# Patient Record
Sex: FEMALE | Race: WHITE | Employment: OTHER | URBAN - METROPOLITAN AREA
[De-identification: names, ages, dates, MRNs, and addresses within clinical notes are randomized per-mention and may not be internally consistent; named-entity substitution may affect disease eponyms.]

---

## 2019-07-30 ENCOUNTER — OFFICE VISIT (OUTPATIENT)
Dept: INTERNAL MEDICINE CLINIC | Age: 74
End: 2019-07-30
Payer: COMMERCIAL

## 2019-07-30 ENCOUNTER — TELEPHONE (OUTPATIENT)
Dept: INTERNAL MEDICINE CLINIC | Age: 74
End: 2019-07-30

## 2019-07-30 VITALS
HEIGHT: 64 IN | HEART RATE: 72 BPM | OXYGEN SATURATION: 99 % | SYSTOLIC BLOOD PRESSURE: 70 MMHG | DIASTOLIC BLOOD PRESSURE: 50 MMHG | WEIGHT: 119.8 LBS | BODY MASS INDEX: 20.45 KG/M2

## 2019-07-30 DIAGNOSIS — N64.4 BREAST TENDERNESS: ICD-10-CM

## 2019-07-30 DIAGNOSIS — I25.83 CORONARY ARTERY DISEASE DUE TO LIPID RICH PLAQUE: ICD-10-CM

## 2019-07-30 DIAGNOSIS — E03.9 ACQUIRED HYPOTHYROIDISM: ICD-10-CM

## 2019-07-30 DIAGNOSIS — R63.0 DECREASED APPETITE: ICD-10-CM

## 2019-07-30 DIAGNOSIS — M89.8X9 RADIOLUCENT LESION OF BONE: ICD-10-CM

## 2019-07-30 DIAGNOSIS — E55.9 VITAMIN D DEFICIENCY: ICD-10-CM

## 2019-07-30 DIAGNOSIS — I25.10 CORONARY ARTERY DISEASE DUE TO LIPID RICH PLAQUE: ICD-10-CM

## 2019-07-30 DIAGNOSIS — M54.5 CHRONIC LOW BACK PAIN, UNSPECIFIED BACK PAIN LATERALITY, WITH SCIATICA PRESENCE UNSPECIFIED: ICD-10-CM

## 2019-07-30 DIAGNOSIS — R79.89 ELEVATED FERRITIN: ICD-10-CM

## 2019-07-30 DIAGNOSIS — E78.5 HYPERLIPIDEMIA LDL GOAL <70: ICD-10-CM

## 2019-07-30 DIAGNOSIS — Z86.718 HISTORY OF DVT (DEEP VEIN THROMBOSIS): ICD-10-CM

## 2019-07-30 DIAGNOSIS — Z86.73 HISTORY OF STROKE: ICD-10-CM

## 2019-07-30 DIAGNOSIS — I48.0 PAROXYSMAL ATRIAL FIBRILLATION (HCC): ICD-10-CM

## 2019-07-30 DIAGNOSIS — F41.9 ANXIETY: ICD-10-CM

## 2019-07-30 DIAGNOSIS — R43.8 BAD TASTE IN MOUTH: ICD-10-CM

## 2019-07-30 DIAGNOSIS — K21.9 GASTROESOPHAGEAL REFLUX DISEASE WITHOUT ESOPHAGITIS: ICD-10-CM

## 2019-07-30 DIAGNOSIS — Z72.0 TOBACCO USE: ICD-10-CM

## 2019-07-30 DIAGNOSIS — F51.04 CHRONIC INSOMNIA: ICD-10-CM

## 2019-07-30 DIAGNOSIS — G89.29 CHRONIC LOW BACK PAIN, UNSPECIFIED BACK PAIN LATERALITY, WITH SCIATICA PRESENCE UNSPECIFIED: ICD-10-CM

## 2019-07-30 DIAGNOSIS — M32.9 LUPUS (HCC): ICD-10-CM

## 2019-07-30 DIAGNOSIS — R91.1 PULMONARY NODULE: ICD-10-CM

## 2019-07-30 DIAGNOSIS — E55.9 VITAMIN D DEFICIENCY: Primary | ICD-10-CM

## 2019-07-30 PROBLEM — M54.50 CHRONIC LOW BACK PAIN: Status: ACTIVE | Noted: 2019-07-30

## 2019-07-30 LAB
A/G RATIO: 1.1 (ref 1.1–2.2)
ALBUMIN SERPL-MCNC: 4.4 G/DL (ref 3.4–5)
ALP BLD-CCNC: 92 U/L (ref 40–129)
ALT SERPL-CCNC: 9 U/L (ref 10–40)
ANION GAP SERPL CALCULATED.3IONS-SCNC: 19 MMOL/L (ref 3–16)
AST SERPL-CCNC: 19 U/L (ref 15–37)
BASOPHILS ABSOLUTE: 0 K/UL (ref 0–0.2)
BASOPHILS RELATIVE PERCENT: 0.4 %
BILIRUB SERPL-MCNC: <0.2 MG/DL (ref 0–1)
BUN BLDV-MCNC: 30 MG/DL (ref 7–20)
C-REACTIVE PROTEIN: 1.9 MG/L (ref 0–5.1)
CALCIUM SERPL-MCNC: 9.9 MG/DL (ref 8.3–10.6)
CHLORIDE BLD-SCNC: 91 MMOL/L (ref 99–110)
CHOLESTEROL, TOTAL: 111 MG/DL (ref 0–199)
CO2: 18 MMOL/L (ref 21–32)
CREAT SERPL-MCNC: 1.2 MG/DL (ref 0.6–1.2)
EOSINOPHILS ABSOLUTE: 0 K/UL (ref 0–0.6)
EOSINOPHILS RELATIVE PERCENT: 0.3 %
FERRITIN: 399.2 NG/ML (ref 15–150)
FOLATE: >20 NG/ML (ref 4.78–24.2)
GFR AFRICAN AMERICAN: 53
GFR NON-AFRICAN AMERICAN: 44
GLOBULIN: 3.9 G/DL
GLUCOSE BLD-MCNC: 110 MG/DL (ref 70–99)
HCT VFR BLD CALC: 27.7 % (ref 36–48)
HDLC SERPL-MCNC: 51 MG/DL (ref 40–60)
HEMOGLOBIN: 9.7 G/DL (ref 12–16)
IRON SATURATION: 14 % (ref 15–50)
IRON: 41 UG/DL (ref 37–145)
LDL CHOLESTEROL CALCULATED: 49 MG/DL
LYMPHOCYTES ABSOLUTE: 0.7 K/UL (ref 1–5.1)
LYMPHOCYTES RELATIVE PERCENT: 16 %
MAGNESIUM: 2.2 MG/DL (ref 1.8–2.4)
MCH RBC QN AUTO: 34.8 PG (ref 26–34)
MCHC RBC AUTO-ENTMCNC: 34.9 G/DL (ref 31–36)
MCV RBC AUTO: 99.7 FL (ref 80–100)
MONOCYTES ABSOLUTE: 0.6 K/UL (ref 0–1.3)
MONOCYTES RELATIVE PERCENT: 14 %
NEUTROPHILS ABSOLUTE: 2.9 K/UL (ref 1.7–7.7)
NEUTROPHILS RELATIVE PERCENT: 69.3 %
PDW BLD-RTO: 13.1 % (ref 12.4–15.4)
PLATELET # BLD: 204 K/UL (ref 135–450)
PMV BLD AUTO: 7 FL (ref 5–10.5)
POTASSIUM SERPL-SCNC: 5.2 MMOL/L (ref 3.5–5.1)
RBC # BLD: 2.78 M/UL (ref 4–5.2)
SEDIMENTATION RATE, ERYTHROCYTE: 64 MM/HR (ref 0–30)
SODIUM BLD-SCNC: 128 MMOL/L (ref 136–145)
TOTAL IRON BINDING CAPACITY: 286 UG/DL (ref 260–445)
TOTAL PROTEIN: 8.3 G/DL (ref 6.4–8.2)
TRIGL SERPL-MCNC: 56 MG/DL (ref 0–150)
TSH REFLEX: 0.5 UIU/ML (ref 0.27–4.2)
VITAMIN B-12: 521 PG/ML (ref 211–911)
VITAMIN D 25-HYDROXY: 89.7 NG/ML
VLDLC SERPL CALC-MCNC: 11 MG/DL
WBC # BLD: 4.2 K/UL (ref 4–11)

## 2019-07-30 PROCEDURE — 99205 OFFICE O/P NEW HI 60 MIN: CPT | Performed by: INTERNAL MEDICINE

## 2019-07-30 RX ORDER — MIRTAZAPINE 7.5 MG/1
7.5 TABLET, FILM COATED ORAL NIGHTLY
Qty: 30 TABLET | Refills: 1 | Status: ON HOLD | OUTPATIENT
Start: 2019-07-30 | End: 2019-08-17 | Stop reason: HOSPADM

## 2019-07-30 RX ORDER — MELOXICAM 7.5 MG/1
7.5 TABLET ORAL DAILY
COMMUNITY
End: 2019-08-07 | Stop reason: ALTCHOICE

## 2019-07-30 RX ORDER — POTASSIUM CHLORIDE 750 MG/1
10 CAPSULE, EXTENDED RELEASE ORAL 2 TIMES DAILY
COMMUNITY
End: 2019-08-01 | Stop reason: ALTCHOICE

## 2019-07-30 RX ORDER — ATORVASTATIN CALCIUM 40 MG/1
40 TABLET, FILM COATED ORAL DAILY
COMMUNITY
End: 2019-12-06 | Stop reason: SDUPTHER

## 2019-07-30 RX ORDER — NITROGLYCERIN 0.4 MG/1
0.4 TABLET SUBLINGUAL EVERY 5 MIN PRN
Status: ON HOLD | COMMUNITY
End: 2022-01-26 | Stop reason: HOSPADM

## 2019-07-30 RX ORDER — ESOMEPRAZOLE MAGNESIUM 40 MG/1
40 FOR SUSPENSION ORAL DAILY
COMMUNITY
End: 2019-07-30 | Stop reason: CLARIF

## 2019-07-30 RX ORDER — CARVEDILOL 3.12 MG/1
3.12 TABLET ORAL 2 TIMES DAILY
Qty: 60 TABLET | Refills: 5 | Status: SHIPPED | OUTPATIENT
Start: 2019-07-30 | End: 2020-03-31 | Stop reason: SDUPTHER

## 2019-07-30 RX ORDER — AMOXICILLIN AND CLAVULANATE POTASSIUM 875; 125 MG/1; MG/1
1 TABLET, FILM COATED ORAL 2 TIMES DAILY
Qty: 28 TABLET | Refills: 0 | Status: ON HOLD | OUTPATIENT
Start: 2019-07-30 | End: 2019-08-17 | Stop reason: HOSPADM

## 2019-07-30 RX ORDER — AMITRIPTYLINE HYDROCHLORIDE 25 MG/1
25 TABLET, FILM COATED ORAL NIGHTLY
Qty: 30 TABLET | Refills: 0 | Status: SHIPPED | OUTPATIENT
Start: 2019-07-30

## 2019-07-30 RX ORDER — CARVEDILOL 3.12 MG/1
3.12 TABLET ORAL DAILY
Qty: 30 TABLET | Refills: 5 | Status: SHIPPED | OUTPATIENT
Start: 2019-07-30 | End: 2019-07-30 | Stop reason: SDUPTHER

## 2019-07-30 RX ORDER — AMITRIPTYLINE HYDROCHLORIDE 50 MG/1
50 TABLET, FILM COATED ORAL NIGHTLY
COMMUNITY
End: 2019-07-30 | Stop reason: SDUPTHER

## 2019-07-30 RX ORDER — ESOMEPRAZOLE MAGNESIUM 40 MG/1
40 CAPSULE, DELAYED RELEASE ORAL
Qty: 90 CAPSULE | Refills: 1 | Status: SHIPPED | OUTPATIENT
Start: 2019-07-30

## 2019-07-30 ASSESSMENT — PATIENT HEALTH QUESTIONNAIRE - PHQ9
2. FEELING DOWN, DEPRESSED OR HOPELESS: 0
SUM OF ALL RESPONSES TO PHQ QUESTIONS 1-9: 1
SUM OF ALL RESPONSES TO PHQ9 QUESTIONS 1 & 2: 1
SUM OF ALL RESPONSES TO PHQ QUESTIONS 1-9: 1
1. LITTLE INTEREST OR PLEASURE IN DOING THINGS: 1

## 2019-07-30 NOTE — PROGRESS NOTES
on file     Family History   Problem Relation Age of Onset    Cancer Father         Brain Cancer     Breast Cancer Sister     High Blood Pressure Sister         Outpatient Medications Prior to Visit   Medication Sig Dispense Refill    atorvastatin (LIPITOR) 40 MG tablet Take 40 mg by mouth daily      meloxicam (MOBIC) 7.5 MG tablet Take 7.5 mg by mouth daily      rivaroxaban (XARELTO) 10 MG TABS tablet Take 10 mg by mouth      Cholecalciferol (VITAMIN D3) 5000 units TABS Take by mouth      potassium chloride (MICRO-K) 10 MEQ extended release capsule Take 10 mEq by mouth 2 times daily      aspirin 81 MG EC tablet Take 81 mg by mouth daily.  levothyroxine (SYNTHROID) 150 MCG tablet Take 125 mcg by mouth daily       nitroGLYCERIN (NITROSTAT) 0.4 MG SL tablet Place 0.4 mg under the tongue every 5 minutes as needed for Chest pain up to max of 3 total doses. If no relief after 1 dose, call 911.  amitriptyline (ELAVIL) 50 MG tablet Take 50 mg by mouth nightly      esomeprazole Magnesium (NEXIUM) 40 MG PACK Take 40 mg by mouth daily      metoprolol tartrate (LOPRESSOR) 25 MG tablet Take 25 mg by mouth 2 times daily      fentanyl (DURAGESIC) 25 MCG/HR Place 1 patch onto the skin every 72 hours. (Patient not taking: Reported on 7/30/2019) 10 patch 0    oxycodone-acetaminophen (PERCOCET) 7.5-325 MG per tablet Take 1 tablet by mouth every 6 hours as needed for Pain. (Patient not taking: Reported on 7/30/2019) 105 tablet 0    quetiapine (SEROQUEL) 25 MG tablet Take 1-2 tablets by mouth nightly. (Patient not taking: Reported on 7/30/2019) 60 tablet 2    pregabalin (LYRICA) 100 MG capsule 1 in AM, 2 in PM (Patient not taking: Reported on 7/30/2019) 90 capsule 2    lubiprostone (AMITIZA) 24 MCG capsule Take 1 capsule by mouth 2 times daily (with meals). (Patient not taking: Reported on 7/30/2019) 60 capsule 3    hydroxychloroquine (PLAQUENIL) 200 MG tablet Take 200 mg by mouth daily.       triamcinolone smoking history- currently smokes 1 ppd. Due for repeat CT chest in March 2020. Radiolucent lesion of bone     Has been seeing heme/onc in [place]. \"Bone uptake on bone scan: No evidence of cancer per CT scan and monoclonal protein was not seen on serum protein electrophoresis. Patient did have a slight increase ratio of free light chain analysis. We will get a follow-up free light chain analysis in September 2019\" per Dr. Dara Mcgee (heme/onc in [place])            Relevant Orders    ALFREDO - Casey Jimenez MD, Oncology, [facility]    Tobacco use     Smokes 1 ppd. Started smoking in 1960. No interest in quitting. Other Visit Diagnoses     Vitamin D deficiency    -  Primary    Relevant Orders    Vitamin D 25 Hydroxy (Completed)    PEPPER Reflex to Antibody Cascade (Completed)    C-REACTIVE PROTEIN (Completed)    SEDIMENTATION RATE (Completed)          Current Outpatient Medications   Medication Sig Dispense Refill    atorvastatin (LIPITOR) 40 MG tablet Take 40 mg by mouth daily      meloxicam (MOBIC) 7.5 MG tablet Take 7.5 mg by mouth daily      rivaroxaban (XARELTO) 10 MG TABS tablet Take 10 mg by mouth      Cholecalciferol (VITAMIN D3) 5000 units TABS Take by mouth      mirtazapine (REMERON) 7.5 MG tablet Take 1 tablet by mouth nightly 30 tablet 1    amitriptyline (ELAVIL) 25 MG tablet Take 1 tablet by mouth nightly 30 tablet 0    esomeprazole (NEXIUM) 40 MG delayed release capsule Take 1 capsule by mouth every morning (before breakfast) 90 capsule 1    amoxicillin-clavulanate (AUGMENTIN) 875-125 MG per tablet Take 1 tablet by mouth 2 times daily for 14 days 28 tablet 0    carvedilol (COREG) 3.125 MG tablet Take 1 tablet by mouth 2 times daily 60 tablet 5    aspirin 81 MG EC tablet Take 81 mg by mouth daily.       levothyroxine (SYNTHROID) 125 MCG tablet Take 1 tablet by mouth daily 30 tablet 5    nitroGLYCERIN (NITROSTAT) 0.4 MG SL tablet Place 0.4 mg under the tongue every 5 minutes as needed for Chest pain up to max of 3 total doses. If no relief after 1 dose, call 911.  fish oil-omega-3 fatty acids (FISH OIL) 1000 MG capsule Take 1 capsule by mouth daily. No current facility-administered medications for this visit. Return in about 4 weeks (around 8/27/2019) for extended.

## 2019-07-31 DIAGNOSIS — M32.9 LUPUS (HCC): ICD-10-CM

## 2019-07-31 DIAGNOSIS — R79.89 ELEVATED SERUM CREATININE: ICD-10-CM

## 2019-07-31 DIAGNOSIS — E87.1 HYPONATREMIA: Primary | ICD-10-CM

## 2019-07-31 LAB
ANTI-CENTROMERE B IGG: <0.2 AI (ref 0–0.9)
ANTI-CHROMATIN IGG: <0.2 AI (ref 0–0.9)
ANTI-DSDNA IGG: <1 IU/ML (ref 0–9)
ANTI-JO1 IGG: <0.2 AI (ref 0–0.9)
ANTI-RIBOSOMAL P IGG: <0.2 AI (ref 0–0.9)
ANTI-RNP IGG: 1 AI (ref 0–0.9)
ANTI-SCL70 IGG: <0.2 AI (ref 0–0.9)
ANTI-SMITH IGG: 0.3 AI (ref 0–0.9)
ANTI-SMRNP IGG: <0.2 AI (ref 0–0.9)
ANTI-SS-A IGG: >8 AI (ref 0–0.9)
ANTI-SS-B IGG: >8 AI (ref 0–0.9)

## 2019-08-01 RX ORDER — LEVOTHYROXINE SODIUM 0.12 MG/1
125 TABLET ORAL DAILY
Qty: 30 TABLET | Refills: 5 | Status: SHIPPED | OUTPATIENT
Start: 2019-08-01 | End: 2019-12-06 | Stop reason: SDUPTHER

## 2019-08-02 LAB
ANA INTERPRETATION: ABNORMAL
ANA PATTERN: ABNORMAL
ANA TITER: ABNORMAL
ANTI-NUCLEAR ANTIBODY (ANA): POSITIVE
PATHOLOGIST: ABNORMAL

## 2019-08-06 ASSESSMENT — ENCOUNTER SYMPTOMS
SINUS PRESSURE: 0
DIARRHEA: 0
BACK PAIN: 1
SORE THROAT: 0
CHEST TIGHTNESS: 0
SINUS PAIN: 0
CONSTIPATION: 0
SHORTNESS OF BREATH: 0

## 2019-08-06 NOTE — ASSESSMENT & PLAN NOTE
s/p PTCA and stent. No MI. Change metoprolol to Coreg 3.125 mg BID d/t low blood pressures w/metoprolol, continue  ASA 81 mg qd, Lipitor 40 mg qd, and NTG SL prn. Refer to cardiology.

## 2019-08-06 NOTE — ASSESSMENT & PLAN NOTE
Per review of records. Previously saw pain management and was on oxycodone. Not currently on medication.

## 2019-08-06 NOTE — ASSESSMENT & PLAN NOTE
Has not seen rheumatology. Was told that it Amy Baldemar affects my skin\". Had elevated ferritin on recent labs in [place]. ? Acute phase reactant. Check labs. Consider referral to rheumatology.
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
## 2019-08-06 NOTE — ASSESSMENT & PLAN NOTE
Has been seeing heme/onc in [place]. \"Bone uptake on bone scan: No evidence of cancer per CT scan and monoclonal protein was not seen on serum protein electrophoresis. Patient did have a slight increase ratio of free light chain analysis.  We will get a follow-up free light chain analysis in September 2019\" per Dr. Perri Greer (heme/onc in [place])

## 2019-08-07 ENCOUNTER — TELEPHONE (OUTPATIENT)
Dept: INTERNAL MEDICINE CLINIC | Age: 74
End: 2019-08-07

## 2019-08-07 NOTE — TELEPHONE ENCOUNTER
[facility] calling Pt is taking meds that have interactions She reported to her prev PCP and was advised to continue meds Now that she has new PCP she wanted to let you be aware Major drug interactions with  ASA interferes xarelto as well as ASA with mobic and Xarelto    Caller also wants to let you know she will be following pt 1 x week for 9 weeks for med safety and ,education Pt had falls on Monday no injuries

## 2019-08-12 ENCOUNTER — HOSPITAL ENCOUNTER (INPATIENT)
Age: 74
LOS: 5 days | Discharge: HOME HEALTH | DRG: 562 | End: 2019-08-17
Attending: EMERGENCY MEDICINE | Admitting: INTERNAL MEDICINE
Payer: COMMERCIAL

## 2019-08-12 ENCOUNTER — APPOINTMENT (OUTPATIENT)
Dept: GENERAL RADIOLOGY | Age: 74
DRG: 562 | End: 2019-08-12
Payer: COMMERCIAL

## 2019-08-12 ENCOUNTER — APPOINTMENT (OUTPATIENT)
Dept: CT IMAGING | Age: 74
DRG: 562 | End: 2019-08-12
Payer: COMMERCIAL

## 2019-08-12 DIAGNOSIS — S42.292A HUMERAL HEAD FRACTURE, LEFT, CLOSED, INITIAL ENCOUNTER: Primary | ICD-10-CM

## 2019-08-12 DIAGNOSIS — R53.1 GENERAL WEAKNESS: ICD-10-CM

## 2019-08-12 DIAGNOSIS — E87.1 HYPONATREMIA: ICD-10-CM

## 2019-08-12 DIAGNOSIS — F40.9 PHOBIA, UNSPECIFIED TYPE: ICD-10-CM

## 2019-08-12 DIAGNOSIS — R52 INTRACTABLE PAIN: ICD-10-CM

## 2019-08-12 PROBLEM — S72.002A HIP FRACTURE REQUIRING OPERATIVE REPAIR, LEFT, CLOSED, INITIAL ENCOUNTER (HCC): Status: RESOLVED | Noted: 2019-08-12 | Resolved: 2019-08-12

## 2019-08-12 PROBLEM — S72.002A HIP FRACTURE REQUIRING OPERATIVE REPAIR, LEFT, CLOSED, INITIAL ENCOUNTER (HCC): Status: ACTIVE | Noted: 2019-08-12
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
## 2019-08-12 PROBLEM — S42.202A CLOSED FRACTURE OF UPPER END OF LEFT HUMERUS: Status: ACTIVE | Noted: 2019-08-12

## 2019-08-12 LAB
A/G RATIO: 0.7 (ref 1.1–2.2)
ALBUMIN SERPL-MCNC: 3.7 G/DL (ref 3.4–5)
ALP BLD-CCNC: 68 U/L (ref 40–129)
ALT SERPL-CCNC: 10 U/L (ref 10–40)
ANION GAP SERPL CALCULATED.3IONS-SCNC: 11 MMOL/L (ref 3–16)
ANION GAP SERPL CALCULATED.3IONS-SCNC: 11 MMOL/L (ref 3–16)
ANION GAP SERPL CALCULATED.3IONS-SCNC: 8 MMOL/L (ref 3–16)
AST SERPL-CCNC: 21 U/L (ref 15–37)
BASOPHILS ABSOLUTE: 0 K/UL (ref 0–0.2)
BASOPHILS RELATIVE PERCENT: 0.3 %
BILIRUB SERPL-MCNC: 0.4 MG/DL (ref 0–1)
BILIRUBIN URINE: NEGATIVE
BLOOD, URINE: NEGATIVE
BUN BLDV-MCNC: 19 MG/DL (ref 7–20)
BUN BLDV-MCNC: 20 MG/DL (ref 7–20)
BUN BLDV-MCNC: 21 MG/DL (ref 7–20)
CALCIUM SERPL-MCNC: 8.3 MG/DL (ref 8.3–10.6)
CALCIUM SERPL-MCNC: 8.4 MG/DL (ref 8.3–10.6)
CALCIUM SERPL-MCNC: 8.9 MG/DL (ref 8.3–10.6)
CHLORIDE BLD-SCNC: 87 MMOL/L (ref 99–110)
CHLORIDE BLD-SCNC: 91 MMOL/L (ref 99–110)
CHLORIDE BLD-SCNC: 96 MMOL/L (ref 99–110)
CLARITY: CLEAR
CO2: 22 MMOL/L (ref 21–32)
CO2: 22 MMOL/L (ref 21–32)
CO2: 23 MMOL/L (ref 21–32)
COLOR: YELLOW
CREAT SERPL-MCNC: 0.9 MG/DL (ref 0.6–1.2)
EKG ATRIAL RATE: 82 BPM
EKG DIAGNOSIS: NORMAL
EKG P AXIS: 55 DEGREES
EKG P-R INTERVAL: 176 MS
EKG Q-T INTERVAL: 376 MS
EKG QRS DURATION: 86 MS
EKG QTC CALCULATION (BAZETT): 439 MS
EKG R AXIS: -16 DEGREES
EKG T AXIS: 55 DEGREES
EKG VENTRICULAR RATE: 82 BPM
EOSINOPHILS ABSOLUTE: 0 K/UL (ref 0–0.6)
EOSINOPHILS RELATIVE PERCENT: 0.1 %
GFR AFRICAN AMERICAN: >60
GFR NON-AFRICAN AMERICAN: >60
GLOBULIN: 5 G/DL
GLUCOSE BLD-MCNC: 108 MG/DL (ref 70–99)
GLUCOSE BLD-MCNC: 111 MG/DL (ref 70–99)
GLUCOSE BLD-MCNC: 90 MG/DL (ref 70–99)
GLUCOSE URINE: NEGATIVE MG/DL
HCT VFR BLD CALC: 23.7 % (ref 36–48)
HEMOGLOBIN: 8.2 G/DL (ref 12–16)
INR BLD: 1.04 (ref 0.86–1.14)
KETONES, URINE: NEGATIVE MG/DL
LEUKOCYTE ESTERASE, URINE: NEGATIVE
LYMPHOCYTES ABSOLUTE: 1.1 K/UL (ref 1–5.1)
LYMPHOCYTES RELATIVE PERCENT: 19.2 %
MCH RBC QN AUTO: 33.2 PG (ref 26–34)
MCHC RBC AUTO-ENTMCNC: 34.7 G/DL (ref 31–36)
MCV RBC AUTO: 95.8 FL (ref 80–100)
MICROSCOPIC EXAMINATION: NORMAL
MONOCYTES ABSOLUTE: 1.1 K/UL (ref 0–1.3)
MONOCYTES RELATIVE PERCENT: 18.8 %
NEUTROPHILS ABSOLUTE: 3.5 K/UL (ref 1.7–7.7)
NEUTROPHILS RELATIVE PERCENT: 61.6 %
NITRITE, URINE: NEGATIVE
PDW BLD-RTO: 13.5 % (ref 12.4–15.4)
PH UA: 7 (ref 5–8)
PLATELET # BLD: 171 K/UL (ref 135–450)
PMV BLD AUTO: 6.1 FL (ref 5–10.5)
POTASSIUM REFLEX MAGNESIUM: 3.7 MMOL/L (ref 3.5–5.1)
POTASSIUM REFLEX MAGNESIUM: 4.5 MMOL/L (ref 3.5–5.1)
POTASSIUM REFLEX MAGNESIUM: 5.2 MMOL/L (ref 3.5–5.1)
PROTEIN UA: NEGATIVE MG/DL
PROTHROMBIN TIME: 11.8 SEC (ref 9.8–13)
RBC # BLD: 2.48 M/UL (ref 4–5.2)
SODIUM BLD-SCNC: 121 MMOL/L (ref 136–145)
SODIUM BLD-SCNC: 124 MMOL/L (ref 136–145)
SODIUM BLD-SCNC: 126 MMOL/L (ref 136–145)
SPECIFIC GRAVITY UA: 1.03 (ref 1–1.03)
TOTAL PROTEIN: 8.7 G/DL (ref 6.4–8.2)
URINE REFLEX TO CULTURE: NORMAL
URINE TYPE: NORMAL
UROBILINOGEN, URINE: 0.2 E.U./DL
WBC # BLD: 5.6 K/UL (ref 4–11)

## 2019-08-12 PROCEDURE — 99221 1ST HOSP IP/OBS SF/LOW 40: CPT | Performed by: NURSE PRACTITIONER

## 2019-08-12 PROCEDURE — 2500000003 HC RX 250 WO HCPCS: Performed by: EMERGENCY MEDICINE

## 2019-08-12 PROCEDURE — 6360000002 HC RX W HCPCS

## 2019-08-12 PROCEDURE — 96374 THER/PROPH/DIAG INJ IV PUSH: CPT

## 2019-08-12 PROCEDURE — 81003 URINALYSIS AUTO W/O SCOPE: CPT

## 2019-08-12 PROCEDURE — 96376 TX/PRO/DX INJ SAME DRUG ADON: CPT

## 2019-08-12 PROCEDURE — 70450 CT HEAD/BRAIN W/O DYE: CPT

## 2019-08-12 PROCEDURE — 85025 COMPLETE CBC W/AUTO DIFF WBC: CPT

## 2019-08-12 PROCEDURE — 6370000000 HC RX 637 (ALT 250 FOR IP): Performed by: INTERNAL MEDICINE

## 2019-08-12 PROCEDURE — 6360000002 HC RX W HCPCS: Performed by: INTERNAL MEDICINE

## 2019-08-12 PROCEDURE — 73060 X-RAY EXAM OF HUMERUS: CPT

## 2019-08-12 PROCEDURE — 2060000000 HC ICU INTERMEDIATE R&B

## 2019-08-12 PROCEDURE — 2580000003 HC RX 258: Performed by: EMERGENCY MEDICINE

## 2019-08-12 PROCEDURE — 6360000002 HC RX W HCPCS: Performed by: EMERGENCY MEDICINE

## 2019-08-12 PROCEDURE — 85610 PROTHROMBIN TIME: CPT

## 2019-08-12 PROCEDURE — 36415 COLL VENOUS BLD VENIPUNCTURE: CPT

## 2019-08-12 PROCEDURE — 96375 TX/PRO/DX INJ NEW DRUG ADDON: CPT

## 2019-08-12 PROCEDURE — 80053 COMPREHEN METABOLIC PANEL: CPT

## 2019-08-12 PROCEDURE — 2580000003 HC RX 258: Performed by: INTERNAL MEDICINE

## 2019-08-12 PROCEDURE — 93010 ELECTROCARDIOGRAM REPORT: CPT | Performed by: INTERNAL MEDICINE

## 2019-08-12 PROCEDURE — 71045 X-RAY EXAM CHEST 1 VIEW: CPT

## 2019-08-12 PROCEDURE — 96361 HYDRATE IV INFUSION ADD-ON: CPT

## 2019-08-12 PROCEDURE — 93005 ELECTROCARDIOGRAM TRACING: CPT | Performed by: EMERGENCY MEDICINE

## 2019-08-12 PROCEDURE — 99285 EMERGENCY DEPT VISIT HI MDM: CPT

## 2019-08-12 RX ORDER — AMITRIPTYLINE HYDROCHLORIDE 25 MG/1
25 TABLET, FILM COATED ORAL NIGHTLY
Status: DISCONTINUED | OUTPATIENT
Start: 2019-08-12 | End: 2019-08-17 | Stop reason: HOSPADM

## 2019-08-12 RX ORDER — PANTOPRAZOLE SODIUM 40 MG/1
40 TABLET, DELAYED RELEASE ORAL
Status: DISCONTINUED | OUTPATIENT
Start: 2019-08-13 | End: 2019-08-17 | Stop reason: HOSPADM

## 2019-08-12 RX ORDER — SODIUM CHLORIDE 0.9 % (FLUSH) 0.9 %
10 SYRINGE (ML) INJECTION EVERY 12 HOURS SCHEDULED
Status: DISCONTINUED | OUTPATIENT
Start: 2019-08-12 | End: 2019-08-17 | Stop reason: HOSPADM

## 2019-08-12 RX ORDER — 0.9 % SODIUM CHLORIDE 0.9 %
1000 INTRAVENOUS SOLUTION INTRAVENOUS ONCE
Status: COMPLETED | OUTPATIENT
Start: 2019-08-12 | End: 2019-08-12

## 2019-08-12 RX ORDER — LORAZEPAM 2 MG/ML
1 INJECTION INTRAMUSCULAR ONCE
Status: COMPLETED | OUTPATIENT
Start: 2019-08-12 | End: 2019-08-12

## 2019-08-12 RX ORDER — ASPIRIN 81 MG/1
81 TABLET ORAL DAILY
Status: DISCONTINUED | OUTPATIENT
Start: 2019-08-12 | End: 2019-08-17 | Stop reason: HOSPADM

## 2019-08-12 RX ORDER — HYDROMORPHONE HYDROCHLORIDE 1 MG/ML
0.5 INJECTION, SOLUTION INTRAMUSCULAR; INTRAVENOUS; SUBCUTANEOUS ONCE
Status: COMPLETED | OUTPATIENT
Start: 2019-08-12 | End: 2019-08-12

## 2019-08-12 RX ORDER — ONDANSETRON 2 MG/ML
4 INJECTION INTRAMUSCULAR; INTRAVENOUS EVERY 6 HOURS PRN
Status: DISCONTINUED | OUTPATIENT
Start: 2019-08-12 | End: 2019-08-17 | Stop reason: HOSPADM

## 2019-08-12 RX ORDER — MORPHINE SULFATE 2 MG/ML
2 INJECTION, SOLUTION INTRAMUSCULAR; INTRAVENOUS EVERY 4 HOURS PRN
Status: DISCONTINUED | OUTPATIENT
Start: 2019-08-12 | End: 2019-08-17 | Stop reason: HOSPADM

## 2019-08-12 RX ORDER — SODIUM CHLORIDE 0.9 % (FLUSH) 0.9 %
10 SYRINGE (ML) INJECTION PRN
Status: DISCONTINUED | OUTPATIENT
Start: 2019-08-12 | End: 2019-08-17 | Stop reason: HOSPADM

## 2019-08-12 RX ORDER — SODIUM CHLORIDE 9 MG/ML
INJECTION, SOLUTION INTRAVENOUS CONTINUOUS
Status: DISCONTINUED | OUTPATIENT
Start: 2019-08-12 | End: 2019-08-14

## 2019-08-12 RX ORDER — MIRTAZAPINE 15 MG/1
7.5 TABLET, FILM COATED ORAL NIGHTLY
Status: DISCONTINUED | OUTPATIENT
Start: 2019-08-12 | End: 2019-08-16

## 2019-08-12 RX ORDER — CARVEDILOL 3.12 MG/1
3.12 TABLET ORAL 2 TIMES DAILY
Status: DISCONTINUED | OUTPATIENT
Start: 2019-08-12 | End: 2019-08-17 | Stop reason: HOSPADM

## 2019-08-12 RX ORDER — AMOXICILLIN AND CLAVULANATE POTASSIUM 875; 125 MG/1; MG/1
1 TABLET, FILM COATED ORAL 2 TIMES DAILY
Status: COMPLETED | OUTPATIENT
Start: 2019-08-12 | End: 2019-08-13

## 2019-08-12 RX ORDER — ATORVASTATIN CALCIUM 40 MG/1
40 TABLET, FILM COATED ORAL DAILY
Status: DISCONTINUED | OUTPATIENT
Start: 2019-08-12 | End: 2019-08-17 | Stop reason: HOSPADM

## 2019-08-12 RX ORDER — HYDROMORPHONE HYDROCHLORIDE 1 MG/ML
0.5 INJECTION, SOLUTION INTRAMUSCULAR; INTRAVENOUS; SUBCUTANEOUS
Status: COMPLETED | OUTPATIENT
Start: 2019-08-12 | End: 2019-08-12

## 2019-08-12 RX ORDER — HYDROCODONE BITARTRATE AND ACETAMINOPHEN 5; 325 MG/1; MG/1
1 TABLET ORAL EVERY 6 HOURS PRN
Status: DISCONTINUED | OUTPATIENT
Start: 2019-08-12 | End: 2019-08-17 | Stop reason: HOSPADM

## 2019-08-12 RX ORDER — LORAZEPAM 2 MG/ML
INJECTION INTRAMUSCULAR
Status: COMPLETED
Start: 2019-08-12 | End: 2019-08-12

## 2019-08-12 RX ADMIN — HYDROMORPHONE HYDROCHLORIDE 0.5 MG: 1 INJECTION, SOLUTION INTRAMUSCULAR; INTRAVENOUS; SUBCUTANEOUS at 03:00

## 2019-08-12 RX ADMIN — AMOXICILLIN AND CLAVULANATE POTASSIUM 1 TABLET: 875; 125 TABLET, FILM COATED ORAL at 15:15

## 2019-08-12 RX ADMIN — ASPIRIN 81 MG: 81 TABLET, COATED ORAL at 15:16

## 2019-08-12 RX ADMIN — HYDROMORPHONE HYDROCHLORIDE 0.5 MG: 1 INJECTION, SOLUTION INTRAMUSCULAR; INTRAVENOUS; SUBCUTANEOUS at 08:25

## 2019-08-12 RX ADMIN — MORPHINE SULFATE 2 MG: 2 INJECTION, SOLUTION INTRAMUSCULAR; INTRAVENOUS at 15:17

## 2019-08-12 RX ADMIN — MORPHINE SULFATE 2 MG: 2 INJECTION, SOLUTION INTRAMUSCULAR; INTRAVENOUS at 20:32

## 2019-08-12 RX ADMIN — HYDROMORPHONE HYDROCHLORIDE 0.5 MG: 1 INJECTION, SOLUTION INTRAMUSCULAR; INTRAVENOUS; SUBCUTANEOUS at 01:53

## 2019-08-12 RX ADMIN — LORAZEPAM 1 MG: 2 INJECTION INTRAMUSCULAR; INTRAVENOUS at 05:19

## 2019-08-12 RX ADMIN — SODIUM CHLORIDE: 9 INJECTION, SOLUTION INTRAVENOUS at 15:15

## 2019-08-12 RX ADMIN — ENOXAPARIN SODIUM 40 MG: 40 INJECTION SUBCUTANEOUS at 15:16

## 2019-08-12 RX ADMIN — MIRTAZAPINE 7.5 MG: 15 TABLET, FILM COATED ORAL at 20:36

## 2019-08-12 RX ADMIN — DESMOPRESSIN ACETATE 40 MG: 0.2 TABLET ORAL at 20:40

## 2019-08-12 RX ADMIN — CARVEDILOL 3.12 MG: 3.12 TABLET, FILM COATED ORAL at 20:36

## 2019-08-12 RX ADMIN — SODIUM CHLORIDE 1000 ML: 9 INJECTION, SOLUTION INTRAVENOUS at 08:24

## 2019-08-12 RX ADMIN — AMOXICILLIN AND CLAVULANATE POTASSIUM 1 TABLET: 875; 125 TABLET, FILM COATED ORAL at 20:36

## 2019-08-12 RX ADMIN — LORAZEPAM 1 MG: 2 INJECTION INTRAMUSCULAR at 05:19

## 2019-08-12 RX ADMIN — HYDROMORPHONE HYDROCHLORIDE 0.5 MG: 1 INJECTION, SOLUTION INTRAMUSCULAR; INTRAVENOUS; SUBCUTANEOUS at 09:38

## 2019-08-12 RX ADMIN — HYDROCODONE BITARTRATE AND ACETAMINOPHEN 1 TABLET: 5; 325 TABLET ORAL at 23:16

## 2019-08-12 RX ADMIN — SODIUM CHLORIDE: 9 INJECTION, SOLUTION INTRAVENOUS at 23:14

## 2019-08-12 RX ADMIN — AMITRIPTYLINE HYDROCHLORIDE 25 MG: 25 TABLET, FILM COATED ORAL at 20:36

## 2019-08-12 ASSESSMENT — PAIN SCALES - GENERAL
PAINLEVEL_OUTOF10: 7
PAINLEVEL_OUTOF10: 10
PAINLEVEL_OUTOF10: 9
PAINLEVEL_OUTOF10: 10
PAINLEVEL_OUTOF10: 0
PAINLEVEL_OUTOF10: 6
PAINLEVEL_OUTOF10: 10

## 2019-08-12 ASSESSMENT — PAIN DESCRIPTION - LOCATION
LOCATION: SHOULDER

## 2019-08-12 ASSESSMENT — PAIN DESCRIPTION - PAIN TYPE
TYPE: ACUTE PAIN

## 2019-08-12 ASSESSMENT — PAIN DESCRIPTION - ORIENTATION
ORIENTATION: LEFT

## 2019-08-12 NOTE — CONSULTS
Cancer     Breast Cancer Sister     High Blood Pressure Sister      Medications:  ALL MEDICATIONS HAVE BEEN REVIEWED:  Scheduled:  Continuous:  PRN:HYDROmorphone    Allergies: No Known Allergies    Review of Systems:  Constitutional: Negative for fever, chills, fatigue. Skin:  Negative for pruritis, rash  Eyes: Negative for photophobia and visual disturbance. ENT:  Negative for rhinorrhea, epistaxis, sore throat  Respiratory:  Negative for cough and shortness of breath. Cardiovascular: Negative for chest pain. Gastrointestinal: Negative for nausea, vomiting, diarrhea. Genitourinary: Negative for dysuria and difficulty urinating. Neurological: Negative for confusion, dysarthria, tremors, seizures. Psychiatric:  Negative for depression or anxiety  Musculoskeletal:  Positive for left shoulder pain. Objective:  Vitals:    08/12/19 0824   BP:    Pulse: 87   Resp: 15   Temp:    SpO2: 100%      Physical Examination:  GENERAL: No apparent distress, well-nourished  SKIN:  Warm and dry  EYES: Nonicteric. ENT: Mucous membranes moist  HEAD: Normocephalic, atraumatic  RESPIRATORY: Resp easy and unlabored  CARDIOVASCULAR: Regular rate and rhythm  GI: Abdomen soft, nontender  NEURO: Awake and alert. No speech defect  PSYCHIATRIC: Appropriate affect; not agitated  MUSCULOSKELETAL:  LEFT UE  Inspection: Upon examination the patient's left arm is in a sling and swath. There are no open areas about the left shoulder. Skin examination is unremarkable for lesions, ulcerations, or rashes. She has diffuse tenderness to palpation of the left arm. ROM of the elbow/shoulder deferred. Motor: She can flex and extend the left wrist.  5/5  strength bilaterally.    Sensation: Grossly intact to light touch throughout the left upper extremity   Vascular:  2+ Radial pulse    Labs reviewed:  Recent Labs     08/12/19  0748   WBC 5.6   HGB 8.2*   HCT 23.7*        Recent Labs     08/12/19  0748   *   K 5.2*

## 2019-08-12 NOTE — CARE COORDINATION
Please consider ordering PT/OT for assistance with discharge planning.    Meagan López, Case Management
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
## 2019-08-12 NOTE — ED PROVIDER NOTES
eMERGENCY dEPARTMENT eNCOUnter      [facility]    Chief Complaint   Patient presents with    Fall     pt brought to ed by ems after a fall, patient fell walking c/o left shoulder pain radiating to back rates pain 10/10        HPI    Adri Tony is a 75 y.o. female who presents with left humerus pain and left shoulder pain after a fall. The patient states she was walking in the kitchen and then fell. She did not have any loss of consciousness before or after the fall. Did not strike her head has no head or neck pain. She is uninjured anywhere else except in the left upper extremity. She states the pain is very severe and it is 10 out of 10 and is intensity. 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     HBP (high blood pressure)     Heart disease     Spinal stenosis, lumbar region, without neurogenic claudication     Sprain of lumbosacral (joint) (ligament)     Sprain of neck     Thyroid disorder        SURGICAL HISTORY    Past Surgical History:   Procedure Laterality Date    HYSTERECTOMY, TOTAL ABDOMINAL  1972    LUMBAR FUSION      L4-5    PARTIAL HYSTERECTOMY  1964       CURRENT MEDICATIONS    Current Outpatient Rx   Medication Sig Dispense Refill    levothyroxine (SYNTHROID) 125 MCG tablet Take 1 tablet by mouth daily 30 tablet 5    atorvastatin (LIPITOR) 40 MG tablet Take 40 mg by mouth daily      nitroGLYCERIN (NITROSTAT) 0.4 MG SL tablet Place 0.4 mg under the tongue every 5 minutes as needed for Chest pain up to max of 3 total doses. If no relief after 1 dose, call 911.       rivaroxaban (XARELTO) 10 MG TABS tablet Take 10 mg Warm, Dry, No erythema, No rash. Lymphatic:  No lymphadenopathy noted. Neurologic:  Alert & oriented x 3, Normal motor function, Normal sensory function, No focal deficits noted. Psychiatric:  Affect normal, Judgment normal, Mood normal.     EKG        RADIOLOGY    XR HUMERUS LEFT (MIN 2 VIEWS)   Final Result   Nondisplaced (mildly impacted) proximal left humeral fracture. PROCEDURES        ED COURSE & MEDICAL DECISION MAKING    Pertinent Labs & Imaging studies reviewed. (See chart for details)  This patient has left upper extremity pain. She is given some IV Dilaudid for pain and then I got an x-ray. This patient has a humeral head fracture. She is stating that she is going to have a hard time taking care of her activities of daily life so I am putting in a social work consult she can be evaluated in the morning as to whether she is going to be able to be discharged tonight. FINAL IMPRESSION    1.  Humeral head fracture, left, closed, initial encounter             Beatriz Anderson MD  08/12/19 3116

## 2019-08-12 NOTE — ED NOTES
Paged Dr Jessica Drake regarding admission orders to innuire if pt can go to 4T bed instead of 3T. Also pt has humerus fracture, not hip fracture as admit order states. Awaiting order change.       Steven Ceja RN  08/12/19 4185

## 2019-08-13 PROBLEM — D64.9 ANEMIA: Status: ACTIVE | Noted: 2019-08-13

## 2019-08-13 PROBLEM — E43 SEVERE MALNUTRITION (HCC): Chronic | Status: ACTIVE | Noted: 2019-08-13

## 2019-08-13 LAB
ABO/RH: NORMAL
ANION GAP SERPL CALCULATED.3IONS-SCNC: 10 MMOL/L (ref 3–16)
ANION GAP SERPL CALCULATED.3IONS-SCNC: 11 MMOL/L (ref 3–16)
ANION GAP SERPL CALCULATED.3IONS-SCNC: 9 MMOL/L (ref 3–16)
ANTIBODY SCREEN: NORMAL
BASOPHILS ABSOLUTE: 0 K/UL (ref 0–0.2)
BASOPHILS RELATIVE PERCENT: 0.3 %
BLOOD BANK DISPENSE STATUS: NORMAL
BLOOD BANK PRODUCT CODE: NORMAL
BPU ID: NORMAL
BUN BLDV-MCNC: 14 MG/DL (ref 7–20)
BUN BLDV-MCNC: 16 MG/DL (ref 7–20)
BUN BLDV-MCNC: 19 MG/DL (ref 7–20)
CALCIUM SERPL-MCNC: 7.7 MG/DL (ref 8.3–10.6)
CALCIUM SERPL-MCNC: 7.9 MG/DL (ref 8.3–10.6)
CALCIUM SERPL-MCNC: 8.1 MG/DL (ref 8.3–10.6)
CHLORIDE BLD-SCNC: 101 MMOL/L (ref 99–110)
CHLORIDE BLD-SCNC: 103 MMOL/L (ref 99–110)
CHLORIDE BLD-SCNC: 98 MMOL/L (ref 99–110)
CO2: 18 MMOL/L (ref 21–32)
CO2: 20 MMOL/L (ref 21–32)
CO2: 20 MMOL/L (ref 21–32)
CREAT SERPL-MCNC: 0.8 MG/DL (ref 0.6–1.2)
CREAT SERPL-MCNC: 0.8 MG/DL (ref 0.6–1.2)
CREAT SERPL-MCNC: 0.9 MG/DL (ref 0.6–1.2)
DESCRIPTION BLOOD BANK: NORMAL
EOSINOPHILS ABSOLUTE: 0 K/UL (ref 0–0.6)
EOSINOPHILS RELATIVE PERCENT: 0.8 %
FOLATE: >20 NG/ML (ref 4.78–24.2)
GFR AFRICAN AMERICAN: >60
GFR NON-AFRICAN AMERICAN: >60
GLUCOSE BLD-MCNC: 121 MG/DL (ref 70–99)
GLUCOSE BLD-MCNC: 129 MG/DL (ref 70–99)
GLUCOSE BLD-MCNC: 97 MG/DL (ref 70–99)
HCT VFR BLD CALC: 19.1 % (ref 36–48)
HEMOGLOBIN: 6.4 G/DL (ref 12–16)
IRON SATURATION: 10 % (ref 15–50)
IRON: 17 UG/DL (ref 37–145)
LYMPHOCYTES ABSOLUTE: 0.8 K/UL (ref 1–5.1)
LYMPHOCYTES RELATIVE PERCENT: 20 %
MAGNESIUM: 1.8 MG/DL (ref 1.8–2.4)
MAGNESIUM: 2.1 MG/DL (ref 1.8–2.4)
MCH RBC QN AUTO: 32.9 PG (ref 26–34)
MCHC RBC AUTO-ENTMCNC: 33.8 G/DL (ref 31–36)
MCV RBC AUTO: 97.5 FL (ref 80–100)
MONOCYTES ABSOLUTE: 0.7 K/UL (ref 0–1.3)
MONOCYTES RELATIVE PERCENT: 16.5 %
NEUTROPHILS ABSOLUTE: 2.5 K/UL (ref 1.7–7.7)
NEUTROPHILS RELATIVE PERCENT: 62.4 %
PDW BLD-RTO: 13.3 % (ref 12.4–15.4)
PLATELET # BLD: 134 K/UL (ref 135–450)
PMV BLD AUTO: 6.5 FL (ref 5–10.5)
POTASSIUM REFLEX MAGNESIUM: 3.4 MMOL/L (ref 3.5–5.1)
POTASSIUM REFLEX MAGNESIUM: 3.5 MMOL/L (ref 3.5–5.1)
POTASSIUM REFLEX MAGNESIUM: 3.8 MMOL/L (ref 3.5–5.1)
RBC # BLD: 1.96 M/UL (ref 4–5.2)
SODIUM BLD-SCNC: 128 MMOL/L (ref 136–145)
SODIUM BLD-SCNC: 129 MMOL/L (ref 136–145)
SODIUM BLD-SCNC: 133 MMOL/L (ref 136–145)
TOTAL IRON BINDING CAPACITY: 171 UG/DL (ref 260–445)
TSH REFLEX: 1.97 UIU/ML (ref 0.27–4.2)
VITAMIN B-12: 356 PG/ML (ref 211–911)
WBC # BLD: 4.1 K/UL (ref 4–11)

## 2019-08-13 PROCEDURE — P9016 RBC LEUKOCYTES REDUCED: HCPCS

## 2019-08-13 PROCEDURE — 80048 BASIC METABOLIC PNL TOTAL CA: CPT

## 2019-08-13 PROCEDURE — 6360000002 HC RX W HCPCS: Performed by: INTERNAL MEDICINE

## 2019-08-13 PROCEDURE — 86900 BLOOD TYPING SEROLOGIC ABO: CPT

## 2019-08-13 PROCEDURE — 97162 PT EVAL MOD COMPLEX 30 MIN: CPT

## 2019-08-13 PROCEDURE — 94760 N-INVAS EAR/PLS OXIMETRY 1: CPT

## 2019-08-13 PROCEDURE — 36415 COLL VENOUS BLD VENIPUNCTURE: CPT

## 2019-08-13 PROCEDURE — 2580000003 HC RX 258: Performed by: INTERNAL MEDICINE

## 2019-08-13 PROCEDURE — 2060000000 HC ICU INTERMEDIATE R&B

## 2019-08-13 PROCEDURE — 85025 COMPLETE CBC W/AUTO DIFF WBC: CPT

## 2019-08-13 PROCEDURE — 84443 ASSAY THYROID STIM HORMONE: CPT

## 2019-08-13 PROCEDURE — 36430 TRANSFUSION BLD/BLD COMPNT: CPT

## 2019-08-13 PROCEDURE — 97530 THERAPEUTIC ACTIVITIES: CPT

## 2019-08-13 PROCEDURE — 6370000000 HC RX 637 (ALT 250 FOR IP): Performed by: INTERNAL MEDICINE

## 2019-08-13 PROCEDURE — 82746 ASSAY OF FOLIC ACID SERUM: CPT

## 2019-08-13 PROCEDURE — 86880 COOMBS TEST DIRECT: CPT

## 2019-08-13 PROCEDURE — 86923 COMPATIBILITY TEST ELECTRIC: CPT

## 2019-08-13 PROCEDURE — 83735 ASSAY OF MAGNESIUM: CPT

## 2019-08-13 PROCEDURE — 97116 GAIT TRAINING THERAPY: CPT

## 2019-08-13 PROCEDURE — 86850 RBC ANTIBODY SCREEN: CPT

## 2019-08-13 PROCEDURE — 83540 ASSAY OF IRON: CPT

## 2019-08-13 PROCEDURE — 83550 IRON BINDING TEST: CPT

## 2019-08-13 PROCEDURE — 86901 BLOOD TYPING SEROLOGIC RH(D): CPT

## 2019-08-13 PROCEDURE — 82607 VITAMIN B-12: CPT

## 2019-08-13 PROCEDURE — 97166 OT EVAL MOD COMPLEX 45 MIN: CPT

## 2019-08-13 PROCEDURE — 86922 COMPATIBILITY TEST ANTIGLOB: CPT

## 2019-08-13 RX ORDER — 0.9 % SODIUM CHLORIDE 0.9 %
250 INTRAVENOUS SOLUTION INTRAVENOUS ONCE
Status: DISCONTINUED | OUTPATIENT
Start: 2019-08-13 | End: 2019-08-17 | Stop reason: HOSPADM

## 2019-08-13 RX ADMIN — MORPHINE SULFATE 2 MG: 2 INJECTION, SOLUTION INTRAMUSCULAR; INTRAVENOUS at 10:29

## 2019-08-13 RX ADMIN — PANTOPRAZOLE SODIUM 40 MG: 40 TABLET, DELAYED RELEASE ORAL at 05:18

## 2019-08-13 RX ADMIN — DESMOPRESSIN ACETATE 40 MG: 0.2 TABLET ORAL at 20:10

## 2019-08-13 RX ADMIN — MIRTAZAPINE 7.5 MG: 15 TABLET, FILM COATED ORAL at 20:05

## 2019-08-13 RX ADMIN — ENOXAPARIN SODIUM 40 MG: 40 INJECTION SUBCUTANEOUS at 10:30

## 2019-08-13 RX ADMIN — ASPIRIN 81 MG: 81 TABLET, COATED ORAL at 10:30

## 2019-08-13 RX ADMIN — HYDROCODONE BITARTRATE AND ACETAMINOPHEN 1 TABLET: 5; 325 TABLET ORAL at 12:56

## 2019-08-13 RX ADMIN — LEVOTHYROXINE SODIUM 125 MCG: 100 TABLET ORAL at 10:29

## 2019-08-13 RX ADMIN — HYDROCODONE BITARTRATE AND ACETAMINOPHEN 1 TABLET: 5; 325 TABLET ORAL at 18:09

## 2019-08-13 RX ADMIN — CARVEDILOL 3.12 MG: 3.12 TABLET, FILM COATED ORAL at 10:29

## 2019-08-13 RX ADMIN — MORPHINE SULFATE 2 MG: 2 INJECTION, SOLUTION INTRAMUSCULAR; INTRAVENOUS at 03:14

## 2019-08-13 RX ADMIN — MORPHINE SULFATE 2 MG: 2 INJECTION, SOLUTION INTRAMUSCULAR; INTRAVENOUS at 20:06

## 2019-08-13 RX ADMIN — MORPHINE SULFATE 2 MG: 2 INJECTION, SOLUTION INTRAMUSCULAR; INTRAVENOUS at 15:22

## 2019-08-13 RX ADMIN — HYDROCODONE BITARTRATE AND ACETAMINOPHEN 1 TABLET: 5; 325 TABLET ORAL at 07:25

## 2019-08-13 RX ADMIN — CARVEDILOL 3.12 MG: 3.12 TABLET, FILM COATED ORAL at 20:05

## 2019-08-13 RX ADMIN — AMOXICILLIN AND CLAVULANATE POTASSIUM 1 TABLET: 875; 125 TABLET, FILM COATED ORAL at 20:05

## 2019-08-13 RX ADMIN — AMOXICILLIN AND CLAVULANATE POTASSIUM 1 TABLET: 875; 125 TABLET, FILM COATED ORAL at 10:30

## 2019-08-13 RX ADMIN — SODIUM CHLORIDE: 9 INJECTION, SOLUTION INTRAVENOUS at 07:23

## 2019-08-13 RX ADMIN — Medication 10 ML: at 10:30

## 2019-08-13 RX ADMIN — AMITRIPTYLINE HYDROCHLORIDE 25 MG: 25 TABLET, FILM COATED ORAL at 20:06

## 2019-08-13 ASSESSMENT — PAIN DESCRIPTION - LOCATION
LOCATION: ARM;SHOULDER
LOCATION: ARM
LOCATION: ARM;SHOULDER
LOCATION: ARM;SHOULDER
LOCATION: SHOULDER

## 2019-08-13 ASSESSMENT — PAIN DESCRIPTION - PAIN TYPE
TYPE: ACUTE PAIN

## 2019-08-13 ASSESSMENT — PAIN SCALES - GENERAL
PAINLEVEL_OUTOF10: 2
PAINLEVEL_OUTOF10: 5
PAINLEVEL_OUTOF10: 6
PAINLEVEL_OUTOF10: 5
PAINLEVEL_OUTOF10: 3
PAINLEVEL_OUTOF10: 5
PAINLEVEL_OUTOF10: 3
PAINLEVEL_OUTOF10: 5
PAINLEVEL_OUTOF10: 5
PAINLEVEL_OUTOF10: 6

## 2019-08-13 ASSESSMENT — PAIN DESCRIPTION - ORIENTATION
ORIENTATION: LEFT

## 2019-08-13 NOTE — PROGRESS NOTES
weeks  -Pain management      Hyponatremia: In the setting of poor oral intake. Improved today up to 120s. -Continue IV hydration with normal saline and will decrease rate to 75 cc an hour. We will plan to stop IV fluids in the morning  -Monitor renal function and electrolytes. If hyponatremia persists, will consider nephrology consult        Severe malnutrition ([facility] [address]        Medications:  Reviewed  Infusion Medications    sodium chloride 75 mL/hr at 08/13/19 1519     Scheduled Medications    0.9 % sodium chloride  250 mL Intravenous Once    amitriptyline  25 mg Oral Nightly    amoxicillin-clavulanate  1 tablet Oral BID    aspirin  81 mg Oral Daily    atorvastatin  40 mg Oral Daily    carvedilol  3.125 mg Oral BID    pantoprazole  40 mg Oral QAM AC    levothyroxine  125 mcg Oral Daily    mirtazapine  7.5 mg Oral Nightly    sodium chloride flush  10 mL Intravenous 2 times per day    enoxaparin  40 mg Subcutaneous Daily     PRN Meds: sodium chloride flush, magnesium hydroxide, ondansetron, morphine, HYDROcodone 5 mg - acetaminophen      DVT Prophylaxis: Subcut enoxaparin  Diet: DIET GENERAL;  Dietary Nutrition Supplements: Standard High Calorie Oral Supplement  Code Status: Full Code    Dispo: Anticipate discharge in the next 48hrs    ____________________________________________________________________________    Subjective:   Overnight Events:   Uneventful overnight. Pain at the fracture site but controlled with medications  Improved hydration  Noted for H&H drop. She reports family history of anemia. I suspect anemia at baseline worsened with hydration      Physical Exam Performed:  /66   Pulse 74   Temp 98 °F (36.7 °C) (Temporal)   Resp 16   Ht 5' 4\" (1.626 m)   Wt 128 lb 6.4 oz (58.2 kg)   SpO2 95%   BMI 22.04 kg/m²   General appearance: No apparent distress, appears stated age and cooperative.   HEENT: Normocephalic, atraumatic, MMM, No sclera icterus/conjuctival palor  Neck: Supple, Alo Paul MD

## 2019-08-13 NOTE — PLAN OF CARE
Problem: Falls - Risk of:  Goal: Will remain free from falls  Description  Will remain free from falls  8/13/2019 1012 by Berl Koyanagi, RN  Outcome: Ongoing  Note:   Pt high fall risk, bed alarm on. Instructed pt to call nurse prior to any ambulation. Problem: Pain:  Goal: Pain level will decrease  Description  Pain level will decrease  Outcome: Ongoing  Note:   Pt c/o level 5/10 left shoulder and arm pain.  Will medicate with morphine per mar

## 2019-08-14 ENCOUNTER — APPOINTMENT (OUTPATIENT)
Dept: GENERAL RADIOLOGY | Age: 74
DRG: 562 | End: 2019-08-14
Payer: COMMERCIAL

## 2019-08-14 LAB
ANION GAP SERPL CALCULATED.3IONS-SCNC: 10 MMOL/L (ref 3–16)
BILIRUB SERPL-MCNC: 0.3 MG/DL (ref 0–1)
BILIRUBIN DIRECT: <0.2 MG/DL (ref 0–0.3)
BILIRUBIN, INDIRECT: NORMAL MG/DL (ref 0–1)
BLOOD SMEAR REVIEW: NORMAL
BUN BLDV-MCNC: 11 MG/DL (ref 7–20)
CALCIUM SERPL-MCNC: 7.4 MG/DL (ref 8.3–10.6)
CHLORIDE BLD-SCNC: 108 MMOL/L (ref 99–110)
CO2: 17 MMOL/L (ref 21–32)
CREAT SERPL-MCNC: 0.8 MG/DL (ref 0.6–1.2)
DAT C3: NORMAL
DAT IGG: NORMAL
DAT POLYSPECIFIC: NORMAL
FERRITIN: 278 NG/ML (ref 15–150)
GFR AFRICAN AMERICAN: >60
GFR NON-AFRICAN AMERICAN: >60
GLUCOSE BLD-MCNC: 91 MG/DL (ref 70–99)
HAPTOGLOBIN: 171 MG/DL (ref 30–200)
HCT VFR BLD CALC: 21.8 % (ref 36–48)
HCT VFR BLD CALC: 22 % (ref 36–48)
HEMOGLOBIN: 7.3 G/DL (ref 12–16)
IGA: 209 MG/DL (ref 70–400)
IGG: 2069 MG/DL (ref 700–1600)
IGM: 128 MG/DL (ref 40–230)
IMMATURE RETIC FRACT: 0.56 (ref 0.21–0.37)
LACTATE DEHYDROGENASE: 124 U/L (ref 100–190)
LV EF: 65 %
LVEF MODALITY: NORMAL
MCH RBC QN AUTO: 31.8 PG (ref 26–34)
MCHC RBC AUTO-ENTMCNC: 33.2 G/DL (ref 31–36)
MCV RBC AUTO: 95.9 FL (ref 80–100)
OCCULT BLOOD DIAGNOSTIC: NORMAL
PDW BLD-RTO: 15.8 % (ref 12.4–15.4)
PLATELET # BLD: 131 K/UL (ref 135–450)
PMV BLD AUTO: 6.4 FL (ref 5–10.5)
POTASSIUM SERPL-SCNC: 3.6 MMOL/L (ref 3.5–5.1)
RBC # BLD: 2.3 M/UL (ref 4–5.2)
RETICULOCYTE ABSOLUTE COUNT: 0.06 M/UL (ref 0.02–0.1)
RETICULOCYTE COUNT PCT: 2.7 % (ref 0.5–2.18)
RHEUMATOID FACTOR: 31 IU/ML
SEDIMENTATION RATE, ERYTHROCYTE: 31 MM/HR (ref 0–30)
SODIUM BLD-SCNC: 135 MMOL/L (ref 136–145)
TRANSFERRIN: 148 MG/DL (ref 200–360)
WBC # BLD: 3.2 K/UL (ref 4–11)

## 2019-08-14 PROCEDURE — 36415 COLL VENOUS BLD VENIPUNCTURE: CPT

## 2019-08-14 PROCEDURE — 85652 RBC SED RATE AUTOMATED: CPT

## 2019-08-14 PROCEDURE — 83883 ASSAY NEPHELOMETRY NOT SPEC: CPT

## 2019-08-14 PROCEDURE — 2580000003 HC RX 258: Performed by: INTERNAL MEDICINE

## 2019-08-14 PROCEDURE — 84466 ASSAY OF TRANSFERRIN: CPT

## 2019-08-14 PROCEDURE — 82784 ASSAY IGA/IGD/IGG/IGM EACH: CPT

## 2019-08-14 PROCEDURE — 94760 N-INVAS EAR/PLS OXIMETRY 1: CPT

## 2019-08-14 PROCEDURE — 97535 SELF CARE MNGMENT TRAINING: CPT

## 2019-08-14 PROCEDURE — 2060000000 HC ICU INTERMEDIATE R&B

## 2019-08-14 PROCEDURE — 83010 ASSAY OF HAPTOGLOBIN QUANT: CPT

## 2019-08-14 PROCEDURE — 77075 RADEX OSSEOUS SURVEY COMPL: CPT

## 2019-08-14 PROCEDURE — 80048 BASIC METABOLIC PNL TOTAL CA: CPT

## 2019-08-14 PROCEDURE — 82728 ASSAY OF FERRITIN: CPT

## 2019-08-14 PROCEDURE — 6360000002 HC RX W HCPCS: Performed by: INTERNAL MEDICINE

## 2019-08-14 PROCEDURE — 82247 BILIRUBIN TOTAL: CPT

## 2019-08-14 PROCEDURE — 93306 TTE W/DOPPLER COMPLETE: CPT

## 2019-08-14 PROCEDURE — 85027 COMPLETE CBC AUTOMATED: CPT

## 2019-08-14 PROCEDURE — 82248 BILIRUBIN DIRECT: CPT

## 2019-08-14 PROCEDURE — 86431 RHEUMATOID FACTOR QUANT: CPT

## 2019-08-14 PROCEDURE — 83615 LACTATE (LD) (LDH) ENZYME: CPT

## 2019-08-14 PROCEDURE — 85045 AUTOMATED RETICULOCYTE COUNT: CPT

## 2019-08-14 PROCEDURE — 84155 ASSAY OF PROTEIN SERUM: CPT

## 2019-08-14 PROCEDURE — 6370000000 HC RX 637 (ALT 250 FOR IP): Performed by: INTERNAL MEDICINE

## 2019-08-14 PROCEDURE — 84165 PROTEIN E-PHORESIS SERUM: CPT

## 2019-08-14 PROCEDURE — G0328 FECAL BLOOD SCRN IMMUNOASSAY: HCPCS

## 2019-08-14 RX ADMIN — MORPHINE SULFATE 2 MG: 2 INJECTION, SOLUTION INTRAMUSCULAR; INTRAVENOUS at 11:39

## 2019-08-14 RX ADMIN — CARVEDILOL 3.12 MG: 3.12 TABLET, FILM COATED ORAL at 09:07

## 2019-08-14 RX ADMIN — MORPHINE SULFATE 2 MG: 2 INJECTION, SOLUTION INTRAMUSCULAR; INTRAVENOUS at 16:29

## 2019-08-14 RX ADMIN — HYDROCODONE BITARTRATE AND ACETAMINOPHEN 1 TABLET: 5; 325 TABLET ORAL at 09:07

## 2019-08-14 RX ADMIN — AMITRIPTYLINE HYDROCHLORIDE 25 MG: 25 TABLET, FILM COATED ORAL at 21:08

## 2019-08-14 RX ADMIN — HYDROCODONE BITARTRATE AND ACETAMINOPHEN 1 TABLET: 5; 325 TABLET ORAL at 18:32

## 2019-08-14 RX ADMIN — LEVOTHYROXINE SODIUM 125 MCG: 100 TABLET ORAL at 09:33

## 2019-08-14 RX ADMIN — SODIUM CHLORIDE: 9 INJECTION, SOLUTION INTRAVENOUS at 05:14

## 2019-08-14 RX ADMIN — CARVEDILOL 3.12 MG: 3.12 TABLET, FILM COATED ORAL at 21:08

## 2019-08-14 RX ADMIN — HYDROCODONE BITARTRATE AND ACETAMINOPHEN 1 TABLET: 5; 325 TABLET ORAL at 00:10

## 2019-08-14 RX ADMIN — DESMOPRESSIN ACETATE 40 MG: 0.2 TABLET ORAL at 16:47

## 2019-08-14 RX ADMIN — MAGNESIUM HYDROXIDE 30 ML: 400 SUSPENSION ORAL at 16:48

## 2019-08-14 RX ADMIN — MORPHINE SULFATE 2 MG: 2 INJECTION, SOLUTION INTRAMUSCULAR; INTRAVENOUS at 06:34

## 2019-08-14 RX ADMIN — PANTOPRAZOLE SODIUM 40 MG: 40 TABLET, DELAYED RELEASE ORAL at 05:13

## 2019-08-14 RX ADMIN — MIRTAZAPINE 7.5 MG: 15 TABLET, FILM COATED ORAL at 21:08

## 2019-08-14 RX ADMIN — Medication 10 ML: at 21:08

## 2019-08-14 RX ADMIN — MORPHINE SULFATE 2 MG: 2 INJECTION, SOLUTION INTRAMUSCULAR; INTRAVENOUS at 02:34

## 2019-08-14 RX ADMIN — ENOXAPARIN SODIUM 40 MG: 40 INJECTION SUBCUTANEOUS at 09:07

## 2019-08-14 RX ADMIN — ASPIRIN 81 MG: 81 TABLET, COATED ORAL at 09:07

## 2019-08-14 RX ADMIN — MORPHINE SULFATE 2 MG: 2 INJECTION, SOLUTION INTRAMUSCULAR; INTRAVENOUS at 21:07

## 2019-08-14 ASSESSMENT — PAIN DESCRIPTION - PROGRESSION
CLINICAL_PROGRESSION: GRADUALLY IMPROVING

## 2019-08-14 ASSESSMENT — PAIN DESCRIPTION - ORIENTATION
ORIENTATION: LEFT
ORIENTATION: LEFT;OUTER
ORIENTATION: LEFT

## 2019-08-14 ASSESSMENT — PAIN DESCRIPTION - PAIN TYPE
TYPE: ACUTE PAIN

## 2019-08-14 ASSESSMENT — PAIN SCALES - GENERAL
PAINLEVEL_OUTOF10: 2
PAINLEVEL_OUTOF10: 3
PAINLEVEL_OUTOF10: 8
PAINLEVEL_OUTOF10: 8
PAINLEVEL_OUTOF10: 7
PAINLEVEL_OUTOF10: 6
PAINLEVEL_OUTOF10: 7
PAINLEVEL_OUTOF10: 8
PAINLEVEL_OUTOF10: 7
PAINLEVEL_OUTOF10: 7
PAINLEVEL_OUTOF10: 8
PAINLEVEL_OUTOF10: 5
PAINLEVEL_OUTOF10: 6
PAINLEVEL_OUTOF10: 8
PAINLEVEL_OUTOF10: 3

## 2019-08-14 ASSESSMENT — PAIN DESCRIPTION - LOCATION
LOCATION: SHOULDER

## 2019-08-14 ASSESSMENT — PAIN - FUNCTIONAL ASSESSMENT: PAIN_FUNCTIONAL_ASSESSMENT: PREVENTS OR INTERFERES SOME ACTIVE ACTIVITIES AND ADLS

## 2019-08-14 ASSESSMENT — PAIN DESCRIPTION - ONSET: ONSET: ON-GOING

## 2019-08-14 ASSESSMENT — PAIN DESCRIPTION - DESCRIPTORS: DESCRIPTORS: THROBBING

## 2019-08-14 ASSESSMENT — PAIN DESCRIPTION - FREQUENCY: FREQUENCY: CONTINUOUS

## 2019-08-14 NOTE — PLAN OF CARE
LUE pain rated 8/10- 2 mg iv morphine now given for pain -alert and oriented x 4 - vitals stable- sling and swath in place - call light in reach -bed alarm on

## 2019-08-14 NOTE — PROGRESS NOTES
spinal stenosis, lumbar fusion  Family / Caregiver Present: No  Diagnosis: L proximal humerus fx, non operative  Subjective  Subjective: Pt lying supine in bed on OT arrival. Pt agreeable to OT eval. Pt requesting to use the toilet. Pt pleasant and cooperative with session, until UB bathing and incrasing pain in L UE. Pt began yelling at OT, telling this writer to [employer] up! \" and \"Get out! \" \"I'm perfectly capable of doing this for myself. You are always crowding me, in my way. \"   General Comment  Comments: Pt goes by \"Levi\"  Pain Assessment  Pain Assessment: 0-10  Pain Level: 7(at end of session; 3/10 at beginning)  Pain Type: Acute pain  Pain Location: Shoulder  Pain Orientation: Left  Pre Treatment Pain Screening  Intervention List: Patient able to continue with treatment;Nurse called to administer meds  Vital Signs  Patient Currently in Pain: Yes   Orientation  Orientation  Overall Orientation Status: Within Functional Limits  Objective    ADL  Grooming: Setup(brush teeth in stance at sink)  UE Bathing: Maximum assistance(Pt partially bathed abdomen, OT assisted with UEs)  LE Bathing: Contact guard assistance(colette bathing in stance)  UE Dressing: Maximum assistance(slip gown over shoulders for UB bathing; pt able to zip/unzip, dependent don/doff swath, adjust sling)  LE Dressing: Supervision(don/doff socks over heels w/R hand)  Toileting: Supervision  Additional Comments: Pt wearing long zip-up gown and agreed to bathing under the gown. Pt requested using washclothes with soap and water, instead of warm wipes. Balance  Sitting Balance: Supervision  Standing Balance: Contact guard assistance(CGA/SBA)  Standing Balance  Time: ~30 sec, ~9 min; ~30 sec  Activity: functional mobility ~10 ft to toilet, grooming in stance at sink; functional mobility ~15 ft to chair  Comment: no AD, no LOB.  Pt held IV pole from bathroom to chair  Functional Mobility  Functional - Mobility Device: No device  Activity: To/from bathroom  Assist Level: Contact guard assistance(CGA/SBA)  Toilet Transfers  Toilet - Technique: Ambulating  Equipment Used: Standard toilet  Toilet Transfer: Contact guard assistance  Toilet Transfers Comments: SBA sit to stand  Bed mobility  Supine to Sit: Stand by assistance  Sit to Supine: Stand by assistance  Scooting: Stand by assistance  Transfers  Sit to stand: Contact guard assistance  Stand to sit: Stand by assistance                    Vision  Patient Visual Report: No visual complaint reported. Cognition  Overall Cognitive Status: WFL  Safety Judgement: Decreased awareness of need for assistance;Decreased awareness of need for safety  Insights: Decreased awareness of deficits  Cognition Comment: Pt adamantly refusing d/c to SNF. Pt not aware of current limitations. Pt very agitated with attempts to assist her, use of gait belt, holding on to gait belt.      Perception  Overall Perceptual Status: WFL              Exercises  Other: Not addressed d/t pt refusal/UE pain                    Plan   Plan  Times per week: 7x  Times per day: Daily  Current Treatment Recommendations: ROM, Functional Mobility Training, Equipment Evaluation, Education, & procurement, Self-Care / ADL, Safety Education & Training, Home Management Training           AM-PAC Score        -[facility] Inpatient Daily Activity Raw Score: 15 (08/14/19 0936)  AM-PAC Inpatient ADL T-Scale Score : 34.69 (08/14/19 0936)  ADL Inpatient CMS 0-100% Score: 56.46 (08/14/19 0936)  ADL Inpatient CMS G-Code Modifier : CK (08/14/19 0936)    Goals  Short term goals  Time Frame for Short term goals: Discharge  Short term goal 1: Min A for all UB ADLs--Dependent UB bathing, Max A UB dressing  Short term goal 2: Mod I for all LB ADLs--supervision partially doff/don socks  Short term goal 3: Mod I for functional mobility--CGA/SBA  Short term goal 4: Mod I for functional transfers--CGA/SBA  Short term goal 5: Mod I light IADL task--Not addressed  Long term

## 2019-08-14 NOTE — CONSULTS
HYSTERECTOMY  1965        Current Medications:     0.9 % sodium chloride  250 mL Intravenous Once    amitriptyline  25 mg Oral Nightly    aspirin  81 mg Oral Daily    atorvastatin  40 mg Oral Daily    carvedilol  3.125 mg Oral BID    pantoprazole  40 mg Oral QAM AC    levothyroxine  125 mcg Oral Daily    mirtazapine  7.5 mg Oral Nightly    sodium chloride flush  10 mL Intravenous 2 times per day    enoxaparin  40 mg Subcutaneous Daily       Allergies: Allergies   Allergen Reactions    Other Other (See Comments)     Anti depression cause black outs     Trazodone      Sedation?  Venlafaxine      Sedation? Social History:    reports that she has been smoking. She started smoking about 59 years ago. She has a 45.00 pack-year smoking history. She has never used smokeless tobacco. She reports that she drank alcohol. She reports that she does not use drugs. Family History:     family history includes Breast Cancer in her sister; Cancer in her father; High Blood Pressure in her sister. ROS:      Constitutional:  No weight loss, No fever, No chills, No night sweats.  Energy level good. Eyes:  No diplopia, No transient or permanent loss of vision, No scotomata. ENT / Mouth:  No epistaxis, No dysphagia, No hoarseness, No oral ulcers, No gingival bleeding.  No sore throat, No postnasal drip, No nasal drip, No mouth pain, No sinus pain, No tinnitus, Normal hearing. Cardiovascular:  No chest pain, No palpitations, No syncope, No upper extremity edema, No lower extremity edema, No calf discomfort. Respiratory:  No cough.  No hemoptysis, No pleurisy, No wheezing, No dyspnea. Gastrointestinal:  No abdominal pain, No abdominal cramping, No nausea, No vomiting, No constipation, No diarrhea, No hemotochezia, No melena, No jaundice, No dyspepsia, No dysphagia. Urinary:  No dysuria, No hematuria, No urinary incontinence.   Musculoskeletal:  No muscle pain, No swollen joints, No joint redness, No bone (L) 2.48 (L)  1.96 (L)  2.30 (L)   Hemoglobin Quant Latest Ref Range: 12.0 - 16.0 g/dL 9.7 (L) 8.2 (L)  6.4 (LL)  7.3 (L)   Hematocrit Latest Ref Range: 36.0 - 48.0 % 27.7 (L) 23.7 (L)  19.1 (LL) 21.8 (L) 22.0 (L)   MCV Latest Ref Range: 80.0 - 100.0 fL 99.7 95.8  97.5  95.9   MCH Latest Ref Range: 26.0 - 34.0 pg 34.8 (H) 33.2  32.9  31.8   MCHC Latest Ref Range: 31.0 - 36.0 g/dL 34.9 34.7  33.8  33.2   MPV Latest Ref Range: 5.0 - 10.5 fL 7.0 6.1  6.5  6.4   RDW Latest Ref Range: 12.4 - 15.4 % 13.1 13.5  13.3  15.8 (H)   Platelet Count Latest Ref Range: 135 - 450 K/uL 204 171  134 (L)  131 (L)          Ref. Range 7/30/2019 15:04   PEPPER Latest Ref Range: Negative  POSITIVE (A)   PEPPER Interpretation Unknown see below   PEPPER Pattern Unknown Speckled   PEPPER TITER Unknown 1:2560        Ref. Range 8/13/2019 04:41   Iron Latest Ref Range: 37 - 145 ug/dL 17 (L)   Iron Saturation Latest Ref Range: 15 - 50 % 10 (L)   TIBC Latest Ref Range: 260 - 445 ug/dL 171 (L)   Folate Latest Ref Range: 4.78 - 24.20 ng/mL >20.00   Vitamin B-12 Latest Ref Range: 211 - 911 pg/mL 356       A/P:    Anemia: More ABDON with Anemia of inflammation/Chr. Dz  -Anemia -acute on chronic  -High ESR and PEPPER strong +ve  -previously HERNANDEZ at [address] 3/2019  -SPEP : polyclonal,no M spike,more Chr.  Inflamation    -recent PEPPER Strong +ve 7/30/19 from PCP  -BL UE joint deformity  -Would need Rheumatological HERNANDEZ as OP-PCP to arrange    Acute drop in HGB,can't r/o GI source w low iron saturation  -SFOBT Pending  -high retic count  -no hemolysis  - check SHELBY    Previously high ferritin  -also possible more- acute phase reactant to inflamation  -HFE negative in 3/2019 at [facility]  -check transferrin saturation    Her low Mild WBC and PLT possible dilutional,watch  -normal WBC and PLT on 8/12/19      No C-scope last 15 yrs,would need GI HERNANDEZ at some point  Will repete SPEP/SFLC/DUGLAS/ESR  With bone and joint pain,gen-get bone XR survey to r/o any lytic lesions    No

## 2019-08-14 NOTE — PROGRESS NOTES
Hospitalist Progress Note      PCP: [address], DO    Date of Admission: 8/12/2019    LOS: 2    Chief Complaint:   Chief Complaint   Patient presents with    Fall     pt brought to ed by ems after a fall, patient fell walking c/o left shoulder pain radiating to back rates pain 10/10        Case Summary:   79-year-old lady with history of hypothyroidism, hypertension, spinal stenosis of the lumbar region, depression, chronic pain syndrome who was admitted following a fall without loss of consciousness and found to have left humeral fracture complicated by hyponatremia in the setting of poor oral intake and poor hydration and appetite. Hyponatremia improved with hydration. She was consulted by orthopedics and had a use letter placed with plans for follow-up in the outpatient. Active Hospital Problems    Diagnosis Date Noted    Severe malnutrition ([address] [E43] 08/13/2019    Anemia [D64.9] 08/13/2019    Closed fracture of upper end of left humerus [S42.202A] 08/12/2019    Hyponatremia [E87.1] 08/12/2019    General weakness [R53.1] 08/12/2019    Fall [W19. XXXA] 08/12/2019         Principal Problem:    Fall with general weakness: Follow-up at home with loss of strength in the lower extremities sustaining left humeral fracture. Patient found to have hyponatremia with sodium of 121 on presentation. This is a improved with hydration. -PT OT evaluations  -Falls precaution    Active Problems:    Anemia: Unclear etiology. Likely poor dietary supplemental intake. Denies any melena, hematochezia, hematemesis. No abdominal pains. Status post 1 unit packed red blood cell transfusion. Hemodynamics remained stable. Hemoglobin eight 7.3 this morning after 1 unit. I discussed with hematology this morning and its noted that the patient seems to have followed up with outpatient hematology for anemia and there is question for inflammatory related anemia.   She has positive PEPPER from outside studies with

## 2019-08-15 ENCOUNTER — APPOINTMENT (OUTPATIENT)
Dept: GENERAL RADIOLOGY | Age: 74
DRG: 562 | End: 2019-08-15
Payer: COMMERCIAL

## 2019-08-15 PROBLEM — E87.1 HYPONATREMIA: Status: RESOLVED | Noted: 2019-08-12 | Resolved: 2019-08-15

## 2019-08-15 PROBLEM — R09.02 HYPOXIA: Status: ACTIVE | Noted: 2019-08-15

## 2019-08-15 LAB
ANION GAP SERPL CALCULATED.3IONS-SCNC: 9 MMOL/L (ref 3–16)
BASOPHILS ABSOLUTE: 0 K/UL (ref 0–0.2)
BASOPHILS RELATIVE PERCENT: 0.4 %
BUN BLDV-MCNC: 14 MG/DL (ref 7–20)
CALCIUM SERPL-MCNC: 8.6 MG/DL (ref 8.3–10.6)
CHLORIDE BLD-SCNC: 103 MMOL/L (ref 99–110)
CO2: 22 MMOL/L (ref 21–32)
CREAT SERPL-MCNC: 0.9 MG/DL (ref 0.6–1.2)
EOSINOPHILS ABSOLUTE: 0.1 K/UL (ref 0–0.6)
EOSINOPHILS RELATIVE PERCENT: 2.2 %
GFR AFRICAN AMERICAN: >60
GFR NON-AFRICAN AMERICAN: >60
GLUCOSE BLD-MCNC: 102 MG/DL (ref 70–99)
HCT VFR BLD CALC: 23.6 % (ref 36–48)
HEMOGLOBIN: 8 G/DL (ref 12–16)
KAPPA, FREE LIGHT CHAINS, SERUM: 85.05 MG/L (ref 3.3–19.4)
KAPPA/LAMBDA RATIO: 1.81 (ref 0.26–1.65)
KAPPA/LAMBDA TEST COMMENT: ABNORMAL
LAMBDA, FREE LIGHT CHAINS, SERUM: 46.86 MG/L (ref 5.71–26.3)
LYMPHOCYTES ABSOLUTE: 0.8 K/UL (ref 1–5.1)
LYMPHOCYTES RELATIVE PERCENT: 23.2 %
MCH RBC QN AUTO: 32.2 PG (ref 26–34)
MCHC RBC AUTO-ENTMCNC: 34 G/DL (ref 31–36)
MCV RBC AUTO: 94.9 FL (ref 80–100)
MONOCYTES ABSOLUTE: 0.6 K/UL (ref 0–1.3)
MONOCYTES RELATIVE PERCENT: 18.2 %
NEUTROPHILS ABSOLUTE: 1.9 K/UL (ref 1.7–7.7)
NEUTROPHILS RELATIVE PERCENT: 56 %
PDW BLD-RTO: 15.1 % (ref 12.4–15.4)
PLATELET # BLD: 161 K/UL (ref 135–450)
PMV BLD AUTO: 6.5 FL (ref 5–10.5)
POTASSIUM SERPL-SCNC: 3.9 MMOL/L (ref 3.5–5.1)
RBC # BLD: 2.49 M/UL (ref 4–5.2)
SODIUM BLD-SCNC: 134 MMOL/L (ref 136–145)
WBC # BLD: 3.4 K/UL (ref 4–11)

## 2019-08-15 PROCEDURE — 36415 COLL VENOUS BLD VENIPUNCTURE: CPT

## 2019-08-15 PROCEDURE — 94760 N-INVAS EAR/PLS OXIMETRY 1: CPT

## 2019-08-15 PROCEDURE — 97535 SELF CARE MNGMENT TRAINING: CPT

## 2019-08-15 PROCEDURE — 86902 BLOOD TYPE ANTIGEN DONOR EA: CPT

## 2019-08-15 PROCEDURE — 71045 X-RAY EXAM CHEST 1 VIEW: CPT

## 2019-08-15 PROCEDURE — 6370000000 HC RX 637 (ALT 250 FOR IP): Performed by: INTERNAL MEDICINE

## 2019-08-15 PROCEDURE — 94762 N-INVAS EAR/PLS OXIMTRY CONT: CPT

## 2019-08-15 PROCEDURE — 97530 THERAPEUTIC ACTIVITIES: CPT

## 2019-08-15 PROCEDURE — 86870 RBC ANTIBODY IDENTIFICATION: CPT

## 2019-08-15 PROCEDURE — 86860 RBC ANTIBODY ELUTION: CPT

## 2019-08-15 PROCEDURE — 85025 COMPLETE CBC W/AUTO DIFF WBC: CPT

## 2019-08-15 PROCEDURE — 97116 GAIT TRAINING THERAPY: CPT

## 2019-08-15 PROCEDURE — 2060000000 HC ICU INTERMEDIATE R&B

## 2019-08-15 PROCEDURE — 2700000000 HC OXYGEN THERAPY PER DAY

## 2019-08-15 PROCEDURE — 80048 BASIC METABOLIC PNL TOTAL CA: CPT

## 2019-08-15 PROCEDURE — 85613 RUSSELL VIPER VENOM DILUTED: CPT

## 2019-08-15 PROCEDURE — 85610 PROTHROMBIN TIME: CPT

## 2019-08-15 PROCEDURE — 85730 THROMBOPLASTIN TIME PARTIAL: CPT

## 2019-08-15 PROCEDURE — 86147 CARDIOLIPIN ANTIBODY EA IG: CPT

## 2019-08-15 PROCEDURE — 2580000003 HC RX 258: Performed by: INTERNAL MEDICINE

## 2019-08-15 PROCEDURE — 86880 COOMBS TEST DIRECT: CPT

## 2019-08-15 PROCEDURE — 6360000002 HC RX W HCPCS: Performed by: INTERNAL MEDICINE

## 2019-08-15 RX ADMIN — DESMOPRESSIN ACETATE 40 MG: 0.2 TABLET ORAL at 21:17

## 2019-08-15 RX ADMIN — MIRTAZAPINE 7.5 MG: 15 TABLET, FILM COATED ORAL at 21:17

## 2019-08-15 RX ADMIN — LEVOTHYROXINE SODIUM 125 MCG: 100 TABLET ORAL at 08:47

## 2019-08-15 RX ADMIN — MORPHINE SULFATE 2 MG: 2 INJECTION, SOLUTION INTRAMUSCULAR; INTRAVENOUS at 05:33

## 2019-08-15 RX ADMIN — MORPHINE SULFATE 2 MG: 2 INJECTION, SOLUTION INTRAMUSCULAR; INTRAVENOUS at 12:32

## 2019-08-15 RX ADMIN — ASPIRIN 81 MG: 81 TABLET, COATED ORAL at 08:47

## 2019-08-15 RX ADMIN — Medication 10 ML: at 08:48

## 2019-08-15 RX ADMIN — HYDROCODONE BITARTRATE AND ACETAMINOPHEN 1 TABLET: 5; 325 TABLET ORAL at 19:17

## 2019-08-15 RX ADMIN — Medication 10 ML: at 21:17

## 2019-08-15 RX ADMIN — ENOXAPARIN SODIUM 40 MG: 40 INJECTION SUBCUTANEOUS at 08:48

## 2019-08-15 RX ADMIN — AMITRIPTYLINE HYDROCHLORIDE 25 MG: 25 TABLET, FILM COATED ORAL at 21:17

## 2019-08-15 RX ADMIN — HYDROCODONE BITARTRATE AND ACETAMINOPHEN 1 TABLET: 5; 325 TABLET ORAL at 08:47

## 2019-08-15 RX ADMIN — CARVEDILOL 3.12 MG: 3.12 TABLET, FILM COATED ORAL at 08:47

## 2019-08-15 RX ADMIN — CARVEDILOL 3.12 MG: 3.12 TABLET, FILM COATED ORAL at 21:17

## 2019-08-15 RX ADMIN — PANTOPRAZOLE SODIUM 40 MG: 40 TABLET, DELAYED RELEASE ORAL at 05:33

## 2019-08-15 RX ADMIN — HYDROCODONE BITARTRATE AND ACETAMINOPHEN 1 TABLET: 5; 325 TABLET ORAL at 01:52

## 2019-08-15 ASSESSMENT — PAIN SCALES - GENERAL
PAINLEVEL_OUTOF10: 4
PAINLEVEL_OUTOF10: 5
PAINLEVEL_OUTOF10: 5
PAINLEVEL_OUTOF10: 3
PAINLEVEL_OUTOF10: 3
PAINLEVEL_OUTOF10: 5
PAINLEVEL_OUTOF10: 5
PAINLEVEL_OUTOF10: 8
PAINLEVEL_OUTOF10: 6
PAINLEVEL_OUTOF10: 5
PAINLEVEL_OUTOF10: 8

## 2019-08-15 ASSESSMENT — PAIN DESCRIPTION - ORIENTATION
ORIENTATION: LEFT

## 2019-08-15 ASSESSMENT — PAIN DESCRIPTION - PROGRESSION
CLINICAL_PROGRESSION: GRADUALLY IMPROVING
CLINICAL_PROGRESSION: GRADUALLY IMPROVING

## 2019-08-15 ASSESSMENT — PAIN DESCRIPTION - DESCRIPTORS
DESCRIPTORS: THROBBING
DESCRIPTORS: THROBBING

## 2019-08-15 ASSESSMENT — PAIN DESCRIPTION - LOCATION
LOCATION: SHOULDER

## 2019-08-15 ASSESSMENT — PAIN DESCRIPTION - ONSET
ONSET: ON-GOING
ONSET: ON-GOING

## 2019-08-15 ASSESSMENT — PAIN - FUNCTIONAL ASSESSMENT: PAIN_FUNCTIONAL_ASSESSMENT: PREVENTS OR INTERFERES SOME ACTIVE ACTIVITIES AND ADLS

## 2019-08-15 ASSESSMENT — PAIN DESCRIPTION - FREQUENCY
FREQUENCY: CONTINUOUS
FREQUENCY: CONTINUOUS

## 2019-08-15 ASSESSMENT — PAIN DESCRIPTION - PAIN TYPE
TYPE: ACUTE PAIN
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
## 2019-08-15 NOTE — PROGRESS NOTES
Physical Therapy  Facility/Department: [address]  Daily Treatment Note  NAME: June Akins  : 1945  MRN: [id number]    Date of Service: 8/15/2019    Discharge Recommendations:    June Akins scored a 16/24 on the AM-PAC short mobility form. Current research shows that an AM-PAC score of 17 or less is typically not associated with a discharge to the patient's home setting. Based on the patients AM-PAC score and their current functional mobility deficits, it is recommended that the patient have 3-5 sessions per week of Physical Therapy at d/c to increase the patients independence. PT Equipment Recommendations  Equipment Needed: No(possible LBQC)    Assessment   Body structures, Functions, Activity limitations: Decreased functional mobility ; Decreased strength;Decreased balance;Decreased safe awareness;Decreased endurance  Assessment: Patient not at baseline function and would benefit from skilled PT to address above deficits and facilitate return to baseline function. Concern with home discharge as patient lives alone and at increased risk of falls. Tolerates limited activity due to pain. Very adamantly opposed to non-home discharge despite education provided. Treatment Diagnosis: decreased functional mobility, impaired gait, decreased balance  Prognosis: Good  Decision Making: Medium Complexity  Clinical Presentation: evolving  PT Education: Goals;PT Role;Plan of Care;Precautions;Gait Training;General Safety;Weight-bearing Education  Patient Education: d/c recommendations  Barriers to Learning: emotional  REQUIRES PT FOLLOW UP: Yes  Activity Tolerance  Activity Tolerance: Patient limited by pain  Activity Tolerance: frequent rest breaks due SOB. O2 sats significantly dropped     Patient Diagnosis(es): The primary encounter diagnosis was Humeral head fracture, left, closed, initial encounter.  Diagnoses of Hyponatremia, Intractable pain, and General weakness were also pertinent to this
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
## 2019-08-16 LAB
ALBUMIN SERPL-MCNC: 2.6 G/DL (ref 3.1–4.9)
ALPHA-1-GLOBULIN: 0.3 G/DL (ref 0.2–0.4)
ALPHA-2-GLOBULIN: 0.6 G/DL (ref 0.4–1.1)
BASOPHILS ABSOLUTE: 0 K/UL (ref 0–0.2)
BASOPHILS RELATIVE PERCENT: 0.4 %
BETA GLOBULIN: 0.8 G/DL (ref 0.9–1.6)
EOSINOPHILS ABSOLUTE: 0.1 K/UL (ref 0–0.6)
EOSINOPHILS RELATIVE PERCENT: 1.5 %
GAMMA GLOBULIN: 1.9 G/DL (ref 0.6–1.8)
HCT VFR BLD CALC: 24.4 % (ref 36–48)
HEMOGLOBIN: 8.3 G/DL (ref 12–16)
LYMPHOCYTES ABSOLUTE: 0.8 K/UL (ref 1–5.1)
LYMPHOCYTES RELATIVE PERCENT: 20.8 %
MCH RBC QN AUTO: 32.1 PG (ref 26–34)
MCHC RBC AUTO-ENTMCNC: 34.1 G/DL (ref 31–36)
MCV RBC AUTO: 94.2 FL (ref 80–100)
MONOCYTES ABSOLUTE: 0.7 K/UL (ref 0–1.3)
MONOCYTES RELATIVE PERCENT: 16.9 %
NEUTROPHILS ABSOLUTE: 2.3 K/UL (ref 1.7–7.7)
NEUTROPHILS RELATIVE PERCENT: 60.4 %
PDW BLD-RTO: 15.4 % (ref 12.4–15.4)
PLATELET # BLD: 188 K/UL (ref 135–450)
PMV BLD AUTO: 6.4 FL (ref 5–10.5)
RBC # BLD: 2.59 M/UL (ref 4–5.2)
SPE/IFE INTERPRETATION: NORMAL
TOTAL PROTEIN: 6.2 G/DL (ref 6.4–8.2)
WBC # BLD: 3.8 K/UL (ref 4–11)

## 2019-08-16 PROCEDURE — 99221 1ST HOSP IP/OBS SF/LOW 40: CPT | Performed by: PSYCHIATRY & NEUROLOGY

## 2019-08-16 PROCEDURE — 85025 COMPLETE CBC W/AUTO DIFF WBC: CPT

## 2019-08-16 PROCEDURE — 2580000003 HC RX 258: Performed by: INTERNAL MEDICINE

## 2019-08-16 PROCEDURE — 6360000002 HC RX W HCPCS: Performed by: INTERNAL MEDICINE

## 2019-08-16 PROCEDURE — 2060000000 HC ICU INTERMEDIATE R&B

## 2019-08-16 PROCEDURE — 6370000000 HC RX 637 (ALT 250 FOR IP): Performed by: INTERNAL MEDICINE

## 2019-08-16 PROCEDURE — 94680 O2 UPTK RST&XERS DIR SIMPLE: CPT

## 2019-08-16 PROCEDURE — 97535 SELF CARE MNGMENT TRAINING: CPT

## 2019-08-16 PROCEDURE — 36415 COLL VENOUS BLD VENIPUNCTURE: CPT

## 2019-08-16 PROCEDURE — 97530 THERAPEUTIC ACTIVITIES: CPT

## 2019-08-16 PROCEDURE — 94761 N-INVAS EAR/PLS OXIMETRY MLT: CPT

## 2019-08-16 PROCEDURE — 97116 GAIT TRAINING THERAPY: CPT

## 2019-08-16 PROCEDURE — 6370000000 HC RX 637 (ALT 250 FOR IP): Performed by: PSYCHIATRY & NEUROLOGY

## 2019-08-16 RX ORDER — MIRTAZAPINE 15 MG/1
15 TABLET, FILM COATED ORAL NIGHTLY
Status: DISCONTINUED | OUTPATIENT
Start: 2019-08-16 | End: 2019-08-17 | Stop reason: HOSPADM

## 2019-08-16 RX ORDER — FUROSEMIDE 10 MG/ML
40 INJECTION INTRAMUSCULAR; INTRAVENOUS ONCE
Status: COMPLETED | OUTPATIENT
Start: 2019-08-16 | End: 2019-08-16

## 2019-08-16 RX ADMIN — ENOXAPARIN SODIUM 40 MG: 40 INJECTION SUBCUTANEOUS at 09:35

## 2019-08-16 RX ADMIN — ASPIRIN 81 MG: 81 TABLET, COATED ORAL at 09:35

## 2019-08-16 RX ADMIN — LEVOTHYROXINE SODIUM 125 MCG: 100 TABLET ORAL at 09:35

## 2019-08-16 RX ADMIN — CARVEDILOL 3.12 MG: 3.12 TABLET, FILM COATED ORAL at 20:55

## 2019-08-16 RX ADMIN — FUROSEMIDE 40 MG: 10 INJECTION, SOLUTION INTRAMUSCULAR; INTRAVENOUS at 17:21

## 2019-08-16 RX ADMIN — Medication 10 ML: at 00:40

## 2019-08-16 RX ADMIN — HYDROCODONE BITARTRATE AND ACETAMINOPHEN 1 TABLET: 5; 325 TABLET ORAL at 04:53

## 2019-08-16 RX ADMIN — Medication 10 ML: at 17:22

## 2019-08-16 RX ADMIN — DESMOPRESSIN ACETATE 40 MG: 0.2 TABLET ORAL at 17:21

## 2019-08-16 RX ADMIN — HYDROCODONE BITARTRATE AND ACETAMINOPHEN 1 TABLET: 5; 325 TABLET ORAL at 20:55

## 2019-08-16 RX ADMIN — AMITRIPTYLINE HYDROCHLORIDE 25 MG: 25 TABLET, FILM COATED ORAL at 20:55

## 2019-08-16 RX ADMIN — Medication 10 ML: at 20:56

## 2019-08-16 RX ADMIN — PANTOPRAZOLE SODIUM 40 MG: 40 TABLET, DELAYED RELEASE ORAL at 04:53

## 2019-08-16 RX ADMIN — MORPHINE SULFATE 2 MG: 2 INJECTION, SOLUTION INTRAMUSCULAR; INTRAVENOUS at 00:39

## 2019-08-16 RX ADMIN — Medication 10 ML: at 09:36

## 2019-08-16 RX ADMIN — MIRTAZAPINE 15 MG: 15 TABLET, FILM COATED ORAL at 20:55

## 2019-08-16 RX ADMIN — HYDROCODONE BITARTRATE AND ACETAMINOPHEN 1 TABLET: 5; 325 TABLET ORAL at 14:12

## 2019-08-16 RX ADMIN — MORPHINE SULFATE 2 MG: 2 INJECTION, SOLUTION INTRAMUSCULAR; INTRAVENOUS at 17:21

## 2019-08-16 RX ADMIN — CARVEDILOL 3.12 MG: 3.12 TABLET, FILM COATED ORAL at 09:35

## 2019-08-16 ASSESSMENT — PAIN DESCRIPTION - DESCRIPTORS
DESCRIPTORS: THROBBING
DESCRIPTORS: THROBBING

## 2019-08-16 ASSESSMENT — PAIN - FUNCTIONAL ASSESSMENT: PAIN_FUNCTIONAL_ASSESSMENT: PREVENTS OR INTERFERES SOME ACTIVE ACTIVITIES AND ADLS

## 2019-08-16 ASSESSMENT — PAIN DESCRIPTION - ONSET
ONSET: ON-GOING
ONSET: ON-GOING

## 2019-08-16 ASSESSMENT — PAIN SCALES - GENERAL
PAINLEVEL_OUTOF10: 5
PAINLEVEL_OUTOF10: 8
PAINLEVEL_OUTOF10: 6
PAINLEVEL_OUTOF10: 5
PAINLEVEL_OUTOF10: 8
PAINLEVEL_OUTOF10: 7
PAINLEVEL_OUTOF10: 8
PAINLEVEL_OUTOF10: 7
PAINLEVEL_OUTOF10: 8
PAINLEVEL_OUTOF10: 7

## 2019-08-16 ASSESSMENT — PAIN DESCRIPTION - FREQUENCY
FREQUENCY: CONTINUOUS
FREQUENCY: CONTINUOUS

## 2019-08-16 ASSESSMENT — PAIN DESCRIPTION - PROGRESSION
CLINICAL_PROGRESSION: GRADUALLY IMPROVING
CLINICAL_PROGRESSION: GRADUALLY IMPROVING

## 2019-08-16 ASSESSMENT — PAIN DESCRIPTION - LOCATION
LOCATION: SHOULDER

## 2019-08-16 ASSESSMENT — PAIN DESCRIPTION - PAIN TYPE
TYPE: SURGICAL PAIN
TYPE: ACUTE PAIN

## 2019-08-16 ASSESSMENT — PAIN DESCRIPTION - ORIENTATION
ORIENTATION: LEFT
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
## 2019-08-16 NOTE — PROGRESS NOTES
Short term goals: To be met prior to discharge  Short term goal 1: Bed mobility with mod I  Short term goal 2: Sit to/from stand with mod I  Short term goal 3: Ambulate 150 feet without AD and mod I  Short term goal 4: Navigate up/down 1 setp with rail and mod I  Patient Goals   Patient goals : TO go home  NO GOALS MET THIS DATE    Plan    Plan  Times per week: 7  Times per day: Daily  Current Treatment Recommendations: Strengthening, ROM, Balance Training, Functional Mobility Training, Transfer Training, Endurance Training, Gait Training, Stair training, Home Exercise Program, Safety Education & Training  Safety Devices  Type of devices: All fall risk precautions in place, Call light within reach, Gait belt, Patient at risk for falls, Nurse notified, Left in chair, Chair alarm in place  Restraints  Initially in place: No     Therapy Time   Individual Concurrent Group Co-treatment   Time In 0851         Time Out 0922         Minutes 31         Timed Code Treatment Minutes: [address] [facility], [address]      I agree with the note above. Goals addressed by PT this session.    [address] [place] [id number]

## 2019-08-16 NOTE — PROGRESS NOTES
2.20 07/30/2019     PEPPER:    Lab Results   Component Value Date    ANATITER 1:2560 07/30/2019    PEPPER POSITIVE 07/30/2019       RADIOLOGY:    Xr Humerus Left (min 2 Views)    Result Date: 8/12/2019  EXAMINATION: TWO XRAY VIEWS OF THE LEFT HUMERUS 8/12/2019 1:55 am COMPARISON: None. HISTORY: ORDERING SYSTEM PROVIDED HISTORY: Fall TECHNOLOGIST PROVIDED HISTORY: Reason for exam:->Fall FINDINGS: Nondisplaced (mildly impacted) proximal humeral fracture involving the metadiaphysis and surgical neck. Distal humerus is intact. Included left lung is clear. Unremarkable soft tissues. Nondisplaced (mildly impacted) proximal left humeral fracture. Xr Bone Survey Complete    Result Date: 8/14/2019  EXAMINATION: COMPLETE BONE SURVEY 8/14/2019 3:32 pm COMPARISON: 08/12/2019 humeral x-ray series. HISTORY: ORDERING SYSTEM PROVIDED HISTORY: anemia.r/o lytic lesions on bone TECHNOLOGIST PROVIDED HISTORY: Reason for exam:->anemia.r/o lytic lesions on bone Reason for Exam: Anemia. R/o lytic lesions on bone. fx left shoulder. Acuity: Unknown Type of Exam: Unknown FINDINGS: Lateral view of the cervical spine demonstrates 1 mm anterolisthesis C4 on C5 with moderate degenerative disc disease at C5-6. No obvious lytic lesions. Prevertebral soft tissues unremarkable. Thoracic spine demonstrates no obvious lytic lesion or vertebral body malalignment. Thoracic spine demonstrates surgical changes on the left side at the L4-5 level with a posterior marleny fixed by screws. AP view of the pelvis demonstrates intact pubic rami. Moderate joint space narrowing of the hips. No obvious lytic lesion about the iliac bones. AP view of the right femur demonstrates no lytic or blastic lesion. AP view of the left femur demonstrates no lytic or blastic lesion. No obvious lytic lesion is identified within the ribs. AP view of the right humerus demonstrates no lytic or blastic lesion. AP view of the left humerus demonstrates no lytic lesion.

## 2019-08-16 NOTE — FLOWSHEET NOTE
MD paged: Brandy Garcia has been seen by Psych. Dr. Shanthi Fox is increasing her Remeron. Pt is going to have home O2 eval.  LSW is unsure if patient is going to have a qualifying diagnosis and is worried that if she needs home O2 we may have problem with d/c.  Please advise, thank you\"

## 2019-08-16 NOTE — PROGRESS NOTES
Modified independent(increased time to complete)  Transfers  Sit to stand: Contact guard assistance  Stand to sit: Contact guard assistance    Cognition  Overall Cognitive Status: WFL  Safety Judgement: Decreased awareness of need for assistance;Decreased awareness of need for safety  Insights: Decreased awareness of deficits  Cognition Comment: Pt adamantly refusing d/c to SNF. Pt not aware of current limitations. Pt less agitated less date with assistance      Right Hand AROM (degrees)  Right Hand General AROM: pt completed gently L hand and elbow ROM during sessoin. completing grasp and release, pronation and supination, and slight elbow flexion. Pt completed ROM exercises seated in chair for up to 20 minutes. Pt pleased with progress and ability to move affected L UE.         Plan   Plan  Times per week: 7x  Times per day: Daily  Current Treatment Recommendations: ROM, Functional Mobility Training, Equipment Evaluation, Education, & procurement, Self-Care / ADL, Safety Education & Training, Home Management Training      AM-PAC Score        AM-PAC Inpatient Daily Activity Raw Score: 15 (08/15/19 1436)  AM-PAC Inpatient ADL T-Scale Score : 34.69 (08/15/19 1436)  ADL Inpatient CMS 0-100% Score: 56.46 (08/15/19 1436)  ADL Inpatient CMS G-Code Modifier : CK (08/15/19 1436)    Goals  Short term goals  Time Frame for Short term goals: Discharge  Short term goal 1: Min A for all UB ADLs--Dependent UB bathing, Max A UB dressing, pt declined 8/16  Short term goal 2: Mod I for all LB ADLs--supervision partially doff/don socks, pt declined 8/16  Short term goal 3: Mod I for functional mobility--CGA 8/16  Short term goal 4: Mod I for functional transfers--CGA 8/16  Short term goal 5: Mod I light IADL task--not addressed 8/16  Long term goals  Time Frame for Long term goals : STG=LTG  Patient Goals   Patient goals : Pt adamantly wanting to d/c home       Therapy Time   Individual Concurrent Group Co-treatment   Time In 9666

## 2019-08-16 NOTE — CARE COORDINATION
Patient was referred to [facility] to offer a reacher per OT  recommendation. Referral made for home oxygen upon discharge. Will need an order and qualifying SATs before discharge. SATs must be day before Discharge or day of Discharge. The following is how the SATs should read:     RA at rest_____%   RA with Ambulation ____%   Ambulation with _____LPM of O2 _____%. Thanks for the referral                  Discharge order called/faxed to [facility] home care.   [phone]         Fax: [phone]

## 2019-08-16 NOTE — CONSULTS
hemorrhage, mass effect or   midline shift.  No abnormal extra-axial fluid collection.  There are moderate   periventricular white matter hypodensities. Rondi Fails is a small remote   appearing lacunar infarct within the right basal ganglia.  There is a small   remote appearing infarct within anterolateral left cerebellum with mild   volume loss.  The gray-white differentiation is maintained without evidence   of an acute infarct.  There is no evidence of hydrocephalus.       ORBITS: The visualized portion of the orbits demonstrate no acute abnormality.       SINUSES: The visualized paranasal sinuses and mastoid air cells demonstrate   no acute abnormality.       SOFT TISSUES/SKULL:  No acute abnormality of the visualized skull or soft   tissues.           Impression   No acute intracranial abnormality.       Moderate white matter changes including a small remote appearing left   cerebellar infarct.      EK2019: rate 82 bpm, NSR, QTc 439ms        Cosme Scott MD   Psychiatrist

## 2019-08-16 NOTE — PLAN OF CARE
Problem: Pain:  Goal: Pain level will decrease  Description  Pain level will decrease  8/16/2019 0043 by Elvira Mccloud, VIRIDIANA  Outcome: Ongoing  Note:   Pt c/o pain to left arm/shoulder as 8 out of 10. PRN morphine given, see MAR.   Pt states I\"t is there, but already improving\"
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
## 2019-08-16 NOTE — PROGRESS NOTES
Nutrition Assessment    Type and Reason for Visit: Reassess    Nutrition Recommendations:   1. Continue current diet  2. Continue Ensure Enlive TID  3. Encourage po    Nutrition Assessment: Pt's appetite has improved since last visit. PO now 256-50% and also %. Pt is consuming her supplements 100%. Pt denies any n/v at this time. Pt remains at risk for further nutritional risk d/t varied po intake although it has improved. Malnutrition Assessment:  · Malnutrition Status: Meets the criteria for severe malnutrition  · Context: Chronic illness  · Findings of the 6 clinical characteristics of malnutrition (Minimum of 2 out of 6 clinical characteristics is required to make the diagnosis of moderate or severe Protein Calorie Malnutrition based on AND/ASPEN Guidelines):  1. Energy Intake-Less than or equal to 50% of estimated energy requirement, Greater than or equal to 3 months(prior to admission)    2. Weight Loss-Unable to assess,    3. Fat Loss-No significant subcutaneous fat loss,    4. Muscle Loss-Moderate muscle mass loss, Temples (temporalis muscle), Clavicles (pectoralis and deltoids)  5. Fluid Accumulation-No significant fluid accumulation,    6.  Strength-Not measured    Nutrition Risk Level:  Moderate    Nutrient Needs:  · Estimated Daily Total Kcal: 1503-5485  · Estimated Daily Protein (g): 71-89 gms(1.2-1.5 gms)  · Estimated Daily Total Fluid (ml/day): 1 ml/kcal    Nutrition Diagnosis:   · Problem: Inadequate energy intake  · Etiology: related to Insufficient energy/nutrient consumption     Signs and symptoms:  as evidenced by Intake 25-50%, Diet history of poor intake, Moderate muscle loss    Objective Information:  · Nutrition-Focused Physical Findings: LUE +1  · Wound Type: None  · Current Nutrition Therapies:  · Oral Diet Orders: General   · Oral Diet intake: 26-50%, %  · Oral Nutrition Supplement (ONS) Orders: Standard High Calorie Oral Supplement  · ONS intake:

## 2019-08-17 VITALS
OXYGEN SATURATION: 95 % | DIASTOLIC BLOOD PRESSURE: 67 MMHG | TEMPERATURE: 98.6 F | WEIGHT: 129.7 LBS | RESPIRATION RATE: 16 BRPM | HEART RATE: 84 BPM | BODY MASS INDEX: 22.14 KG/M2 | SYSTOLIC BLOOD PRESSURE: 107 MMHG | HEIGHT: 64 IN

## 2019-08-17 LAB
ANTICARDIOLIPIN IGG ANTIBODY: 7 GPL (ref 0–14)
BASOPHILS ABSOLUTE: 0 K/UL (ref 0–0.2)
BASOPHILS RELATIVE PERCENT: 0.6 %
BLOOD BANK DISPENSE STATUS: NORMAL
BLOOD BANK DISPENSE STATUS: NORMAL
BLOOD BANK PRODUCT CODE: NORMAL
BLOOD BANK PRODUCT CODE: NORMAL
BPU ID: NORMAL
BPU ID: NORMAL
CARDIOLIPIN AB IGM: 15 MPL (ref 0–12)
DESCRIPTION BLOOD BANK: NORMAL
DESCRIPTION BLOOD BANK: NORMAL
DRVVT CONFIRMATION TEST: ABNORMAL RATIO
DRVVT SCREEN: 30 SEC (ref 33–44)
DRVVT,DIL: ABNORMAL SEC (ref 33–44)
EOSINOPHILS ABSOLUTE: 0.1 K/UL (ref 0–0.6)
EOSINOPHILS RELATIVE PERCENT: 2 %
HCT VFR BLD CALC: 24.7 % (ref 36–48)
HEMOGLOBIN: 8.3 G/DL (ref 12–16)
HEXAGONAL PHOSPHOLIPID NEUTRALIZAT TEST: ABNORMAL
LUPUS ANTICOAG INTERP: ABNORMAL
LYMPHOCYTES ABSOLUTE: 0.8 K/UL (ref 1–5.1)
LYMPHOCYTES RELATIVE PERCENT: 24.4 %
MCH RBC QN AUTO: 31.4 PG (ref 26–34)
MCHC RBC AUTO-ENTMCNC: 33.4 G/DL (ref 31–36)
MCV RBC AUTO: 93.8 FL (ref 80–100)
MONOCYTES ABSOLUTE: 0.7 K/UL (ref 0–1.3)
MONOCYTES RELATIVE PERCENT: 20.7 %
NEUTROPHILS ABSOLUTE: 1.8 K/UL (ref 1.7–7.7)
NEUTROPHILS RELATIVE PERCENT: 52.3 %
PDW BLD-RTO: 14.9 % (ref 12.4–15.4)
PLATELET # BLD: 211 K/UL (ref 135–450)
PLT NEUTA: ABNORMAL
PMV BLD AUTO: 6.3 FL (ref 5–10.5)
PT D: 12 SEC (ref 12–15.5)
PTT D: 32 SEC (ref 32–48)
PTT-D CORR REFLEX: ABNORMAL SEC (ref 32–48)
PTT-HEPARIN NEUTRALIZED: ABNORMAL SEC (ref 32–48)
RBC # BLD: 2.64 M/UL (ref 4–5.2)
REPTILASE TIME: ABNORMAL SEC
THROMBIN TIME: ABNORMAL SEC (ref 14.7–19.5)
WBC # BLD: 3.4 K/UL (ref 4–11)

## 2019-08-17 PROCEDURE — 85025 COMPLETE CBC W/AUTO DIFF WBC: CPT

## 2019-08-17 PROCEDURE — 36415 COLL VENOUS BLD VENIPUNCTURE: CPT

## 2019-08-17 PROCEDURE — 97116 GAIT TRAINING THERAPY: CPT

## 2019-08-17 PROCEDURE — 6370000000 HC RX 637 (ALT 250 FOR IP): Performed by: INTERNAL MEDICINE

## 2019-08-17 PROCEDURE — 97530 THERAPEUTIC ACTIVITIES: CPT

## 2019-08-17 PROCEDURE — 6360000002 HC RX W HCPCS: Performed by: INTERNAL MEDICINE

## 2019-08-17 RX ORDER — HYDROCODONE BITARTRATE AND ACETAMINOPHEN 7.5; 325 MG/1; MG/1
1 TABLET ORAL ONCE
Status: DISCONTINUED | OUTPATIENT
Start: 2019-08-17 | End: 2019-08-17

## 2019-08-17 RX ORDER — HYDROCODONE BITARTRATE AND ACETAMINOPHEN 5; 325 MG/1; MG/1
1 TABLET ORAL EVERY 6 HOURS PRN
Qty: 15 TABLET | Refills: 0 | Status: SHIPPED | OUTPATIENT
Start: 2019-08-17 | End: 2019-08-22 | Stop reason: SDUPTHER

## 2019-08-17 RX ORDER — HYDROCODONE BITARTRATE AND ACETAMINOPHEN 5; 325 MG/1; MG/1
1 TABLET ORAL ONCE
Status: DISCONTINUED | OUTPATIENT
Start: 2019-08-17 | End: 2019-08-17 | Stop reason: HOSPADM

## 2019-08-17 RX ORDER — MIRTAZAPINE 15 MG/1
15 TABLET, FILM COATED ORAL NIGHTLY
Qty: 30 TABLET | Refills: 3 | Status: SHIPPED | OUTPATIENT
Start: 2019-08-17

## 2019-08-17 RX ORDER — DOCUSATE SODIUM 100 MG/1
100 CAPSULE, LIQUID FILLED ORAL 2 TIMES DAILY
Qty: 60 CAPSULE | Refills: 0 | COMMUNITY
Start: 2019-08-17 | End: 2019-09-16

## 2019-08-17 RX ADMIN — ENOXAPARIN SODIUM 40 MG: 40 INJECTION SUBCUTANEOUS at 08:07

## 2019-08-17 RX ADMIN — CARVEDILOL 3.12 MG: 3.12 TABLET, FILM COATED ORAL at 08:07

## 2019-08-17 RX ADMIN — LEVOTHYROXINE SODIUM 125 MCG: 100 TABLET ORAL at 08:07

## 2019-08-17 RX ADMIN — ASPIRIN 81 MG: 81 TABLET, COATED ORAL at 08:07

## 2019-08-17 RX ADMIN — HYDROCODONE BITARTRATE AND ACETAMINOPHEN 1 TABLET: 5; 325 TABLET ORAL at 05:46

## 2019-08-17 RX ADMIN — HYDROCODONE BITARTRATE AND ACETAMINOPHEN 1 TABLET: 5; 325 TABLET ORAL at 17:11

## 2019-08-17 RX ADMIN — HYDROCODONE BITARTRATE AND ACETAMINOPHEN 1 TABLET: 5; 325 TABLET ORAL at 11:46

## 2019-08-17 RX ADMIN — PANTOPRAZOLE SODIUM 40 MG: 40 TABLET, DELAYED RELEASE ORAL at 05:46

## 2019-08-17 ASSESSMENT — PAIN SCALES - GENERAL
PAINLEVEL_OUTOF10: 4
PAINLEVEL_OUTOF10: 6
PAINLEVEL_OUTOF10: 8
PAINLEVEL_OUTOF10: 4

## 2019-08-17 ASSESSMENT — PAIN DESCRIPTION - PAIN TYPE
TYPE: ACUTE PAIN

## 2019-08-17 ASSESSMENT — PAIN DESCRIPTION - LOCATION
LOCATION: ARM

## 2019-08-17 ASSESSMENT — PAIN DESCRIPTION - ORIENTATION
ORIENTATION: LEFT

## 2019-08-17 NOTE — DISCHARGE SUMMARY
proximal left humeral fracture. Invasive procedures and treatments. 1. None     Problem-based Hospital Course. Hypoxia: This in the setting of IV hydration for hyponatremia during her admission course. Received Lasix currently saturating 96% on room air no need for home oxygen therapy      Anxiety disorder: Psych consulted  continue Elavil and mirtazapine increased to 15 Mg     Fall with general weakness: Fall at home with loss of strength in the lower extremities sustaining left humeral fracture. Active Problems:    Anemia of chronic inflammatory disease: Likely chronic inflammation disease given positive rheumatological work-up with strong positive PEPPER and positive rheumatoid factor. Stool was negative for occult blood. She required 1 unit packed red blood cell transfusion   She was consulted by hematology/oncology who advocated for outpatient rheumatology consult and work-up. ESR ferritin slightly elevated at 31 and 278 respectively. O04 and folic acid within normal limits. Iron studies noted for iron deficiency.       Closed fracture of upper end of left humerus: Pain at fracture site with the use lab in place.  -Consulted by orthopedics with plans for follow-up with outpatient in a couple of weeks. She has a sling in place  -Pain management outpatient follow-up with Orthopedic       Hyponatremia: Resolved    Severe malnutrition ([address]): Continue nutritional supplement    Consults. IP CONSULT TO SOCIAL WORK  IP CONSULT TO HOSPITALIST  IP CONSULT TO ORTHOPEDIC SURGERY  IP CONSULT TO SOCIAL WORK  IP CONSULT TO DIETITIAN  IP CONSULT TO ONCOLOGY  IP CONSULT TO PSYCHIATRY    Physical examination on discharge day. /72   Pulse 77   Temp 98.8 °F (37.1 °C) (Temporal)   Resp 16   Ht 5' 4\" (1.626 m)   Wt 129 lb 11.2 oz (58.8 kg)   SpO2 96%   BMI 22.26 kg/m²   General appearance. Alert. Looks comfortable. HEENT. Sclera clear. Moist mucus membranes. Cardiovascular.  Regular rate daily  Notes to patient:  Use: treat heart failure, prevent future heart attacks, lower blood pressure and heart rate, treat chest pain  Side effects: dizziness, fatigue, and diarrhea     esomeprazole 40 MG delayed release capsule  Commonly known as:  NEXIUM  Take 1 capsule by mouth every morning (before breakfast)  Notes to patient:  Use: Prevention and treatment of gastric ulcers  Side Effects: Headache, fatigue, constipation, dry  mouth     fish oil-omega-3 fatty acids 1000 MG capsule  Notes to patient:  supplement     levothyroxine 125 MCG tablet  Commonly known as:  SYNTHROID  Take 1 tablet by mouth daily  Notes to patient:  Use: to treat low thyroid levels  Side effects: hair loss, chest pain, irregular heartbeat, irritability, insomnia      nitroGLYCERIN 0.4 MG SL tablet  Commonly known as:  NITROSTAT  Notes to patient:  Nitroglycerin/ nitro-bid/ Nitrostat  Use: Treat chest pain  Side effects: headache, flushing, dizziness     Vitamin D3 5000 units Tabs  Notes to patient:  USE:  vitamin D supplement    Side Effects: nausea, constipation        STOP taking these medications    amoxicillin-clavulanate 875-125 MG per tablet  Commonly known as:  AUGMENTIN     rivaroxaban 10 MG Tabs tablet  Commonly known as:  Eliz Gram           Where to Get Your Medications      These medications were sent to [facility] [facility], [address] [place] [address],[address] [facility][note 3] [phone] [address]    Phone:  [phone]   · mirtazapine 15 MG tablet     You can get these medications from any pharmacy    Bring a paper prescription for each of these medications  · HYDROcodone-acetaminophen 5-325 MG per tablet  You don't need a prescription for these medications  · docusate sodium 100 MG capsule         Discharge recommendations given to patient. Follow Up. PCP  in 1 week   Disposition. home  Activity.  activity as tolerated  Diet: DIET GENERAL;  Dietary Nutrition Supplements: Standard High

## 2019-08-17 NOTE — DISCHARGE INSTR - COC
Continuity of Care Form    Patient Name: Corbin Brown   :  1945  MRN:  [id number]    Admit date:  2019  Discharge date:  2019      Code Status Order: Full Code   Advance Directives:   Advance Care Flowsheet Documentation     Date/Time Healthcare Directive Type of Healthcare Directive Copy in [address] Agent's Name Healthcare Agent's Phone Number    19 1245  No, patient does not have an advance directive for healthcare treatment -- -- -- -- --          Admitting Physician:  Han Grande MD  PCP: Saba Valera DO    Discharging Nurse: Rachael Yarbrough RN (previously [facility] RN)     [address] Unit/Room#: 8FS-3056/2615-11  Discharging Unit Phone Number: [phone]      Emergency Contact:   Extended Emergency Contact Information  Primary Emergency Contact: [address] [place] [address] Phone: [phone]  Relation: Brother/Sister    Past Surgical History:  Past Surgical History:   Procedure Laterality Date    HYSTERECTOMY, TOTAL ABDOMINAL  1972    LUMBAR FUSION      L4-5    PARTIAL HYSTERECTOMY  1964       Immunization History:   Immunization History   Administered Date(s) Administered    Influenza, High Dose (Fluzone 65 yrs and older) 10/13/2018    Pneumococcal Conjugate 13-valent (Namykgp41) 10/28/2015    Pneumococcal Conjugate 7-valent (Prevnar7) 10/28/2015       Active Problems:  Patient Active Problem List   Diagnosis Code    Sprain of ligament of lumbosacral joint S33. 5XXA    BWC Neck sprain and strain S13. 9XXA    BWC Degeneration of lumbar or lumbosacral intervertebral disc M51.37    BWC Acquired spondylolisthesis M43.10    BWC Displacement of lumbar intervertebral disc without myelopathy M51.26    BWC Spinal stenosis, lumbar region, without neurogenic claudication M48.061    C Depressive disorder, not elsewhere classified F32.9    Chronic pain syndrome G89.4    Lupus (HCC) M32.9    Coronary artery disease due to

## 2019-08-17 NOTE — PROGRESS NOTES
Minutes 24         Timed Code Treatment Minutes: 25 Minutes       Mendoza Frankel, [address] [place], [place] [address]

## 2019-08-19 ENCOUNTER — TELEPHONE (OUTPATIENT)
Dept: INTERNAL MEDICINE CLINIC | Age: 74
End: 2019-08-19

## 2019-08-19 DIAGNOSIS — S42.202D CLOSED FRACTURE OF PROXIMAL END OF LEFT HUMERUS WITH ROUTINE HEALING, UNSPECIFIED FRACTURE MORPHOLOGY, SUBSEQUENT ENCOUNTER: Primary | ICD-10-CM

## 2019-08-19 DIAGNOSIS — F40.9 PHOBIA, UNSPECIFIED TYPE: ICD-10-CM

## 2019-08-19 NOTE — TELEPHONE ENCOUNTER
[address] Transitions Initial Follow Up Call    Outreach made within 2 business days of discharge: Yes    Patient: Nestor Martínez Patient : 1945   MRN: [id number]  Reason for Admission: There are no discharge diagnoses documented for the most recent discharge. Discharge Date: 19       Spoke with: patient    Discharge department/facility: [facility]    TCM Interactive Patient Contact:  Was patient able to fill all prescriptions: Yes  Was patient instructed to bring all medications to the follow-up visit: Yes  Is patient taking all medications as directed in the discharge summary? Yes  Does patient understand their discharge instructions: Yes  Does patient have questions or concerns that need addressed prior to 7-14 day follow up office visit: no    Scheduled appointment with PCP within 7-14 days-already had an appt set for 19 & wanted to keep it, so I changed it to a TCM.     Follow Up  Future Appointments   Date Time Provider Reyes Huffman   2019  4:00 PM Octavia Conde DO [facility] [address] [place], [place]   Patient Services Specialist  [phone]

## 2019-08-19 NOTE — TELEPHONE ENCOUNTER
Margie Barriga w/[facility] would like to go over pts medication list b/c she was recently d/c from the hospital due to a fx of her humerus.

## 2019-08-20 LAB — BLOOD BANK REF CASE: NORMAL

## 2019-08-22 RX ORDER — HYDROCODONE BITARTRATE AND ACETAMINOPHEN 5; 325 MG/1; MG/1
1 TABLET ORAL EVERY 6 HOURS PRN
Qty: 28 TABLET | Refills: 0 | Status: SHIPPED | OUTPATIENT
Start: 2019-08-22 | End: 2019-08-29

## 2019-08-28 ENCOUNTER — TELEPHONE (OUTPATIENT)
Dept: INTERNAL MEDICINE CLINIC | Age: 74
End: 2019-08-28

## 2019-08-30 ENCOUNTER — OFFICE VISIT (OUTPATIENT)
Dept: INTERNAL MEDICINE CLINIC | Age: 74
End: 2019-08-30
Payer: COMMERCIAL

## 2019-08-30 VITALS
HEIGHT: 64 IN | WEIGHT: 123 LBS | DIASTOLIC BLOOD PRESSURE: 78 MMHG | HEART RATE: 84 BPM | BODY MASS INDEX: 21 KG/M2 | SYSTOLIC BLOOD PRESSURE: 118 MMHG
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
## 2019-08-30 DIAGNOSIS — R93.6 ABNORMAL FINDINGS ON DIAGNOSTIC IMAGING OF LIMBS: ICD-10-CM

## 2019-08-30 DIAGNOSIS — S42.215S CLOSED NONDISPLACED FRACTURE OF SURGICAL NECK OF LEFT HUMERUS, UNSPECIFIED FRACTURE MORPHOLOGY, SEQUELA: ICD-10-CM

## 2019-08-30 DIAGNOSIS — Z86.718 HISTORY OF DVT (DEEP VEIN THROMBOSIS): ICD-10-CM

## 2019-08-30 DIAGNOSIS — I48.0 PAROXYSMAL ATRIAL FIBRILLATION (HCC): ICD-10-CM

## 2019-08-30 DIAGNOSIS — Z86.73 HISTORY OF STROKE: ICD-10-CM

## 2019-08-30 DIAGNOSIS — R76.8 RHEUMATOID FACTOR POSITIVE: ICD-10-CM

## 2019-08-30 DIAGNOSIS — R76.8 POSITIVE ANA (ANTINUCLEAR ANTIBODY): ICD-10-CM

## 2019-08-30 DIAGNOSIS — D64.9 ANEMIA, UNSPECIFIED TYPE: ICD-10-CM

## 2019-08-30 PROCEDURE — 1111F DSCHRG MED/CURRENT MED MERGE: CPT | Performed by: NURSE PRACTITIONER

## 2019-08-30 PROCEDURE — 99495 TRANSJ CARE MGMT MOD F2F 14D: CPT | Performed by: NURSE PRACTITIONER

## 2019-08-30 ASSESSMENT — ENCOUNTER SYMPTOMS
APNEA: 0
WHEEZING: 0
SHORTNESS OF BREATH: 0
COUGH: 0
SINUS PRESSURE: 0
ABDOMINAL DISTENTION: 0
DIARRHEA: 0
ABDOMINAL PAIN: 0
CONSTIPATION: 0
EYE REDNESS: 0
BLOOD IN STOOL: 0
VOMITING: 0
NAUSEA: 0
BACK PAIN: 0
RHINORRHEA: 0
CHEST TIGHTNESS: 0
EYE PAIN: 0

## 2019-08-30 NOTE — PROGRESS NOTES
Home Medication Instructions YUMIKO:    Printed on:08/30/19 7171   Medication Information                      amitriptyline (ELAVIL) 25 MG tablet  Take 1 tablet by mouth nightly             aspirin 81 MG EC tablet  Take 81 mg by mouth daily. atorvastatin (LIPITOR) 40 MG tablet  Take 40 mg by mouth daily             carvedilol (COREG) 3.125 MG tablet  Take 1 tablet by mouth 2 times daily             Cholecalciferol (VITAMIN D3) 5000 units TABS  Take 1 tablet by mouth daily              docusate sodium (COLACE) 100 MG capsule  Take 1 capsule by mouth 2 times daily             esomeprazole (NEXIUM) 40 MG delayed release capsule  Take 1 capsule by mouth every morning (before breakfast)             levothyroxine (SYNTHROID) 125 MCG tablet  Take 1 tablet by mouth daily             mirtazapine (REMERON) 15 MG tablet  Take 1 tablet by mouth nightly             nitroGLYCERIN (NITROSTAT) 0.4 MG SL tablet  Place 0.4 mg under the tongue every 5 minutes as needed for Chest pain up to max of 3 total doses. If no relief after 1 dose, call 911. rivaroxaban (XARELTO) 10 MG TABS tablet  Take 1 tablet by mouth every 24 hours                   Medications marked \"taking\" at this time  Outpatient Medications Marked as Taking for the 8/30/19 encounter (Office Visit) with MANUEL Barrios CNP   Medication Sig Dispense Refill    rivaroxaban (XARELTO) 10 MG TABS tablet Take 1 tablet by mouth every 24 hours 30 tablet 3    mirtazapine (REMERON) 15 MG tablet Take 1 tablet by mouth nightly 30 tablet 3    docusate sodium (COLACE) 100 MG capsule Take 1 capsule by mouth 2 times daily 60 capsule 0    levothyroxine (SYNTHROID) 125 MCG tablet Take 1 tablet by mouth daily 30 tablet 5    atorvastatin (LIPITOR) 40 MG tablet Take 40 mg by mouth daily      nitroGLYCERIN (NITROSTAT) 0.4 MG SL tablet Place 0.4 mg under the tongue every 5 minutes as needed for Chest pain up to max of 3 total doses.  If no relief after 1 Negative for chest pain, palpitations and leg swelling. Gastrointestinal: Negative for abdominal distention, abdominal pain, blood in stool, constipation, diarrhea, nausea and vomiting. Endocrine: Negative for cold intolerance, heat intolerance, polydipsia, polyphagia and polyuria. Genitourinary: Negative for difficulty urinating, dysuria, enuresis, frequency, hematuria and urgency. Musculoskeletal: Positive for arthralgias. Negative for back pain, gait problem, joint swelling and neck pain. Skin: Negative for rash and wound. Allergic/Immunologic: Negative for environmental allergies, food allergies and immunocompromised state. Neurological: Negative for dizziness, syncope, light-headedness, numbness and headaches. Hematological: Negative for adenopathy. Does not bruise/bleed easily. Psychiatric/Behavioral: Negative for agitation, behavioral problems and confusion. The patient is not nervous/anxious. Vitals:    08/30/19 1130   BP: 118/78   Site: Right Upper Arm   Position: Sitting   Cuff Size: Small Adult   Pulse: 84   Weight: 123 lb (55.8 kg)   Height: 5' 4\" (1.626 m)     Body mass index is 21.11 kg/m². Wt Readings from Last 3 Encounters:   08/30/19 123 lb (55.8 kg)   08/17/19 129 lb 11.2 oz (58.8 kg)   07/30/19 119 lb 12.8 oz (54.3 kg)     BP Readings from Last 3 Encounters:   08/30/19 118/78   08/17/19 107/67   07/30/19 (!) 70/50      Summary   -Normal global systolic function with an ejection fraction estimated at 65%.  -No regional wall motion abnormalities noted.   -Aortic valve appears sclerotic but opens adequately. No evidence of aortic   valve regurgitation.   -There is trivial tricuspid regurgitation with a RVSP estimation of 39 mmHg.   -Normal diastolic function. Avg. E/e'=9.1      Signature    Physical Exam   Constitutional: She is oriented to person, place, and time. She appears well-developed. Non-toxic appearance. She does not have a sickly appearance.  She appears ill for about a year. Referrals provided. - [facility], [facility]    7. Positive PEPPER (antinuclear antibody)  Stable, uncontrolled. - Digna Manning MD, [facility]ivanitology, [facility], [facility]    8. Rheumatoid factor positive  Stable, uncontrolled. - Digna Manning MD, [facility]ivanitology, [facility], [facility]    9. Abnormal findings on diagnostic imaging of limbs   - DEXA BONE DENSITY 2 SITES; Future      History of DVT, PE, stroke and afib- has been off xarelto from fall. Reviewed risk / benefit of xarelto and falls in detail with pt. Pt would like to restart xarelto given history - new Rx sent. Agreeable to PT for fall prevention - adding to this to PT at home.       Medical Decision Making: moderate complexity    Follow up with PCP in 1 month   Strongly encouraged appropriate follow up

## 2019-09-06 ENCOUNTER — HOSPITAL ENCOUNTER (OUTPATIENT)
Dept: GENERAL RADIOLOGY | Age: 74
Discharge: HOME | End: 2019-09-06
Payer: COMMERCIAL

## 2019-09-06 DIAGNOSIS — S42.215S CLOSED NONDISPLACED FRACTURE OF SURGICAL NECK OF LEFT HUMERUS, UNSPECIFIED FRACTURE MORPHOLOGY, SEQUELA: ICD-10-CM

## 2019-09-06 DIAGNOSIS — R93.6 ABNORMAL FINDINGS ON DIAGNOSTIC IMAGING OF LIMBS: ICD-10-CM

## 2019-09-06 PROCEDURE — 77080 DXA BONE DENSITY AXIAL: CPT

## 2019-09-13 ENCOUNTER — OFFICE VISIT (OUTPATIENT)
Dept: ORTHOPEDIC SURGERY | Age: 74
End: 2019-09-13
Payer: COMMERCIAL

## 2019-09-13 VITALS
DIASTOLIC BLOOD PRESSURE: 64 MMHG | HEIGHT: 64 IN | BODY MASS INDEX: 21 KG/M2 | WEIGHT: 123 LBS | SYSTOLIC BLOOD PRESSURE: 86 MMHG | HEART RATE: 88 BPM

## 2019-09-13 DIAGNOSIS — S42.292A OTHER CLOSED DISPLACED FRACTURE OF PROXIMAL END OF LEFT HUMERUS, INITIAL ENCOUNTER: Primary | ICD-10-CM

## 2019-09-13 PROCEDURE — 99202 OFFICE O/P NEW SF 15 MIN: CPT | Performed by: PHYSICIAN ASSISTANT

## 2019-09-13 PROCEDURE — L3660 SO 8 AB RSTR CAN/WEB PRE OTS: HCPCS | Performed by: PHYSICIAN ASSISTANT

## 2019-09-13 NOTE — PROGRESS NOTES
Patient Name: Jess Haywood  Medical Record Number: [id number]  YOB: 1945  Date of Encounter: 9/13/2019     Chief Complaint   Patient presents with    Shoulder Injury     Injured Left shoulder when she fell; 8/12/19. History of Present Illness:   Ms. Jess Haywood is here regarding her left proximal humerus fracture she sustained during a fall at her home on 8/12/2019. She did go to the emergency department where she was found to have a fracture. She was admitted and was seen by Paul Mcclure. It was decided that this would be treated nonoperatively. Patient has been wearing a sling. She rates her pain a 2/10. She has been working with both physical therapy and occupational therapy at her home. She feels her range of motion is slowly improving. The patient's past medical history, medications, allergies, family history, social history, and review of systems have been reviewed, and dated and are recorded in the chart under the 'MEDIA\" tab. Physical Exam:    Ms. Jess Haywood appears well, she is in no apparent distress, she demonstrates appropriate mood & affect. She is alert and oriented to person, place and time. BP 86/64   Pulse 88   Ht 5' 4\" (1.626 m)   Wt 123 lb (55.8 kg)   BMI 21.11 kg/m²     On examination of patient's left shoulder there is no obvious swelling or joint effusion. She does have tenderness on palpation of the proximal humerus. She has active forward flexion to about 45 degrees and abduction to about 35 degrees. She has good functional range of motion and strength of her left elbow and with  strength. There is no edema or signs of DVT. Radiology:  X-rays obtained and reviewed in office:   Views: 3 view left shoulder including Grashey, Y and axillary  Impression: There is evidence of callus formation around a mildly displaced proximal humerus fracture involving the metadiaphysis and surgical neck.      Orders:  Orders Placed This Encounter Procedures    XR SHOULDER LEFT (MIN 2 VIEWS)    Serene Holcomb Shoulder Sling       Impression:   Diagnosis Orders   1. Other closed displaced fracture of proximal end of left humerus, initial encounter  XR SHOULDER LEFT (MIN 2 VIEWS)    Serene Holcomb Shoulder Sling       Treatment Plan:    Patient is 4+ weeks out from her initial injury that resulted in a left proximal humerus fracture. She states her pain and range of motion are improving. She has been wearing the sling from her sling and swath. She is given a normal arm sling at today's visit. She can start weaning out of the sling when at home but will continue wearing the sling when out and about for the next few weeks. She is working with home physical therapy and Occupational Therapy. She will also continue working on her home exercises and stretches. She can start working on more active range of motion with the left shoulder. She will plan on following up in 3 weeks or before that time with any concerns. We we will repeat imaging at that time. Juliocesar Hannah was informed of the results of any imaging. We discussed treatment options and a time was given to answer questions. A plan was proposed and Juliocesar Hannah understand and accepts this course of care. Electronically signed by Randi Beckford PA-C on 9/64/2822  Board Certified [facility]    Please note that portions of this note were completed with a voice recognition program.  Efforts were made to edit the dictations but occasionally words are mis-transcribed.
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
## 2019-09-18 NOTE — PROGRESS NOTES
Aleks Powell MD  [facility]) Physicians  [facility] Rheumatology  Rheumatology Consult Note    HISTORY OF PRESENT ILLNESS:    The pt is a 75 y.o. female referred by Sherin Del Rosario NP for evaluation of anemia, positive PEPPER and RF. Pt does not recall why she was referred to Rheumatology. Per chart review pt was recently admitted 8/12-8/17/19 for a fall at home sustaining L humeral fx. Pt states she fell after \"taking a few steps and next thing I know I was on the floor\", she denies LOC, SOB, CP, dizziness. Her hospitalization was c/b hypoxia s/p IV hydration for hyponatremia, she was diuresed w/ Lasix. She also had significant anemia requiring 1 U of pRBC, had negative occult blood, anemia felt to be d/t \"chronic inflammation disease\" and pt was referred to Rheumatology for +ANA and +RF. Per chart review, pt was noted to have high titer PEPPER and positive SSA and SSB dating back to 2010. Pt states she was reportedly Dx w/ Bx proven \"skin lupus\" by a dermatologist (Dr. Mary Grace Umana?) who has since then retired many yrs ago. She recalls having a rash on her back. Pt states she reportedly underwent \"2 skin biopsies by my dermatologist just to be sure and he told me that I just have skin lupus\". Pt states he's never been evaluated by a rheumatologist in the past.  She reports chronic dry and \"thin\" skin, skin on her shins sometimes turn red. She denies photosensitivity or any other rash including malar rash. She reports worsening diffuse alopecia over the past 3-4 yrs. She denies sicca, denies Hx of nasal or oral ulcers. Pt reports occasional joint pain her hands brought on by overuse of her hands, she reports reduced  strength over the yrs. She denies any joint swelling or morning stiffness. She reports chronic LBP exacerbated by physical activity. She denies any subjective myalgias or muscle weakness, dysphagia.      Pt reports easy bruising from chronic anticoagulation mouth 2 times daily, Disp: 60 tablet, Rfl: 5    aspirin 81 MG EC tablet, Take 81 mg by mouth daily. , Disp: , Rfl:     ALLERGIES:  Other; Trazodone; and Venlafaxine    PHYSICAL EXAM:    Vitals:    /79 (Site: Right Upper Arm, Position: Sitting, Cuff Size: Medium Adult)   Pulse 78   Ht 5' 3.5\" (1.613 m)   Wt 122 lb (55.3 kg)   BMI 21.27 kg/m²     GEN: AAOx3, in NAD, pale appearance w/ temporal wasting  HEAD: normocephalic, atraumatic  EYES: EOMI, PERRLA, no injection or icterus  NOSE: no nasal ulcers or nasal drainage  ORAL CAVITY: moist oral mucosa w/ some saliva pooling, no oral lesions, no evidence of thrush, no evidence of parotid gland enlargement  NECK: supple w/ FROM, of evidence of lymphadenopathy  CVS: RRR, no murmurs rubs or gallops, no JVD, 2+ distal pulses b/l  LUNGS: in no acute respiratory distress, CTAB  ABDOMEN: soft, NT/ND, no evidence of organomegaly  LYMPH: no cervical, supraclavicular or axillary LAD  MSK:  Spine: old surgical scar along lumbar spine, no paraspinal muscle or vertebral tenderness, SI joints NTTP  Upper extremities: LUE in sling   Hands: no active synovitis, minimal bogginess in b/l DIP joints slightly TTP w/ chronic OA changes w/ +[place] nodes slightly TTP, able to make strong full fists   Wrist: no synovitis in the wrist joints b/l, FROM   Elbow: no synovitis or bursitis, FROM   Shoulders: no pain or swelling or warmth on palpation, FROM  Lower extremities:   Knees: there is chronic bony hypertrophy of the b/l knee joints otherwise no warmth or effusion present, good ROM, +crepitus   Ankles: no synovitis, FROM, Achilles tendons w/o swelling or warmth NTTP   Feet: no toe swelling or pain or warmth on palpation w/ FROM, negative MTP squeeze test  INTEGUMENTARY: dry skin, no active rash or psoriatic lesions, no petechiae, bruises, or palpable purpura, no patchy alopecia, no nail or periungual changes, no clubbing or digital ulcers  PSYCH: normal mood    LABS:  I personally reviewed prior labs including:  Lab Results   Component Value Date    WBC 3.4 (L) 08/17/2019    HGB 8.3 (L) 08/17/2019    HCT 24.7 (L) 08/17/2019    MCV 93.8 08/17/2019     08/17/2019    LYMPHOPCT 24.4 08/17/2019    RBC 2.64 (L) 08/17/2019    MCH 31.4 08/17/2019    MCHC 33.4 08/17/2019    RDW 14.9 08/17/2019       Lab Results   Component Value Date     (L) 08/15/2019    K 3.9 08/15/2019     08/15/2019    CO2 22 08/15/2019    BUN 14 08/15/2019    CREATININE 0.9 08/15/2019    GLUCOSE 102 (H) 08/15/2019    CALCIUM 8.6 08/15/2019    PROT 6.2 (L) 08/14/2019    LABALBU 2.6 (L) 08/14/2019    BILITOT 0.3 08/14/2019    ALKPHOS 68 08/12/2019    AST 21 08/12/2019    ALT 10 08/12/2019    LABGLOM >60 08/15/2019    GFRAA >60 08/15/2019    AGRATIO 0.7 (L) 08/12/2019    GLOB 5.0 08/12/2019     PEPPER 1:2560 w/ nucleolar pattern, +SSA, SSB, negative dsDNA, SM, RNP, PHUC-1 (12/2/10)  PEPPER 1:2560 w/ speckled pattern, +RNP 1.0, +SSA and SSB (>8.0), rest of SERAFIN panel negative (7/30/19)  Negative dsDNA (1/26/19)  RF latex 53.4 (1/24/19)  RF 31.0 (8/14/19)  Negative CCP (1/24/19)  IgA and IgM wnl, IgG 2069 (8/14/19)  SPEP (3/4/19): The SPE pattern reflects a polyclonal increase in gamma globulin due to numerous clones of plasma cells producing heterogeneous antibody in response to some form of antigenic stimulus.  Hypergammaglobulinemia is found in a wide variety of infectious, non-infectious and autoimmune disease states. Evidence of monoclonal protein is not apparent. SPEP (8/14/19): no monoclobnal band, hypergammaglobulinemia is present (elevated polyclonal).   This may be seen in a variety of conditions associated with chronic inflammation and/or infection, including chronic liver disease, autoimmune diseases, granulomatous processes, etc.   Kappa/lambda ratio elevated to 2.09 (3/1/19)  Haptoglobin wnl (8/14/19)  Iron low at 17, low iron saturation at 10, TIBC low at 171 (8/13/19), ferritin mildly elevated to 278

## 2019-09-19 ENCOUNTER — OFFICE VISIT (OUTPATIENT)
Dept: RHEUMATOLOGY | Age: 74
End: 2019-09-19
Payer: COMMERCIAL

## 2019-09-19 VITALS
BODY MASS INDEX: 20.83 KG/M2 | WEIGHT: 122 LBS | DIASTOLIC BLOOD PRESSURE: 79 MMHG | HEART RATE: 78 BPM | HEIGHT: 64 IN | SYSTOLIC BLOOD PRESSURE: 119 MMHG

## 2019-09-19 DIAGNOSIS — R53.83 OTHER FATIGUE: ICD-10-CM

## 2019-09-19 DIAGNOSIS — M80.00XA AGE-RELATED OSTEOPOROSIS WITH CURRENT PATHOLOGICAL FRACTURE, INITIAL ENCOUNTER: ICD-10-CM

## 2019-09-19 DIAGNOSIS — Z79.899 HIGH RISK MEDICATION USE: ICD-10-CM

## 2019-09-19 DIAGNOSIS — Z11.59 NEED FOR HEPATITIS B SCREENING TEST: ICD-10-CM

## 2019-09-19 DIAGNOSIS — R76.8 POSITIVE ANA (ANTINUCLEAR ANTIBODY): ICD-10-CM

## 2019-09-19 DIAGNOSIS — I82.409 RECURRENT DEEP VEIN THROMBOSIS (DVT) (HCC): ICD-10-CM

## 2019-09-19 DIAGNOSIS — R77.9 ABNORMALITY OF PLASMA PROTEIN: ICD-10-CM

## 2019-09-19 DIAGNOSIS — R76.8 RHEUMATOID FACTOR POSITIVE: ICD-10-CM

## 2019-09-19 DIAGNOSIS — R94.8 ABNORMAL RADIONUCLIDE BONE SCAN: ICD-10-CM

## 2019-09-19 DIAGNOSIS — R76.8 ELEVATED SERUM IMMUNOGLOBULIN FREE LIGHT CHAINS: ICD-10-CM

## 2019-09-19 DIAGNOSIS — Z11.59 NEED FOR HEPATITIS C SCREENING TEST: ICD-10-CM

## 2019-09-19 DIAGNOSIS — R53.82 CHRONIC FATIGUE: ICD-10-CM

## 2019-09-19 DIAGNOSIS — D63.8 ANEMIA, CHRONIC DISEASE: ICD-10-CM

## 2019-09-19 PROCEDURE — 99205 OFFICE O/P NEW HI 60 MIN: CPT | Performed by: INTERNAL MEDICINE

## 2019-09-19 NOTE — PATIENT INSTRUCTIONS
replacement therapy is right for you. Selective estrogen receptor modulators. These medications, often referred to as SERMs, mimic estrogen's good effects on bones without some of the serious side effects such as breast cancer. However, there is still a risk of blood clots and stroke with use of SERMs. The SERM raloxifene (Evista) decreases the risk of spine fractures in women. It is approved for use only in postmenopausal women. Teriparatide (Forteo). Teriparatide is a form of parathyroid hormone that helps stimulate bone formation. It is approved for use in postmenopausal women and men at high risk of osteoporotic fracture. It also is approved for treatment of glucocorticoid-induced osteoporosis. It is given as a daily injection under the skin and can be used for up to two years. If you have ever had radiation treatment or your parathyroid hormone levels are already too high, you may not be able to take this drug. Strontium ranelate. This medicine is approved for managing postmenopausal osteoporosis in several countries around the world, but not the [place] (Brand names include Protelos, Protos, Osseor, Bivalos, Protaxos and Ossum.) Studies show it lowers the fracture risk in postmenopausal women. The drug comes as a powder, which women dissolve in water and take daily. Because of an increased risk of blood clots, it should be used with caution in women who have a history of or risk of blood clots such as deep venous thrombosis or pulmonary embolism. Denosumab (Prolia). This new class of \"antiresorptive\" drug is a fully human monoclonal antibody, a type of immune therapy. It works against a protein that interferes with the survival of bone-resorbing cells. This treatment is approved for use in postmenopausal women who have osteoporosis and are at high risk of fracture.  Another approved use is for women and men at high risk of bone loss and fractures from hormone-depleting medications used to treat breast and that can improve balance, such as [place] or yoga, may help prevent falls. You also should get treatment of any underlying medical problem that can cause osteoporosis. If you are on a medication that can cause osteoporosis, ask your doctor if you can lower the dose or take another type of medicine. Never change the dose or stop taking any medicine without speaking to your doctor first.  If you have low bone density and a high risk of breaking a bone, your doctor may suggest medicine to prevent your bones from getting weaker. (See the section \"How is glucocorticoid-osteoporosis treated? \") Health care providers now have a tool for estimating the risk of a patient's having an osteoporotic fracture in the next 10 years. This fracture risk assessment tool, from the [employer], is called FRAX. The score can help guide treatment decisions. What is the broader health impact of osteoporosis? The most serious health consequence of osteoporosis is a fracture. Spine and hip fractures especially may lead to chronic pain, long-term disability and even death. The main goal of treating osteoporosis is to prevent fractures. Living with osteoporosis  If you have osteoporosis, it is important to help prevent not just further bone loss but also a fracture. Here are some ways to decrease your chance of falls:  Use a walking aid. If you are unsteady, use a cane or walker. Remove hazards in the home. Remove throw rugs. Also, remove or secure loose wires or cables that may make you trip. Add nightlights in the hallways leading to the bathroom. Install grab bars in the bathroom and nonskid mats near sinks and the tub. Get help carrying or lifting heavy items. If you are not careful, you could fall, or even suffer a spine fracture without falling. Wear sturdy shoes. This is above all true in winter or when it rains. Points to remember  Make sure there is enough calcium and vitamin D in your diet.    Be physically active and do weight-bearing exercises, like walking, most days each week. Change lifestyle choices that raise your risk of osteoporosis. Implement strategies to help decrease your risk of falling. Appropriate exercise is key in the treatment of osteoporosis. The rheumatologist's role in the treatment of osteoporosis  As doctors who are experts in diagnosing and treating diseases of the joints, muscles and bones, rheumatologists can help find the cause of osteoporosis. They can provide and monitor the best treatments for this condition. To find a rheumatologist  For more information about rheumatologists, click here. Learn more about rheumatologists and rheumatology health professionals. For more information   The [employer] of Rheumatology has compiled this list to give you a starting point for your own additional research. The ACR does not endorse or maintain these Web sites, and is not responsible for any information or claims provided on them. It is always best to talk with your rheumatologist for more information and before making any decisions about your care. [address] [facility]  www. osteo. org  FRAX: World Health Organization Fracture Risk Assessment Tool  www. shef.ac.uk/FRAX  Rheumatology [address] advances research and training to improve the health of people with rheumatic diseases. www. rheumatology. org/REF      Calcium and Vitamin D     Why do I need calcium and vitamin D? Calcium and vitamin D are important for bone health. Nerves, muscles, and blood vessels need calcium to work. Vitamin D helps the body absorb calcium, and is needed for immune system function. There is some evidence that vitamin D helps prevent cancer and cardiovascular disease. What are sources of calcium and vitamin D?   Calcium is found in foods. Dairy products are good sources. Eight ounces of yogurt (228 gram) or milk (1 cup [236 mL]), or a 1.5-oz. (43 gram) serving of cheese, can provide around 300 mg. Fortified orange juice can provide 300 mg per 8-oz. (236 mL) serving. Vitamin D is made by sun-exposed skin and is found in some foods. One of the best sources is salmon. A 3-oz. (86 gram) serving of sockeye salmon provides almost 800 IU. A 3-oz. serving of tuna canned in water provides about 150 IU. Dairy products fortified with vitamin D are good sources. Examples include a cup of fortified milk (115 to 124 IU), a cup of fortified orange juice (80 IU), or 6-ozs. (171 grams) of fortified yogurt (80 IU). Calcium and vitamin D are also available as supplements. Do I need a supplement? Are they safe? Many people are low on vitamin D. It is hard to get enough vitamin D from food, and most people don't get much sun exposure because they use sunscreens, spend long hours indoors, or live at a [facility]. Most people need a vitamin D supplement. Ask if you should have your vitamin D level checked. People typically get 300 mg calcium from their diet daily, not including dairy. If you include two servings of high-calcium foods (e.g., dairy), you can get around 900 mg per day. Supplementation with just 300 mg of calcium daily, or adding a third high-calcium serving, will provide 1200 mg daily. You may have heard calcium supplements are unsafe. While there has been negative press about heart attacks and prostate cancer, calcium supplements have not been proven to be unsafe. But don't go overboard with calcium supplements; get your calcium from diet when possible. However, avoid calcium supplements from coral or dolomite (a kind of limestone); they can contain heavy metals like lead. How do I choose a calcium or vitamin D supplement? Most calcium products contain calcium carbonate (e.g., Tums, Caltrate) or calcium citrate (e.g., Citracal). Both work.

## 2019-10-04 ENCOUNTER — OFFICE VISIT (OUTPATIENT)
Dept: ORTHOPEDIC SURGERY | Age: 74
End: 2019-10-04
Payer: COMMERCIAL

## 2019-10-04 VITALS
HEIGHT: 63 IN | SYSTOLIC BLOOD PRESSURE: 116 MMHG | HEART RATE: 78 BPM | BODY MASS INDEX: 21.62 KG/M2 | WEIGHT: 122 LBS | DIASTOLIC BLOOD PRESSURE: 71 MMHG

## 2019-10-04 DIAGNOSIS — S42.292D OTHER CLOSED DISPLACED FRACTURE OF PROXIMAL END OF LEFT HUMERUS WITH ROUTINE HEALING, SUBSEQUENT ENCOUNTER: Primary | ICD-10-CM

## 2019-10-04 PROCEDURE — 99213 OFFICE O/P EST LOW 20 MIN: CPT | Performed by: PHYSICIAN ASSISTANT

## 2019-10-16 ENCOUNTER — TELEPHONE (OUTPATIENT)
Dept: INTERNAL MEDICINE CLINIC | Age: 74
End: 2019-10-16

## 2019-11-01 ENCOUNTER — TELEPHONE (OUTPATIENT)
Dept: INTERNAL MEDICINE CLINIC | Age: 74
End: 2019-11-01

## 2019-11-01 DIAGNOSIS — R26.81 GAIT INSTABILITY: Primary | ICD-10-CM

## 2019-12-06 ENCOUNTER — OFFICE VISIT (OUTPATIENT)
Dept: INTERNAL MEDICINE CLINIC | Age: 74
End: 2019-12-06
Payer: COMMERCIAL

## 2019-12-06 VITALS
SYSTOLIC BLOOD PRESSURE: 122 MMHG | OXYGEN SATURATION: 99 % | HEART RATE: 72 BPM | WEIGHT: 128.8 LBS | BODY MASS INDEX: 22.82 KG/M2 | DIASTOLIC BLOOD PRESSURE: 64 MMHG

## 2019-12-06 DIAGNOSIS — D50.9 IRON DEFICIENCY ANEMIA, UNSPECIFIED IRON DEFICIENCY ANEMIA TYPE: ICD-10-CM

## 2019-12-06 DIAGNOSIS — Z72.0 TOBACCO USE: ICD-10-CM

## 2019-12-06 DIAGNOSIS — R43.8 BAD TASTE IN MOUTH: Primary | ICD-10-CM

## 2019-12-06 DIAGNOSIS — D89.2 HYPERGAMMAGLOBULINEMIA, UNSPECIFIED: ICD-10-CM

## 2019-12-06 DIAGNOSIS — E78.5 HYPERLIPIDEMIA LDL GOAL <70: ICD-10-CM

## 2019-12-06 DIAGNOSIS — E03.9 ACQUIRED HYPOTHYROIDISM: ICD-10-CM

## 2019-12-06 DIAGNOSIS — M89.8X9 RADIOLUCENT LESION OF BONE: ICD-10-CM

## 2019-12-06 DIAGNOSIS — E10.9 AUTOIMMUNE DIABETES (HCC): ICD-10-CM

## 2019-12-06 DIAGNOSIS — I48.0 PAROXYSMAL ATRIAL FIBRILLATION (HCC): ICD-10-CM

## 2019-12-06 DIAGNOSIS — F51.04 CHRONIC INSOMNIA: ICD-10-CM

## 2019-12-06 LAB
BASOPHILS ABSOLUTE: 0.1 K/UL (ref 0–0.2)
BASOPHILS RELATIVE PERCENT: 1.1 %
EOSINOPHILS ABSOLUTE: 0.1 K/UL (ref 0–0.6)
EOSINOPHILS RELATIVE PERCENT: 2.9 %
HCT VFR BLD CALC: 31.2 % (ref 36–48)
HEMOGLOBIN: 10.8 G/DL (ref 12–16)
LYMPHOCYTES ABSOLUTE: 1.3 K/UL (ref 1–5.1)
LYMPHOCYTES RELATIVE PERCENT: 25.9 %
MCH RBC QN AUTO: 33.2 PG (ref 26–34)
MCHC RBC AUTO-ENTMCNC: 34.6 G/DL (ref 31–36)
MCV RBC AUTO: 96.1 FL (ref 80–100)
MONOCYTES ABSOLUTE: 0.7 K/UL (ref 0–1.3)
MONOCYTES RELATIVE PERCENT: 13.7 %
NEUTROPHILS ABSOLUTE: 2.9 K/UL (ref 1.7–7.7)
NEUTROPHILS RELATIVE PERCENT: 56.4 %
PDW BLD-RTO: 15.4 % (ref 12.4–15.4)
PLATELET # BLD: 240 K/UL (ref 135–450)
PMV BLD AUTO: 7.2 FL (ref 5–10.5)
RBC # BLD: 3.25 M/UL (ref 4–5.2)
WBC # BLD: 5.2 K/UL (ref 4–11)

## 2019-12-06 PROCEDURE — 99214 OFFICE O/P EST MOD 30 MIN: CPT | Performed by: INTERNAL MEDICINE

## 2019-12-06 RX ORDER — LEVOTHYROXINE SODIUM 0.12 MG/1
125 TABLET ORAL DAILY
Qty: 30 TABLET | Refills: 5 | Status: SHIPPED | OUTPATIENT
Start: 2019-12-06 | End: 2019-12-10

## 2019-12-06 RX ORDER — ATORVASTATIN CALCIUM 40 MG/1
40 TABLET, FILM COATED ORAL DAILY
Qty: 90 TABLET | Refills: 3 | Status: SHIPPED | OUTPATIENT
Start: 2019-12-06 | End: 2020-12-01

## 2019-12-07 LAB
A/G RATIO: 0.9 (ref 1.1–2.2)
ALBUMIN SERPL-MCNC: 4.2 G/DL (ref 3.4–5)
ALP BLD-CCNC: 99 U/L (ref 40–129)
ALT SERPL-CCNC: 13 U/L (ref 10–40)
ANION GAP SERPL CALCULATED.3IONS-SCNC: 16 MMOL/L (ref 3–16)
AST SERPL-CCNC: 23 U/L (ref 15–37)
BILIRUB SERPL-MCNC: 0.4 MG/DL (ref 0–1)
BUN BLDV-MCNC: 18 MG/DL (ref 7–20)
CALCIUM SERPL-MCNC: 9.4 MG/DL (ref 8.3–10.6)
CHLORIDE BLD-SCNC: 93 MMOL/L (ref 99–110)
CO2: 19 MMOL/L (ref 21–32)
CREAT SERPL-MCNC: 0.8 MG/DL (ref 0.6–1.2)
GFR AFRICAN AMERICAN: >60
GFR NON-AFRICAN AMERICAN: >60
GLOBULIN: 4.5 G/DL
GLUCOSE BLD-MCNC: 101 MG/DL (ref 70–99)
POTASSIUM SERPL-SCNC: 5.4 MMOL/L (ref 3.5–5.1)
SODIUM BLD-SCNC: 128 MMOL/L (ref 136–145)
T4 FREE: 1.9 NG/DL (ref 0.9–1.8)
TOTAL PROTEIN: 8.7 G/DL (ref 6.4–8.2)
TSH REFLEX: 0.05 UIU/ML (ref 0.27–4.2)

## 2019-12-08 PROBLEM — D89.2 HYPERGAMMAGLOBULINEMIA, UNSPECIFIED: Status: ACTIVE | Noted: 2019-12-08

## 2019-12-08 PROBLEM — E10.9 AUTOIMMUNE DIABETES (HCC): Status: ACTIVE | Noted: 2019-12-08

## 2019-12-08 ASSESSMENT — ENCOUNTER SYMPTOMS
DIARRHEA: 0
CONSTIPATION: 0
CHEST TIGHTNESS: 0
SHORTNESS OF BREATH: 0

## 2019-12-10 DIAGNOSIS — E03.9 ACQUIRED HYPOTHYROIDISM: ICD-10-CM

## 2019-12-10 RX ORDER — LEVOTHYROXINE SODIUM 112 UG/1
112 TABLET ORAL DAILY
Qty: 30 TABLET | Refills: 2 | Status: SHIPPED | OUTPATIENT
Start: 2019-12-10 | End: 2020-03-10

## 2020-03-10 RX ORDER — LEVOTHYROXINE SODIUM 112 UG/1
112 TABLET ORAL DAILY
Qty: 30 TABLET | Refills: 2 | Status: SHIPPED | OUTPATIENT
Start: 2020-03-10 | End: 2020-03-31 | Stop reason: SDUPTHER

## 2020-03-31 ENCOUNTER — TELEPHONE (OUTPATIENT)
Dept: INTERNAL MEDICINE CLINIC | Age: 75
End: 2020-03-31

## 2020-03-31 RX ORDER — LEVOTHYROXINE SODIUM 112 UG/1
112 TABLET ORAL DAILY
Qty: 30 TABLET | Refills: 2 | Status: SHIPPED | OUTPATIENT
Start: 2020-03-31 | End: 2020-11-02

## 2020-03-31 RX ORDER — CARVEDILOL 3.12 MG/1
3.12 TABLET ORAL 2 TIMES DAILY
Qty: 60 TABLET | Refills: 5 | Status: SHIPPED | OUTPATIENT
Start: 2020-03-31 | End: 2020-11-02

## 2020-08-04 RX ORDER — RIVAROXABAN 10 MG/1
TABLET, FILM COATED ORAL
Qty: 30 TABLET | Refills: 3 | Status: SHIPPED | OUTPATIENT
Start: 2020-08-04 | End: 2020-12-01

## 2020-11-02 RX ORDER — LEVOTHYROXINE SODIUM 112 UG/1
112 TABLET ORAL DAILY
Qty: 30 TABLET | Refills: 2 | Status: SHIPPED | OUTPATIENT
Start: 2020-11-02 | End: 2021-02-01

## 2020-11-02 RX ORDER — CARVEDILOL 3.12 MG/1
TABLET ORAL
Qty: 60 TABLET | Refills: 5 | Status: ON HOLD | OUTPATIENT
Start: 2020-11-02 | End: 2022-01-26 | Stop reason: HOSPADM

## 2020-12-10 ENCOUNTER — TELEPHONE (OUTPATIENT)
Dept: INTERNAL MEDICINE CLINIC | Age: 75
End: 2020-12-10

## 2020-12-10 NOTE — TELEPHONE ENCOUNTER
Can you please clarify the message? I am not sure what she is asking for, does she maybe want to do a VV?   saw

## 2020-12-10 NOTE — TELEPHONE ENCOUNTER
Pt has not been getting out or exercising during this covid pandemic. Patient doesn't feel like she can get out and get a flu shot or anything. Need to talk with Dr. Nettie Potter re:her care. Aware this will be addressed tomorrow.

## 2020-12-14 ENCOUNTER — TELEPHONE (OUTPATIENT)
Dept: INTERNAL MEDICINE CLINIC | Age: 75
End: 2020-12-14

## 2020-12-14 NOTE — TELEPHONE ENCOUNTER
----- Message from Bryan Mendoza sent at 12/11/2020  5:25 PM EST -----  Subject: Message to Provider    QUESTIONS  Information for Provider? Pt returned Roberta's call to clarify Edyta's   request for Josselyn to come and give her her flu shot and to do physical   therapy as she has been so inactive she is not able to use her walker   outside. Pt doesn't know if orders from Dr. Lisa Turk are needed. Pls cl  ---------------------------------------------------------------------------  --------------  CALL BACK INFO  What is the best way for the office to contact you? OK to leave message on   voicemail  Preferred Call Back Phone Number? [phone]  ---------------------------------------------------------------------------  --------------  SCRIPT ANSWERS  Relationship to Patient?  Self

## 2020-12-15 ENCOUNTER — TELEPHONE (OUTPATIENT)
Dept: INTERNAL MEDICINE CLINIC | Age: 75
End: 2020-12-15

## 2020-12-15 NOTE — TELEPHONE ENCOUNTER
Mobility decreased. Trouble walking. Doesn't feel like she can come in to the office to see Dr. Everett Hoover or go receive a flu shot. She is asking if visiting physicians would be an option or some kind of home health care. PT/OT. Did explain VV / telephone visit was an option also. She did not know this was available. She was advised that this week has been troublesome with staffing etc. Will forward to Maryann dunn RN and Dr. Everett Hoover. Will schedule pt for VV phone visit if the patient agrees.

## 2020-12-15 NOTE — TELEPHONE ENCOUNTER
She may be better severed by visiting physicians than VV, especially if she needs her flu vaccine. I reviewed her chart and I think that she is pretty complicated and should be seen physically.  saw

## 2020-12-16 NOTE — TELEPHONE ENCOUNTER
Spoke with patient - she is agreeable to visiting physicians. Referral made. Patient instructed to follow up with  in 2 days, account #[id number]. Visiting physicians [phone].

## 2020-12-29 ENCOUNTER — TELEPHONE (OUTPATIENT)
Dept: INTERNAL MEDICINE CLINIC | Age: 75
End: 2020-12-29

## 2020-12-29 NOTE — TELEPHONE ENCOUNTER
Per Jaky Castro with [facility], Visiting Physicians is out of network. Patient will need a referral to either [facility] or [facility] - patient does not have a preference.

## 2020-12-30 NOTE — TELEPHONE ENCOUNTER
Insurance information and demographics faxed to [phone].    Waiting to hear back regarding coverage before placing referral.

## 2020-12-31 RX ORDER — ATORVASTATIN CALCIUM 40 MG/1
40 TABLET, FILM COATED ORAL DAILY
Qty: 30 TABLET | Refills: 0 | Status: SHIPPED | OUTPATIENT
Start: 2020-12-31 | End: 2021-02-01

## 2020-12-31 RX ORDER — RIVAROXABAN 10 MG/1
TABLET, FILM COATED ORAL
Qty: 30 TABLET | Refills: 0 | Status: SHIPPED | OUTPATIENT
Start: 2020-12-31 | End: 2021-02-01

## 2021-01-04 NOTE — TELEPHONE ENCOUNTER
Call out to [employer] regarding insurance coverage - awaiting call return from [facility]

## 2021-01-05 NOTE — TELEPHONE ENCOUNTER
Per Cindy, fax was not received from last week. Verified fax number. Re-faxed to [phone]. Will follow up at end of day.

## 2021-01-05 NOTE — TELEPHONE ENCOUNTER
Per [employer] member ID not valid. Attempted to reach out to patient to verify insurance information. I spoke with patient - she now has [facility] member ID: [id number]  Group no. WDonald GDonald [id number]) [facility]  [phone]  Mailing address: [facility] [id number], [place], [address]. Updated insurance information provided to Kenedy with [employer]. Minda is in network. Patient requesting to establish primary care with [facility].

## 2021-12-13 ENCOUNTER — TELEPHONE (OUTPATIENT)
Dept: PHARMACY | Facility: CLINIC | Age: 76
End: 2021-12-13

## 2021-12-13 NOTE — TELEPHONE ENCOUNTER
[facility] CLINICAL PHARMACY REVIEW: ADHERENCE REVIEW  Identified care gap per Karmen; fills at [facility]: Statin adherence    Last Visit: unknown    Patient identified as LIS = 1, therefore patient may be able to receive a 90-day supply for the same cost as a 30-day supply    STATIN ADHERENCE    Per Insurance Records through 12/5/21 90%; Potential Fail Date: 12/16/21):   ATORVASTATIN TAB 40MG last filled on 11/3/21 for 30 day supply. Next refill due: 12/3/21    Per Rivera Martinez at [address] [place] [address]:   She will process refill today using [address] [place]    Lab Results   Component Value Date    CHOL 111 07/30/2019    TRIG 56 07/30/2019    HDL 51 07/30/2019    LDLCALC 49 07/30/2019     ALT   Date Value Ref Range Status   12/06/2019 13 10 - 40 U/L Final     AST   Date Value Ref Range Status   12/06/2019 23 15 - 37 U/L Final     The ASCVD Risk score (Arden Benjamin, et al., 2013) failed to calculate for the following reasons: The systolic blood pressure is missing    The valid total cholesterol range is 130 to 320 mg/dL     PLAN  The following are interventions that have been identified:   - Patient overdue refilling ATORVASTATIN TAB 40MG and active on home medication list.     Attempting to reach patient to review changing prescription from a 30-day supply to a 90-day supply.  Left message asking for return call     Refill is ready for  at [facility]    No future appointments. Yina Abdalla CP.    [address] [facility] free: [phone]

## 2021-12-17 NOTE — TELEPHONE ENCOUNTER
For Pharmacy [id number] in place:  No   Recommendation Provided To: Pharmacy: 1   Intervention Detail: Adherence Monitorin   Gap Closed?: Yes    Intervention Accepted By: Pharmacy: 1   Time Spent (min): 15

## 2021-12-17 NOTE — TELEPHONE ENCOUNTER
2nd Attempt Documentation:  2nd attempt to contact this patient regarding the previous message  CLINICAL PHARMACY: ADHERENCE REVIEW  Patient unavailable at the time of call.  Left following message on home TAD: please  refill at [facility]    Will verify

## 2022-01-07 ENCOUNTER — TELEPHONE (OUTPATIENT)
Dept: INTERNAL MEDICINE CLINIC | Age: 77
End: 2022-01-07

## 2022-01-07 NOTE — TELEPHONE ENCOUNTER
Spoke with patient she states the NP that has been seeing her at home is concerned that she has lung cancer. Do to patients limited transportation she can only come Donald and Fri.

## 2022-01-07 NOTE — TELEPHONE ENCOUNTER
I am not sure what this is? Does she mean a PET scan? Also, she has not been seen since 2019 and will need an appointment prior to any orders being placed.

## 2022-01-07 NOTE — TELEPHONE ENCOUNTER
----- Message from Duc Jung sent at 1/6/2022  5:08 PM EST -----  Subject: Referral Request    QUESTIONS   Reason for referral request? PT scan   Has the physician seen you for this condition before? No   Preferred Specialist (if applicable)? Do you already have an appointment scheduled? No  Additional Information for Provider?   ---------------------------------------------------------------------------  --------------  CALL BACK INFO  What is the best way for the office to contact you? OK to leave message on   voicemail  Preferred Call Back Phone Number?  [phone]

## 2022-01-13 ENCOUNTER — APPOINTMENT (OUTPATIENT)
Dept: CT IMAGING | Age: 77
DRG: 871 | End: 2022-01-13
Payer: MEDICARE

## 2022-01-13 ENCOUNTER — HOSPITAL ENCOUNTER (INPATIENT)
Age: 77
LOS: 13 days | Discharge: NURSING, REHAB OR LONG-TERM CARE | DRG: 871 | End: 2022-01-26
Attending: EMERGENCY MEDICINE | Admitting: INTERNAL MEDICINE
Payer: MEDICARE

## 2022-01-13 ENCOUNTER — APPOINTMENT (OUTPATIENT)
Dept: GENERAL RADIOLOGY | Age: 77
DRG: 871 | End: 2022-01-13
Payer: MEDICARE

## 2022-01-13 DIAGNOSIS — R53.83 FATIGUE, UNSPECIFIED TYPE: Primary | ICD-10-CM

## 2022-01-13 DIAGNOSIS — C79.51 MALIGNANT NEOPLASM METASTATIC TO BONE (HCC): ICD-10-CM

## 2022-01-13 DIAGNOSIS — N63.20 LEFT BREAST MASS: ICD-10-CM

## 2022-01-13 DIAGNOSIS — R68.89 SUSPECTED MALIGNANT NEOPLASM: ICD-10-CM

## 2022-01-13 DIAGNOSIS — J18.9 PNEUMONIA DUE TO INFECTIOUS ORGANISM, UNSPECIFIED LATERALITY, UNSPECIFIED PART OF LUNG: ICD-10-CM

## 2022-01-13 LAB
A/G RATIO: 0.6 (ref 1.1–2.2)
ALBUMIN SERPL-MCNC: 3.1 G/DL (ref 3.4–5)
ALP BLD-CCNC: 103 U/L (ref 40–129)
ALT SERPL-CCNC: 17 U/L (ref 10–40)
ANION GAP SERPL CALCULATED.3IONS-SCNC: 12 MMOL/L (ref 3–16)
AST SERPL-CCNC: 22 U/L (ref 15–37)
BACTERIA: ABNORMAL /HPF
BASE EXCESS VENOUS: -8.5 MMOL/L (ref -3–3)
BASOPHILS ABSOLUTE: 0 K/UL (ref 0–0.2)
BASOPHILS RELATIVE PERCENT: 0 %
BILIRUB SERPL-MCNC: 0.4 MG/DL (ref 0–1)
BILIRUBIN URINE: NEGATIVE
BLOOD, URINE: NEGATIVE
BUN BLDV-MCNC: 33 MG/DL (ref 7–20)
CALCIUM SERPL-MCNC: 12.9 MG/DL (ref 8.3–10.6)
CARBOXYHEMOGLOBIN: 4.6 % (ref 0–1.5)
CHLORIDE BLD-SCNC: 103 MMOL/L (ref 99–110)
CLARITY: CLEAR
CO2: 18 MMOL/L (ref 21–32)
COLOR: YELLOW
CREAT SERPL-MCNC: 1.1 MG/DL (ref 0.6–1.2)
EKG ATRIAL RATE: 75 BPM
EKG DIAGNOSIS: NORMAL
EKG P AXIS: 77 DEGREES
EKG P-R INTERVAL: 168 MS
EKG Q-T INTERVAL: 344 MS
EKG QRS DURATION: 92 MS
EKG QTC CALCULATION (BAZETT): 384 MS
EKG R AXIS: -23 DEGREES
EKG T AXIS: 55 DEGREES
EKG VENTRICULAR RATE: 75 BPM
EOSINOPHILS ABSOLUTE: 0 K/UL (ref 0–0.6)
EOSINOPHILS RELATIVE PERCENT: 0.1 %
EPITHELIAL CELLS, UA: 3 /HPF (ref 0–5)
GFR AFRICAN AMERICAN: 58
GFR NON-AFRICAN AMERICAN: 48
GLUCOSE BLD-MCNC: 118 MG/DL (ref 70–99)
GLUCOSE URINE: NEGATIVE MG/DL
HCO3 VENOUS: 17.9 MMOL/L (ref 23–29)
HCT VFR BLD CALC: 27.3 % (ref 36–48)
HEMOGLOBIN, VEN, REDUCED: 15 %
HEMOGLOBIN: 8.7 G/DL (ref 12–16)
HYALINE CASTS: 2 /LPF (ref 0–8)
KETONES, URINE: NEGATIVE MG/DL
LACTIC ACID, SEPSIS: 1.7 MMOL/L (ref 0.4–1.9)
LEUKOCYTE ESTERASE, URINE: ABNORMAL
LYMPHOCYTES ABSOLUTE: 0.7 K/UL (ref 1–5.1)
LYMPHOCYTES RELATIVE PERCENT: 3.9 %
MCH RBC QN AUTO: 32.3 PG (ref 26–34)
MCHC RBC AUTO-ENTMCNC: 32 G/DL (ref 31–36)
MCV RBC AUTO: 100.8 FL (ref 80–100)
METHEMOGLOBIN VENOUS: <0 %
MICROSCOPIC EXAMINATION: YES
MONOCYTES ABSOLUTE: 1.5 K/UL (ref 0–1.3)
MONOCYTES RELATIVE PERCENT: 8.2 %
NEUTROPHILS ABSOLUTE: 16.4 K/UL (ref 1.7–7.7)
NEUTROPHILS RELATIVE PERCENT: 87.8 %
NITRITE, URINE: NEGATIVE
O2 CONTENT, VEN: 10 VOL %
O2 SAT, VEN: 85 %
O2 THERAPY: ABNORMAL
PCO2, VEN: 39.8 MMHG (ref 40–50)
PDW BLD-RTO: 13 % (ref 12.4–15.4)
PH UA: 6 (ref 5–8)
PH VENOUS: 7.26 (ref 7.35–7.45)
PLATELET # BLD: 424 K/UL (ref 135–450)
PMV BLD AUTO: 6.6 FL (ref 5–10.5)
PO2, VEN: 54.9 MMHG (ref 25–40)
POTASSIUM REFLEX MAGNESIUM: 3.9 MMOL/L (ref 3.5–5.1)
PROTEIN UA: 30 MG/DL
RBC # BLD: 2.71 M/UL (ref 4–5.2)
RBC UA: 1 /HPF (ref 0–4)
SODIUM BLD-SCNC: 133 MMOL/L (ref 136–145)
SPECIFIC GRAVITY UA: 1.01 (ref 1–1.03)
TCO2 CALC VENOUS: 43 MMOL/L
TOTAL PROTEIN: 8.4 G/DL (ref 6.4–8.2)
TROPONIN: 0.02 NG/ML
URINE REFLEX TO CULTURE: YES
URINE TYPE: ABNORMAL
UROBILINOGEN, URINE: 1 E.U./DL
WBC # BLD: 18.7 K/UL (ref 4–11)
WBC UA: 18 /HPF (ref 0–5)

## 2022-01-13 PROCEDURE — 85025 COMPLETE CBC W/AUTO DIFF WBC: CPT

## 2022-01-13 PROCEDURE — 87086 URINE CULTURE/COLONY COUNT: CPT

## 2022-01-13 PROCEDURE — 71250 CT THORAX DX C-: CPT

## 2022-01-13 PROCEDURE — 6370000000 HC RX 637 (ALT 250 FOR IP): Performed by: INTERNAL MEDICINE

## 2022-01-13 PROCEDURE — 94761 N-INVAS EAR/PLS OXIMETRY MLT: CPT

## 2022-01-13 PROCEDURE — 1200000000 HC SEMI PRIVATE

## 2022-01-13 PROCEDURE — 87040 BLOOD CULTURE FOR BACTERIA: CPT

## 2022-01-13 PROCEDURE — 84484 ASSAY OF TROPONIN QUANT: CPT

## 2022-01-13 PROCEDURE — 81001 URINALYSIS AUTO W/SCOPE: CPT

## 2022-01-13 PROCEDURE — 93005 ELECTROCARDIOGRAM TRACING: CPT | Performed by: EMERGENCY MEDICINE

## 2022-01-13 PROCEDURE — 93010 ELECTROCARDIOGRAM REPORT: CPT | Performed by: INTERNAL MEDICINE

## 2022-01-13 PROCEDURE — 82542 COL CHROMOTOGRAPHY QUAL/QUAN: CPT

## 2022-01-13 PROCEDURE — 82803 BLOOD GASES ANY COMBINATION: CPT

## 2022-01-13 PROCEDURE — 6360000002 HC RX W HCPCS: Performed by: EMERGENCY MEDICINE

## 2022-01-13 PROCEDURE — 83605 ASSAY OF LACTIC ACID: CPT

## 2022-01-13 PROCEDURE — 2580000003 HC RX 258: Performed by: EMERGENCY MEDICINE

## 2022-01-13 PROCEDURE — 99284 EMERGENCY DEPT VISIT MOD MDM: CPT

## 2022-01-13 PROCEDURE — 80053 COMPREHEN METABOLIC PANEL: CPT

## 2022-01-13 PROCEDURE — 71045 X-RAY EXAM CHEST 1 VIEW: CPT

## 2022-01-13 PROCEDURE — 2580000003 HC RX 258: Performed by: INTERNAL MEDICINE

## 2022-01-13 PROCEDURE — 84443 ASSAY THYROID STIM HORMONE: CPT

## 2022-01-13 PROCEDURE — 2700000000 HC OXYGEN THERAPY PER DAY

## 2022-01-13 PROCEDURE — 36415 COLL VENOUS BLD VENIPUNCTURE: CPT

## 2022-01-13 RX ORDER — SODIUM CHLORIDE 9 MG/ML
25 INJECTION, SOLUTION INTRAVENOUS PRN
Status: DISCONTINUED | OUTPATIENT
Start: 2022-01-13 | End: 2022-01-26 | Stop reason: HOSPADM

## 2022-01-13 RX ORDER — POTASSIUM CHLORIDE 20 MEQ/1
20 TABLET, EXTENDED RELEASE ORAL 2 TIMES DAILY
COMMUNITY

## 2022-01-13 RX ORDER — AZITHROMYCIN 500 MG/1
500 INJECTION, POWDER, LYOPHILIZED, FOR SOLUTION INTRAVENOUS ONCE
Status: DISCONTINUED | OUTPATIENT
Start: 2022-01-13 | End: 2022-01-13 | Stop reason: SDUPTHER

## 2022-01-13 RX ORDER — MIRTAZAPINE 15 MG/1
15 TABLET, FILM COATED ORAL NIGHTLY
Status: DISCONTINUED | OUTPATIENT
Start: 2022-01-13 | End: 2022-01-13 | Stop reason: ALTCHOICE

## 2022-01-13 RX ORDER — ACETAMINOPHEN 325 MG/1
650 TABLET ORAL EVERY 6 HOURS PRN
Status: DISCONTINUED | OUTPATIENT
Start: 2022-01-13 | End: 2022-01-26 | Stop reason: HOSPADM

## 2022-01-13 RX ORDER — ACETAMINOPHEN 650 MG/1
650 SUPPOSITORY RECTAL EVERY 6 HOURS PRN
Status: DISCONTINUED | OUTPATIENT
Start: 2022-01-13 | End: 2022-01-26 | Stop reason: HOSPADM

## 2022-01-13 RX ORDER — CARVEDILOL 3.12 MG/1
1 TABLET ORAL 2 TIMES DAILY
Status: DISCONTINUED | OUTPATIENT
Start: 2022-01-13 | End: 2022-01-13 | Stop reason: ALTCHOICE

## 2022-01-13 RX ORDER — SODIUM CHLORIDE 0.9 % (FLUSH) 0.9 %
5-40 SYRINGE (ML) INJECTION PRN
Status: DISCONTINUED | OUTPATIENT
Start: 2022-01-13 | End: 2022-01-26 | Stop reason: HOSPADM

## 2022-01-13 RX ORDER — GABAPENTIN 100 MG/1
200 CAPSULE ORAL NIGHTLY
COMMUNITY

## 2022-01-13 RX ORDER — PANTOPRAZOLE SODIUM 40 MG/1
40 TABLET, DELAYED RELEASE ORAL
Status: DISCONTINUED | OUTPATIENT
Start: 2022-01-14 | End: 2022-01-26 | Stop reason: HOSPADM

## 2022-01-13 RX ORDER — SODIUM CHLORIDE 0.9 % (FLUSH) 0.9 %
5-40 SYRINGE (ML) INJECTION EVERY 12 HOURS SCHEDULED
Status: DISCONTINUED | OUTPATIENT
Start: 2022-01-13 | End: 2022-01-26 | Stop reason: HOSPADM

## 2022-01-13 RX ORDER — LEVOTHYROXINE SODIUM 112 UG/1
112 TABLET ORAL DAILY
Status: DISCONTINUED | OUTPATIENT
Start: 2022-01-13 | End: 2022-01-26 | Stop reason: HOSPADM

## 2022-01-13 RX ORDER — SODIUM CHLORIDE 9 MG/ML
INJECTION, SOLUTION INTRAVENOUS CONTINUOUS
Status: DISCONTINUED | OUTPATIENT
Start: 2022-01-13 | End: 2022-01-15

## 2022-01-13 RX ORDER — ATORVASTATIN CALCIUM 40 MG/1
40 TABLET, FILM COATED ORAL DAILY
Status: DISCONTINUED | OUTPATIENT
Start: 2022-01-13 | End: 2022-01-26 | Stop reason: HOSPADM

## 2022-01-13 RX ORDER — ASPIRIN 81 MG/1
81 TABLET ORAL DAILY
Status: DISCONTINUED | OUTPATIENT
Start: 2022-01-13 | End: 2022-01-26 | Stop reason: HOSPADM

## 2022-01-13 RX ADMIN — RIVAROXABAN 10 MG: 10 TABLET, FILM COATED ORAL at 23:54

## 2022-01-13 RX ADMIN — Medication 10 ML: at 23:50

## 2022-01-13 RX ADMIN — AZITHROMYCIN MONOHYDRATE 500 MG: 500 INJECTION, POWDER, LYOPHILIZED, FOR SOLUTION INTRAVENOUS at 14:43

## 2022-01-13 RX ADMIN — Medication 1000 MG: at 14:27

## 2022-01-13 RX ADMIN — ACETAMINOPHEN 650 MG: 325 TABLET ORAL at 23:50

## 2022-01-13 RX ADMIN — SODIUM CHLORIDE: 9 INJECTION, SOLUTION INTRAVENOUS at 23:54

## 2022-01-13 RX ADMIN — ASPIRIN 81 MG: 81 TABLET, COATED ORAL at 23:50

## 2022-01-13 RX ADMIN — ATORVASTATIN CALCIUM 40 MG: 40 TABLET, FILM COATED ORAL at 23:50

## 2022-01-13 ASSESSMENT — PAIN SCALES - GENERAL
PAINLEVEL_OUTOF10: 3
PAINLEVEL_OUTOF10: 8
PAINLEVEL_OUTOF10: 7
PAINLEVEL_OUTOF10: 3
PAINLEVEL_OUTOF10: 10

## 2022-01-13 ASSESSMENT — PAIN DESCRIPTION - PROGRESSION: CLINICAL_PROGRESSION: NOT CHANGED

## 2022-01-13 ASSESSMENT — PAIN DESCRIPTION - ONSET: ONSET: ON-GOING

## 2022-01-13 ASSESSMENT — PAIN DESCRIPTION - ORIENTATION
ORIENTATION: RIGHT;LEFT;MID
ORIENTATION: UPPER
ORIENTATION: MID

## 2022-01-13 ASSESSMENT — PAIN DESCRIPTION - DESCRIPTORS
DESCRIPTORS: PRESSURE
DESCRIPTORS: PRESSURE

## 2022-01-13 ASSESSMENT — PAIN DESCRIPTION - LOCATION
LOCATION: BACK
LOCATION: BACK
LOCATION: CHEST

## 2022-01-13 ASSESSMENT — PAIN DESCRIPTION - PAIN TYPE: TYPE: ACUTE PAIN

## 2022-01-13 ASSESSMENT — PAIN DESCRIPTION - FREQUENCY: FREQUENCY: CONTINUOUS

## 2022-01-13 NOTE — PROGRESS NOTES
Pt seen in  ED, admission completed. Pt is alert and oriented x 4. Pt lives at home with a friend, and is being admitted for PNA. Plan of care updated, all questions answered. Meal tray ordered for pt.

## 2022-01-13 NOTE — ED PROVIDER NOTES
[address] [facility]  EMERGENCY DEPARTMENTENCOUNTER      Pt Name: Cheri Bush  MRN: [id number]  [place] 1945  Date ofevaluation: 1/13/2022  Provider: Michael Mcmahon MD    CHIEF COMPLAINT       Chief Complaint   Patient presents with    Fatigue     Pt in via EMS from home. Pts neighbor called 911 because the pt has been getting weaker. Pt has been feeling weak for the past few months. Pt endorses no pain but chest pressure that goes through to her back. Pt has hx of stroke and angioplasty       HPI    HISTORY OF PRESENT ILLNESS   (Location/Symptom, Timing/Onset,Context/Setting, Quality, Duration, Modifying Factors, Severity)  Note limiting factors. Cheri Bush is a 67 y.o. female who presents to the emergency department with weakness. This is a 58-year-old female who lives with a family member who presents by ambulance after the family member called 46. The patient has apparently been getting progressively weaker over the last several months. She denies any injuries or falls. She denies any substernal chest pain. She denies any fevers or chills. NursingNotes were reviewed. Review of Systems    REVIEW OF SYSTEMS    (2-9 systems for level 4, 10 or more for level 5)     Review of Systems   Constitutional: Negative for fever. HENT: Negative for rhinorrhea and sore throat. Eyes: Negative for redness. Respiratory: Negative for shortness of breath. Cardiovascular: Negative for chest pain. Gastrointestinal: Negative for abdominal pain. Genitourinary: Negative for flank pain. Neurological: Negative for headaches. Hematological: Negative for adenopathy. Psychiatric/Behavioral: Negative for confusion. Except as noted above the remainder of the review of systems was reviewed and negative.        PAST MEDICAL HISTORY     Past Medical History:   Diagnosis Date    Acquired spondylolisthesis     Arthritis     Cataract     Chronic pain syndrome     Degeneration of lumbar or lumbosacral intervertebral disc     Depressive disorder, not elsewhere classified     Displacement of lumbar intervertebral disc without myelopathy     H/O blood clots     HBP (high blood pressure)     Heart disease     Spinal stenosis, lumbar region, without neurogenic claudication     Sprain of lumbosacral (joint) (ligament)     Sprain of neck     Stroke (HCC)     Thyroid disorder     Thyroid disorder          SURGICALHISTORY       Past Surgical History:   Procedure Laterality Date    HYSTERECTOMY, TOTAL ABDOMINAL  1972    LUMBAR FUSION      L4-5    PARTIAL HYSTERECTOMY  1964         CURRENT MEDICATIONS       Previous Medications    AMITRIPTYLINE (ELAVIL) 25 MG TABLET    Take 1 tablet by mouth nightly    ASPIRIN 81 MG EC TABLET    Take 81 mg by mouth daily. ATORVASTATIN (LIPITOR) 40 MG TABLET    TAKE 1 TABLET BY MOUTH DAILY    CARVEDILOL (COREG) 3.125 MG TABLET    TAKE 1 TABLET BY MOUTH TWICE DAILY    CHOLECALCIFEROL (VITAMIN D3) 5000 UNITS TABS    Take 1 tablet by mouth daily     ESOMEPRAZOLE (NEXIUM) 40 MG DELAYED RELEASE CAPSULE    Take 1 capsule by mouth every morning (before breakfast)    LEVOTHYROXINE (SYNTHROID) 112 MCG TABLET    TAKE 1 TABLET BY MOUTH DAILY    MIRTAZAPINE (REMERON) 15 MG TABLET    Take 1 tablet by mouth nightly    MISC. DEVICES (QUAD CANE) MISC    1 each by Does not apply route daily    NITROGLYCERIN (NITROSTAT) 0.4 MG SL TABLET    Place 0.4 mg under the tongue every 5 minutes as needed for Chest pain up to max of 3 total doses. If no relief after 1 dose, call 911.     XARELTO 10 MG TABS TABLET    TAKE 1 TABLET BY MOUTH EVERY 24 HOURS       ALLERGIES     Other, Trazodone, and Venlafaxine    FAMILY HISTORY       Family History   Problem Relation Age of Onset    Cancer Father         Brain Cancer     Breast Cancer Sister     High Blood Pressure Sister           SOCIAL HISTORY       Social History     Socioeconomic History    Marital status:      Spouse name: None    Number of children: 1    Years of education: None    Highest education level: None   Occupational History    Occupation: Retired   Tobacco Use    Smoking status: Current Every Day Smoker     Packs/day: 0.50     Years: 57.00     Pack years: 28.50     Start date: 1960    Smokeless tobacco: Never Used   Vaping Use    Vaping Use: Never used   Substance and Sexual Activity    Alcohol use: Not Currently    Drug use: No    Sexual activity: None   Other Topics Concern    None   Social History Narrative    None     Social Determinants of Health     Financial Resource Strain:     Difficulty of Paying Living Expenses: Not on file   Food Insecurity:     Worried About Running Out of Food in the Last Year: Not on file    Melinda of Food in the Last Year: Not on file   Transportation Needs:     Lack of Transportation (Medical): Not on file    Lack of Transportation (Non-Medical):  Not on file   Physical Activity:     Days of Exercise per Week: Not on file    Minutes of Exercise per Session: Not on file   Stress:     Feeling of Stress : Not on file   Social Connections:     Frequency of Communication with Friends and Family: Not on file    Frequency of Social Gatherings with Friends and Family: Not on file    Attends Alevism Services: Not on file    Active Member of [address] [place] [address] or Organizations: Not on file    Attends Club or Organization Meetings: Not on file    Marital Status: Not on file   Intimate Partner Violence:     Fear of Current or Ex-Partner: Not on file    Emotionally Abused: Not on file    Physically Abused: Not on file    Sexually Abused: Not on file   Housing Stability:     Unable to Pay for Housing in the Last Year: Not on file    Number of [place] in the Last Year: Not on file    Unstable Housing in the Last Year: Not on file       SCREENINGS             PHYSICAL EXAM    (up to 7 for level 4, 8 or more for level 5)     ED Triage Vitals   BP Temp Temp Source Pulse Resp SpO2 Height Weight   01/13/22 0809 01/13/22 1130 01/13/22 1130 01/13/22 0809 01/13/22 0809 01/13/22 0809 -- --   108/81 98.3 °F (36.8 °C) Oral 77 16 93 %         Physical Exam:      General Appearance:  Alert, cooperative, appears stated age. Thin appearing   Head:  Normocephalic, without obvious abnormality, atraumatic. Eyes:  conjunctiva/corneas clear, EOM's intact. Sclera anicteric. ENT:  Mucous remains moist and pink   Neck: Supple, symmetrical, trachea midline, no adenopathy. No jugular venous distention. Lungs:    Clear to auscultation bilaterally   Chest Wall:   No pain to palpation. Positive mass on the lateral aspect of her left breast area. Heart:   Genitourinary:  Regular rate rhythm with no murmurs rubs gallops  Deferred   Abdomen:    Soft and benign. No guarding or rebound. Extremities:  No clubbing cyanosis or edema   Pulses:  Good throughout   Skin:  No rashes or lesions to exposed skin. Neurologic: Alert and oriented X 3. DIAGNOSTIC RESULTS     EKG: All EKG's are interpreted by the Emergency Department Physician who either signs or Co-signsthis chart in the absence of a cardiologist.    Sinus rhythm at a rate of 75 beats a minute with no acute ST elevations or depressions or pathologic Q waves. RADIOLOGY:   Non-plain filmimages such as CT, Ultrasound and MRI are read by the radiologist. Plain radiographic images are visualized and preliminarily interpreted by the emergency physician with the below findings:    See below    Interpretation per the Radiologist below, if available at the time ofthis note: All incidental findings were discussed with the patient. CT CHEST WO CONTRAST   Final Result   1. 6.3 cm left upper lobe, suprahilar mass, suspicious for malignancy. There   may be a postobstructive component of pneumonia, versus transbronchial spread   of tumor in the apex of the left upper lobe.    2. AP window mediastinal adenopathy, AWIL[facility]  [address], [address]   Phone [phone]   BLOOD GAS, VENOUS - Abnormal; Notable for the following components:    pH, Prince 7.261 (*)     pCO2, Prince 39.8 (*)     pO2, Prince 54.9 (*)     HCO3, Venous 17.9 (*)     Base Excess, Prince -8.5 (*)     Carboxyhemoglobin 4.6 (*)     All other components within normal limits    Narrative:     Performed at:  [facility]  [address], [address]   Phone [phone]   URINE RT REFLEX TO CULTURE       All other labs were within normal range or not returned as of this dictation. EMERGENCY DEPARTMENT COURSE and DIFFERENTIAL DIAGNOSIS/MDM:   Vitals:    Vitals:    01/13/22 0809 01/13/22 1130   BP: 108/81    Pulse: 77    Resp: 16    Temp:  98.3 °F (36.8 °C)   TempSrc:  Oral   SpO2: 93%            MDM    The patient has remained stable throughout her hospital course. Patient's work-up was extensive including a chest x-ray that shows a left lung mass versus infiltrate. This was augmented by a CT of the patient's chest that does indeed show what appears to be a large left lung mass with possible superimposed pneumonia. The patient also has some other worrisome findings consistent with possible metastatic disease. In addition, the patient also has a left breast mass on examination. Work-up also shows a hypercalcemia consistent with possible ostial lytic disease. The patient is mildly ill-appearing, weak and thin. She cannot take care of her self. She will be admitted for further care and oncology consultation. I will treat her for presumptive pneumonia. REASSESSMENT          CRITICAL CARE TIME   Total Critical Care time was 45 minutes, excluding separatelyreportable procedures. There was a high probability ofclinically significant/life threatening deterioration in the patient's condition which required my urgent intervention.       CONSULTS:  None    PROCEDURES:  Unless otherwise noted below, none     Procedures    FINAL IMPRESSION      1. Fatigue, unspecified type    2. Suspected malignant neoplasm    3. Pneumonia due to infectious organism, unspecified laterality, unspecified part of lung    4. Left breast mass          DISPOSITION/PLAN   DISPOSITION Decision To Admit 01/13/2022 01:05:24 PM      PATIENT REFERREDTO:  No follow-up provider specified. DISCHARGEMEDICATIONS:  New Prescriptions    No medications on file     Controlled Substances Monitoring:     RX Monitoring 8/22/2019   Acute Pain Prescriptions Prescription exceeds daily limit for a specific reason. See comments or note.        (Please note that portions of this note were completed with a voice recognition program.  Efforts were made to edit the dictations but occasionally words are mis-transcribed.)    Pascual Sutherland MD (electronically signed)  Attending Emergency Physician          Pascual Sutherland MD  01/13/22 9147

## 2022-01-13 NOTE — ED NOTES
Bed: 21  Expected date:   Expected time:   Means of arrival:   Comments:  alesha Bowen, RN  01/13/22 8843

## 2022-01-13 NOTE — H&P
Hospital Medicine History & Physical      PCP: Jennifer Barnett DO    Date of Admission: 1/13/2022    Date of Service: Pt seen/examined 1/13/2022 and Admitted to Inpatient with expected LOS greater than two midnights due to medical therapy. Chief Complaint: Progressive weakness      History Of Present Illness:  67 y.o. female with past medical history of thyroid disease, heart disease, CVA, thyroid disorder, DVT, A. fib, presents from home in view of progressive weakness. Patient is a poor historian. She states she has been getting weak. She also describes stabbing pain that goes from her chest to her back when she lies down. States pain has been there for few months. She states that she has been getting all of her care from a nurse practitioner who visits her at home every few months. She has been living with her friend Alex for the last few months. I have discussed this presentation with Hanna Valadez over the phone. According to her, patient has been getting progressively weak over the last couple of months. She has not been eating much. She has been spending increasingly more time in bed and has not really gotten out of bed for over a week now. Patient has been managing her own medications. She has been somewhat confused over the past week but does not have memory problems at baseline. Upon chart review it appears that patient has not seen her primary care provider since 2019. Recent communication documented between patient's NP and Dr. Pat Braden. There is being requested for imaging in view of concern for lung cancer. I attempted to reach out to [facility] NP at [phone]. She is not available today. I am expecting a call back from another NP with some information from her chart. ADD noncontrast CT of the chest shows 6.3 cm left upper lobe suprahilar mass suspicious for malignancy. There is also mediastinal adenopathy. There is a 5 x 7 mm right middle lobe nodule.   There are bilateral adrenal nodules suspicious for metastasis. Patient is also found to be hyperglycemic. She has leukocytosis and concern for postobstructive pneumonia on CT imaging pain      Past Medical History:          Diagnosis Date    Acquired spondylolisthesis     Arthritis     Cataract     Chronic pain syndrome     Degeneration of lumbar or lumbosacral intervertebral disc     Depressive disorder, not elsewhere classified     Displacement of lumbar intervertebral disc without myelopathy     H/O blood clots     HBP (high blood pressure)     Heart disease     Spinal stenosis, lumbar region, without neurogenic claudication     Sprain of lumbosacral (joint) (ligament)     Sprain of neck     Stroke (HCC)     Thyroid disorder     Thyroid disorder        Past Surgical History:          Procedure Laterality Date    HYSTERECTOMY, TOTAL ABDOMINAL  1972    LUMBAR FUSION      L4-5    PARTIAL HYSTERECTOMY  1964       Medications Prior to Admission:      Prior to Admission medications    Medication Sig Start Date End Date Taking? Authorizing Provider   XARELTO 10 MG TABS tablet TAKE 1 TABLET BY MOUTH EVERY 24 HOURS 2/1/21   Manish Dent, DO   atorvastatin (LIPITOR) 40 MG tablet TAKE 1 TABLET BY MOUTH DAILY 2/1/21   Manish Dent, DO   levothyroxine (SYNTHROID) 112 MCG tablet TAKE 1 TABLET BY MOUTH DAILY 2/1/21 5/2/21  Manish Dent, DO   carvedilol (COREG) 3.125 MG tablet TAKE 1 TABLET BY MOUTH TWICE DAILY 11/2/20   Manish Brooke, DO   Misc. Devices (QUAD CANE) MISC 1 each by Does not apply route daily 11/1/19   Manish Brooke, DO   mirtazapine (REMERON) 15 MG tablet Take 1 tablet by mouth nightly 8/17/19   Carmel Millard MD   nitroGLYCERIN (NITROSTAT) 0.4 MG SL tablet Place 0.4 mg under the tongue every 5 minutes as needed for Chest pain up to max of 3 total doses. If no relief after 1 dose, call 911.     Historical Provider, MD   Cholecalciferol (VITAMIN D3) 5000 units TABS Take 1 tablet by mouth daily Historical Provider, MD   amitriptyline (ELAVIL) 25 MG tablet Take 1 tablet by mouth nightly 7/30/19   Karan Jurado, DO   esomeprazole (NEXIUM) 40 MG delayed release capsule Take 1 capsule by mouth every morning (before breakfast) 7/30/19   Karan Jurado, DO   aspirin 81 MG EC tablet Take 81 mg by mouth daily. Historical Provider, MD       Allergies: Other, Trazodone, and Venlafaxine    Social History:      The patient currently lives home    TOBACCO:   reports that she has been smoking. She started smoking about 62 years ago. She has a 28.50 pack-year smoking history. She has never used smokeless tobacco.  ETOH:   reports previous alcohol use. E-Cigarettes/Vaping Use     Questions Responses    E-Cigarette/Vaping Use Never User    Start Date     Passive Exposure     Quit Date     Counseling Given     Comments Unknown            Family History:       Reviewed in detail and negative for DM, CAD, Cancer, CVA. Positive as follows:        Problem Relation Age of Onset    Cancer Father         Brain Cancer     Breast Cancer Sister     High Blood Pressure Sister        REVIEW OF SYSTEMS:   Pertinent positives as noted in the HPI. All other systems reviewed and negative. PHYSICAL EXAM PERFORMED:    /81   Pulse 77   Temp 98.3 °F (36.8 °C) (Oral)   Resp 16   SpO2 93%     General appearance:  No apparent distress, appears stated age and cooperative. HEENT:  Normal cephalic, atraumatic without obvious deformity. Pupils equal, round, and reactive to light. Extra ocular muscles intact. Conjunctivae/corneas clear. Neck: Supple, with full range of motion. No jugular venous distention. Trachea midline. Respiratory:  Normal respiratory effort. Clear to auscultation, bilaterally without Rales/Wheezes/Rhonchi. Cardiovascular:  Regular rate and rhythm with normal S1/S2 without murmurs, rubs or gallops. Abdomen: Soft, non-tender, non-distended with normal bowel sounds.   Musculoskeletal:  No clubbing, cyanosis or edema bilaterally. Full range of motion without deformity. Skin: Skin color, texture, turgor normal.  No rashes or lesions. Neurologic:  Neurovascularly intact without any focal sensory/motor deficits. Cranial nerves: II-XII intact, grossly non-focal.  Psychiatric:  Alert and oriented, thought content appropriate, normal insight  Capillary Refill: Brisk,< 3 seconds   Peripheral Pulses: +2 palpable, equal bilaterally       Labs:     Recent Labs     01/13/22  0909   WBC 18.7*   HGB 8.7*   HCT 27.3*        Recent Labs     01/13/22  0909   *   K 3.9      CO2 18*   BUN 33*   CREATININE 1.1   CALCIUM 12.9*     Recent Labs     01/13/22  0909   AST 22   ALT 17   BILITOT 0.4   ALKPHOS 103     No results for input(s): INR in the last 72 hours. Recent Labs     01/13/22  0909   TROPONINI 0.02*       Urinalysis:      Lab Results   Component Value Date    NITRU Negative 08/12/2019    BLOODU Negative 08/12/2019    SPECGRAV 1.028 08/12/2019    GLUCOSEU Negative 08/12/2019       Radiology:     CXR: I have reviewed the CXR with the following interpretation: Please see CT report  EKG:  I have reviewed the EKG with the following interpretation: NSR with PAC, otherwise normal EKG    CT CHEST WO CONTRAST   Final Result   1. 6.3 cm left upper lobe, suprahilar mass, suspicious for malignancy. There   may be a postobstructive component of pneumonia, versus transbronchial spread   of tumor in the apex of the left upper lobe. 2. AP window mediastinal adenopathy, likely metastatic. 3. 9 x 7 mm right middle lobe nodule, indeterminate for inflammation, primary   or metastatic neoplasm. 4. Bilateral adrenal nodules, suspicious for metastasis. RECOMMENDATIONS:   Unavailable         XR CHEST PORTABLE   Final Result   New left upper lobe opacity concerning for pneumonia versus malignancy. RECOMMENDATION:   Chest CT is recommended for further evaluation.              ASSESSMENT:    C/Monroe Lopes 4891 Problems    Diagnosis Date Noted    PNA (pneumonia) [J18.9] 01/13/2022         PLAN:    Pneumonia  Postobstructive  WBC 18  Patient is on room air  Afebrile  Follow-up septic work-up  Rocephin azithromycin    Lung mass  Suspicious for malignancy  There are adrenal lesions suspicious for metastasis  Consult heme-onc  Consult palliative care    Hypercalcemia  In the setting of lung mass  Check PTH related protein  IV fluids    Failure to thrive  In the setting of malignancy    History of DVT  On Xarelto    History of paroxysmal A. fib  Continue Xarelto    DVT Prophylaxis: Xarelto  Diet: No diet orders on file  Code Status: Prior      Electronically signed by Diogenes Verdugo MD on 1/13/2022 at 2:36 PM

## 2022-01-14 ENCOUNTER — APPOINTMENT (OUTPATIENT)
Dept: CT IMAGING | Age: 77
DRG: 871 | End: 2022-01-14
Payer: MEDICARE

## 2022-01-14 PROBLEM — E43 SEVERE MALNUTRITION (HCC): Chronic | Status: ACTIVE | Noted: 2022-01-14

## 2022-01-14 LAB
ANION GAP SERPL CALCULATED.3IONS-SCNC: 12 MMOL/L (ref 3–16)
BASOPHILS ABSOLUTE: 0.1 K/UL (ref 0–0.2)
BASOPHILS RELATIVE PERCENT: 0.4 %
BUN BLDV-MCNC: 30 MG/DL (ref 7–20)
CALCIUM SERPL-MCNC: 11.9 MG/DL (ref 8.3–10.6)
CHLORIDE BLD-SCNC: 100 MMOL/L (ref 99–110)
CO2: 15 MMOL/L (ref 21–32)
CREAT SERPL-MCNC: 1.1 MG/DL (ref 0.6–1.2)
EOSINOPHILS ABSOLUTE: 0.1 K/UL (ref 0–0.6)
EOSINOPHILS RELATIVE PERCENT: 0.3 %
FERRITIN: 594.6 NG/ML (ref 15–150)
FOLATE: 6.63 NG/ML (ref 4.78–24.2)
GFR AFRICAN AMERICAN: 58
GFR NON-AFRICAN AMERICAN: 48
GLUCOSE BLD-MCNC: 102 MG/DL (ref 70–99)
HCT VFR BLD CALC: 23.7 % (ref 36–48)
HEMOGLOBIN: 7.4 G/DL (ref 12–16)
IRON SATURATION: 14 % (ref 15–50)
IRON: 23 UG/DL (ref 37–145)
LYMPHOCYTES ABSOLUTE: 1.1 K/UL (ref 1–5.1)
LYMPHOCYTES RELATIVE PERCENT: 6.3 %
MAGNESIUM: 2.3 MG/DL (ref 1.8–2.4)
MCH RBC QN AUTO: 32.3 PG (ref 26–34)
MCHC RBC AUTO-ENTMCNC: 31.4 G/DL (ref 31–36)
MCV RBC AUTO: 103 FL (ref 80–100)
MONOCYTES ABSOLUTE: 1.6 K/UL (ref 0–1.3)
MONOCYTES RELATIVE PERCENT: 9.4 %
NEUTROPHILS ABSOLUTE: 14 K/UL (ref 1.7–7.7)
NEUTROPHILS RELATIVE PERCENT: 83.6 %
PDW BLD-RTO: 12.8 % (ref 12.4–15.4)
PHOSPHORUS: 2.9 MG/DL (ref 2.5–4.9)
PLATELET # BLD: 359 K/UL (ref 135–450)
PMV BLD AUTO: 6.9 FL (ref 5–10.5)
POTASSIUM SERPL-SCNC: 4 MMOL/L (ref 3.5–5.1)
PROCALCITONIN: 0.13 NG/ML (ref 0–0.15)
RBC # BLD: 2.3 M/UL (ref 4–5.2)
SODIUM BLD-SCNC: 127 MMOL/L (ref 136–145)
TOTAL IRON BINDING CAPACITY: 160 UG/DL (ref 260–445)
TSH REFLEX: 1.75 UIU/ML (ref 0.27–4.2)
URINE CULTURE, ROUTINE: NORMAL
VITAMIN B-12: 1161 PG/ML (ref 211–911)
WBC # BLD: 16.8 K/UL (ref 4–11)

## 2022-01-14 PROCEDURE — 97530 THERAPEUTIC ACTIVITIES: CPT

## 2022-01-14 PROCEDURE — 6360000004 HC RX CONTRAST MEDICATION

## 2022-01-14 PROCEDURE — 84100 ASSAY OF PHOSPHORUS: CPT

## 2022-01-14 PROCEDURE — 74177 CT ABD & PELVIS W/CONTRAST: CPT

## 2022-01-14 PROCEDURE — 2580000003 HC RX 258: Performed by: INTERNAL MEDICINE

## 2022-01-14 PROCEDURE — 99222 1ST HOSP IP/OBS MODERATE 55: CPT | Performed by: INTERNAL MEDICINE

## 2022-01-14 PROCEDURE — 70470 CT HEAD/BRAIN W/O & W/DYE: CPT

## 2022-01-14 PROCEDURE — 1200000000 HC SEMI PRIVATE

## 2022-01-14 PROCEDURE — 97535 SELF CARE MNGMENT TRAINING: CPT

## 2022-01-14 PROCEDURE — 6360000002 HC RX W HCPCS: Performed by: INTERNAL MEDICINE

## 2022-01-14 PROCEDURE — 6360000002 HC RX W HCPCS: Performed by: NURSE PRACTITIONER

## 2022-01-14 PROCEDURE — 6370000000 HC RX 637 (ALT 250 FOR IP): Performed by: INTERNAL MEDICINE

## 2022-01-14 PROCEDURE — 6360000004 HC RX CONTRAST MEDICATION: Performed by: NURSE PRACTITIONER

## 2022-01-14 PROCEDURE — 84145 PROCALCITONIN (PCT): CPT

## 2022-01-14 PROCEDURE — 97162 PT EVAL MOD COMPLEX 30 MIN: CPT

## 2022-01-14 PROCEDURE — 80048 BASIC METABOLIC PNL TOTAL CA: CPT

## 2022-01-14 PROCEDURE — 94760 N-INVAS EAR/PLS OXIMETRY 1: CPT

## 2022-01-14 PROCEDURE — 83735 ASSAY OF MAGNESIUM: CPT

## 2022-01-14 PROCEDURE — 82746 ASSAY OF FOLIC ACID SERUM: CPT

## 2022-01-14 PROCEDURE — 83540 ASSAY OF IRON: CPT

## 2022-01-14 PROCEDURE — 97166 OT EVAL MOD COMPLEX 45 MIN: CPT

## 2022-01-14 PROCEDURE — 83550 IRON BINDING TEST: CPT

## 2022-01-14 PROCEDURE — 36415 COLL VENOUS BLD VENIPUNCTURE: CPT

## 2022-01-14 PROCEDURE — 85025 COMPLETE CBC W/AUTO DIFF WBC: CPT

## 2022-01-14 PROCEDURE — 82728 ASSAY OF FERRITIN: CPT

## 2022-01-14 PROCEDURE — 82607 VITAMIN B-12: CPT

## 2022-01-14 RX ADMIN — IOPAMIDOL 75 ML: 755 INJECTION, SOLUTION INTRAVENOUS at 12:24

## 2022-01-14 RX ADMIN — Medication 1000 MG: at 12:45

## 2022-01-14 RX ADMIN — SODIUM CHLORIDE 25 ML: 9 INJECTION, SOLUTION INTRAVENOUS at 09:13

## 2022-01-14 RX ADMIN — ACETAMINOPHEN 650 MG: 325 TABLET ORAL at 15:52

## 2022-01-14 RX ADMIN — PANTOPRAZOLE SODIUM 40 MG: 40 TABLET, DELAYED RELEASE ORAL at 06:40

## 2022-01-14 RX ADMIN — SODIUM CHLORIDE: 9 INJECTION, SOLUTION INTRAVENOUS at 06:03

## 2022-01-14 RX ADMIN — IOHEXOL 50 ML: 240 INJECTION, SOLUTION INTRATHECAL; INTRAVASCULAR; INTRAVENOUS; ORAL at 09:06

## 2022-01-14 RX ADMIN — RIVAROXABAN 10 MG: 10 TABLET, FILM COATED ORAL at 18:06

## 2022-01-14 RX ADMIN — ZOLEDRONIC ACID 4 MG: 0.04 INJECTION, SOLUTION INTRAVENOUS at 09:13

## 2022-01-14 RX ADMIN — ATORVASTATIN CALCIUM 40 MG: 40 TABLET, FILM COATED ORAL at 09:05

## 2022-01-14 RX ADMIN — Medication 10 ML: at 09:06

## 2022-01-14 RX ADMIN — SODIUM CHLORIDE: 9 INJECTION, SOLUTION INTRAVENOUS at 23:10

## 2022-01-14 RX ADMIN — LEVOTHYROXINE SODIUM 112 MCG: 0.11 TABLET ORAL at 09:05

## 2022-01-14 RX ADMIN — ASPIRIN 81 MG: 81 TABLET, COATED ORAL at 09:05

## 2022-01-14 RX ADMIN — AZITHROMYCIN MONOHYDRATE 500 MG: 500 INJECTION, POWDER, LYOPHILIZED, FOR SOLUTION INTRAVENOUS at 15:39

## 2022-01-14 ASSESSMENT — PAIN DESCRIPTION - LOCATION
LOCATION: BACK

## 2022-01-14 ASSESSMENT — PAIN SCALES - GENERAL
PAINLEVEL_OUTOF10: 5
PAINLEVEL_OUTOF10: 3
PAINLEVEL_OUTOF10: 3
PAINLEVEL_OUTOF10: 5
PAINLEVEL_OUTOF10: 0

## 2022-01-14 ASSESSMENT — PAIN DESCRIPTION - ORIENTATION
ORIENTATION: UPPER

## 2022-01-14 ASSESSMENT — PAIN DESCRIPTION - PAIN TYPE
TYPE: ACUTE PAIN
TYPE: ACUTE PAIN

## 2022-01-14 NOTE — PROGRESS NOTES
Palliative Care:     Received information of patient's only daughter Terry Connolly ([phone]). Call to [facility]. Update of patient status provided. [facility] does state she has not had a relationship with patient for many years due to patients long history with ETOH. However, she states she loves her mother and will be more that happy to be advocate/DPOA for patient if/when indicated. Information to contact unit and patient room provided to daughter. Daughter will provide this information to patient sister should she want to call her directly. Dr. Yakelin Vieyra and Nurse Lisa Baez updated of this information.

## 2022-01-14 NOTE — CONSULTS
Palliative Care:       Hospital Course: 67 y. o. female with past medical history of thyroid disease, heart disease, CVA, thyroid disorder, DVT, A. fib, presents from home in view of progressive weakness.  Patient is a poor historian. Viviana Spencer states she has been getting weak.  She also describes stabbing pain that goes from her chest to her back when she lies down.  States pain has been there for few months.  She states that she has been getting all of her care from a nurse practitioner who visits her at home every few months. Viviana Spencer has been living with her friend Alex for the last few months.  I have discussed this presentation with Woo Crum over the phone. Reese Dire to her, patient has been getting progressively weak over the last couple of months. Viviana Spencer has not been eating much. Viviana Spencer has been spending increasingly more time in bed and has not really gotten out of bed for over a week now. Joseline Walker has been managing her own medications. Viviana Spencer has been somewhat confused over the past week but does not have memory problems at baseline.  Upon chart review it appears that patient has not seen her primary care provider since 2019.  Recent communication documented between patient's NP and Dr. April Sosa is being requested for imaging in view of concern for lung cancer.  I attempted to reach out to [facility] [phone]. Viviana Spencer is not available today.  I am expecting a call back from another NP with some information from her chart.  ADD noncontrast CT of the chest shows 6.3 cm left upper lobe suprahilar mass suspicious for malignancy. Pauleen Blue is also mediastinal adenopathy. Paulvinod Blue is a 5 x 7 mm right middle lobe nodule.  There are bilateral adrenal nodules suspicious for metastasis.  Patient is also found to be hyperglycemic.  She has leukocytosis and concern for postobstructive pneumonia on CT imaging pain. Patient admitted for symptom management on 1/13/22 and Palliative consult placed.        Past Medical History:   has a past medical history of Acquired spondylolisthesis, Arthritis, Cataract, Chronic pain syndrome, Degeneration of lumbar or lumbosacral intervertebral disc, Depressive disorder, not elsewhere classified, Displacement of lumbar intervertebral disc without myelopathy, H/O blood clots, HBP (high blood pressure), Heart disease, Spinal stenosis, lumbar region, without neurogenic claudication, Sprain of lumbosacral (joint) (ligament), Sprain of neck, Stroke [facility]), Thyroid disorder, and Thyroid disorder. Past Surgical History:   has a past surgical history that includes partial hysterectomy (cervix not removed) (1964); Hysterectomy, total abdominal (1972); and lumbar fusion. Advance Directives:   Full Code. No ACP in place. Next of kin is sister listed in Emergency contact  Jillian     Problem Severity: Pain/Other Symptoms:   Generalized weakness and chest/back pain. Possible lung cancer with mets. Bed Mobility/Toileting/Transfer:  Stand by assist with walker for ambulation. Performance Status:        Palliative Performance Scale:  100% []Full Normal activity & work No evidence of disease  90%   [] Full Normal activity & work Some evidence of disease  80%   [] Full Normal activity with Effort Some evidence of disease  70%   [] Reduced Unable Normal Job/Work Significant disease Full Normal or reduced  60%   [] Ambulation reduced; Significant disease; Can't do hobbies/housework; intake normal   or reduced; occasional assist; LOC full/confusion  50%   [] Mainly sit/lie; Extensive disease; Can't do any work; Considerable assist; intake normal  Or reduced; LOC full/confusion  40%   [x] Mainly in bed; Extensive disease; Mainly assist; intake normal or reduced; occasional assist; LOC full/confusion  30%   [] Bed Bound; Extensive disease; Total care; intake reduced; LOC full/confusion  20%   [] Bed Bound; Extensive disease; Total care; intake minimal; Drowsy/coma  10%   [] Bed Bound; Extensive disease;  Total care; Mouth care only; Drowsy/coma    PPS 40%  Symptom Assessment: Appetite/Nausea/Bowels/Fatigue:    lbs. Albumin 3.1      Social History:   reports that she has been smoking. She started smoking about 62 years ago. She has a 28.50 pack-year smoking history. She has never used smokeless tobacco. She reports previous alcohol use. She reports that she does not use drugs. Family History:  family history includes Breast Cancer in her sister; Cancer in her father; High Blood Pressure in her sister. Psychological/Spiritual:   . Has one estranged daughter. No contact information over (10) years. She lives in [place]. One sister whom is listed on Emergency contact would be next of kin if Gwen Rainersen is indicated. No Anglican affiliations. Denies any spiritual support. Family Discussion:        Lengthy conversation with patient. Vague history obtained. Patient does not have a good family relationship. Only sister is her next of kin for contact and they have not spoken since end of November 2022. Discussed code status. Education on all variables. Patient states she wants full code yet unable to comprehend all information shared as she would become distracted and discuss conflicts with her family in past situations. Call to sister (Jillian)of patient to gather further insight on next of kin. Update of today's status provided. Jana Puckett states she thought patient did ACP in November however was not provided a copy of such paperwork. States patient does have a daughter estranged. She will \"attempt\" to reach her and follow up with palliative should daughter legally be next of kin as acting DPOA when indicated. Sister currently understands without daughter involvement this would place Jana Puckett as acting DPOA. She verbalizes understanding. Patient states she understands she may have cancer, and if able would do aggressive treatments. Is also in agreement with potential SNIF when stable for PT/OT. Undetermined if patient could return to current living arrangements with a former co-worker due to dynamics in care that would be required for patient. Palliative will continue to follow and if sister does get in contact with daughter of patient will place such information in Emergency contacts.

## 2022-01-14 NOTE — PROGRESS NOTES
Pt reports the 5 bottles of home meds in her belongings are the only meds she is currently taking. Home med completed with these. Home meds to be secured in pharmacy.

## 2022-01-14 NOTE — PROGRESS NOTES
Hospitalist Progress Note      PCP: Sofy Melgoza DO    Date of Admission: 1/13/2022    Chief Complaint: Progressive weakness    Hospital Course: 67 y.o. female with past medical history of thyroid disease, heart disease, CVA, thyroid disorder, DVT, A. fib, presents from home in view of progressive weakness. Patient is a poor historian. She states she has been getting weak. She also describes stabbing pain that goes from her chest to her back when she lies down. States pain has been there for few months. She states that she has been getting all of her care from a nurse practitioner who visits her at home every few months. She has been living with her friend Alex for the last few months. I have discussed this presentation with Raul Evans over the phone. According to her, patient has been getting progressively weak over the last couple of months. She has not been eating much. She has been spending increasingly more time in bed and has not really gotten out of bed for over a week now. Patient has been managing her own medications. She has been somewhat confused over the past week but does not have memory problems at baseline. Upon chart review it appears that patient has not seen her primary care provider since 2019. Recent communication documented between patient's NP and Dr. Nasir Kauffman. There is being requested for imaging in view of concern for lung cancer. I attempted to reach out to Tish Lehman NP at [phone]. She is not available today. I am expecting a call back from another NP with some information from her chart. ADD noncontrast CT of the chest shows 6.3 cm left upper lobe suprahilar mass suspicious for malignancy. There is also mediastinal adenopathy. There is a 5 x 7 mm right middle lobe nodule. There are bilateral adrenal nodules suspicious for metastasis. Patient is also found to be hyperglycemic.   She has leukocytosis and concern for postobstructive pneumonia on CT imaging pain Subjective: Patient seen and examined at bedside. She feels better today. She is able to provide more of history. Medications:  Reviewed    Infusion Medications    sodium chloride 25 mL (01/14/22 0913)    sodium chloride 150 mL/hr at 01/14/22 1251     Scheduled Medications    cefTRIAXone (ROCEPHIN) IV  1,000 mg IntraVENous Q24H    aspirin  81 mg Oral Daily    atorvastatin  40 mg Oral Daily    pantoprazole  40 mg Oral QAM AC    levothyroxine  112 mcg Oral Daily    rivaroxaban  10 mg Oral Daily    sodium chloride flush  5-40 mL IntraVENous 2 times per day    azithromycin  500 mg IntraVENous Q24H     PRN Meds: sodium chloride flush, sodium chloride, acetaminophen **OR** acetaminophen      Intake/Output Summary (Last 24 hours) at 1/14/2022 1308  Last data filed at 1/14/2022 1026  Gross per 24 hour   Intake 2215.78 ml   Output --   Net 2215.78 ml       Physical Exam Performed:    /74   Pulse 80   Temp 98 °F (36.7 °C) (Axillary)   Resp 16   Ht 5' 4\" (1.626 m)   Wt 117 lb 14.4 oz (53.5 kg)   SpO2 96%   BMI 20.24 kg/m²     General appearance: No apparent distress, appears stated age and cooperative. HEENT: Pupils equal, round, and reactive to light. Conjunctivae/corneas clear. Neck: Supple, with full range of motion. No jugular venous distention. Trachea midline. Respiratory:  Normal respiratory effort. Clear to auscultation, bilaterally without Rales/Wheezes/Rhonchi. Cardiovascular: Regular rate and rhythm with normal S1/S2 without murmurs, rubs or gallops. Abdomen: Soft, non-tender, non-distended with normal bowel sounds. Musculoskeletal: No clubbing, cyanosis or edema bilaterally. Full range of motion without deformity. Skin: Skin color, texture, turgor normal.  No rashes or lesions. Neurologic:  Neurovascularly intact without any focal sensory/motor deficits.  Cranial nerves: II-XII intact, grossly non-focal.  Psychiatric: Alert and oriented, thought content appropriate, normal insight  Capillary Refill: Brisk,< 3 seconds   Peripheral Pulses: +2 palpable, equal bilaterally       Labs:   Recent Labs     01/13/22  0909 01/14/22  0547   WBC 18.7* 16.8*   HGB 8.7* 7.4*   HCT 27.3* 23.7*    359     Recent Labs     01/13/22  0909 01/14/22  0547   * 127*   K 3.9 4.0    100   CO2 18* 15*   BUN 33* 30*   CREATININE 1.1 1.1   CALCIUM 12.9* 11.9*   PHOS  --  2.9     Recent Labs     01/13/22  0909   AST 22   ALT 17   BILITOT 0.4   ALKPHOS 103     No results for input(s): INR in the last 72 hours. Recent Labs     01/13/22  0909   TROPONINI 0.02*       Urinalysis:      Lab Results   Component Value Date    NITRU Negative 01/13/2022    WBCUA 18 01/13/2022    BACTERIA RARE 01/13/2022    RBCUA 1 01/13/2022    BLOODU Negative 01/13/2022    SPECGRAV 1.015 01/13/2022    GLUCOSEU Negative 01/13/2022       Radiology:  CT CHEST WO CONTRAST   Final Result   1. 6.3 cm left upper lobe, suprahilar mass, suspicious for malignancy. There   may be a postobstructive component of pneumonia, versus transbronchial spread   of tumor in the apex of the left upper lobe. 2. AP window mediastinal adenopathy, likely metastatic. 3. 9 x 7 mm right middle lobe nodule, indeterminate for inflammation, primary   or metastatic neoplasm. 4. Bilateral adrenal nodules, suspicious for metastasis. RECOMMENDATIONS:   Unavailable         XR CHEST PORTABLE   Final Result   New left upper lobe opacity concerning for pneumonia versus malignancy. RECOMMENDATION:   Chest CT is recommended for further evaluation.          CT HEAD W WO CONTRAST    (Results Pending)   CT ABDOMEN PELVIS W IV CONTRAST Additional Contrast? Oral    (Results Pending)           Assessment/Plan:    Active Hospital Problems    Diagnosis     PNA (pneumonia) [J18.9]      Pneumonia  Postobstructive  WBC 18 on admission, improving  Patient is on room air  Afebrile  Follow-up septic work-up  Rocephin azithromycin    Hyponatremia  Sodium 127

## 2022-01-14 NOTE — PROGRESS NOTES
Called CT - let them know pt completed oral contrast. Tentative p/u 1130 today for CT head & abd/pelvis.

## 2022-01-14 NOTE — CONSULTS
Oncology [facility]ology [facility]   CONSULT NOTE    1/14/2022 8:17 AM    Patient Information: Brad Civil DAJUAN   Date of Admit:  1/13/2022  Primary Care Physician:  Juaquin Meigs, DO  Requesting Physician:  Samantha Parks MD    Reason for consult:   Evaluation and recommendations for lung mass    Chief complaint:    Chief Complaint   Patient presents with    Fatigue     Pt in via EMS from home. Pts neighbor called 911 because the pt has been getting weaker. Pt has been feeling weak for the past few months. Pt endorses no pain but chest pressure that goes through to her back. Pt has hx of stroke and angioplasty       History of Present Illness:  Shawna Jasso is a 67 y.o. female on Samantha Parks MD service who was admitted on 1/13/2022 for weakness and fatigue. She has been feeling this way for about 6 months. She also has decreased appetite and has lost about 10 lbs in that time. CT of the chest without contrast shows 6.3 cm left upper lobe, suprahilar mass. There is also AP window mediastinal adenopathy, 9x7 mm right middle lobe nodule and bilateral adrenal nodules. There is a palpable mass in the left upper quadrant of the left breast. Findings are concerning for malignancy. No record of having screening mammograms or colonoscopy. She is a current everyday smoker since 55. She has a history of anemia and elevated ferritin. She was evaluated by hematology at [facility] in 2019. Hereditary hemochromatosis workup was negative. She has limited mobility due to prior stroke. She has poor social support.        Past Medical History:     has a past medical history of Acquired spondylolisthesis, Arthritis, Cataract, Chronic pain syndrome, Degeneration of lumbar or lumbosacral intervertebral disc, Depressive disorder, not elsewhere classified, Displacement of lumbar intervertebral disc without myelopathy, H/O blood clots, HBP (high blood pressure), Heart disease, Spinal stenosis, lumbar region, without neurogenic claudication, Sprain of lumbosacral (joint) (ligament), Sprain of neck, Stroke [facility]), Thyroid disorder, and Thyroid disorder. Past Surgical History:    Past Surgical History:   Procedure Laterality Date    HYSTERECTOMY, TOTAL ABDOMINAL  1972    LUMBAR FUSION      L4-5    PARTIAL HYSTERECTOMY  1964        Current Medications:     zoledronic acid (ZOMETA) IVPB  4 mg IntraVENous Once    cefTRIAXone (ROCEPHIN) IV  1,000 mg IntraVENous Q24H    aspirin  81 mg Oral Daily    atorvastatin  40 mg Oral Daily    pantoprazole  40 mg Oral QAM AC    levothyroxine  112 mcg Oral Daily    rivaroxaban  10 mg Oral Daily    sodium chloride flush  5-40 mL IntraVENous 2 times per day    azithromycin  500 mg IntraVENous Q24H       Allergies: Allergies   Allergen Reactions    Other Other (See Comments)     Anti depression cause black outs     Trazodone      Sedation? Venlafaxine      Sedation? Social History:    reports that she has been smoking. She started smoking about 62 years ago. She has a 28.50 pack-year smoking history. She has never used smokeless tobacco. She reports previous alcohol use. She reports that she does not use drugs. Family History:     family history includes Breast Cancer in her sister; Cancer in her father; High Blood Pressure in her sister. ROS:      Constitutional:  No fever, No chills, No night sweats. Generalized fatigue, weakness.  Weight loss  Eyes:  No impairment or change in vision  ENT / Mouth:  No pain, abnormal ulceration, bleeding, nasal drip or change in voice or hearing  Cardiovascular:  No chest pain, palpitations, new edema, or calf discomfort  Respiratory:  No pain, hemoptysis, change to breathing  Gastrointestinal:  No pain, cramping, jaundice, change to eating and bowel habits  Urinary:  No pain, bleeding or change in continence  Musculoskeletal:  No redness, pain, edema or weakness  Skin:  No pruritus, rash, change to nodules or lesions  Neurologic:  No discomfort, change in mental status, speech, sensory or motor activity  Psychiatric:  No change in concentration or change to affect or mood  Endocrine:  No hot flashes, increased thirst, or change to urine production  Hematologic: No petechiae, ecchymosis or bleeding  Lymphatic:  No lymphadenopathy or lymphedema  Allergy / Immunologic:  No eczema, hives, frequent or recurrent infections      PHYSICAL EXAM:    Vitals:  Vitals:    01/14/22 0601   BP: 116/70   Pulse: 64   Resp: 16   Temp: 97.3 °F (36.3 °C)   SpO2: 98%        Intake/Output Summary (Last 24 hours) at 1/14/2022 0817  Last data filed at 1/14/2022 0458  Gross per 24 hour   Intake 1215.78 ml   Output --   Net 1215.78 ml      Wt Readings from Last 3 Encounters:   01/13/22 116 lb 12.8 oz (53 kg)   12/06/19 128 lb 12.8 oz (58.4 kg)   10/04/19 122 lb (55.3 kg)        General appearance: Appears comfortable. Pale  Eyes: Sclera clear, pupils equal  ENT: Moist mucus membranes, no thrush  Neck: Trachea midline, symmetrical  Cardiovascular: Regular rhythm, normal S1, S2. No murmur, gallop, rub. No edema in  lower extremities  Respiratory: Clear to auscultation bilaterally. No wheeze. Good inspiratory effort  Gastrointestinal: Abdomen soft, not tender, not distended, normal bowel sounds  Musculoskeletal: No cyanosis in digits, warm extremities  Neurologic: Cranial nerves grossly intact, no motor or speech deficits. Psychiatric: Flat affect. Alert and oriented to time, place and person.   Skin: Warm, dry, normal turgor, no rash  Breasts: Palpable mass in the left upper quadrant of the left breast.     DATA:  CBC:   Lab Results   Component Value Date    WBC 18.7 01/13/2022    RBC 2.71 01/13/2022    HGB 8.7 01/13/2022    HCT 27.3 01/13/2022    .8 01/13/2022    MCH 32.3 01/13/2022    MCHC 32.0 01/13/2022    RDW 13.0 01/13/2022     01/13/2022    MPV 6.6 01/13/2022     BMP:  Lab Results   Component Value Date     01/13/2022    K 3.9 01/13/2022     01/13/2022    CO2 18 01/13/2022    BUN 33 01/13/2022    CREATININE 1.1 01/13/2022    CALCIUM 12.9 01/13/2022    GFRAA 58 01/13/2022    LABGLOM 48 01/13/2022    LABGLOM 51 02/16/2011    GLUCOSE 118 01/13/2022     Magnesium:   Lab Results   Component Value Date    MG 2.10 08/13/2019    MG 1.80 08/13/2019    MG 2.20 07/30/2019     LIVER PROFILE:   Recent Labs     01/13/22  0909   AST 22   ALT 17   BILITOT 0.4   ALKPHOS 103     PT/INR:    Lab Results   Component Value Date    PROTIME 11.8 08/12/2019    INR 1.04 08/12/2019     IMAGING:    Narrative   EXAMINATION:   ONE XRAY VIEW OF THE CHEST       1/13/2022 8:46 am       COMPARISON:   Chest x-ray dated 08/15/2019       HISTORY:   ORDERING SYSTEM PROVIDED HISTORY: other   TECHNOLOGIST PROVIDED HISTORY:   Reason for exam:->other   Reason for Exam: fatigue       FINDINGS:   New left upper lobe opacity concerning for pneumonia versus malignancy. The   right lung is clear. No pleural effusion or pneumothorax. Cardiac   silhouette is unremarkable. Degenerative disease of the visualized osseous   structures. Impression   New left upper lobe opacity concerning for pneumonia versus malignancy. RECOMMENDATION:   Chest CT is recommended for further evaluation. Narrative   EXAMINATION:   CT OF THE CHEST WITHOUT CONTRAST 1/13/2022 12:16 pm       TECHNIQUE:   CT of the chest was performed without the administration of intravenous   contrast. Multiplanar reformatted images are provided for review. Dose   modulation, iterative reconstruction, and/or weight based adjustment of the   mA/kV was utilized to reduce the radiation dose to as low as reasonably   achievable.        COMPARISON:   Chest x-ray, 3 hours ago       HISTORY:   ORDERING SYSTEM PROVIDED HISTORY: mass   TECHNOLOGIST PROVIDED HISTORY:   Reason for exam:->mass   Decision Support Exception - unselect if not a suspected or confirmed   emergency medical condition->Emergency Medical Condition (MA)   Reason for Exam: mass       FINDINGS:   Mediastinum: There are several AP window nodes for example, mild 2 measuring   19 and 18 mm in short axis. Lungs/pleura: There is a left upper lobe suprahilar mass, measuring 5.2 x 6.3   x 6.2 cm. There are peripheral components of ground-glass and interlobular   septal thickening in the left apex. In the right middle lobe there is a 9 x 7 mm partially solid nodule. The   solid component is 3 mm. (Image 55, series 3). Mild dependent atelectasis is   noted. There is pleural thickening along the lateral aspect of the right   hemithorax, likely fibrotic. Upper Abdomen: There is a right adrenal nodule measuring 4 cm in diameter. There are 2 left adrenal nodules, measuring 2.6 and 2.5 cm in diameter. Soft Tissues/Bones: No suspicious lytic or blastic bone lesions are   appreciated. Impression   1. 6.3 cm left upper lobe, suprahilar mass, suspicious for malignancy. There   may be a postobstructive component of pneumonia, versus transbronchial spread   of tumor in the apex of the left upper lobe. 2. AP window mediastinal adenopathy, likely metastatic. 3. 9 x 7 mm right middle lobe nodule, indeterminate for inflammation, primary   or metastatic neoplasm. 4. Bilateral adrenal nodules, suspicious for metastasis. RECOMMENDATIONS:   Unavailable           IMPRESSION/RECOMMENDATIONS:    Active Problems:    PNA (pneumonia)  Resolved Problems:    * No resolved hospital problems. *       68 year old female with a history of a-fib, CVA, HTN, and hypothyroidism. She has:    1. Lung mass  -CT of the chest without contrast shows 6.3 cm left upper lobe, suprahilar mass. There is also AP window mediastinal adenopathy, 9x7 mm right middle lobe nodule and bilateral adrenal nodules. -Findings concerning for malignancy.  -Will need tissue biopsy for diagnosis. -Pulmonology is consulted. -Will obtain CT of the head and CT of the abdomen and pelvis for staging.     2. Left breast mass  -Primary malignancy vs metastasis  -Unfortunately breast workup cannot be done as inpatient. 3. Hypercalcemia  -Continue IV fluids.  -Will given 4mg IV Zometa. 4. Pneumonia  -Postobstructive due to lung mass. -Receiving azithromycin and ceftriaxone. 5. Hyponatremia  -? SIADH. 6. History of DVT  -On Xarelto. 7. Severe malnutrition   -Dietician to evaluate. This plan was discussed with the patient and he/she verbalized understanding. Thank you for allowing us to participate in the care of this patient. [place] , [address] [facility]  Oncology Hematology [facility].  [phone]    Patient was seen with JIMBO in the morning. Not doing well over the last 6 months. Weight loss and progressive weakness. CT scan showed left upper lobe mass and suprahilar mass. Bilateral adrenal lesions concerning for adrenal metastasis. History of significant tobacco use. Palpable left breast mass in the upper outer quadrant at least 3 cm. Movable and nonfixed. Poor social support. In addition she has hypercalcemia, anemia and leukocytosis. Explained the findings. Discussed further plan. We will get CT of the brain, abdomen and pelvis. Pulmonary service will be consulted. We will continue hydration. Zometa will be given. She has history of DVT and has been on Xarelto. Eli Langston.  Corby Campos MD, [place] [id number]  Hematology and Oncology  [facility]  [phone]

## 2022-01-14 NOTE — PROGRESS NOTES
Patient Tolerated treatment well;Patient limited by fatigue  Safety Devices  Safety Devices in place: Yes  Type of devices: All fall risk precautions in place;Nurse notified;Call light within reach; Chair alarm in place; Left in chair;Patient at risk for falls  Restraints  Initially in place: No           Patient Diagnosis(es): The primary encounter diagnosis was Fatigue, unspecified type. Diagnoses of Suspected malignant neoplasm, Pneumonia due to infectious organism, unspecified laterality, unspecified part of lung, and Left breast mass were also pertinent to this visit. has a past medical history of Acquired spondylolisthesis, Arthritis, Cataract, Chronic pain syndrome, Degeneration of lumbar or lumbosacral intervertebral disc, Depressive disorder, not elsewhere classified, Displacement of lumbar intervertebral disc without myelopathy, H/O blood clots, HBP (high blood pressure), Heart disease, Spinal stenosis, lumbar region, without neurogenic claudication, Sprain of lumbosacral (joint) (ligament), Sprain of neck, Stroke [facility]), Thyroid disorder, and Thyroid disorder. has a past surgical history that includes partial hysterectomy (cervix not removed) (1964); Hysterectomy, total abdominal (1972); and lumbar fusion. Treatment Diagnosis: Debility and decreased ADLs due to pneumonia, weakness      Restrictions  Restrictions/Precautions  Restrictions/Precautions: Fall Risk (high fall risk  Simultaneous filing. User may not have seen previous data.)  Required Braces or Orthoses?: No  Position Activity Restriction  Other position/activity restrictions: 67 y.o. female with past medical history of thyroid disease, heart disease, CVA, thyroid disorder, DVT, A. fib, presents from home in view of progressive weakness. Patient is a poor historian. She states she has been getting weak. She also describes stabbing pain that goes from her chest to her back when she lies down. States pain has been there for few months. She states that she has been getting all of her care from a nurse practitioner who visits her at home every few months. She has been living with her friend Alex for the last few months. I have discussed this presentation with Renard Hickey over the phone. According to her, patient has been getting progressively weak over the last couple of months. She has not been eating much. She has been spending increasingly more time in bed and has not really gotten out of bed for over a week now. Patient has been managing her own medications. She has been somewhat confused over the past week but does not have memory problems at baseline. Upon chart review it appears that patient has not seen her primary care provider since 2019. Recent communication documented between patient's NP and Dr. Grayson Bran. There is being requested for imaging in view of concern for lung cancer. I attempted to reach out to [facility] NP at [phone]. She is not available today. I am expecting a call back from another NP with some information from her chart. ADD noncontrast CT of the chest shows 6.3 cm left upper lobe suprahilar mass suspicious for malignancy. There is also mediastinal adenopathy. There is a 5 x 7 mm right middle lobe nodule. There are bilateral adrenal nodules suspicious for metastasis. Patient is also found to be hyperglycemic. She has leukocytosis and concern for postobstructive pneumonia on CT imaging pain    Subjective   General  Chart Reviewed: Yes  Patient assessed for rehabilitation services?: Yes  Additional Pertinent Hx: h/o stroke, left side residual weakness  Family / Caregiver Present: Yes (Roommate Alex)  Diagnosis: Left breast mass  Subjective  Subjective: Patient supine in bed, pleasant and cooperative. Patient's friend/housemate entered room at beginning of session  General Comment  Comments: Patient c/o 5/10 pain between shoulder blades  Patient Currently in Pain: Yes (Simultaneous filing.  User may not have seen previous Comments: Performed sponge bathing and dressing from EOB. Patient with poor sitting balance and fatigued quickly. Periods of CGA to mod/max assist needed for balance. Patient reporting feeling very weak this date. Tone RUE  RUE Tone: Normotonic  Tone LUE  LUE Tone: Normotonic  Coordination  Movements Are Fluid And Coordinated: Yes     Bed mobility  Supine to Sit: Minimal assistance  Scooting: Moderate assistance  Transfers  Stand Step Transfers: Minimal assistance  Sit to stand: Minimal assistance  Stand to sit: Minimal assistance  Transfer Comments: Patient very fatigued, would easily need increase assist if ambulated or attempted to stand more than 30 seconds this date. Sat quickly in chair  Vision - Basic Assessment  Prior Vision: Wears glasses only for reading  Visual History: No significant visual history  Patient Visual Report: No visual complaint reported. Cognition  Overall Cognitive Status: Exceptions  Arousal/Alertness: Appropriate responses to stimuli  Following Commands: Follows one step commands with repetition  Attention Span: Appears intact  Memory: Decreased short term memory  Safety Judgement: Decreased awareness of need for safety;Decreased awareness of need for assistance  Problem Solving: Decreased awareness of errors  Insights: Decreased awareness of deficits  Cognition Comment: Patient's roommate stated patient falls on average 3 times a week. Stated may fall even more because she works 10 hour days and patient may fall and not tell her. Fair historian. Patient stated she never leaves her house. Perception  Overall Perceptual Status: WFL     Sensation  Overall Sensation Status: WFL        LUE AROM (degrees)  LUE AROM : WFL  LUE General AROM: Left shoulder flexion @ 0-100 degrees  from previous stroke and shoulder fracture.   Left Hand AROM (degrees)  Left Hand AROM: WFL  RUE AROM (degrees)  RUE AROM : WFL  Right Hand AROM (degrees)  Right Hand AROM: WFL  LUE Strength  Gross LUE Strength: WFL  L Hand General: 4+/5  RUE Strength  Gross RUE Strength: WFL  R Hand General: 4+/5                   Plan   Plan  Times per week: 3=5  Current Treatment Recommendations: Endurance Training,Patient/Caregiver Education & Training,Self-Care / ADL,Functional Mobility Training,Safety Education & Training    G-Code     OutComes Score                                                  AM-PAC Score        AM-PAC Inpatient Daily Activity Raw Score: 12 (01/14/22 1029)  AM-PAC Inpatient ADL T-Scale Score : 30.6 (01/14/22 1029)  ADL Inpatient CMS 0-100% Score: 66.57 (01/14/22 1029)  ADL Inpatient CMS G-Code Modifier : CL (01/14/22 1029)    Goals  Short term goals  Time Frame for Short term goals: Until discharge  Short term goal 1: MInimal assist bathing  Short term goal 2: Minimal assist dressing  Short term goal 3: MInimal assist toileting  Short term goal 4: CGA functional transfers with RW  Short term goal 5: SBA functional mobility  Long term goals  Time Frame for Long term goals : STGs= LTGs  Patient Goals   Patient goals : Get stronger and go back home with friend       Therapy Time   Individual Concurrent Group Co-treatment   Time In          Time Out 1003         Minutes 40              Timed Code Treatment Minutes:  25      Total Treatment Minutes:  Jessica 25, [address]

## 2022-01-14 NOTE — PROGRESS NOTES
Physical Therapy    Facility/Department: [address] NURSING  Initial Assessment    NAME: Kellie Davis  : 1945  MRN: [id number]    Date of Service: 2022    Discharge Recommendations: Kellie Davis scored a 12/24 on the AM-PAC short mobility form. Current research shows that an AM-PAC score of 17 or less is typically not associated with a discharge to the patient's home setting. Based on the patient's AM-PAC score and their current functional mobility deficits, it is recommended that the patient have 3-5 sessions per week of Physical Therapy at d/c to increase the patient's independence. Please see assessment section for further patient specific details. If patient discharges prior to next session this note will serve as a discharge summary. Please see below for the latest assessment towards goals. PT Equipment Recommendations  Equipment Needed: No    Assessment   Body structures, Functions, Activity limitations: Decreased functional mobility ; Decreased strength;Decreased safe awareness;Decreased endurance;Decreased balance  Assessment: Patient presents with generalized LE weakness, impaired balance and reduced endurance impacting her ability to perform functional mobility independently. Patient experiences rapid fatigue with minimal mobility due to reduced muscular endurance. Patient slightly unaware of deficits and need for assistance with tasks. Patient would benefit from skilled therapy in order to combat deficits described to facilitate a return to PLOF, reduce caregiver burden and fall risk at home. Treatment Diagnosis: Reduced endurance and balance impairing ability to perform functional mobility  Prognosis: Good  Decision Making: Medium Complexity  Clinical Presentation: Evolving  PT Education: Goals;PT Role;Plan of Care  Patient Education: Additional education was provided detailing d/c recommendation. Patient and roommate both verbalized understanding.   Barriers to Learning: Cognition  REQUIRES PT FOLLOW UP: Yes  Activity Tolerance  Activity Tolerance: Patient Tolerated treatment well;Patient limited by fatigue;Patient limited by endurance  Activity Tolerance: Patient tolerated treatment well despite rapid fatigue and significant lack of endurance with functional mobility. Patient required to sit after standing ~ 5-10 sec. Patient Diagnosis(es): The primary encounter diagnosis was Fatigue, unspecified type. Diagnoses of Suspected malignant neoplasm, Pneumonia due to infectious organism, unspecified laterality, unspecified part of lung, and Left breast mass were also pertinent to this visit. has a past medical history of Acquired spondylolisthesis, Arthritis, Cataract, Chronic pain syndrome, Degeneration of lumbar or lumbosacral intervertebral disc, Depressive disorder, not elsewhere classified, Displacement of lumbar intervertebral disc without myelopathy, H/O blood clots, HBP (high blood pressure), Heart disease, Spinal stenosis, lumbar region, without neurogenic claudication, Sprain of lumbosacral (joint) (ligament), Sprain of neck, Stroke [facility]), Thyroid disorder, and Thyroid disorder. has a past surgical history that includes partial hysterectomy (cervix not removed) (1964); Hysterectomy, total abdominal (1972); and lumbar fusion. Restrictions  Restrictions/Precautions  Restrictions/Precautions: Fall Risk (high fall risk  Simultaneous filing. User may not have seen previous data.)  Required Braces or Orthoses?: No  Position Activity Restriction  Other position/activity restrictions: 67 y.o. female with past medical history of thyroid disease, heart disease, CVA, thyroid disorder, DVT, A. fib, presents from home in view of progressive weakness. Patient is a poor historian. She states she has been getting weak. She also describes stabbing pain that goes from her chest to her back when she lies down. States pain has been there for few months.   She states that she has been getting all of her care from a nurse practitioner who visits her at home every few months. She has been living with her friend Alex for the last few months. I have discussed this presentation with Sharron Bull over the phone. According to her, patient has been getting progressively weak over the last couple of months. She has not been eating much. She has been spending increasingly more time in bed and has not really gotten out of bed for over a week now. Patient has been managing her own medications. She has been somewhat confused over the past week but does not have memory problems at baseline. Upon chart review it appears that patient has not seen her primary care provider since 2019. Recent communication documented between patient's NP and Dr. Rachel Lundberg. There is being requested for imaging in view of concern for lung cancer. I attempted to reach out to [facility] NP at [phone]. She is not available today. I am expecting a call back from another NP with some information from her chart. ADD noncontrast CT of the chest shows 6.3 cm left upper lobe suprahilar mass suspicious for malignancy. There is also mediastinal adenopathy. There is a 5 x 7 mm right middle lobe nodule. There are bilateral adrenal nodules suspicious for metastasis. Patient is also found to be hyperglycemic. She has leukocytosis and concern for postobstructive pneumonia on CT imaging pain     Vision/Hearing  Vision: Impaired  Vision Exceptions: Wears glasses for reading  Hearing: Within functional limits       Subjective  General  Chart Reviewed: Yes  Patient assessed for rehabilitation services?: Yes  Family / Caregiver Present: Yes (Roommate arrived during PT/OT evaluation.)  Diagnosis: Left breast mass  Follows Commands: Within Functional Limits  General Comment  Comments: Patient lying supine in bed upon arrival for PT/OT evaluation. Subjective  Subjective: Patient reports 5/10 pain localized intrascapularly. Nurse notified.   Pain Screening  Patient Currently in Pain: Yes (Simultaneous filing. User may not have seen previous data.)  Pain Assessment  Pain Assessment: 0-10  Pain Level: 5  Pain Type: Acute pain  Pain Location: Back  Pain Orientation: Upper     Orientation  Orientation  Overall Orientation Status: Within Functional Limits     Social/Functional History  Social/Functional History  Lives With: Friend(s)  Type of Home: House  Home Layout: One level  Home Access: Stairs to enter without rails  Entrance Stairs - Number of Steps: 2 + 1 to enter house  Bathroom Shower/Tub: Tub/Shower unit  [place] Equipment: Shower chair,Hand-held shower  Home Equipment: Rolling walker,Cane,Reacher,Hospital bed  Receives Help From: Friend(s)  ADL Assistance: Independent  Homemaking Responsibilities: No  Ambulation Assistance: Independent  Transfer Assistance: Independent  Active : No  Occupation: Retired  Type of occupation: Retired office work  Leisure & Hobbies: Watch TV  IADL Comments: Roommate performs all IADLs. Has been living together since May, 2021. Friend stated patient has become much weaker and stopped eating over the last 3 weeks. Additional Comments: Friend reports patient falls at least 3 times a week, friend stated may fall even more because friend works 5 days a week, 10 hour days. Cognition   Cognition  Overall Cognitive Status: Exceptions  Arousal/Alertness: Appropriate responses to stimuli  Following Commands: Follows one step commands with repetition  Attention Span: Appears intact  Memory: Decreased short term memory  Safety Judgement: Decreased awareness of need for safety;Decreased awareness of need for assistance  Problem Solving: Decreased awareness of errors  Insights: Decreased awareness of deficits  Cognition Comment: Patient's roommate stated patient falls on average 3 times a week. Stated may fall even more because she works 10 hour days and patient may fall and not tell her. Fair historian.  Patient stated she never leaves her house. Objective  Observation/Palpation  Posture: Fair    AROM RLE (degrees)  RLE AROM: WFL  AROM LLE (degrees)  LLE AROM : WFL  Strength RLE  Strength RLE: WFL  Comment: Based upon functional mobility performed, patient's strength indicated as 3-/5, significantly lacking muscular endurance. Formal strength assessment was not performed secondary to poor seated balance. Strength LLE  Strength LLE: WFL  Comment: Based upon functional mobility performed, patient's strength indicated as 3-/5, significantly lacking muscular endurance. Formal strength assessment was not performed secondary to poor seated balance. Sensation  Overall Sensation Status: WFL  Bed mobility  Supine to Sit: Minimal assistance (Min A due to inability to maintain balance with mobility)  Sit to Supine: Unable to assess (Patient seated bedside chair at EOS.)  Scooting: Moderate assistance  Transfers  Sit to Stand: Minimal Assistance (Patient demonstrated poor STS technique utilizing RW for support to stand despite instruction to utilize bed surface. Patient required Min A x1 secondary to poor balance capabilities.)  Stand to sit: Moderate Assistance (Patient required Mod A x1 in order to control her descent to seated position. Patient unable to demonstrate eccentric control.)  Stand Pivot Transfers: Minimal Assistance (Patient performed a stand-step transfer with RW from seated EOB --> bedside chair)  Ambulation  Ambulation?: No  Stairs/Curb  Stairs?: No     Balance  Posture: Fair  Sitting - Static: Poor (Patient unable to obtain upright posture while seated EOB without LOB in all directions.)  Sitting - Dynamic: Poor (Patient unable to don socks seated EOB without LOB. Patient demonstrated a significant posterior lean with attempts.  Due to lack of strength, she was unable to perform task in the position she desired.)   Mod A required for all seated balance at EOB ~10-15 minutes total   Standing - Static: Fair;- (Min A with

## 2022-01-14 NOTE — PROGRESS NOTES
4 Eyes Skin Assessment     NAME:  Javi Gamboa  YOB: 1945  MEDICAL RECORD NUMBER:  [id number]    The patient is being assess for  Admission    I agree that 2 RN's have performed a thorough Head to Toe Skin Assessment on the patient. ALL assessment sites listed below have been assessed. Areas assessed by both nurses:    Head, Face, Ears, Shoulders, Back, Chest, Arms, Elbows, Hands, Sacrum. Buttock, Coccyx, Ischium and Legs. Feet and Heels        Does the Patient have a Wound?  No noted wound(s)       Dominic Prevention initiated:  Yes   Wound Care Orders initiated:  No    Pressure Injury (Stage 3,4, Unstageable, DTI, NWPT, and Complex wounds) if present place consult order under [de-identified] No    New and Established Ostomies if present place consult order under : No      Nurse 1 eSignature: Electronically signed by Mayela St RN on 1/14/22 at 4:22 PM EST    **SHARE this note so that the co-signing nurse is able to place an eSignature**    Nurse 2 eSignature: Electronically signed by Josr Liang RN on 1/14/22 at 4:22 PM EST

## 2022-01-14 NOTE — PROGRESS NOTES
Comprehensive Nutrition Assessment    Type and Reason for Visit:  Initial,Positive Nutrition Screen (decreased appetite, weight loss)    Nutrition Recommendations/Plan:   1. Regular diet  2. Fluid restriction per MD  3. Offer Ensure vanilla bid  4. Will monitor nutritional adequacy, nutrition-related labs, weights, BMs, and clinical progress     Nutrition Assessment:  Patient admitted with pneumonia. Currently on a regular diet with fluid restriction 1800 ml/day. Prior to admission patient was getting progressively weaker and  appetite poor. Patient reported just wanting to lye in bed due to pain. CT scan today, possible maliganancy per progress notes, patient aware. Patient unsure weight history at this time although weight loss evident. RD offered Ensure, patient stated she would drink vanilla. At home patient drank [facility] in the past, but not recently. Will continue to monitor nutritional needs. Malnutrition Assessment:  Malnutrition Status:  Severe malnutrition    Context:  Chronic Illness     Findings of the 6 clinical characteristics of malnutrition:  Energy Intake:  7 - 75% or less estimated energy requirements for 1 month or longer  Weight Loss:  Unable to assess     Body Fat Loss:  7 - Severe body fat loss (hollowing face)     Muscle Mass Loss:  7 - Severe muscle mass loss Temples (temporalis)  Fluid Accumulation:  Unable to assess     Strength:       Nutrition Related Findings:  Na 127 decreasing, last BM on 1/12/22      Wounds:   (left breast wound, scattered abrasions)       Current Nutrition Therapies:    ADULT ORAL NUTRITION SUPPLEMENT; Breakfast, Dinner; Standard High Calorie/High Protein Oral Supplement  ADULT DIET; Regular; 1800 ml    Anthropometric Measures:  · Height: 5' 4\" (162.6 cm)  · Current Body Weight: 117 lb 14 oz (53.5 kg)     · Ideal Body Weight: 120 lbs; % Ideal Body Weight     · BMI: 20.2  · BMI Categories: Normal Weight (BMI 18.5-24. 9) Nutrition Diagnosis:   · Inadequate energy intake related to pain as evidenced by weight loss,severe muscle loss    Nutrition Interventions:   Food and/or Nutrient Delivery:  Continue Current Diet,Start Oral Nutrition Supplement  Nutrition Education/Counseling:      Coordination of Nutrition Care:  Continue to monitor while inpatient    Goals:  Patient will eat 50% or greater of meals and supplements. Nutrition Monitoring and Evaluation:   Behavioral-Environmental Outcomes:      Food/Nutrient Intake Outcomes:  Supplement Intake,Food and Nutrient Intake  Physical Signs/Symptoms Outcomes:  Biochemical Data,Nutrition Focused Physical Findings     Discharge Planning:     Too soon to determine     Electronically signed by Albaro Ramey RD, YAZMIN on 1/14/22 at 2:03 PM EST    Contact: [phone]

## 2022-01-14 NOTE — PROGRESS NOTES
Pt transported by myself and Zach Weinstein RN to room 0600. Vitals taken, pt alert and oriented. Explained fall prevention measures and call light use. Needs met, resting comfortably in bed.

## 2022-01-14 NOTE — PLAN OF CARE
Nutrition Problem #1: Inadequate energy intake  Intervention: Food and/or Nutrient Delivery: Continue Current Diet,Start Oral Nutrition Supplement  Nutritional Goals: Patient will eat 50% or greater of meals and supplements.

## 2022-01-15 ENCOUNTER — APPOINTMENT (OUTPATIENT)
Dept: GENERAL RADIOLOGY | Age: 77
DRG: 871 | End: 2022-01-15
Payer: MEDICARE

## 2022-01-15 LAB
ANION GAP SERPL CALCULATED.3IONS-SCNC: 12 MMOL/L (ref 3–16)
BASE EXCESS VENOUS: -10 MMOL/L (ref -3–3)
BASOPHILS ABSOLUTE: 0 K/UL (ref 0–0.2)
BASOPHILS RELATIVE PERCENT: 0.2 %
BUN BLDV-MCNC: 23 MG/DL (ref 7–20)
CALCIUM SERPL-MCNC: 10.2 MG/DL (ref 8.3–10.6)
CARBOXYHEMOGLOBIN: 2.4 % (ref 0–1.5)
CHLORIDE BLD-SCNC: 104 MMOL/L (ref 99–110)
CO2: 12 MMOL/L (ref 21–32)
CREAT SERPL-MCNC: 1 MG/DL (ref 0.6–1.2)
EOSINOPHILS ABSOLUTE: 0.1 K/UL (ref 0–0.6)
EOSINOPHILS RELATIVE PERCENT: 0.6 %
GFR AFRICAN AMERICAN: >60
GFR NON-AFRICAN AMERICAN: 54
GLUCOSE BLD-MCNC: 79 MG/DL (ref 70–99)
HCO3 VENOUS: 16.9 MMOL/L (ref 23–29)
HCT VFR BLD CALC: 22.4 % (ref 36–48)
HEMOGLOBIN, VEN, REDUCED: 18 %
HEMOGLOBIN: 7.1 G/DL (ref 12–16)
LACTATE DEHYDROGENASE: 479 U/L (ref 100–190)
LACTIC ACID: 1.9 MMOL/L (ref 0.4–2)
LYMPHOCYTES ABSOLUTE: 0.7 K/UL (ref 1–5.1)
LYMPHOCYTES RELATIVE PERCENT: 3.5 %
MAGNESIUM: 2 MG/DL (ref 1.8–2.4)
MCH RBC QN AUTO: 31.9 PG (ref 26–34)
MCHC RBC AUTO-ENTMCNC: 31.8 G/DL (ref 31–36)
MCV RBC AUTO: 100.2 FL (ref 80–100)
METHEMOGLOBIN VENOUS: 0.2 %
MONOCYTES ABSOLUTE: 1.8 K/UL (ref 0–1.3)
MONOCYTES RELATIVE PERCENT: 9.1 %
NEUTROPHILS ABSOLUTE: 17 K/UL (ref 1.7–7.7)
NEUTROPHILS RELATIVE PERCENT: 86.6 %
O2 CONTENT, VEN: 7 VOL %
O2 SAT, VEN: 82 %
O2 THERAPY: ABNORMAL
PCO2, VEN: 41.6 MMHG (ref 40–50)
PDW BLD-RTO: 13 % (ref 12.4–15.4)
PH VENOUS: 7.22 (ref 7.35–7.45)
PHOSPHORUS: 1.8 MG/DL (ref 2.5–4.9)
PLATELET # BLD: 350 K/UL (ref 135–450)
PMV BLD AUTO: 6.7 FL (ref 5–10.5)
PO2, VEN: 52.9 MMHG (ref 25–40)
POTASSIUM SERPL-SCNC: 4.1 MMOL/L (ref 3.5–5.1)
RBC # BLD: 2.24 M/UL (ref 4–5.2)
SODIUM BLD-SCNC: 128 MMOL/L (ref 136–145)
TCO2 CALC VENOUS: 41 MMOL/L
WBC # BLD: 19.6 K/UL (ref 4–11)

## 2022-01-15 PROCEDURE — 6370000000 HC RX 637 (ALT 250 FOR IP): Performed by: INTERNAL MEDICINE

## 2022-01-15 PROCEDURE — 84100 ASSAY OF PHOSPHORUS: CPT

## 2022-01-15 PROCEDURE — 83605 ASSAY OF LACTIC ACID: CPT

## 2022-01-15 PROCEDURE — 83735 ASSAY OF MAGNESIUM: CPT

## 2022-01-15 PROCEDURE — 2580000003 HC RX 258: Performed by: INTERNAL MEDICINE

## 2022-01-15 PROCEDURE — 36415 COLL VENOUS BLD VENIPUNCTURE: CPT

## 2022-01-15 PROCEDURE — 71045 X-RAY EXAM CHEST 1 VIEW: CPT

## 2022-01-15 PROCEDURE — 1200000000 HC SEMI PRIVATE

## 2022-01-15 PROCEDURE — 6360000002 HC RX W HCPCS: Performed by: INTERNAL MEDICINE

## 2022-01-15 PROCEDURE — 85025 COMPLETE CBC W/AUTO DIFF WBC: CPT

## 2022-01-15 PROCEDURE — 99232 SBSQ HOSP IP/OBS MODERATE 35: CPT | Performed by: INTERNAL MEDICINE

## 2022-01-15 PROCEDURE — 2500000003 HC RX 250 WO HCPCS: Performed by: INTERNAL MEDICINE

## 2022-01-15 PROCEDURE — 80048 BASIC METABOLIC PNL TOTAL CA: CPT

## 2022-01-15 PROCEDURE — 83615 LACTATE (LD) (LDH) ENZYME: CPT

## 2022-01-15 PROCEDURE — 6370000000 HC RX 637 (ALT 250 FOR IP): Performed by: PHYSICIAN ASSISTANT

## 2022-01-15 PROCEDURE — 82803 BLOOD GASES ANY COMBINATION: CPT

## 2022-01-15 RX ORDER — OXYCODONE HYDROCHLORIDE AND ACETAMINOPHEN 5; 325 MG/1; MG/1
1 TABLET ORAL ONCE
Status: COMPLETED | OUTPATIENT
Start: 2022-01-15 | End: 2022-01-15

## 2022-01-15 RX ORDER — SODIUM CHLORIDE, SODIUM LACTATE, POTASSIUM CHLORIDE, CALCIUM CHLORIDE 600; 310; 30; 20 MG/100ML; MG/100ML; MG/100ML; MG/100ML
INJECTION, SOLUTION INTRAVENOUS CONTINUOUS
Status: DISCONTINUED | OUTPATIENT
Start: 2022-01-15 | End: 2022-01-17

## 2022-01-15 RX ADMIN — ATORVASTATIN CALCIUM 40 MG: 40 TABLET, FILM COATED ORAL at 10:13

## 2022-01-15 RX ADMIN — Medication 10 ML: at 20:31

## 2022-01-15 RX ADMIN — IRON SUCROSE 100 MG: 20 INJECTION, SOLUTION INTRAVENOUS at 13:37

## 2022-01-15 RX ADMIN — LEVOTHYROXINE SODIUM 112 MCG: 0.11 TABLET ORAL at 10:13

## 2022-01-15 RX ADMIN — AZITHROMYCIN MONOHYDRATE 500 MG: 500 INJECTION, POWDER, LYOPHILIZED, FOR SOLUTION INTRAVENOUS at 19:31

## 2022-01-15 RX ADMIN — ENOXAPARIN SODIUM 50 MG: 100 INJECTION SUBCUTANEOUS at 20:54

## 2022-01-15 RX ADMIN — SODIUM PHOSPHATE, MONOBASIC, MONOHYDRATE 20 MMOL: 276; 142 INJECTION, SOLUTION INTRAVENOUS at 15:14

## 2022-01-15 RX ADMIN — PANTOPRAZOLE SODIUM 40 MG: 40 TABLET, DELAYED RELEASE ORAL at 05:40

## 2022-01-15 RX ADMIN — SODIUM CHLORIDE, POTASSIUM CHLORIDE, SODIUM LACTATE AND CALCIUM CHLORIDE: 600; 310; 30; 20 INJECTION, SOLUTION INTRAVENOUS at 13:36

## 2022-01-15 RX ADMIN — OXYCODONE AND ACETAMINOPHEN 1 TABLET: 5; 325 TABLET ORAL at 21:33

## 2022-01-15 RX ADMIN — ASPIRIN 81 MG: 81 TABLET, COATED ORAL at 10:13

## 2022-01-15 RX ADMIN — SODIUM CHLORIDE: 9 INJECTION, SOLUTION INTRAVENOUS at 05:57

## 2022-01-15 RX ADMIN — Medication 1000 MG: at 12:32

## 2022-01-15 ASSESSMENT — PAIN SCALES - GENERAL
PAINLEVEL_OUTOF10: 6
PAINLEVEL_OUTOF10: 0
PAINLEVEL_OUTOF10: 5
PAINLEVEL_OUTOF10: 4
PAINLEVEL_OUTOF10: 6
PAINLEVEL_OUTOF10: 3

## 2022-01-15 ASSESSMENT — PAIN SCALES - PAIN ASSESSMENT IN ADVANCED DEMENTIA (PAINAD)
BREATHING: 1
BREATHING: 1
CONSOLABILITY: 0
BREATHING: 2
CONSOLABILITY: 0
FACIALEXPRESSION: 2
CONSOLABILITY: 0
FACIALEXPRESSION: 2
BREATHING: 2
TOTALSCORE: 4
CONSOLABILITY: 0
BODYLANGUAGE: 0
BODYLANGUAGE: 0
TOTALSCORE: 4
BODYLANGUAGE: 0
NEGVOCALIZATION: 0
FACIALEXPRESSION: 2
NEGVOCALIZATION: 0
CONSOLABILITY: 0
TOTALSCORE: 3
TOTALSCORE: 5
NEGVOCALIZATION: 1
NEGVOCALIZATION: 0
FACIALEXPRESSION: 2
NEGVOCALIZATION: 0
NEGVOCALIZATION: 0
BREATHING: 1
BODYLANGUAGE: 0
BREATHING: 2
FACIALEXPRESSION: 2
BODYLANGUAGE: 1
CONSOLABILITY: 0
TOTALSCORE: 4
BODYLANGUAGE: 0
FACIALEXPRESSION: 2
TOTALSCORE: 3

## 2022-01-15 ASSESSMENT — PAIN DESCRIPTION - LOCATION: LOCATION: BACK;CHEST

## 2022-01-15 ASSESSMENT — PAIN DESCRIPTION - PAIN TYPE: TYPE: ACUTE PAIN

## 2022-01-15 ASSESSMENT — PAIN DESCRIPTION - FREQUENCY: FREQUENCY: CONTINUOUS

## 2022-01-15 ASSESSMENT — PAIN DESCRIPTION - ORIENTATION: ORIENTATION: LEFT;UPPER

## 2022-01-15 ASSESSMENT — PAIN DESCRIPTION - DESCRIPTORS: DESCRIPTORS: PRESSURE

## 2022-01-15 ASSESSMENT — PAIN DESCRIPTION - ONSET: ONSET: ON-GOING

## 2022-01-15 ASSESSMENT — PAIN DESCRIPTION - PROGRESSION: CLINICAL_PROGRESSION: NOT CHANGED

## 2022-01-15 NOTE — PROGRESS NOTES
[facility] Pulmonary/CCM Progress note      Admit Date: 1/13/2022    Chief Complaint: Generalized fatigue and weakness    Subjective: Interval History: Appears weak and fatigued. Still appears somewhat drowsy but answers appropriately to questions. Denies any significant shortness of breath, cough or chest pain.     Scheduled Meds:   enoxaparin  1 mg/kg SubCUTAneous BID    sodium phosphate IVPB  20 mmol IntraVENous Once    iron sucrose (VENOFER) iv piggyback 100 mL (Admin over 60 minutes)  100 mg IntraVENous Q24H    cefTRIAXone (ROCEPHIN) IV  1,000 mg IntraVENous Q24H    aspirin  81 mg Oral Daily    atorvastatin  40 mg Oral Daily    pantoprazole  40 mg Oral QAM AC    levothyroxine  112 mcg Oral Daily    sodium chloride flush  5-40 mL IntraVENous 2 times per day    azithromycin  500 mg IntraVENous Q24H     Continuous Infusions:   sodium chloride Stopped (01/14/22 1023)    sodium chloride 75 mL/hr at 01/15/22 1230     PRN Meds:sodium chloride flush, sodium chloride, acetaminophen **OR** acetaminophen    Review of Systems  Constitutional: Fatigue, malaise, cachexia, weight loss  Ears, nose, mouth, throat: negative for ear drainage, epistaxis, hoarseness, nasal congestion, sore throat and voice change  Respiratory: negative except for cough and shortness of breath  Cardiovascular: negative for chest pain, chest pressure/discomfort, irregular heart beat, lower extremity edema and palpitations  Gastrointestinal: negative for abdominal pain, constipation, diarrhea, jaundice, melena, odynophagia, reflux symptoms and vomiting  Hematologic/lymphatic: negative for bleeding, easy bruising, lymphadenopathy and petechiae  Musculoskeletal:negative for arthralgias, bone pain, muscle weakness, neck pain and stiff joints  Neurological: negative for dizziness, gait problems, headaches, seizures, speech problems, tremors and weakness  Behavioral/Psych: negative for anxiety, behavior problems, depression, fatigue and sleep disturbance  Endocrine: negative for diabetic symptoms including none, neuropathy, polyphagia, polyuria, polydipsia, vomiting and diarrhea and temperature intolerance  Allergic/Immunologic: negative for anaphylaxis, angioedema, hay fever and urticaria    Objective:     Patient Vitals for the past 8 hrs:   BP Temp Temp src Pulse Resp SpO2 Weight   01/15/22 1230 129/88 98.3 °F (36.8 °C) Temporal 75 20 93 % --   01/15/22 0820 (!) 120/58 98.3 °F (36.8 °C) Temporal 78 16 92 % --   01/15/22 0600 -- -- -- -- -- -- 117 lb 6.4 oz (53.3 kg)   01/15/22 0540 -- 98.7 °F (37.1 °C) Oral -- -- -- --     I/O last 3 completed shifts: In: 4998.2 [P.O.:1960; I.V.:3038.2]  Out: 800 [Urine:800]  No intake/output data recorded.     General Appearance: alert and oriented to person, place and time, well developed and well- nourished, in no acute distress  Skin: warm and dry, no rash or erythema  Head: normocephalic and atraumatic  Eyes: pupils equal, round, and reactive to light, extraocular eye movements intact, conjunctivae normal  ENT: external ear and ear canal normal bilaterally, nose without deformity, nasal mucosa and turbinates normal  Neck: supple and non-tender without mass, no cervical lymphadenopathy  Pulmonary/Chest: clear to auscultation bilaterally- no wheezes, rales or rhonchi, normal air movement, no respiratory distress  Cardiovascular: normal rate, regular rhythm,  no murmurs, rubs, distal pulses intact, no carotid bruits  Abdomen: soft, non-tender, non-distended, normal bowel sounds, no masses or organomegaly  Lymph Nodes: Cervical, supraclavicular normal  Extremities: no cyanosis, clubbing or edema  Musculoskeletal: normal range of motion, no joint swelling, deformity or tenderness  Neurologic: alert, no focal neurologic deficits    Data Review:  CBC:   Lab Results   Component Value Date    WBC 19.6 01/15/2022    RBC 2.24 01/15/2022     BMP:   Lab Results   Component Value Date    GLUCOSE 79 01/15/2022    CO2 12 01/15/2022    BUN 23 01/15/2022    CREATININE 1.0 01/15/2022    CALCIUM 10.2 01/15/2022     ABG: No results found for: HKA3BUN, BEART, [id number], PHART, THGBART, DGK5IXZ, PO2ART, CHQ5YVW    Radiology: All pertinent images / reports were reviewed as a part of this visit. Narrative   EXAMINATION:   CT OF THE CHEST WITHOUT CONTRAST 1/13/2022 12:16 pm       TECHNIQUE:   CT of the chest was performed without the administration of intravenous   contrast. Multiplanar reformatted images are provided for review. Dose   modulation, iterative reconstruction, and/or weight based adjustment of the   mA/kV was utilized to reduce the radiation dose to as low as reasonably   achievable.       COMPARISON:   Chest x-ray, 3 hours ago       HISTORY:   ORDERING SYSTEM PROVIDED HISTORY: mass   TECHNOLOGIST PROVIDED HISTORY:   Reason for exam:->mass   Decision Support Exception - unselect if not a suspected or confirmed   emergency medical condition->Emergency Medical Condition (MA)   Reason for Exam: mass       FINDINGS:   Mediastinum: There are several AP window nodes for example, mild 2 measuring   19 and 18 mm in short axis.       Lungs/pleura: There is a left upper lobe suprahilar mass, measuring 5.2 x 6.3   x 6.2 cm. There are peripheral components of ground-glass and interlobular   septal thickening in the left apex.       In the right middle lobe there is a 9 x 7 mm partially solid nodule. The   solid component is 3 mm. (Image 55, series 3).  Mild dependent atelectasis is   noted. There is pleural thickening along the lateral aspect of the right   hemithorax, likely fibrotic.       Upper Abdomen: There is a right adrenal nodule measuring 4 cm in diameter.    There are 2 left adrenal nodules, measuring 2.6 and 2.5 cm in diameter.       Soft Tissues/Bones: No suspicious lytic or blastic bone lesions are   appreciated.           Impression   1. 6.3 cm left upper lobe, suprahilar mass, suspicious for malignancy.  There   may be a postobstructive component of pneumonia, versus transbronchial spread   of tumor in the apex of the left upper lobe. 2. AP window mediastinal adenopathy, likely metastatic. 3. 9 x 7 mm right middle lobe nodule, indeterminate for inflammation, primary   or metastatic neoplasm. 4. Bilateral adrenal nodules, suspicious for metastasis. 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 Improved serum calcium noted with IV fluids. Now has metabolic acidosis-switch IV fluids to Ringer's lactate solution. Monitor sodium. Serum calcium 10.2.    6 cm left suprahilar mass with possible postobstructive pneumonia. Continue with empiric antibiotics with Rocephin and Zithromax. Patient wants to go through biopsy, which will be organized when clinically more stable. In the meantime we will switch Xarelto over to therapeutic Lovenox. Discussed with oncology.     Adrienne Snider MD

## 2022-01-15 NOTE — CONSULTS
[facility] Pulmonary and Critical Care   Consult Note      Reason for Consult: Left suprahilar mass  Requesting Physician: Suzanna Dickey    Subjective:   CHIEF COMPLAINT: Generalized fatigue and weakness associated with poor appetite and weight loss     HPI: Patient states that she has been progressively getting weaker and has increased fatigue for the last several months. She has poor appetite and weight loss which she really could not quantify. Pretty much bedridden for a week or so. States that she has some back pain but no anterior chest pain. Cough is nonproductive, denies hemoptysis. Patient noted to have a large left suprahilar mass measuring 6 cm of left upper lobe and hence a pulmonary consultation has been requested for biopsy. Smoker. Poor performance status and mobility due to prior stroke. History of falls at home. History of paroxysmal atrial fibrillation, prior stroke in 2018. On Xarelto for anticoagulation. Poorly compliant with doctors visits and investigations.      The patient is a 67 y.o. female with significant past medical history of:      Diagnosis Date    Acquired spondylolisthesis     Arthritis     Cataract     Chronic pain syndrome     Degeneration of lumbar or lumbosacral intervertebral disc     Depressive disorder, not elsewhere classified     Displacement of lumbar intervertebral disc without myelopathy     H/O blood clots     HBP (high blood pressure)     Heart disease     Spinal stenosis, lumbar region, without neurogenic claudication     Sprain of lumbosacral (joint) (ligament)     Sprain of neck     Stroke (HCC)     Thyroid disorder     Thyroid disorder         Past Surgical History:        Procedure Laterality Date    HYSTERECTOMY, TOTAL ABDOMINAL  1972    LUMBAR FUSION      L4-5    PARTIAL HYSTERECTOMY  1964     Current Medications:    Current Facility-Administered Medications: cefTRIAXone (ROCEPHIN) 1000 mg in sterile water 10 mL IV syringe, 1,000 mg, IntraVENous, Q24H  aspirin EC tablet 81 mg, 81 mg, Oral, Daily  atorvastatin (LIPITOR) tablet 40 mg, 40 mg, Oral, Daily  pantoprazole (PROTONIX) tablet 40 mg, 40 mg, Oral, QAM AC  levothyroxine (SYNTHROID) tablet 112 mcg, 112 mcg, Oral, Daily  rivaroxaban (XARELTO) tablet 10 mg, 10 mg, Oral, Daily  sodium chloride flush 0.9 % injection 5-40 mL, 5-40 mL, IntraVENous, 2 times per day  sodium chloride flush 0.9 % injection 5-40 mL, 5-40 mL, IntraVENous, PRN  0.9 % sodium chloride infusion, 25 mL, IntraVENous, PRN  acetaminophen (TYLENOL) tablet 650 mg, 650 mg, Oral, Q6H PRN **OR** acetaminophen (TYLENOL) suppository 650 mg, 650 mg, Rectal, Q6H PRN  0.9 % sodium chloride infusion, , IntraVENous, Continuous  [DISCONTINUED] cefTRIAXone (ROCEPHIN) 1000 mg in sterile water 10 mL IV syringe, 1,000 mg, IntraVENous, Q24H **AND** azithromycin (ZITHROMAX) 500 mg in D5W 250ml Vial Mate, 500 mg, IntraVENous, Q24H    Allergies   Allergen Reactions    Other Other (See Comments)     Anti depression cause black outs     Trazodone      Sedation?  Venlafaxine      Sedation? Social History:    TOBACCO:   reports that she has been smoking. She started smoking about 62 years ago. She has a 28.50 pack-year smoking history. She has never used smokeless tobacco.  ETOH:   reports previous alcohol use.   Patient currently lives independently    Family History:       Problem Relation Age of Onset    Cancer Father         Brain Cancer     Breast Cancer Sister     High Blood Pressure Sister        REVIEW OF SYSTEMS:    Constitutional: Generalized fatigue, loss of appetite and weight  Ears, nose, mouth, throat: negative for ear drainage, epistaxis, hoarseness, nasal congestion, sore throat and voice change  Respiratory: negative except for cough and shortness of breath  Cardiovascular: negative for chest pain, chest pressure/discomfort, irregular heart beat, lower extremity edema and palpitations  Gastrointestinal: negative for abdominal pain, constipation, diarrhea, jaundice, melena, odynophagia, reflux symptoms and vomiting  Hematologic/lymphatic: negative for bleeding, easy bruising, lymphadenopathy and petechiae  Musculoskeletal:negative for arthralgias, bone pain, muscle weakness, neck pain and stiff joints  Neurological: negative for dizziness, gait problems, headaches, seizures, speech problems, tremors and weakness  Behavioral/Psych: negative for anxiety, behavior problems, depression, fatigue and sleep disturbance  Endocrine: negative for diabetic symptoms including none, neuropathy, polyphagia, polyuria, polydipsia, vomiting and diarrhea and temperature intolerance  Allergic/Immunologic: negative for anaphylaxis, angioedema, hay fever and urticaria      Objective:     Patient Vitals for the past 8 hrs:   BP Temp Temp src Pulse Resp SpO2   01/14/22 1530 100/67 97 °F (36.1 °C) Oral 80 16 98 %   01/14/22 1230 109/74 98 °F (36.7 °C) Axillary 80 16 96 %     I/O last 3 completed shifts: In: 2695.8 [P.O.:1960; I.V.:735.8]  Out: -   No intake/output data recorded.     Physical Exam:  General Appearance: alert and oriented, cachectic, in no acute distress  Skin: warm and dry, no rash or erythema  Head: normocephalic and atraumatic  Eyes: pupils equal, round, and reactive to light, extraocular eye movements intact, conjunctivae normal  ENT: external ear and ear canal normal bilaterally, nose without deformity, nasal mucosa and turbinates normal  Neck: supple and non-tender without mass, no cervical lymphadenopathy  Pulmonary/Chest: clear to auscultation bilaterally- no wheezes, rales or rhonchi, normal air movement, no respiratory distress  Cardiovascular: normal rate, regular rhythm,  no murmurs, rubs, distal pulses intact, no carotid bruits  Abdomen: soft, non-tender, non-distended, normal bowel sounds, no masses or organomegaly  Lymph Nodes: Cervical, supraclavicular normal  Extremities: no cyanosis, clubbing or edema  Musculoskeletal: normal range of motion, no joint swelling, deformity or tenderness  Neurologic: alert, no focal neurologic deficits    Data Review:  CBC:   Lab Results   Component Value Date    WBC 16.8 01/14/2022    RBC 2.30 01/14/2022     BMP:   Lab Results   Component Value Date    GLUCOSE 102 01/14/2022    CO2 15 01/14/2022    BUN 30 01/14/2022    CREATININE 1.1 01/14/2022    CALCIUM 11.9 01/14/2022     ABG: No results found for: [id number], BEART, [id number], PHART, THGBART, AXK3LJW, PO2ART, CLT9YLT    Radiology: All pertinent images / reports were reviewed as a part of this visit. Narrative   EXAMINATION:   CT OF THE CHEST WITHOUT CONTRAST 1/13/2022 12:16 pm       TECHNIQUE:   CT of the chest was performed without the administration of intravenous   contrast. Multiplanar reformatted images are provided for review. Dose   modulation, iterative reconstruction, and/or weight based adjustment of the   mA/kV was utilized to reduce the radiation dose to as low as reasonably   achievable.       COMPARISON:   Chest x-ray, 3 hours ago       HISTORY:   ORDERING SYSTEM PROVIDED HISTORY: mass   TECHNOLOGIST PROVIDED HISTORY:   Reason for exam:->mass   Decision Support Exception - unselect if not a suspected or confirmed   emergency medical condition->Emergency Medical Condition (MA)   Reason for Exam: mass       FINDINGS:   Mediastinum: There are several AP window nodes for example, mild 2 measuring   19 and 18 mm in short axis.       Lungs/pleura: There is a left upper lobe suprahilar mass, measuring 5.2 x 6.3   x 6.2 cm. There are peripheral components of ground-glass and interlobular   septal thickening in the left apex.       In the right middle lobe there is a 9 x 7 mm partially solid nodule. The   solid component is 3 mm. (Image 55, series 3).  Mild dependent atelectasis is   noted. There is pleural thickening along the lateral aspect of the right   hemithorax, likely fibrotic.       Upper Abdomen:  There is a right adrenal nodule measuring 4 cm in diameter. There are 2 left adrenal nodules, measuring 2.6 and 2.5 cm in diameter.       Soft Tissues/Bones: No suspicious lytic or blastic bone lesions are   appreciated.           Impression   1. 6.3 cm left upper lobe, suprahilar mass, suspicious for malignancy.  There   may be a postobstructive component of pneumonia, versus transbronchial spread   of tumor in the apex of the left upper lobe. 2. AP window mediastinal adenopathy, likely metastatic. 3. 9 x 7 mm right middle lobe nodule, indeterminate for inflammation, primary   or metastatic neoplasm. 4. Bilateral adrenal nodules, suspicious for metastasis. 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 Patient has poor performance status, deconditioned and noted to be volume depleted with significant hypercalcemia >12. At this point patient's electrolytes needs to be replaced/corrected after which we will make a decision about bronchoscopy +/- EBUS. Patient currently appears to be at risk from general anesthesia for performing the procedure at this time. Patient is receiving IV fluids and Zometa for hypercalcemia. Patient also noted to have breast mass which will need further evaluation. Will reevaluate patient over the next 24 to 48 hours before we make a decision. Xarelto will need to be held for 48 hours prior to the procedure if we would like to proceed. Will discussed with Jackieadalbertone Machado, patient's sister [[phone]] when a final decision is made about the biopsy.     Adrienne Snider MD

## 2022-01-15 NOTE — PROGRESS NOTES
Hospitalist Progress Note      PCP: Tyrone Andrade DO    Date of Admission: 1/13/2022    Chief Complaint: Progressive weakness    Hospital Course: 67 y.o. female with past medical history of thyroid disease, heart disease, CVA, thyroid disorder, DVT, A. fib, presents from home in view of progressive weakness. Patient is a poor historian. She states she has been getting weak. She also describes stabbing pain that goes from her chest to her back when she lies down. States pain has been there for few months. She states that she has been getting all of her care from a nurse practitioner who visits her at home every few months. She has been living with her friend Alex for the last few months. I have discussed this presentation with aCrmelo Beckham over the phone. According to her, patient has been getting progressively weak over the last couple of months. She has not been eating much. She has been spending increasingly more time in bed and has not really gotten out of bed for over a week now. Patient has been managing her own medications. She has been somewhat confused over the past week but does not have memory problems at baseline. Upon chart review it appears that patient has not seen her primary care provider since 2019. Recent communication documented between patient's NP and Dr. Richy Gilliland. There is being requested for imaging in view of concern for lung cancer. I attempted to reach out to [facility] NP at [phone]. She is not available today. I am expecting a call back from another NP with some information from her chart. ADD noncontrast CT of the chest shows 6.3 cm left upper lobe suprahilar mass suspicious for malignancy. There is also mediastinal adenopathy. There is a 5 x 7 mm right middle lobe nodule. There are bilateral adrenal nodules suspicious for metastasis. Patient is also found to be hyperglycemic.   She has leukocytosis and concern for postobstructive pneumonia on CT imaging pain Subjective: Patient seen and examined at bedside. She feels better today. She is able to provide more of history. Medications:  Reviewed    Infusion Medications    lactated ringers 75 mL/hr at 01/15/22 1336    sodium chloride Stopped (01/14/22 1023)     Scheduled Medications    enoxaparin  1 mg/kg SubCUTAneous BID    sodium phosphate IVPB  20 mmol IntraVENous Once    iron sucrose (VENOFER) iv piggyback 100 mL (Admin over 60 minutes)  100 mg IntraVENous Q24H    cefTRIAXone (ROCEPHIN) IV  1,000 mg IntraVENous Q24H    aspirin  81 mg Oral Daily    atorvastatin  40 mg Oral Daily    pantoprazole  40 mg Oral QAM AC    levothyroxine  112 mcg Oral Daily    sodium chloride flush  5-40 mL IntraVENous 2 times per day    azithromycin  500 mg IntraVENous Q24H     PRN Meds: sodium chloride flush, sodium chloride, acetaminophen **OR** acetaminophen      Intake/Output Summary (Last 24 hours) at 1/15/2022 1551  Last data filed at 1/15/2022 0542  Gross per 24 hour   Intake 2782.43 ml   Output 800 ml   Net 1982.43 ml       Physical Exam Performed:    /88   Pulse 75   Temp 98.3 °F (36.8 °C) (Temporal)   Resp 20   Ht 5' 4\" (1.626 m)   Wt 117 lb 6.4 oz (53.3 kg)   SpO2 93%   BMI 20.15 kg/m²     General appearance: No apparent distress, appears stated age and cooperative. HEENT: Pupils equal, round, and reactive to light. Conjunctivae/corneas clear. Neck: Supple, with full range of motion. No jugular venous distention. Trachea midline. Respiratory:  Normal respiratory effort. Clear to auscultation, bilaterally without Rales/Wheezes/Rhonchi. Cardiovascular: Regular rate and rhythm with normal S1/S2 without murmurs, rubs or gallops. Abdomen: Soft, non-tender, non-distended with normal bowel sounds. Musculoskeletal: No clubbing, cyanosis or edema bilaterally. Full range of motion without deformity. Skin: Skin color, texture, turgor normal.  No rashes or lesions.   Neurologic:  Neurovascularly intact without any focal sensory/motor deficits. Cranial nerves: II-XII intact, grossly non-focal.  Psychiatric: Alert and oriented, thought content appropriate, normal insight  Capillary Refill: Brisk,< 3 seconds   Peripheral Pulses: +2 palpable, equal bilaterally       Labs:   Recent Labs     01/13/22  0909 01/14/22  0547 01/15/22  0705   WBC 18.7* 16.8* 19.6*   HGB 8.7* 7.4* 7.1*   HCT 27.3* 23.7* 22.4*    359 350     Recent Labs     01/13/22  0909 01/14/22  0547 01/15/22  0705   * 127* 128*   K 3.9 4.0 4.1    100 104   CO2 18* 15* 12*   BUN 33* 30* 23*   CREATININE 1.1 1.1 1.0   CALCIUM 12.9* 11.9* 10.2   PHOS  --  2.9 1.8*     Recent Labs     01/13/22  0909   AST 22   ALT 17   BILITOT 0.4   ALKPHOS 103     No results for input(s): INR in the last 72 hours. Recent Labs     01/13/22  0909   TROPONINI 0.02*       Urinalysis:      Lab Results   Component Value Date    NITRU Negative 01/13/2022    WBCUA 18 01/13/2022    BACTERIA RARE 01/13/2022    RBCUA 1 01/13/2022    BLOODU Negative 01/13/2022    SPECGRAV 1.015 01/13/2022    GLUCOSEU Negative 01/13/2022       Radiology:  XR CHEST PORTABLE   Final Result   Focal consolidations seen within the left upper lobe. Pneumonia is primarily   considered. However, that must be followed to resolution to ensure that   there is no underlying neoplasm. CT ABDOMEN PELVIS W IV CONTRAST Additional Contrast? Oral   Final Result   1. Bilateral adrenal masses, likely metastatic. No other evidence of   abdominal or pelvic metastatic disease. 2. Lytic L2 vertebral body lesion concerning for metastatic disease. 3. 2.3 cm left breast mass likely malignant. 4. Cholelithiasis with no acute features. 5. Nonobstructive right nephrolithiasis. CT HEAD W WO CONTRAST   Final Result   No acute intracranial abnormality. No definite evidence of intracranial metastatic disease. It is noted that CT   imaging is insensitive for small metastatic lesions.   If possible further   evaluation with MRI should be considered for better characterization. RECOMMENDATIONS:   Unavailable         CT CHEST WO CONTRAST   Final Result   1. 6.3 cm left upper lobe, suprahilar mass, suspicious for malignancy. There   may be a postobstructive component of pneumonia, versus transbronchial spread   of tumor in the apex of the left upper lobe. 2. AP window mediastinal adenopathy, likely metastatic. 3. 9 x 7 mm right middle lobe nodule, indeterminate for inflammation, primary   or metastatic neoplasm. 4. Bilateral adrenal nodules, suspicious for metastasis. RECOMMENDATIONS:   Unavailable         XR CHEST PORTABLE   Final Result   New left upper lobe opacity concerning for pneumonia versus malignancy. RECOMMENDATION:   Chest CT is recommended for further evaluation. Assessment/Plan:    Active Hospital Problems    Diagnosis     Severe malnutrition ([facility] [address] [E43]     PNA (pneumonia) [J18.9]      Pneumonia  Postobstructive  WBC 18 on admission, improving  Patient is on room air  Afebrile  Follow-up septic work-up  Rocephin azithromycin    Hyponatremia  Sodium 127 today  SIADH in the setting of lung CA? Will restrict dietary free water to 1800ml     Lung mass  Suspicious for malignancy  There are adrenal lesions suspicious for metastasis  Consult heme-onc  Consult palliative care  CT head and abdomen pelvis today for staging     Hypercalcemia  In the setting of lung mass  Check PTH related protein  IV fluids     Failure to thrive  In the setting of malignancy     History of DVT  On Xarelto     History of paroxysmal A. fib  Continue Xarelto    DVT Prophylaxis: Lovenox  Diet: ADULT ORAL NUTRITION SUPPLEMENT; Breakfast, Dinner; Standard High Calorie/High Protein Oral Supplement  ADULT DIET;  Regular; 1800 ml  ADULT ORAL NUTRITION SUPPLEMENT; Breakfast, Lunch, Dinner, HS Snack; Standard High Calorie/High Protein Oral Supplement  Code Status: Full

## 2022-01-15 NOTE — PROGRESS NOTES
[facility]   Progress Note      SUBJECTIVE:      Events noted. Discussed with RN. Overall patient is doing okay  She has fatigue and tiredness. Has some chest pain. OBJECTIVE    Physical  VITALS:  BP (!) 120/58   Pulse 78   Temp 98.3 °F (36.8 °C) (Temporal)   Resp 16   Ht 5' 4\" (1.626 m)   Wt 117 lb 14.4 oz (53.5 kg)   SpO2 92%   BMI 20.24 kg/m²   TEMPERATURE:  Current - Temp: 98.3 °F (36.8 °C); Max - Temp  Av.1 °F (36.7 °C)  Min: 97 °F (36.1 °C)  Max: 100.3 °F (37.9 °C)  BLOOD PRESSURE RANGE:  Systolic (12AUU), VLV:248 , Min:100 , LB   ; Diastolic (11KPR), SYE:13, Min:58, Max:74    24HR INTAKE/OUTPUT:    Intake/Output Summary (Last 24 hours) at 1/15/2022 1117  Last data filed at 1/15/2022 0542  Gross per 24 hour   Intake 2782.43 ml   Output 800 ml   Net 1982.43 ml       Conscious alert and oriented. Pallor is present. Neck is supple  Respiratory efforts are normal.  Abdomen is soft bowel sounds are heard. Extremities 1+ edema noted.     Data  Labs:  General Labs:  CBC with Differential:    Lab Results   Component Value Date    WBC 19.6 01/15/2022    RBC 2.24 01/15/2022    HGB 7.1 01/15/2022    HCT 22.4 01/15/2022     01/15/2022    .2 01/15/2022    MCH 31.9 01/15/2022    MCHC 31.8 01/15/2022    RDW 13.0 01/15/2022    LYMPHOPCT 3.5 01/15/2022    MONOPCT 9.1 01/15/2022    BASOPCT 0.2 01/15/2022    MONOSABS 1.8 01/15/2022    LYMPHSABS 0.7 01/15/2022    EOSABS 0.1 01/15/2022    BASOSABS 0.0 01/15/2022     BMP:    Lab Results   Component Value Date     01/15/2022    K 4.1 01/15/2022    K 3.9 2022     01/15/2022    CO2 12 01/15/2022    BUN 23 01/15/2022    LABALBU 3.1 2022    CREATININE 1.0 01/15/2022    CALCIUM 10.2 01/15/2022    GFRAA >60 01/15/2022    LABGLOM 54 01/15/2022    LABGLOM 51 2011    GLUCOSE 79 01/15/2022     Hepatic Function Panel:    Lab Results   Component Value Date    ALKPHOS 103 2022    ALT 17 2022    AST 22 01/13/2022    PROT 8.4 01/13/2022    PROT 7.6 02/16/2011    BILITOT 0.4 01/13/2022    BILIDIR <0.2 08/14/2019    IBILI see below 08/14/2019    LABALBU 3.1 01/13/2022     LDH:    Lab Results   Component Value Date     08/14/2019     PT/INR:    Lab Results   Component Value Date    PROTIME 11.8 08/12/2019    INR 1.04 08/12/2019     PTT:  No results found for: APTT, PTT[APTT    Current Medications  Current Facility-Administered Medications: enoxaparin (LOVENOX) injection 50 mg, 1 mg/kg, SubCUTAneous, BID  cefTRIAXone (ROCEPHIN) 1000 mg in sterile water 10 mL IV syringe, 1,000 mg, IntraVENous, Q24H  aspirin EC tablet 81 mg, 81 mg, Oral, Daily  atorvastatin (LIPITOR) tablet 40 mg, 40 mg, Oral, Daily  pantoprazole (PROTONIX) tablet 40 mg, 40 mg, Oral, QAM AC  levothyroxine (SYNTHROID) tablet 112 mcg, 112 mcg, Oral, Daily  sodium chloride flush 0.9 % injection 5-40 mL, 5-40 mL, IntraVENous, 2 times per day  sodium chloride flush 0.9 % injection 5-40 mL, 5-40 mL, IntraVENous, PRN  0.9 % sodium chloride infusion, 25 mL, IntraVENous, PRN  acetaminophen (TYLENOL) tablet 650 mg, 650 mg, Oral, Q6H PRN **OR** acetaminophen (TYLENOL) suppository 650 mg, 650 mg, Rectal, Q6H PRN  0.9 % sodium chloride infusion, , IntraVENous, Continuous  [DISCONTINUED] cefTRIAXone (ROCEPHIN) 1000 mg in sterile water 10 mL IV syringe, 1,000 mg, IntraVENous, Q24H **AND** azithromycin (ZITHROMAX) 500 mg in D5W 250ml Vial Mate, 500 mg, IntraVENous, Q24H    ASSESSMENT AND PLAN    50-year-old lady with history of chronic smoking, alcohol abuse, poor social support has    1. Left upper lobe, suprahilar mass with mediastinal lymphadenopathy, bilateral adrenal metastasis and lytic bony lesions    2.  2.3 cm left breast mass    3. Hypercalcemia    4. Anemia of chronic disease.   Transfuse PRBCs if hemoglobin is less than 7    Overall it appears that patient might have 2 different malignancies-metastatic lung cancer and possibly localized breast cancer. Calcium has decreased to 10.2 after IV hydration and zoledronic acid. Will need to establish tissue diagnosis-either by bronchoscopy or by CT-guided lung biopsy. Check LDH  . Might need breast biopsy which can be done as outpatient      Konstantin Rodríguez MD

## 2022-01-16 LAB
ANION GAP SERPL CALCULATED.3IONS-SCNC: 15 MMOL/L (ref 3–16)
ANISOCYTOSIS: ABNORMAL
BANDED NEUTROPHILS RELATIVE PERCENT: 5 % (ref 0–7)
BASOPHILS ABSOLUTE: 0 K/UL (ref 0–0.2)
BASOPHILS RELATIVE PERCENT: 0 %
BUN BLDV-MCNC: 16 MG/DL (ref 7–20)
BURR CELLS: ABNORMAL
CALCIUM SERPL-MCNC: 9.3 MG/DL (ref 8.3–10.6)
CHLORIDE BLD-SCNC: 103 MMOL/L (ref 99–110)
CO2: 15 MMOL/L (ref 21–32)
CREAT SERPL-MCNC: 1 MG/DL (ref 0.6–1.2)
EOSINOPHILS ABSOLUTE: 0 K/UL (ref 0–0.6)
EOSINOPHILS RELATIVE PERCENT: 0 %
GFR AFRICAN AMERICAN: >60
GFR NON-AFRICAN AMERICAN: 54
GLUCOSE BLD-MCNC: 98 MG/DL (ref 70–99)
HCT VFR BLD CALC: 23.2 % (ref 36–48)
HEMOGLOBIN: 7.4 G/DL (ref 12–16)
LYMPHOCYTES ABSOLUTE: 0 K/UL (ref 1–5.1)
LYMPHOCYTES RELATIVE PERCENT: 0 %
MACROCYTES: ABNORMAL
MAGNESIUM: 1.8 MG/DL (ref 1.8–2.4)
MCH RBC QN AUTO: 32.3 PG (ref 26–34)
MCHC RBC AUTO-ENTMCNC: 31.9 G/DL (ref 31–36)
MCV RBC AUTO: 101.2 FL (ref 80–100)
MONOCYTES ABSOLUTE: 0.5 K/UL (ref 0–1.3)
MONOCYTES RELATIVE PERCENT: 3 %
NEUTROPHILS ABSOLUTE: 16.8 K/UL (ref 1.7–7.7)
NEUTROPHILS RELATIVE PERCENT: 92 %
OVALOCYTES: ABNORMAL
PDW BLD-RTO: 13.3 % (ref 12.4–15.4)
PHOSPHORUS: 2.1 MG/DL (ref 2.5–4.9)
PLATELET # BLD: 349 K/UL (ref 135–450)
PLATELET SLIDE REVIEW: ADEQUATE
PMV BLD AUTO: 6.8 FL (ref 5–10.5)
POLYCHROMASIA: ABNORMAL
POTASSIUM SERPL-SCNC: 2.8 MMOL/L (ref 3.5–5.1)
PROCALCITONIN: 0.37 NG/ML (ref 0–0.15)
RBC # BLD: 2.29 M/UL (ref 4–5.2)
SCHISTOCYTES: ABNORMAL
SLIDE REVIEW: ABNORMAL
SODIUM BLD-SCNC: 133 MMOL/L (ref 136–145)
TEAR DROP CELLS: ABNORMAL
WBC # BLD: 17.3 K/UL (ref 4–11)

## 2022-01-16 PROCEDURE — 85025 COMPLETE CBC W/AUTO DIFF WBC: CPT

## 2022-01-16 PROCEDURE — 92526 ORAL FUNCTION THERAPY: CPT

## 2022-01-16 PROCEDURE — 2580000003 HC RX 258: Performed by: INTERNAL MEDICINE

## 2022-01-16 PROCEDURE — 6360000002 HC RX W HCPCS: Performed by: INTERNAL MEDICINE

## 2022-01-16 PROCEDURE — 83735 ASSAY OF MAGNESIUM: CPT

## 2022-01-16 PROCEDURE — 6370000000 HC RX 637 (ALT 250 FOR IP): Performed by: INTERNAL MEDICINE

## 2022-01-16 PROCEDURE — 84100 ASSAY OF PHOSPHORUS: CPT

## 2022-01-16 PROCEDURE — 36415 COLL VENOUS BLD VENIPUNCTURE: CPT

## 2022-01-16 PROCEDURE — 84145 PROCALCITONIN (PCT): CPT

## 2022-01-16 PROCEDURE — 80048 BASIC METABOLIC PNL TOTAL CA: CPT

## 2022-01-16 PROCEDURE — 92610 EVALUATE SWALLOWING FUNCTION: CPT

## 2022-01-16 PROCEDURE — 6370000000 HC RX 637 (ALT 250 FOR IP): Performed by: HOSPITALIST

## 2022-01-16 PROCEDURE — 1200000000 HC SEMI PRIVATE

## 2022-01-16 RX ORDER — OXYCODONE HYDROCHLORIDE AND ACETAMINOPHEN 5; 325 MG/1; MG/1
1 TABLET ORAL EVERY 8 HOURS PRN
Status: DISCONTINUED | OUTPATIENT
Start: 2022-01-16 | End: 2022-01-24

## 2022-01-16 RX ORDER — POTASSIUM BICARBONATE 25 MEQ/1
50 TABLET, EFFERVESCENT ORAL ONCE
Status: COMPLETED | OUTPATIENT
Start: 2022-01-16 | End: 2022-01-16

## 2022-01-16 RX ADMIN — LEVOTHYROXINE SODIUM 112 MCG: 0.11 TABLET ORAL at 08:32

## 2022-01-16 RX ADMIN — ENOXAPARIN SODIUM 50 MG: 100 INJECTION SUBCUTANEOUS at 08:33

## 2022-01-16 RX ADMIN — SODIUM CHLORIDE, POTASSIUM CHLORIDE, SODIUM LACTATE AND CALCIUM CHLORIDE: 600; 310; 30; 20 INJECTION, SOLUTION INTRAVENOUS at 08:35

## 2022-01-16 RX ADMIN — IRON SUCROSE 100 MG: 20 INJECTION, SOLUTION INTRAVENOUS at 14:39

## 2022-01-16 RX ADMIN — AZITHROMYCIN MONOHYDRATE 500 MG: 500 INJECTION, POWDER, LYOPHILIZED, FOR SOLUTION INTRAVENOUS at 16:10

## 2022-01-16 RX ADMIN — OXYCODONE AND ACETAMINOPHEN 1 TABLET: 5; 325 TABLET ORAL at 06:37

## 2022-01-16 RX ADMIN — Medication 10 ML: at 08:32

## 2022-01-16 RX ADMIN — PANTOPRAZOLE SODIUM 40 MG: 40 TABLET, DELAYED RELEASE ORAL at 05:57

## 2022-01-16 RX ADMIN — ASPIRIN 81 MG: 81 TABLET, COATED ORAL at 08:32

## 2022-01-16 RX ADMIN — POTASSIUM BICARBONATE 50 MEQ: 978 TABLET, EFFERVESCENT ORAL at 16:01

## 2022-01-16 RX ADMIN — Medication 1000 MG: at 14:12

## 2022-01-16 RX ADMIN — SODIUM CHLORIDE, POTASSIUM CHLORIDE, SODIUM LACTATE AND CALCIUM CHLORIDE: 600; 310; 30; 20 INJECTION, SOLUTION INTRAVENOUS at 19:14

## 2022-01-16 RX ADMIN — ENOXAPARIN SODIUM 50 MG: 100 INJECTION SUBCUTANEOUS at 21:21

## 2022-01-16 RX ADMIN — ATORVASTATIN CALCIUM 40 MG: 40 TABLET, FILM COATED ORAL at 08:32

## 2022-01-16 ASSESSMENT — PAIN DESCRIPTION - PROGRESSION
CLINICAL_PROGRESSION: NOT CHANGED

## 2022-01-16 ASSESSMENT — PAIN DESCRIPTION - ORIENTATION: ORIENTATION: LEFT;UPPER

## 2022-01-16 ASSESSMENT — PAIN SCALES - GENERAL
PAINLEVEL_OUTOF10: 5
PAINLEVEL_OUTOF10: 0

## 2022-01-16 ASSESSMENT — PAIN DESCRIPTION - DESCRIPTORS: DESCRIPTORS: PRESSURE

## 2022-01-16 ASSESSMENT — PAIN DESCRIPTION - PAIN TYPE: TYPE: ACUTE PAIN

## 2022-01-16 ASSESSMENT — PAIN DESCRIPTION - ONSET: ONSET: ON-GOING

## 2022-01-16 ASSESSMENT — PAIN DESCRIPTION - FREQUENCY: FREQUENCY: CONTINUOUS

## 2022-01-16 ASSESSMENT — PAIN DESCRIPTION - LOCATION: LOCATION: BACK;CHEST

## 2022-01-16 NOTE — PROGRESS NOTES
[facility]   Progress Note      SUBJECTIVE:      Feels okay. No chest pain. Feels tired      OBJECTIVE    Physical  VITALS:  BP 91/60   Pulse 77   Temp 97.4 °F (36.3 °C) (Oral)   Resp 18   Ht 5' 4\" (1.626 m)   Wt 117 lb 6.4 oz (53.3 kg)   SpO2 94%   BMI 20.15 kg/m²   TEMPERATURE:  Current - Temp: 97.4 °F (36.3 °C); Max - Temp  Av.5 °F (36.4 °C)  Min: 97.2 °F (36.2 °C)  Max: 98.2 °F (36.8 °C)  BLOOD PRESSURE RANGE:  Systolic (52IXD), OXP:815 , Min:91 , TQA:980   ; Diastolic (85MRB), EYY:84, Min:60, Max:76    24HR INTAKE/OUTPUT:      Intake/Output Summary (Last 24 hours) at 2022 1451  Last data filed at 2022 0605  Gross per 24 hour   Intake 2185.57 ml   Output 1300 ml   Net 885.57 ml       Conscious alert and oriented. Pallor is present. Neck is supple  Respiratory efforts are normal.  Abdomen is soft bowel sounds are heard. Extremities 1+ edema noted.     Data  Labs:  General Labs:  CBC with Differential:    Lab Results   Component Value Date    WBC 17.3 2022    RBC 2.29 2022    HGB 7.4 2022    HCT 23.2 2022     2022    .2 2022    MCH 32.3 2022    MCHC 31.9 2022    RDW 13.3 2022    BANDSPCT 5 2022    LYMPHOPCT 0.0 2022    MONOPCT 3.0 2022    BASOPCT 0.0 2022    MONOSABS 0.5 2022    LYMPHSABS 0.0 2022    EOSABS 0.0 2022    BASOSABS 0.0 2022     BMP:    Lab Results   Component Value Date     2022    K 2.8 2022    K 3.9 2022     2022    CO2 15 2022    BUN 16 2022    LABALBU 3.1 2022    CREATININE 1.0 2022    CALCIUM 9.3 2022    GFRAA >60 2022    LABGLOM 54 2022    LABGLOM 51 2011    GLUCOSE 98 2022     Hepatic Function Panel:    Lab Results   Component Value Date    ALKPHOS 103 2022    ALT 17 2022    AST 22 2022    PROT 8.4 2022    PROT 7.6 2011 BILITOT 0.4 01/13/2022    BILIDIR <0.2 08/14/2019    IBILI see below 08/14/2019    LABALBU 3.1 01/13/2022     LDH:    Lab Results   Component Value Date     01/15/2022     PT/INR:    Lab Results   Component Value Date    PROTIME 11.8 08/12/2019    INR 1.04 08/12/2019     PTT:  No results found for: APTT, PTT[APTT    Current Medications  Current Facility-Administered Medications: oxyCODONE-acetaminophen (PERCOCET) 5-325 MG per tablet 1 tablet, 1 tablet, Oral, Q8H PRN  potassium bicarbonate (K-LYTE) disintegrating tablet 50 mEq, 50 mEq, Oral, Once  enoxaparin (LOVENOX) injection 50 mg, 1 mg/kg, SubCUTAneous, BID  iron sucrose (VENOFER) 100 mg in sodium chloride 0.9 % 100 mL IVPB, 100 mg, IntraVENous, Q24H  lactated ringers infusion, , IntraVENous, Continuous  cefTRIAXone (ROCEPHIN) 1000 mg in sterile water 10 mL IV syringe, 1,000 mg, IntraVENous, Q24H  aspirin EC tablet 81 mg, 81 mg, Oral, Daily  atorvastatin (LIPITOR) tablet 40 mg, 40 mg, Oral, Daily  pantoprazole (PROTONIX) tablet 40 mg, 40 mg, Oral, QAM AC  levothyroxine (SYNTHROID) tablet 112 mcg, 112 mcg, Oral, Daily  sodium chloride flush 0.9 % injection 5-40 mL, 5-40 mL, IntraVENous, 2 times per day  sodium chloride flush 0.9 % injection 5-40 mL, 5-40 mL, IntraVENous, PRN  0.9 % sodium chloride infusion, 25 mL, IntraVENous, PRN  acetaminophen (TYLENOL) tablet 650 mg, 650 mg, Oral, Q6H PRN **OR** acetaminophen (TYLENOL) suppository 650 mg, 650 mg, Rectal, Q6H PRN  [DISCONTINUED] cefTRIAXone (ROCEPHIN) 1000 mg in sterile water 10 mL IV syringe, 1,000 mg, IntraVENous, Q24H **AND** azithromycin (ZITHROMAX) 500 mg in D5W 250ml Vial Mate, 500 mg, IntraVENous, Q24H    ASSESSMENT AND PLAN    14-year-old lady with history of chronic smoking, alcohol abuse, poor social support has    1. Left upper lobe, suprahilar mass with mediastinal lymphadenopathy, bilateral adrenal metastasis and lytic bony lesions    2.  2.3 cm left breast mass    3. Hypercalcemia    4. Anemia of chronic disease. Transfuse PRBCs if hemoglobin is less than 7    Overall it appears that patient might have 2 different malignancies-metastatic lung cancer and possibly localized breast cancer. Calcium has decreased to 10.2 after IV hydration and zoledronic acid.   Will need to establish tissue diagnosis-either by bronchoscopy or by CT-guided lung biopsy.     .Might need breast biopsy which can be done as outpatient      Eliza Trinidad MD

## 2022-01-16 NOTE — PROGRESS NOTES
Facility/Department: [address] NURSING  Initial Assessment  DYSPHAGIA BEDSIDE SWALLOW EVALUATION     Patient: Tian Morales   : 1945   MRN: [id number]      Evaluation Date: 2022   Admitting Diagnosis: Left breast mass [N63.20]  PNA (pneumonia) [J18.9]  Suspected malignant neoplasm [R68.89]  Fatigue, unspecified type [R53.83]  Pneumonia due to infectious organism, unspecified laterality, unspecified part of lung [J18.9]  Pain: Patient does not report pain at this time. Does indicate \"burning\" in chest.                         H&P: \"76 y. o. female with past medical history of thyroid disease, heart disease, CVA, thyroid disorder, DVT, A. fib, presents from home in view of progressive weakness.  Patient is a poor historian. Zhao Nelson states she has been getting weak.  She also describes stabbing pain that goes from her chest to her back when she lies down.  States pain has been there for few months.  She states that she has been getting all of her care from a nurse practitioner who visits her at home every few months. Zhao Nelson has been living with her friend Alex for the last few months.  I have discussed this presentation with Susi Hernández over the phone. Marysol Dodd to her, patient has been getting progressively weak over the last couple of months. Zhao Nelson has not been eating much. Zhao Nelson has been spending increasingly more time in bed and has not really gotten out of bed for over a week now. Clark Elise has been managing her own medications. Zhao Nelson has been somewhat confused over the past week but does not have memory problems at baseline.  Upon chart review it appears that patient has not seen her primary care provider since 2019.  Recent communication documented between patient's NP and Dr. Shelley Scott is being requested for imaging in view of concern for lung cancer.  I attempted to reach out to [facility] [phone]. Zhao Nelson is not available today.  I am expecting a call back from another NP with some information from her chart.  ADD noncontrast CT of the chest shows 6.3 cm left upper lobe suprahilar mass suspicious for malignancy. Rhetta Ana is also mediastinal adenopathy. Rhetta Ana is a 5 x 7 mm right middle lobe nodule.  There are bilateral adrenal nodules suspicious for metastasis.  Patient is also found to be hyperglycemic. \"    Chest xray:     mpression   1. 6.3 cm left upper lobe, suprahilar mass, suspicious for malignancy.  There   may be a postobstructive component of pneumonia, versus transbronchial spread   of tumor in the apex of the left upper lobe. 2. AP window mediastinal adenopathy, likely metastatic. 3. 9 x 7 mm right middle lobe nodule, indeterminate for inflammation, primary   or metastatic neoplasm. 4. Bilateral adrenal nodules, suspicious for metastasis.           Head CT:     Impression   No acute intracranial abnormality.       No definite evidence of intracranial metastatic disease.  It is noted that CT   imaging is insensitive for small metastatic lesions.  If possible further   evaluation with MRI should be considered for better characterization.           Modified Barium Swallow Study: None per chart review. History/Prior Level of Function:   Living Status: Home with friend  Prior Dysphagia History: No history of oropharyngeal dysphagia noted. Patient was referred for BSE due to poor PO intake and to rule out any difficulty swallowing. H&P indicates patient has \"not been eating much\" for months. Overall decline noted in H&P. RN reports no observed difficulty swallowing or clinical signs of aspiration. Patient is a rather poor historian, though denies any difficulties swallowing or symptoms of dysphagia. Pallative care has been consulted per chart review. Dysphagia Impressions/Diagnosis: Oropharyngeal Dysphagia   SLP eval and treat orders received. Patient alert with HOB elevated, all lights off & shades drawn upon SLP entry, attempting to eat lunch in dark.  SLP turned lights on and assisted patient with meal set-up. Patient on 2L O2 via NC. SLP attempted to measure O2 sats with pulse ox, however difficulty getting a consistent read despite numerous tries. Noted to be 94% at 1143 this date. OME revealed missing some top teeth, as well as mild deficits with lingual and labial coordination/strength/ROM. Patient observed to have signs of SOB at rest, as well as with speech and PO intake. Patient primarily speaking in short intervals due to reduced breath support. Patient trialed 1x bite of regular solids (salad), resulting in prolonged mastication, reduced AP propulsion, and increased RR. Patient appearing to become less fatigued with intake of puree solids, however still demonstrating variable RR, particularly when attempting to talk while eating. Patient demonstrates delayed initiation of swallow and reduced hyolaryngeal elevation via manual palpation. Patient declined trials of soft solids this date. No overt s/s of aspiration noted on trials of regular solids, puree solids, or thin via cup edge. Patient insisting that she has no trouble swallowing and indicating she would like to remain on regular solids despite SLP's recommendations for possible diet downgrade to softer solids due to decreased respiratory status, increased RR with trials of regular solids, and increased fatigue with advanced solids. Patient may benefit from downgraded diet in order to promote increased tolerance with task and reduced fatigue with task, possibly allowing for more PO intake as a result. However, patient wishing to remain on regular solids at this time. SLP educated on strategies to reduce fatigue with PO intake, including slow rate and taking breaks as needed. If respiratory status worsens and/or overt s/s of aspiration arise, hold PO and notify SLP for re-evaluation.  Follow-up dysphagia therapy is indicated in order to ensure diet tolerance and educated on compensatory strategies, as patient is at increased risk of aspiration due to respiratory status and generalized weakness. Recommended Diet and Intervention 1/16/2022:  Diet Solids Recommendation:  Regular (with use of strategies to reduce fatigue with task) Liquid Consistency Recommendation: Thin Recommended form of Meds:   Whole in puree, as tolerated     Compensatory Swallowing Strategies: Alternate solids/liquids , Upright as possible with all PO intake , Small bites/sips , Eat/feed slowly, Remain upright 30-45 min      Take breaks as needed to minimize fatigue with task    SHORT TERM DYSPHAGIA GOALS/PLAN OF CARE: Speech therapy for dysphagia tx 2-4 times per week during acute care stay.     Pt will functionally tolerate recommended diet with no overt clinical s/s of aspiration  Pt will demonstrate understanding of aspiration risk and precautions via education/demonstration with occasional prompting    Dysphagia Therapeutic Intervention:  Diet Tolerance Monitoring , Patient/Family Education , Therapeutic Trials with SLP     Discharge Recommendations: Do not anticipate need for further speech/dysphagia therapy upon discharge from hospital     Patient Positioning: Upright in bed    Current Diet Level (prior to evaluation): Regular texture diet with  Thin liquids      Respiratory Status:   []Room Air   [x]O2 via nasal cannula   []Other:    Dentition:  [x]Adequate Missing some teeth  []Dentures   []Missing Many Teeth  []Edentulous  []Other:    Baseline Vocal Quality:  []Normal  []Dysphonic   []Aphonic   []Hoarse  []Wet  [x]Weak  [x]Other: Breathy     Volitional Cough:  []Strong  [x]Weak  []Wet  []Absent  []Congested  []Other:    Volitional Swallow:   []Absent   [x]Delayed     []Adequate     []Required use of drink     Oral Mechanism Exam:  []WFL [x]Mild   [] Moderate  []Severe  []To be assessed  Impaired:   []Left side      []Right side    [x]Labial ROM/Coordination    []Labial Symmetry   [x]Lingual ROM/Coordination   []Lingual Symmetry  []Gag  []Other:     Oral Phase: []WFL [x]Mild   [] Moderate  []Severe  []To be assessed   [x]Impaired/Prolonged Mastication:   []Spillage Left:   []Spillage Right:  []Pocketing Left:   []Pocketing Right:   [x]Decreased Anterior to Posterior Transit:   [x]Suspected Premature Bolus Loss:   []Lingual/Palatal Residue:   []Other:     Pharyngeal Phase: []WFL []Mild   [x] Moderate  []Severe  []To be assessed   [x]Delayed Swallow:   []Suspected Pharyngeal Pooling:   [x]Decreased Laryngeal Elevation:   []Absent Swallow:  []Wet Vocal Quality:   []Throat Clearing-Immediate:   []Throat Clearing-Delayed:   []Cough-Immediate:   []Cough-Delayed:  [x]Change in Vital Signs: RR rate  []Suspected Delayed Pharyngeal Clearing:  []Other:       Eating Assistance:  []Independent  [x]Setup or clean-up assistance   [] Supervision or touching assistance   [] Partial or moderate assistance   [] Substantial or maximal assistance  [] Dependent       EDUCATION:   Provided education regarding role of SLP, results of assessment, recommendations and general speech pathology plan of care. [] Pt verbalized understanding and agreement   [x] Pt requires ongoing learning   [x] No evidence of comprehension     If patient discharges prior to next visit, this note will serve as discharge.      Timed Code Minutes: 0 minutes  Total Treatment Minutes: 28 minutes    Electronically signed by:    Edwige Martin M.A., DO. [id number]  Speech-Language Pathologist

## 2022-01-16 NOTE — PROGRESS NOTES
Hospitalist Progress Note      PCP: Elmira Nelson DO    Date of Admission: 1/13/2022    Chief Complaint: Progressive weakness    Hospital Course: 67 y.o. female with past medical history of thyroid disease, heart disease, CVA, thyroid disorder, DVT, A. fib, presents from home in view of progressive weakness. Patient is a poor historian. She states she has been getting weak. She also describes stabbing pain that goes from her chest to her back when she lies down. States pain has been there for few months. She states that she has been getting all of her care from a nurse practitioner who visits her at home every few months. She has been living with her friend Alex for the last few months. I have discussed this presentation with Sofie Cabello over the phone. According to her, patient has been getting progressively weak over the last couple of months. She has not been eating much. She has been spending increasingly more time in bed and has not really gotten out of bed for over a week now. Patient has been managing her own medications. She has been somewhat confused over the past week but does not have memory problems at baseline. Upon chart review it appears that patient has not seen her primary care provider since 2019. Recent communication documented between patient's NP and Dr. Jennifer Fernandez. There is being requested for imaging in view of concern for lung cancer. I attempted to reach out to [facility] NP at [phone]. She is not available today. I am expecting a call back from another NP with some information from her chart. ADD noncontrast CT of the chest shows 6.3 cm left upper lobe suprahilar mass suspicious for malignancy. There is also mediastinal adenopathy. There is a 5 x 7 mm right middle lobe nodule. There are bilateral adrenal nodules suspicious for metastasis. Patient is also found to be hyperglycemic.   She has leukocytosis and concern for postobstructive pneumonia on CT imaging pain Subjective: Patient seen and examined at bedside. She feels better today. She is able to provide more of history. Medications:  Reviewed    Infusion Medications    lactated ringers 75 mL/hr at 01/16/22 0835    sodium chloride Stopped (01/14/22 1023)     Scheduled Medications    potassium bicarbonate  50 mEq Oral Once    enoxaparin  1 mg/kg SubCUTAneous BID    iron sucrose (VENOFER) iv piggyback 100 mL (Admin over 60 minutes)  100 mg IntraVENous Q24H    cefTRIAXone (ROCEPHIN) IV  1,000 mg IntraVENous Q24H    aspirin  81 mg Oral Daily    atorvastatin  40 mg Oral Daily    pantoprazole  40 mg Oral QAM AC    levothyroxine  112 mcg Oral Daily    sodium chloride flush  5-40 mL IntraVENous 2 times per day    azithromycin  500 mg IntraVENous Q24H     PRN Meds: oxyCODONE-acetaminophen, sodium chloride flush, sodium chloride, acetaminophen **OR** acetaminophen      Intake/Output Summary (Last 24 hours) at 1/16/2022 1432  Last data filed at 1/16/2022 5736  Gross per 24 hour   Intake 2425.57 ml   Output 1300 ml   Net 1125.57 ml       Physical Exam Performed:    BP 91/60   Pulse 77   Temp 97.4 °F (36.3 °C) (Oral)   Resp 18   Ht 5' 4\" (1.626 m)   Wt 117 lb 6.4 oz (53.3 kg)   SpO2 94%   BMI 20.15 kg/m²     General appearance: No apparent distress, appears stated age and cooperative. HEENT: Pupils equal, round, and reactive to light. Conjunctivae/corneas clear. Neck: Supple, with full range of motion. No jugular venous distention. Trachea midline. Respiratory:  Normal respiratory effort. Clear to auscultation, bilaterally without Rales/Wheezes/Rhonchi. Cardiovascular: Regular rate and rhythm with normal S1/S2 without murmurs, rubs or gallops. Abdomen: Soft, non-tender, non-distended with normal bowel sounds. Musculoskeletal: No clubbing, cyanosis or edema bilaterally. Full range of motion without deformity. Skin: Skin color, texture, turgor normal.  No rashes or lesions.   Neurologic: Neurovascularly intact without any focal sensory/motor deficits. Cranial nerves: II-XII intact, grossly non-focal.  Psychiatric: Alert and oriented, thought content appropriate, normal insight  Capillary Refill: Brisk,< 3 seconds   Peripheral Pulses: +2 palpable, equal bilaterally       Labs:   Recent Labs     01/14/22  0547 01/15/22  0705 01/16/22  1001   WBC 16.8* 19.6* 17.3*   HGB 7.4* 7.1* 7.4*   HCT 23.7* 22.4* 23.2*    350 349     Recent Labs     01/14/22  0547 01/15/22  0705 01/16/22  1002   * 128* 133*   K 4.0 4.1 2.8*    104 103   CO2 15* 12* 15*   BUN 30* 23* 16   CREATININE 1.1 1.0 1.0   CALCIUM 11.9* 10.2 9.3   PHOS 2.9 1.8* 2.1*     No results for input(s): AST, ALT, BILIDIR, BILITOT, ALKPHOS in the last 72 hours. No results for input(s): INR in the last 72 hours. No results for input(s): Adebayo Sleeper in the last 72 hours. Urinalysis:      Lab Results   Component Value Date    NITRU Negative 01/13/2022    WBCUA 18 01/13/2022    BACTERIA RARE 01/13/2022    RBCUA 1 01/13/2022    BLOODU Negative 01/13/2022    SPECGRAV 1.015 01/13/2022    GLUCOSEU Negative 01/13/2022       Radiology:  XR CHEST PORTABLE   Final Result   Focal consolidations seen within the left upper lobe. Pneumonia is primarily   considered. However, that must be followed to resolution to ensure that   there is no underlying neoplasm. CT ABDOMEN PELVIS W IV CONTRAST Additional Contrast? Oral   Final Result   1. Bilateral adrenal masses, likely metastatic. No other evidence of   abdominal or pelvic metastatic disease. 2. Lytic L2 vertebral body lesion concerning for metastatic disease. 3. 2.3 cm left breast mass likely malignant. 4. Cholelithiasis with no acute features. 5. Nonobstructive right nephrolithiasis. CT HEAD W WO CONTRAST   Final Result   No acute intracranial abnormality. No definite evidence of intracranial metastatic disease.   It is noted that CT   imaging is insensitive for small metastatic lesions. If possible further   evaluation with MRI should be considered for better characterization. RECOMMENDATIONS:   Unavailable         CT CHEST WO CONTRAST   Final Result   1. 6.3 cm left upper lobe, suprahilar mass, suspicious for malignancy. There   may be a postobstructive component of pneumonia, versus transbronchial spread   of tumor in the apex of the left upper lobe. 2. AP window mediastinal adenopathy, likely metastatic. 3. 9 x 7 mm right middle lobe nodule, indeterminate for inflammation, primary   or metastatic neoplasm. 4. Bilateral adrenal nodules, suspicious for metastasis. RECOMMENDATIONS:   Unavailable         XR CHEST PORTABLE   Final Result   New left upper lobe opacity concerning for pneumonia versus malignancy. RECOMMENDATION:   Chest CT is recommended for further evaluation. Assessment/Plan:    Active Hospital Problems    Diagnosis     Severe malnutrition ([address] [E43]     PNA (pneumonia) [J18.9]      Pneumonia  Postobstructive  WBC 18 on admission, improving  Patient is on room air  Afebrile  Follow-up septic work-up  Rocephin azithromycin    Hyponatremia  Sodium 127 today  SIADH in the setting of lung CA? Will restrict dietary free water to 1800ml     Lung mass  Suspicious for malignancy  There are adrenal lesions suspicious for metastasis  Consult heme-onc  Consult palliative care  CT head and abdomen pelvis today for staging     Hypercalcemia  In the setting of lung mass  Check PTH related protein  IV fluids     Failure to thrive  In the setting of malignancy     History of DVT  On Xarelto     History of paroxysmal A. fib  Continue Xarelto    DVT Prophylaxis: Lovenox  Diet: ADULT ORAL NUTRITION SUPPLEMENT; Breakfast, Dinner; Standard High Calorie/High Protein Oral Supplement  ADULT DIET;  Regular; 1800 ml  ADULT ORAL NUTRITION SUPPLEMENT; Breakfast, Lunch, Dinner, HS Snack; Standard High Calorie/High Protein Oral Supplement  Code Status: Full Code        Electronically signed by Sharmila Blackman MD on 1/16/2022 at 2:32 PM

## 2022-01-17 LAB
ABO/RH: NORMAL
ANION GAP SERPL CALCULATED.3IONS-SCNC: 7 MMOL/L (ref 3–16)
ANISOCYTOSIS: ABNORMAL
ANTIBODY SCREEN: NORMAL
BASE EXCESS ARTERIAL: -5.7 MMOL/L (ref -3–3)
BASOPHILS ABSOLUTE: 0 K/UL (ref 0–0.2)
BASOPHILS RELATIVE PERCENT: 0 %
BLOOD CULTURE, ROUTINE: NORMAL
BUN BLDV-MCNC: 13 MG/DL (ref 7–20)
BURR CELLS: ABNORMAL
CALCIUM SERPL-MCNC: 8.6 MG/DL (ref 8.3–10.6)
CARBOXYHEMOGLOBIN ARTERIAL: 0.8 % (ref 0–1.5)
CHLORIDE BLD-SCNC: 101 MMOL/L (ref 99–110)
CO2: 19 MMOL/L (ref 21–32)
CREAT SERPL-MCNC: 0.9 MG/DL (ref 0.6–1.2)
CULTURE, BLOOD 2: NORMAL
EOSINOPHILS ABSOLUTE: 0.3 K/UL (ref 0–0.6)
EOSINOPHILS RELATIVE PERCENT: 2 %
GFR AFRICAN AMERICAN: >60
GFR NON-AFRICAN AMERICAN: >60
GLUCOSE BLD-MCNC: 98 MG/DL (ref 70–99)
HCO3 ARTERIAL: 18.9 MMOL/L (ref 21–29)
HCT VFR BLD CALC: 18.4 % (ref 36–48)
HEMOGLOBIN, ART, EXTENDED: 7.1 G/DL (ref 12–16)
HEMOGLOBIN: 6 G/DL (ref 12–16)
LYMPHOCYTES ABSOLUTE: 0.3 K/UL (ref 1–5.1)
LYMPHOCYTES RELATIVE PERCENT: 2 %
MACROCYTES: ABNORMAL
MAGNESIUM: 1.6 MG/DL (ref 1.8–2.4)
MCH RBC QN AUTO: 31.7 PG (ref 26–34)
MCHC RBC AUTO-ENTMCNC: 32.4 G/DL (ref 31–36)
MCV RBC AUTO: 97.6 FL (ref 80–100)
METHEMOGLOBIN ARTERIAL: 0.1 %
MONOCYTES ABSOLUTE: 1.4 K/UL (ref 0–1.3)
MONOCYTES RELATIVE PERCENT: 9 %
NEUTROPHILS ABSOLUTE: 13.9 K/UL (ref 1.7–7.7)
NEUTROPHILS RELATIVE PERCENT: 87 %
O2 SAT, ARTERIAL: 97 %
O2 THERAPY: ABNORMAL
OVALOCYTES: ABNORMAL
PCO2 ARTERIAL: 32.6 MMHG (ref 35–45)
PDW BLD-RTO: 13.1 % (ref 12.4–15.4)
PH ARTERIAL: 7.37 (ref 7.35–7.45)
PHOSPHORUS: 1.5 MG/DL (ref 2.5–4.9)
PLATELET # BLD: 305 K/UL (ref 135–450)
PMV BLD AUTO: 6.3 FL (ref 5–10.5)
PO2 ARTERIAL: 83.7 MMHG (ref 75–108)
POLYCHROMASIA: ABNORMAL
POTASSIUM SERPL-SCNC: 3.1 MMOL/L (ref 3.5–5.1)
RBC # BLD: 1.89 M/UL (ref 4–5.2)
SODIUM BLD-SCNC: 127 MMOL/L (ref 136–145)
TCO2 ARTERIAL: 44.7 MMOL/L
WBC # BLD: 16 K/UL (ref 4–11)

## 2022-01-17 PROCEDURE — P9016 RBC LEUKOCYTES REDUCED: HCPCS

## 2022-01-17 PROCEDURE — 6370000000 HC RX 637 (ALT 250 FOR IP): Performed by: INTERNAL MEDICINE

## 2022-01-17 PROCEDURE — 84100 ASSAY OF PHOSPHORUS: CPT

## 2022-01-17 PROCEDURE — 36592 COLLECT BLOOD FROM PICC: CPT

## 2022-01-17 PROCEDURE — 84300 ASSAY OF URINE SODIUM: CPT

## 2022-01-17 PROCEDURE — 6360000002 HC RX W HCPCS: Performed by: INTERNAL MEDICINE

## 2022-01-17 PROCEDURE — 85025 COMPLETE CBC W/AUTO DIFF WBC: CPT

## 2022-01-17 PROCEDURE — 86902 BLOOD TYPE ANTIGEN DONOR EA: CPT

## 2022-01-17 PROCEDURE — 2580000003 HC RX 258: Performed by: INTERNAL MEDICINE

## 2022-01-17 PROCEDURE — 2500000003 HC RX 250 WO HCPCS: Performed by: INTERNAL MEDICINE

## 2022-01-17 PROCEDURE — C1751 CATH, INF, PER/CENT/MIDLINE: HCPCS

## 2022-01-17 PROCEDURE — 2700000000 HC OXYGEN THERAPY PER DAY

## 2022-01-17 PROCEDURE — 80048 BASIC METABOLIC PNL TOTAL CA: CPT

## 2022-01-17 PROCEDURE — 86900 BLOOD TYPING SEROLOGIC ABO: CPT

## 2022-01-17 PROCEDURE — 36430 TRANSFUSION BLD/BLD COMPNT: CPT

## 2022-01-17 PROCEDURE — 36600 WITHDRAWAL OF ARTERIAL BLOOD: CPT

## 2022-01-17 PROCEDURE — 05HY33Z INSERTION OF INFUSION DEVICE INTO UPPER VEIN, PERCUTANEOUS APPROACH: ICD-10-PCS | Performed by: INTERNAL MEDICINE

## 2022-01-17 PROCEDURE — 82436 ASSAY OF URINE CHLORIDE: CPT

## 2022-01-17 PROCEDURE — 83935 ASSAY OF URINE OSMOLALITY: CPT

## 2022-01-17 PROCEDURE — 85018 HEMOGLOBIN: CPT

## 2022-01-17 PROCEDURE — 92526 ORAL FUNCTION THERAPY: CPT

## 2022-01-17 PROCEDURE — 83735 ASSAY OF MAGNESIUM: CPT

## 2022-01-17 PROCEDURE — 86901 BLOOD TYPING SEROLOGIC RH(D): CPT

## 2022-01-17 PROCEDURE — 6370000000 HC RX 637 (ALT 250 FOR IP): Performed by: HOSPITALIST

## 2022-01-17 PROCEDURE — 36415 COLL VENOUS BLD VENIPUNCTURE: CPT

## 2022-01-17 PROCEDURE — 84133 ASSAY OF URINE POTASSIUM: CPT

## 2022-01-17 PROCEDURE — 82803 BLOOD GASES ANY COMBINATION: CPT

## 2022-01-17 PROCEDURE — 36569 INSJ PICC 5 YR+ W/O IMAGING: CPT

## 2022-01-17 PROCEDURE — 86850 RBC ANTIBODY SCREEN: CPT

## 2022-01-17 PROCEDURE — 1200000000 HC SEMI PRIVATE

## 2022-01-17 PROCEDURE — 94761 N-INVAS EAR/PLS OXIMETRY MLT: CPT

## 2022-01-17 PROCEDURE — 85014 HEMATOCRIT: CPT

## 2022-01-17 PROCEDURE — 86922 COMPATIBILITY TEST ANTIGLOB: CPT

## 2022-01-17 PROCEDURE — 99233 SBSQ HOSP IP/OBS HIGH 50: CPT | Performed by: INTERNAL MEDICINE

## 2022-01-17 RX ORDER — LIDOCAINE HYDROCHLORIDE 10 MG/ML
5 INJECTION, SOLUTION EPIDURAL; INFILTRATION; INTRACAUDAL; PERINEURAL ONCE
Status: COMPLETED | OUTPATIENT
Start: 2022-01-17 | End: 2022-01-17

## 2022-01-17 RX ORDER — POTASSIUM BICARBONATE 25 MEQ/1
50 TABLET, EFFERVESCENT ORAL ONCE
Status: COMPLETED | OUTPATIENT
Start: 2022-01-17 | End: 2022-01-17

## 2022-01-17 RX ORDER — IPRATROPIUM BROMIDE AND ALBUTEROL SULFATE 2.5; .5 MG/3ML; MG/3ML
1 SOLUTION RESPIRATORY (INHALATION) EVERY 4 HOURS PRN
Status: DISCONTINUED | OUTPATIENT
Start: 2022-01-17 | End: 2022-01-25

## 2022-01-17 RX ORDER — FUROSEMIDE 20 MG/1
20 TABLET ORAL 2 TIMES DAILY
Status: DISCONTINUED | OUTPATIENT
Start: 2022-01-17 | End: 2022-01-21

## 2022-01-17 RX ORDER — SODIUM CHLORIDE 0.9 % (FLUSH) 0.9 %
5-40 SYRINGE (ML) INJECTION EVERY 12 HOURS SCHEDULED
Status: DISCONTINUED | OUTPATIENT
Start: 2022-01-17 | End: 2022-01-26 | Stop reason: HOSPADM

## 2022-01-17 RX ORDER — SODIUM CHLORIDE 0.9 % (FLUSH) 0.9 %
5-40 SYRINGE (ML) INJECTION PRN
Status: DISCONTINUED | OUTPATIENT
Start: 2022-01-17 | End: 2022-01-26 | Stop reason: HOSPADM

## 2022-01-17 RX ORDER — SODIUM CHLORIDE 1000 MG
1 TABLET, SOLUBLE MISCELLANEOUS
Status: DISCONTINUED | OUTPATIENT
Start: 2022-01-17 | End: 2022-01-18

## 2022-01-17 RX ORDER — MAGNESIUM SULFATE IN WATER 40 MG/ML
2000 INJECTION, SOLUTION INTRAVENOUS ONCE
Status: COMPLETED | OUTPATIENT
Start: 2022-01-17 | End: 2022-01-17

## 2022-01-17 RX ORDER — LANOLIN ALCOHOL/MO/W.PET/CERES
400 CREAM (GRAM) TOPICAL 2 TIMES DAILY
Status: DISCONTINUED | OUTPATIENT
Start: 2022-01-17 | End: 2022-01-26

## 2022-01-17 RX ORDER — SODIUM CHLORIDE 9 MG/ML
INJECTION, SOLUTION INTRAVENOUS PRN
Status: DISCONTINUED | OUTPATIENT
Start: 2022-01-17 | End: 2022-01-21 | Stop reason: SDUPTHER

## 2022-01-17 RX ORDER — SODIUM CHLORIDE 9 MG/ML
25 INJECTION, SOLUTION INTRAVENOUS PRN
Status: DISCONTINUED | OUTPATIENT
Start: 2022-01-17 | End: 2022-01-26 | Stop reason: HOSPADM

## 2022-01-17 RX ADMIN — ASPIRIN 81 MG: 81 TABLET, COATED ORAL at 09:23

## 2022-01-17 RX ADMIN — ENOXAPARIN SODIUM 60 MG: 100 INJECTION SUBCUTANEOUS at 20:14

## 2022-01-17 RX ADMIN — IRON SUCROSE 100 MG: 20 INJECTION, SOLUTION INTRAVENOUS at 16:34

## 2022-01-17 RX ADMIN — Medication 1000 MG: at 13:35

## 2022-01-17 RX ADMIN — Medication 10 ML: at 09:22

## 2022-01-17 RX ADMIN — PANTOPRAZOLE SODIUM 40 MG: 40 TABLET, DELAYED RELEASE ORAL at 09:23

## 2022-01-17 RX ADMIN — SODIUM CHLORIDE TAB 1 GM 1 G: 1 TAB at 16:25

## 2022-01-17 RX ADMIN — ATORVASTATIN CALCIUM 40 MG: 40 TABLET, FILM COATED ORAL at 09:23

## 2022-01-17 RX ADMIN — LEVOTHYROXINE SODIUM 112 MCG: 0.11 TABLET ORAL at 09:23

## 2022-01-17 RX ADMIN — AZITHROMYCIN MONOHYDRATE 500 MG: 500 INJECTION, POWDER, LYOPHILIZED, FOR SOLUTION INTRAVENOUS at 15:10

## 2022-01-17 RX ADMIN — MAGNESIUM SULFATE HEPTAHYDRATE 2000 MG: 40 INJECTION, SOLUTION INTRAVENOUS at 15:00

## 2022-01-17 RX ADMIN — POTASSIUM BICARBONATE 50 MEQ: 978 TABLET, EFFERVESCENT ORAL at 16:25

## 2022-01-17 RX ADMIN — ENOXAPARIN SODIUM 50 MG: 100 INJECTION SUBCUTANEOUS at 09:23

## 2022-01-17 RX ADMIN — Medication 400 MG: at 20:14

## 2022-01-17 RX ADMIN — OXYCODONE AND ACETAMINOPHEN 1 TABLET: 5; 325 TABLET ORAL at 16:25

## 2022-01-17 RX ADMIN — FUROSEMIDE 20 MG: 20 TABLET ORAL at 16:26

## 2022-01-17 RX ADMIN — LIDOCAINE HYDROCHLORIDE 5 ML: 10 INJECTION, SOLUTION EPIDURAL; INFILTRATION; INTRACAUDAL; PERINEURAL at 14:00

## 2022-01-17 RX ADMIN — OXYCODONE AND ACETAMINOPHEN 1 TABLET: 5; 325 TABLET ORAL at 02:01

## 2022-01-17 RX ADMIN — POTASSIUM PHOSPHATE, MONOBASIC AND POTASSIUM PHOSPHATE, DIBASIC 30 MMOL: 224; 236 INJECTION, SOLUTION, CONCENTRATE INTRAVENOUS at 14:58

## 2022-01-17 ASSESSMENT — PAIN DESCRIPTION - PROGRESSION
CLINICAL_PROGRESSION: NOT CHANGED

## 2022-01-17 ASSESSMENT — PAIN DESCRIPTION - PAIN TYPE: TYPE: ACUTE PAIN

## 2022-01-17 ASSESSMENT — PAIN SCALES - GENERAL
PAINLEVEL_OUTOF10: 4
PAINLEVEL_OUTOF10: 6
PAINLEVEL_OUTOF10: 4
PAINLEVEL_OUTOF10: 3
PAINLEVEL_OUTOF10: 4
PAINLEVEL_OUTOF10: 7
PAINLEVEL_OUTOF10: 7

## 2022-01-17 ASSESSMENT — PAIN DESCRIPTION - ORIENTATION: ORIENTATION: LEFT;UPPER

## 2022-01-17 ASSESSMENT — PAIN DESCRIPTION - LOCATION: LOCATION: BACK;CHEST

## 2022-01-17 NOTE — PROGRESS NOTES
01/17/22 1817   Oxygen Therapy/Pulse Ox   O2 Device Nasal cannula   O2 Flow Rate (L/min) 2 L/min   Resp 20   SpO2 94 %   Pulse Oximeter Device Mode Intermittent   Pulse Oximeter Device Location Finger   Blood Gas  Performed? Yes   $ABG $ABG   Jhony's Test #1 Pos   Site #1 Right Radial   Site Prepped #1 Yes   Number of Attempts #1 1   Pressure Held #1 Yes   Complications #1 None   Post-procedure #1 Standard   Specimen Status #1 To lab   How Tolerated?  Tolerated well

## 2022-01-17 NOTE — PROGRESS NOTES
[facility]   Progress Note      SUBJECTIVE:      Feels okay. Has some right-sided chest pain. No cough hemoptysis. Feels tired      OBJECTIVE    Physical  VITALS:  /75   Pulse 88   Temp 97.6 °F (36.4 °C) (Oral)   Resp 16   Ht 5' 4\" (1.626 m)   Wt 129 lb 6.4 oz (58.7 kg)   SpO2 96%   BMI 22.21 kg/m²   TEMPERATURE:  Current - Temp: 97.6 °F (36.4 °C); Max - Temp  Av.8 °F (36.6 °C)  Min: 97.3 °F (36.3 °C)  Max: 99 °F (37.2 °C)  BLOOD PRESSURE RANGE:  Systolic (05OOS), VJM:116 , Min:100 , YDZ:183   ; Diastolic (13KNO), DHE:80, Min:63, Max:83    24HR INTAKE/OUTPUT:      Intake/Output Summary (Last 24 hours) at 2022 1434  Last data filed at 2022 1059  Gross per 24 hour   Intake 240 ml   Output 900 ml   Net -660 ml       Conscious alert and oriented. Pallor is present. Neck is supple  Respiratory efforts are normal.  Abdomen is soft bowel sounds are heard. Extremities 1+ edema noted.     Data  Labs:  General Labs:  CBC with Differential:    Lab Results   Component Value Date    WBC 16.0 2022    RBC 1.89 2022    HGB 6.0 2022    HCT 18.4 2022     2022    MCV 97.6 2022    MCH 31.7 2022    MCHC 32.4 2022    RDW 13.1 2022    BANDSPCT 5 2022    LYMPHOPCT 2.0 2022    MONOPCT 9.0 2022    BASOPCT 0.0 2022    MONOSABS 1.4 2022    LYMPHSABS 0.3 2022    EOSABS 0.3 2022    BASOSABS 0.0 2022     BMP:    Lab Results   Component Value Date     2022    K 3.1 2022    K 3.9 2022     2022    CO2 19 2022    BUN 13 2022    LABALBU 3.1 2022    CREATININE 0.9 2022    CALCIUM 8.6 2022    GFRAA >60 2022    LABGLOM >60 2022    LABGLOM 51 2011    GLUCOSE 98 2022     Hepatic Function Panel:    Lab Results   Component Value Date    ALKPHOS 103 2022    ALT 17 2022    AST 22 2022    PROT 8.4 01/13/2022    PROT 7.6 02/16/2011    BILITOT 0.4 01/13/2022    BILIDIR <0.2 08/14/2019    IBILI see below 08/14/2019    LABALBU 3.1 01/13/2022     LDH:    Lab Results   Component Value Date     01/15/2022     PT/INR:    Lab Results   Component Value Date    PROTIME 11.8 08/12/2019    INR 1.04 08/12/2019     PTT:  No results found for: APTT, PTT[APTT    Current Medications  Current Facility-Administered Medications: 0.9 % sodium chloride infusion, , IntraVENous, PRN  magnesium sulfate 2000 mg in 50 mL IVPB premix, 2,000 mg, IntraVENous, Once  potassium phosphate 30 mmol in dextrose 5 % 250 mL IVPB, 30 mmol, IntraVENous, Once  lidocaine PF 1 % injection 5 mL, 5 mL, IntraDERmal, Once  sodium chloride flush 0.9 % injection 5-40 mL, 5-40 mL, IntraVENous, 2 times per day  sodium chloride flush 0.9 % injection 5-40 mL, 5-40 mL, IntraVENous, PRN  0.9 % sodium chloride infusion, 25 mL, IntraVENous, PRN  sodium chloride tablet 1 g, 1 g, Oral, TID WC  furosemide (LASIX) tablet 20 mg, 20 mg, Oral, BID  oxyCODONE-acetaminophen (PERCOCET) 5-325 MG per tablet 1 tablet, 1 tablet, Oral, Q8H PRN  enoxaparin (LOVENOX) injection 50 mg, 1 mg/kg, SubCUTAneous, BID  iron sucrose (VENOFER) 100 mg in sodium chloride 0.9 % 100 mL IVPB, 100 mg, IntraVENous, Q24H  cefTRIAXone (ROCEPHIN) 1000 mg in sterile water 10 mL IV syringe, 1,000 mg, IntraVENous, Q24H  aspirin EC tablet 81 mg, 81 mg, Oral, Daily  atorvastatin (LIPITOR) tablet 40 mg, 40 mg, Oral, Daily  pantoprazole (PROTONIX) tablet 40 mg, 40 mg, Oral, QAM AC  levothyroxine (SYNTHROID) tablet 112 mcg, 112 mcg, Oral, Daily  sodium chloride flush 0.9 % injection 5-40 mL, 5-40 mL, IntraVENous, 2 times per day  sodium chloride flush 0.9 % injection 5-40 mL, 5-40 mL, IntraVENous, PRN  0.9 % sodium chloride infusion, 25 mL, IntraVENous, PRN  acetaminophen (TYLENOL) tablet 650 mg, 650 mg, Oral, Q6H PRN **OR** acetaminophen (TYLENOL) suppository 650 mg, 650 mg, Rectal, Q6H PRN  [DISCONTINUED] cefTRIAXone (ROCEPHIN) 1000 mg in sterile water 10 mL IV syringe, 1,000 mg, IntraVENous, Q24H **AND** azithromycin (ZITHROMAX) 500 mg in D5W 250ml Vial Mate, 500 mg, IntraVENous, Q24H    ASSESSMENT AND PLAN    76-year-old lady with history of chronic smoking, alcohol abuse, poor social support has    1. Left upper lobe, suprahilar mass with mediastinal lymphadenopathy, bilateral adrenal metastasis and lytic bony lesions    2.  2.3 cm left breast mass    3. Hypercalcemia    4. Anemia of chronic disease. Transfuse PRBCs if hemoglobin is less than 7    Overall it appears that patient might have 2 different malignancies-metastatic lung cancer and possibly localized breast cancer. Calcium has decreased to 10.2 after IV hydration and zoledronic acid.   Spoke to pulmonary, respiratory status is tenacious to undergo bronchoscopy. It may be considered in 1 to 2 days if breathing  is better  Spoke to IR, CT-guided biopsy would be difficult and the patient is on Lovenox and aspirin. Will hold aspirin    . Might need breast biopsy which can be done as outpatient      Marcell Pacheco MD

## 2022-01-17 NOTE — FLOWSHEET NOTE
Spoke to floor RN. Notified that asa needs to be held for 5 days. If patient is discharged before 5 days the lung biopsy can be outpatient.

## 2022-01-17 NOTE — PROGRESS NOTES
Palliative Care: Follow up call with vashti Cantrell. Chantel Cantrell verbalizing understanding of DPOA at this time. Education provided on code status and variables. Patient currently will remain as full code as this is patient wishes. Daughter Chantel Cantrell hopes to come from [place] perhaps by end of week. She states she hopes patient would qualify for SNIF and follow up with Oncology as out patient when stable for DC. Education provided on hospice philosophy. Chantel Cantrell verbalizing understanding of information shared. Chantel Cantrell states patient does not have a home to return to at this time. Discussed financial aspect. Chantel Cantrell may want to consult with financial team here at hospital to discuss possible option of Medicaid and if patient would qualify. Will notify. Palliative will continue to follow.

## 2022-01-17 NOTE — CONSULTS
PICC line education:    -Risks  -Benefits  -Alternatives  -Procedure    Discussed the above with patient, verbalized understanding, answered all questions. Provided with information on PICC care to review. PICC tip verified via [employer] (Ok to use). Reported off to patient's  Nurse [address]

## 2022-01-17 NOTE — PROGRESS NOTES
Hospitalist Progress Note      PCP: Kae Velasco DO    Date of Admission: 1/13/2022    Chief Complaint: Progressive weakness    Hospital Course: 67 y.o. female with past medical history of thyroid disease, heart disease, CVA, thyroid disorder, DVT, A. fib, presents from home in view of progressive weakness. Patient is a poor historian. She states she has been getting weak. She also describes stabbing pain that goes from her chest to her back when she lies down. States pain has been there for few months. She states that she has been getting all of her care from a nurse practitioner who visits her at home every few months. She has been living with her friend Alex for the last few months. I have discussed this presentation with Kiesha Martínez over the phone. According to her, patient has been getting progressively weak over the last couple of months. She has not been eating much. She has been spending increasingly more time in bed and has not really gotten out of bed for over a week now. Patient has been managing her own medications. She has been somewhat confused over the past week but does not have memory problems at baseline. Upon chart review it appears that patient has not seen her primary care provider since 2019. Recent communication documented between patient's NP and Dr. Joel Angulo. There is being requested for imaging in view of concern for lung cancer. I attempted to reach out to [facility] NP at [phone]. She is not available today. I am expecting a call back from another NP with some information from her chart. ADD noncontrast CT of the chest shows 6.3 cm left upper lobe suprahilar mass suspicious for malignancy. There is also mediastinal adenopathy. There is a 5 x 7 mm right middle lobe nodule. There are bilateral adrenal nodules suspicious for metastasis. Patient is also found to be hyperglycemic.   She has leukocytosis and concern for postobstructive pneumonia on CT imaging pain Subjective: Patient seen and examined at bedside. She feels better today. She is able to provide more of history. Medications:  Reviewed    Infusion Medications    sodium chloride      sodium chloride      sodium chloride Stopped (01/14/22 1023)     Scheduled Medications    magnesium sulfate  2,000 mg IntraVENous Once    potassium phosphate IVPB  30 mmol IntraVENous Once    lidocaine 1 % injection  5 mL IntraDERmal Once    sodium chloride flush  5-40 mL IntraVENous 2 times per day    sodium chloride  1 g Oral TID WC    furosemide  20 mg Oral BID    enoxaparin  1 mg/kg SubCUTAneous BID    iron sucrose (VENOFER) iv piggyback 100 mL (Admin over 60 minutes)  100 mg IntraVENous Q24H    cefTRIAXone (ROCEPHIN) IV  1,000 mg IntraVENous Q24H    aspirin  81 mg Oral Daily    atorvastatin  40 mg Oral Daily    pantoprazole  40 mg Oral QAM AC    levothyroxine  112 mcg Oral Daily    sodium chloride flush  5-40 mL IntraVENous 2 times per day    azithromycin  500 mg IntraVENous Q24H     PRN Meds: sodium chloride, sodium chloride flush, sodium chloride, oxyCODONE-acetaminophen, sodium chloride flush, sodium chloride, acetaminophen **OR** acetaminophen      Intake/Output Summary (Last 24 hours) at 1/17/2022 1406  Last data filed at 1/17/2022 1059  Gross per 24 hour   Intake 240 ml   Output 900 ml   Net -660 ml       Physical Exam Performed:    /75   Pulse 88   Temp 97.6 °F (36.4 °C) (Oral)   Resp 16   Ht 5' 4\" (1.626 m)   Wt 129 lb 6.4 oz (58.7 kg)   SpO2 96%   BMI 22.21 kg/m²     General appearance: No apparent distress, appears stated age and cooperative. HEENT: Pupils equal, round, and reactive to light. Conjunctivae/corneas clear. Neck: Supple, with full range of motion. No jugular venous distention. Trachea midline. Respiratory:  Normal respiratory effort. Clear to auscultation, bilaterally without Rales/Wheezes/Rhonchi.   Cardiovascular: Regular rate and rhythm with normal S1/S2 without murmurs, rubs or gallops. Abdomen: Soft, non-tender, non-distended with normal bowel sounds. Musculoskeletal: No clubbing, cyanosis or edema bilaterally. Full range of motion without deformity. Skin: Skin color, texture, turgor normal.  No rashes or lesions. Neurologic:  Neurovascularly intact without any focal sensory/motor deficits. Cranial nerves: II-XII intact, grossly non-focal.  Psychiatric: Alert and oriented, thought content appropriate, normal insight  Capillary Refill: Brisk,< 3 seconds   Peripheral Pulses: +2 palpable, equal bilaterally       Labs:   Recent Labs     01/15/22  0705 01/16/22  1001 01/17/22  0513   WBC 19.6* 17.3* 16.0*   HGB 7.1* 7.4* 6.0*   HCT 22.4* 23.2* 18.4*    349 305     Recent Labs     01/15/22  0705 01/16/22  1002 01/17/22  0513   * 133* 127*   K 4.1 2.8* 3.1*    103 101   CO2 12* 15* 19*   BUN 23* 16 13   CREATININE 1.0 1.0 0.9   CALCIUM 10.2 9.3 8.6   PHOS 1.8* 2.1* 1.5*     No results for input(s): AST, ALT, BILIDIR, BILITOT, ALKPHOS in the last 72 hours. No results for input(s): INR in the last 72 hours. No results for input(s): Kennyth Hammers in the last 72 hours. Urinalysis:      Lab Results   Component Value Date    NITRU Negative 01/13/2022    WBCUA 18 01/13/2022    BACTERIA RARE 01/13/2022    RBCUA 1 01/13/2022    BLOODU Negative 01/13/2022    SPECGRAV 1.015 01/13/2022    GLUCOSEU Negative 01/13/2022       Radiology:  XR CHEST PORTABLE   Final Result   Focal consolidations seen within the left upper lobe. Pneumonia is primarily   considered. However, that must be followed to resolution to ensure that   there is no underlying neoplasm. CT ABDOMEN PELVIS W IV CONTRAST Additional Contrast? Oral   Final Result   1. Bilateral adrenal masses, likely metastatic. No other evidence of   abdominal or pelvic metastatic disease. 2. Lytic L2 vertebral body lesion concerning for metastatic disease.    3. 2.3 cm left breast mass likely malignant. 4. Cholelithiasis with no acute features. 5. Nonobstructive right nephrolithiasis. CT HEAD W WO CONTRAST   Final Result   No acute intracranial abnormality. No definite evidence of intracranial metastatic disease. It is noted that CT   imaging is insensitive for small metastatic lesions. If possible further   evaluation with MRI should be considered for better characterization. RECOMMENDATIONS:   Unavailable         CT CHEST WO CONTRAST   Final Result   1. 6.3 cm left upper lobe, suprahilar mass, suspicious for malignancy. There   may be a postobstructive component of pneumonia, versus transbronchial spread   of tumor in the apex of the left upper lobe. 2. AP window mediastinal adenopathy, likely metastatic. 3. 9 x 7 mm right middle lobe nodule, indeterminate for inflammation, primary   or metastatic neoplasm. 4. Bilateral adrenal nodules, suspicious for metastasis. RECOMMENDATIONS:   Unavailable         XR CHEST PORTABLE   Final Result   New left upper lobe opacity concerning for pneumonia versus malignancy. RECOMMENDATION:   Chest CT is recommended for further evaluation. CT GUIDED NEEDLE PLACEMENT    (Results Pending)           Assessment/Plan:    Active Hospital Problems    Diagnosis     Severe malnutrition (HCC) [E43]     PNA (pneumonia) [J18.9]      Pneumonia  Postobstructive  WBC 18 on admission, improving  Patient is on room air  Afebrile  Follow-up septic work-up  Rocephin azithromycin    Anemia  Hemoglobin 6.0 today  Unit PRBC ordered    Hyponatremia  Sodium 127 today  SIADH in the setting of lung CA?   Restrict dietary free water to 1800ml  We will consult nephrology as this persists     Lung mass  Suspicious for malignancy  There are adrenal lesions suspicious for metastasis  Consult heme-onc  Consult palliative care  CT head and abdomen pelvis today for staging     Hypercalcemia  In the setting of lung mass  Check PTH related protein  IV fluids     Failure to thrive  In the setting of malignancy     History of DVT  On Xarelto     History of paroxysmal A. fib  Continue Xarelto    DVT Prophylaxis: Lovenox  Diet: ADULT ORAL NUTRITION SUPPLEMENT; Breakfast, Dinner; Standard High Calorie/High Protein Oral Supplement  ADULT ORAL NUTRITION SUPPLEMENT; Breakfast, Lunch, Dinner, HS Snack; Standard High Calorie/High Protein Oral Supplement  ADULT DIET;  Regular; 1200 ml  Code Status: Full Code        Electronically signed by Lakisha Shetty MD on 1/17/2022 at 2:06 PM

## 2022-01-17 NOTE — PROGRESS NOTES
[facility]    Follow Up Note    Subjective:   CHIEF COMPLAINT / HPI:   Chief Complaint   Patient presents with    Fatigue     Pt in via EMS from home. Pts neighbor called 911 because the pt has been getting weaker. Pt has been feeling weak for the past few months. Pt endorses no pain but chest pressure that goes through to her back. Pt has hx of stroke and angioplasty       Interval history: Patient's main complaint to me today was that she is feeling weak. Hemoglobin is down and she is scheduled to have transfusion. Past Medical History:    Reviewed; no changes    Social History:    Reviewed; no changes    REVIEW OF SYSTEMS:    CONSTITUTIONAL:  negative for fevers and chills  RESPIRATORY:  See HPI  CARDIOVASCULAR:  negative for chest pain, palpitations, edema  GASTROINTESTINAL:  negative for nausea, vomiting, diarrhea, constipation and abdominal pain    Objective:   PHYSICAL EXAM:        VITALS:  /76   Pulse 87   Temp 98.2 °F (36.8 °C) (Oral)   Resp 16   Ht 5' 4\" (1.626 m)   Wt 129 lb 6.4 oz (58.7 kg)   SpO2 98%   BMI 22.21 kg/m²  on 2 L NC    24HR INTAKE/OUTPUT:      Intake/Output Summary (Last 24 hours) at 1/17/2022 1649  Last data filed at 1/17/2022 1059  Gross per 24 hour   Intake 240 ml   Output 900 ml   Net -660 ml       CONSTITUTIONAL:  awake, alert,  no apparent distress, and appears stated age  LUNGS:  No increased work of breathing and clear to auscultation, no crackles or wheezes  CARDIOVASCULAR: S1 and S2 and no JVD  ABDOMEN:  normal bowel sounds, non-distended and non-tender to palpation  EXT: No edema, no calf tenderness. Pulses are present bilaterally. NEUROLOGIC:  Mental Status Exam:  Level of Alertness:   awake  Orientation:   person, place, time.  Non focal  SKIN:  normal skin color, texture, turgor, no redness, warmth, or swelling at IV sites    DATA:    CBC:  Recent Labs     01/15/22  0705 01/16/22  1001 01/17/22  0513   WBC 19.6* 17.3* 16.0*   RBC 2.24* 2.29* 1.89*   HGB 7.1* 7.4* 6.0*   HCT 22.4* 23.2* 18.4*    349 305   .2* 101.2* 97.6   MCH 31.9 32.3 31.7   MCHC 31.8 31.9 32.4   RDW 13.0 13.3 13.1   BANDSPCT  --  5  --       BMP:  Recent Labs     01/15/22  0705 01/16/22  1002 01/17/22  0513   * 133* 127*   K 4.1 2.8* 3.1*    103 101   CO2 12* 15* 19*   BUN 23* 16 13   CREATININE 1.0 1.0 0.9   CALCIUM 10.2 9.3 8.6   GLUCOSE 79 98 98      ABG:  No results for input(s): PHART, BXW8WKR, PO2ART, QJK7UZR, K3VYARKY, BEART in the last 72 hours. Procalcitonin  Recent Labs     01/16/22  1002   PROCAL 0.37*           Radiology Review:  Pertinent images / reports were reviewed as a part of this visit. Assessment:     1. Left upper lobe mass 6.3 cm. Highly suspicious for malignancy  2. Mediastinal lymphadenopathy  3. Postobstructive pneumonia    Plan:     1. CT images look like bronchogenic carcinoma with postobstructive pneumonia. 2. Also likely mediastinal lymphadenopathy  3. This would be accessible to navigational bronchoscopy with endobronchial ultrasound biopsy of mediastinal lymph nodes. 4. However, is also accessible to IR needle biopsy. 5. [facility] has ordered IR biopsy. 6. She is receiving blood transfusion today for hemoglobin of 6  7. Has elevated procalcitonin and is on Rocephin. 8. No guiding culture data  9.  We will continue to follow

## 2022-01-17 NOTE — CONSULTS
Renal Consult  Full Note Dictated [id number]  A/P  1. Acute on Chronic Hyponatremia-no neurologic symptoms. Follow Uosm. NaCl tabs/Replace K/Lasix bid. 2.   Renal function WNL  3. Hypophosphatemia- replace  4. Hypomagnesemia- replace  5. Possible Metastatic Lung CA  6. Breast CA  7. Hypercalcemia-better. Check SPEP and serum free light chain. Check PTH. 8.    Anemia- Heme following  9. Metabolic Acidosis-no anion gap. Hold HCO3 replacement for now. Follow [facility]. No diarrhea. Thank you. Discussed with nurse.

## 2022-01-17 NOTE — PROGRESS NOTES
Speech Language Pathology  Dysphagia Treatment Note    Name:  Tian Morales  :   1945  Medical Diagnosis:  Left breast mass [N63.20]  PNA (pneumonia) [J18.9]  Suspected malignant neoplasm [R68.89]  Fatigue, unspecified type [R53.83]  Pneumonia due to infectious organism, unspecified laterality, unspecified part of lung [J18.9]  Treatment Diagnosis: Oropharyngeal Dysphagia  Pain level: None reported    Diet level prior to treatment: Regular (with use of strategies to reduce fatigue with task) with Thin liquids, meds whole in puree as tolerated  Tolerance of Current Diet Level: No reported concerns per chart review, RN reports reduced intake    Assessment of Texture Tolerance:  -Impressions: Pt alert and receptive, positioned in bed with HOB elevated. On 2L via NC, reported SOB consistent with baseline. Pt agreeable to limited PO presentations of thin liquids and puree, fed with set-up assistance from SLP. Oral phase characterized by adequate oral acceptance and labial seal, adequate manipulation and A-P transit. Pt with no immediate or delayed overt clinical s/s aspiration across consistencies. Discussed safe swallowing strategies re: fatigue and options for diet modification, pt continues to report no difficulty swallowing and declined diet modification. Limited PO intake this date, continue regular textured diet with thin liquids and aspiration precautions, will continue to follow for diet tolerance and safe swallowing education. If respiratory status worsens and/or overt s/s of aspiration arise, hold PO and notify SLP for re-evaluation. Diet and Treatment Recommendations 2022:  Regular (with use of strategies to reduce fatigue with task) with Thin liquids, meds whole in puree as tolerated    Compensatory strategies:  Alternate solids/liquids , Upright as possible with all PO intake , Small bites/sips , Eat/feed slowly, Remain upright 30-45 min, Take breaks as needed to minimize fatigue with task    Dysphagia Goals: Speech therapy for dysphagia tx 2-4 times per week during acute care stay. 1. Pt will functionally tolerate recommended diet with no overt clinical s/s of aspiration. (ongoing 1/17/2022)  2. Pt will demonstrate understanding of aspiration risk and precautions via education/demonstration with occasional prompting. (ongoing 1/17/2022)    Plan: Continued daily Dysphagia treatment with goals per plan of care. Patient/Family Education: Education given to the Pt and nurse, who verbalized understanding. Discharge Recommendations:  Pt will benefit from continued skilled Speech Therapy for Dysphagia services, prior to returning home. Timed Code Treatment: 0    Total Treatment Time: 10    If patient discharges prior to next session this note will serve as a discharge summary.        Signature:   Cher Roman, [id number] [facility]  Speech-Language Pathologist  [place]. [id number]

## 2022-01-18 LAB
ANION GAP SERPL CALCULATED.3IONS-SCNC: 9 MMOL/L (ref 3–16)
BASOPHILS ABSOLUTE: 0.1 K/UL (ref 0–0.2)
BASOPHILS RELATIVE PERCENT: 0.3 %
BUN BLDV-MCNC: 11 MG/DL (ref 7–20)
CALCIUM SERPL-MCNC: 7.5 MG/DL (ref 8.3–10.6)
CHLORIDE BLD-SCNC: 104 MMOL/L (ref 99–110)
CHLORIDE URINE RANDOM: 46 MMOL/L
CO2: 21 MMOL/L (ref 21–32)
CREAT SERPL-MCNC: 1 MG/DL (ref 0.6–1.2)
EOSINOPHILS ABSOLUTE: 0.2 K/UL (ref 0–0.6)
EOSINOPHILS RELATIVE PERCENT: 1.2 %
GFR AFRICAN AMERICAN: >60
GFR NON-AFRICAN AMERICAN: 54
GLUCOSE BLD-MCNC: 108 MG/DL (ref 70–99)
HCT VFR BLD CALC: 22.2 % (ref 36–48)
HCT VFR BLD CALC: 23.9 % (ref 36–48)
HEMATOLOGY PATH CONSULT: NORMAL
HEMOGLOBIN: 7.2 G/DL (ref 12–16)
HEMOGLOBIN: 7.8 G/DL (ref 12–16)
LYMPHOCYTES ABSOLUTE: 0.9 K/UL (ref 1–5.1)
LYMPHOCYTES RELATIVE PERCENT: 5.2 %
MAGNESIUM: 2.1 MG/DL (ref 1.8–2.4)
MCH RBC QN AUTO: 30.7 PG (ref 26–34)
MCHC RBC AUTO-ENTMCNC: 32.5 G/DL (ref 31–36)
MCV RBC AUTO: 94.6 FL (ref 80–100)
MONOCYTES ABSOLUTE: 1.9 K/UL (ref 0–1.3)
MONOCYTES RELATIVE PERCENT: 10.8 %
NEUTROPHILS ABSOLUTE: 14.4 K/UL (ref 1.7–7.7)
NEUTROPHILS RELATIVE PERCENT: 82.5 %
OSMOLALITY URINE: 225 MOSM/KG (ref 390–1070)
PDW BLD-RTO: 16.1 % (ref 12.4–15.4)
PHOSPHORUS: 2.4 MG/DL (ref 2.5–4.9)
PLATELET # BLD: 284 K/UL (ref 135–450)
PMV BLD AUTO: 6.2 FL (ref 5–10.5)
POTASSIUM SERPL-SCNC: 3.8 MMOL/L (ref 3.5–5.1)
POTASSIUM, UR: 23.2 MMOL/L
RBC # BLD: 2.34 M/UL (ref 4–5.2)
SODIUM BLD-SCNC: 134 MMOL/L (ref 136–145)
SODIUM URINE: 44 MMOL/L
WBC # BLD: 17.4 K/UL (ref 4–11)

## 2022-01-18 PROCEDURE — 6370000000 HC RX 637 (ALT 250 FOR IP): Performed by: HOSPITALIST

## 2022-01-18 PROCEDURE — 6370000000 HC RX 637 (ALT 250 FOR IP): Performed by: INTERNAL MEDICINE

## 2022-01-18 PROCEDURE — 2580000003 HC RX 258: Performed by: INTERNAL MEDICINE

## 2022-01-18 PROCEDURE — 80048 BASIC METABOLIC PNL TOTAL CA: CPT

## 2022-01-18 PROCEDURE — 92526 ORAL FUNCTION THERAPY: CPT

## 2022-01-18 PROCEDURE — 1200000000 HC SEMI PRIVATE

## 2022-01-18 PROCEDURE — 97116 GAIT TRAINING THERAPY: CPT

## 2022-01-18 PROCEDURE — 97535 SELF CARE MNGMENT TRAINING: CPT

## 2022-01-18 PROCEDURE — 84155 ASSAY OF PROTEIN SERUM: CPT

## 2022-01-18 PROCEDURE — 85025 COMPLETE CBC W/AUTO DIFF WBC: CPT

## 2022-01-18 PROCEDURE — 97530 THERAPEUTIC ACTIVITIES: CPT

## 2022-01-18 PROCEDURE — 84100 ASSAY OF PHOSPHORUS: CPT

## 2022-01-18 PROCEDURE — 6360000002 HC RX W HCPCS: Performed by: INTERNAL MEDICINE

## 2022-01-18 PROCEDURE — 83883 ASSAY NEPHELOMETRY NOT SPEC: CPT

## 2022-01-18 PROCEDURE — 83735 ASSAY OF MAGNESIUM: CPT

## 2022-01-18 PROCEDURE — 2500000003 HC RX 250 WO HCPCS: Performed by: INTERNAL MEDICINE

## 2022-01-18 PROCEDURE — 84165 PROTEIN E-PHORESIS SERUM: CPT

## 2022-01-18 PROCEDURE — 99233 SBSQ HOSP IP/OBS HIGH 50: CPT | Performed by: INTERNAL MEDICINE

## 2022-01-18 RX ORDER — SODIUM CHLORIDE 1000 MG
1 TABLET, SOLUBLE MISCELLANEOUS 2 TIMES DAILY WITH MEALS
Status: DISCONTINUED | OUTPATIENT
Start: 2022-01-18 | End: 2022-01-20

## 2022-01-18 RX ADMIN — ACETAMINOPHEN 650 MG: 325 TABLET ORAL at 00:21

## 2022-01-18 RX ADMIN — OXYCODONE AND ACETAMINOPHEN 1 TABLET: 5; 325 TABLET ORAL at 14:21

## 2022-01-18 RX ADMIN — Medication 10 ML: at 12:23

## 2022-01-18 RX ADMIN — OXYCODONE AND ACETAMINOPHEN 1 TABLET: 5; 325 TABLET ORAL at 23:12

## 2022-01-18 RX ADMIN — Medication 400 MG: at 21:23

## 2022-01-18 RX ADMIN — SODIUM CHLORIDE TAB 1 GM 1 G: 1 TAB at 10:01

## 2022-01-18 RX ADMIN — PANTOPRAZOLE SODIUM 40 MG: 40 TABLET, DELAYED RELEASE ORAL at 05:25

## 2022-01-18 RX ADMIN — Medication 10 ML: at 23:34

## 2022-01-18 RX ADMIN — ENOXAPARIN SODIUM 60 MG: 100 INJECTION SUBCUTANEOUS at 10:01

## 2022-01-18 RX ADMIN — Medication 1000 MG: at 12:24

## 2022-01-18 RX ADMIN — SODIUM CHLORIDE TAB 1 GM 1 G: 1 TAB at 18:24

## 2022-01-18 RX ADMIN — POTASSIUM PHOSPHATE, MONOBASIC AND POTASSIUM PHOSPHATE, DIBASIC 20 MMOL: 224; 236 INJECTION, SOLUTION, CONCENTRATE INTRAVENOUS at 12:30

## 2022-01-18 RX ADMIN — Medication 10 ML: at 12:31

## 2022-01-18 RX ADMIN — SODIUM CHLORIDE, PRESERVATIVE FREE 10 ML: 5 INJECTION INTRAVENOUS at 12:31

## 2022-01-18 RX ADMIN — ENOXAPARIN SODIUM 60 MG: 100 INJECTION SUBCUTANEOUS at 21:23

## 2022-01-18 RX ADMIN — FUROSEMIDE 20 MG: 20 TABLET ORAL at 10:02

## 2022-01-18 RX ADMIN — ATORVASTATIN CALCIUM 40 MG: 40 TABLET, FILM COATED ORAL at 10:00

## 2022-01-18 RX ADMIN — SODIUM CHLORIDE 25 ML: 9 INJECTION, SOLUTION INTRAVENOUS at 12:29

## 2022-01-18 RX ADMIN — LEVOTHYROXINE SODIUM 112 MCG: 0.11 TABLET ORAL at 10:00

## 2022-01-18 RX ADMIN — OXYCODONE AND ACETAMINOPHEN 1 TABLET: 5; 325 TABLET ORAL at 05:25

## 2022-01-18 RX ADMIN — Medication 400 MG: at 10:01

## 2022-01-18 RX ADMIN — FUROSEMIDE 20 MG: 20 TABLET ORAL at 18:24

## 2022-01-18 ASSESSMENT — ENCOUNTER SYMPTOMS
VOMITING: 0
CONSTIPATION: 0
EYE REDNESS: 0
ABDOMINAL DISTENTION: 0
DIARRHEA: 0
BLOOD IN STOOL: 0
NAUSEA: 0
EYE DISCHARGE: 0
ABDOMINAL PAIN: 0
CHEST TIGHTNESS: 0
FACIAL SWELLING: 0
SHORTNESS OF BREATH: 1
PHOTOPHOBIA: 0
COUGH: 0

## 2022-01-18 ASSESSMENT — PAIN DESCRIPTION - PROGRESSION
CLINICAL_PROGRESSION: NOT CHANGED

## 2022-01-18 ASSESSMENT — PAIN DESCRIPTION - ORIENTATION: ORIENTATION: LEFT;UPPER

## 2022-01-18 ASSESSMENT — PAIN DESCRIPTION - LOCATION
LOCATION: BREAST
LOCATION: BACK

## 2022-01-18 ASSESSMENT — PAIN DESCRIPTION - ONSET: ONSET: ON-GOING

## 2022-01-18 ASSESSMENT — PAIN DESCRIPTION - PAIN TYPE
TYPE: ACUTE PAIN

## 2022-01-18 ASSESSMENT — PAIN SCALES - GENERAL
PAINLEVEL_OUTOF10: 4
PAINLEVEL_OUTOF10: 5
PAINLEVEL_OUTOF10: 4
PAINLEVEL_OUTOF10: 0
PAINLEVEL_OUTOF10: 4
PAINLEVEL_OUTOF10: 0
PAINLEVEL_OUTOF10: 6

## 2022-01-18 ASSESSMENT — PAIN DESCRIPTION - DESCRIPTORS: DESCRIPTORS: BURNING

## 2022-01-18 ASSESSMENT — PAIN SCALES - WONG BAKER: WONGBAKER_NUMERICALRESPONSE: 6

## 2022-01-18 ASSESSMENT — PAIN DESCRIPTION - FREQUENCY: FREQUENCY: CONTINUOUS

## 2022-01-18 ASSESSMENT — PAIN DESCRIPTION - DIRECTION: RADIATING_TOWARDS: INWARD

## 2022-01-18 NOTE — PROGRESS NOTES
Comprehensive Nutrition Assessment    Type and Reason for Visit:  Reassess    Nutrition Recommendations/Plan:   No new rec's @ this time: monitor & encourage for consistently adequate po /supp intake. Nutrition Assessment:  Pt with possible new dx of breast/lung CA. PO intake greater than 50% of meals on regular diet & pt is drinking at least 75% ONS. Con't to encourage po & supp intake to promote strength & recovery. Wt is up 18 lb from admission with +4L per I/O's. Question accuracy of bedscale. No further nutrition concerns expressed @ this time. Malnutrition Assessment:  Malnutrition Status:  Severe malnutrition    Context:  Chronic Illness       Nutrition Related Findings:  LBM 1/12 per pt; +4L; no edema noted; Na 134      Wounds:   (left breast wound, scattered abrasions)       Current Nutrition Therapies:    ADULT ORAL NUTRITION SUPPLEMENT; Breakfast, Dinner; Standard High Calorie/High Protein Oral Supplement  ADULT ORAL NUTRITION SUPPLEMENT; Breakfast, Lunch, Dinner, HS Snack; Standard High Calorie/High Protein Oral Supplement  ADULT DIET; Regular; 1200 ml    Anthropometric Measures:  · Height: 5' 4\" (162.6 cm)  · Current Body Weight: 135 lb (61.2 kg)   · Admission Body Weight: 117 lb (53.1 kg) (bedscale)    · Usual Body Weight:       · Ideal Body Weight: 120 lbs; % Ideal Body Weight 112.5 %   · BMI: 23.2  · Adjusted Body Weight:  ; No Adjustment   · Adjusted BMI:      · BMI Categories: Normal Weight (BMI 18.5-24. 9)       Nutrition Diagnosis:   · Inadequate oral intake related to pain as evidenced by weight loss,severe muscle loss      Nutrition Interventions:   Food and/or Nutrient Delivery:  Continue Current Diet,Continue Oral Nutrition Supplement  Nutrition Education/Counseling:  Education not indicated   Coordination of Nutrition Care:  Continue to monitor while inpatient    Goals:  Patient will eat 50% or greater of meals and supplements.        Nutrition Monitoring and Evaluation: Behavioral-Environmental Outcomes:  None Identified   Food/Nutrient Intake Outcomes:  Food and Nutrient Intake,Supplement Intake  Physical Signs/Symptoms Outcomes:  Fluid Status or Edema,Weight     Discharge Planning:     Too soon to determine     Electronically signed by Patrizia Holcomb RD, LD on 1/18/22 at 11:36 AM EST    Contact: [phone]

## 2022-01-18 NOTE — PLAN OF CARE
Nutrition Problem #1: Inadequate oral intake  Intervention: Food and/or Nutrient Delivery: Continue Current Diet,Continue Oral Nutrition Supplement  Nutritional Goals: Patient will eat 50% or greater of meals and supplements.

## 2022-01-18 NOTE — PROGRESS NOTES
01/18/22 1747   RT Protocol   History Pulmonary Disease 0   Respiratory pattern 0   Breath sounds 0   Cough 0   Bronchodilator Assessment Score 0

## 2022-01-18 NOTE — PROGRESS NOTES
[facility] Note    Patient Active Problem List   Diagnosis    Sprain of ligament of lumbosacral joint    BWC Neck sprain and strain    BWC Degeneration of lumbar or lumbosacral intervertebral disc    BWC Acquired spondylolisthesis    BWC Displacement of lumbar intervertebral disc without myelopathy    BWC Spinal stenosis, lumbar region, without neurogenic claudication    BWC Depressive disorder, not elsewhere classified    Chronic pain syndrome    Lupus ([address]    Coronary artery disease due to lipid rich plaque    Hyperlipidemia LDL goal <70    Paroxysmal atrial fibrillation (HCC)    History of stroke    History of DVT (deep vein thrombosis)    Gastroesophageal reflux disease without esophagitis    Acquired hypothyroidism    Pulmonary nodule    Tobacco use    Elevated ferritin    Chronic insomnia    Anxiety disorder    Radiolucent lesion of bone    Chronic low back pain    Breast tenderness    Bad taste in mouth    Decreased appetite    Closed fracture of upper end of left humerus    General weakness    Severe malnutrition (HCC)    Anemia    Hypoxia    Autoimmune diabetes (HCC)    Hypergammaglobulinemia, unspecified    PNA (pneumonia)       Past Medical History:   has a past medical history of Acquired spondylolisthesis, Arthritis, Cataract, Chronic pain syndrome, Degeneration of lumbar or lumbosacral intervertebral disc, Depressive disorder, not elsewhere classified, Displacement of lumbar intervertebral disc without myelopathy, H/O blood clots, HBP (high blood pressure), Heart disease, Spinal stenosis, lumbar region, without neurogenic claudication, Sprain of lumbosacral (joint) (ligament), Sprain of neck, Stroke ([address] Thyroid disorder, and Thyroid disorder. Past Social History:   reports that she has been smoking. She started smoking about 62 years ago. She has a 28.50 pack-year smoking history.  She has never used smokeless tobacco. She reports previous alcohol use. She reports that she does not use drugs. Subjective:  Na better. No neurologic symptoms    Review of Systems   Constitutional: Positive for fatigue. Negative for activity change, appetite change, chills, fever and unexpected weight change. HENT: Negative for congestion and facial swelling. Eyes: Negative for photophobia, discharge and redness. Respiratory: Positive for shortness of breath. Negative for cough and chest tightness. Cardiovascular: Negative for chest pain, palpitations and leg swelling. Gastrointestinal: Negative for abdominal distention, abdominal pain, blood in stool, constipation, diarrhea, nausea and vomiting. Endocrine: Negative for cold intolerance, heat intolerance and polyuria. Genitourinary: Negative for decreased urine volume, difficulty urinating, flank pain and hematuria. Musculoskeletal: Negative for joint swelling and neck pain. Neurological: Negative for dizziness, seizures, syncope, speech difficulty, light-headedness and headaches. Hematological: Does not bruise/bleed easily. Psychiatric/Behavioral: Negative for agitation, confusion and hallucinations.        Objective:      /63   Pulse 90   Temp 97.3 °F (36.3 °C) (Oral)   Resp 20   Ht 5' 4\" (1.626 m)   Wt 135 lb 3.2 oz (61.3 kg)   SpO2 97%   BMI 23.21 kg/m²     Wt Readings from Last 3 Encounters:   01/18/22 135 lb 3.2 oz (61.3 kg)   12/06/19 128 lb 12.8 oz (58.4 kg)   10/04/19 122 lb (55.3 kg)       BP Readings from Last 3 Encounters:   01/18/22 103/63   12/06/19 122/64   10/04/19 116/71     Chest- clear  Heart-regular  Abd-soft  Ext- no edema    Labs  Hemoglobin   Date Value Ref Range Status   01/18/2022 7.2 (L) 12.0 - 16.0 g/dL Final     Hematocrit   Date Value Ref Range Status   01/18/2022 22.2 (L) 36.0 - 48.0 % Final     WBC   Date Value Ref Range Status   01/18/2022 17.4 (H) 4.0 - 11.0 K/uL Final     Platelets   Date Value Ref Range Status 01/18/2022 284 135 - 450 K/uL Final     Lab Results   Component Value Date    CREATININE 1.0 01/18/2022    BUN 11 01/18/2022     (L) 01/18/2022    K 3.8 01/18/2022     01/18/2022    CO2 21 01/18/2022     Uosm 225  Lon 44      Assessment/Plan:  1. Acute-on-chronic hyponatremia. Increase ADH state. The  patient does have a possibility of metastatic lung cancer which may  increase ADH release. Lower serum potassium would lower serum sodium also. Better and at goal.  Continue NaCl tabs, decrease to bid and po Lasix. 2.  Non anion gap metabolic acidosis in the setting of hypokalemia. No  diarrhea. No need for bicarbonate replacement at this time. Positive  urine anion gap. May have type 2 RTA. 3.  Hypomagnesemia. Replaced. It should help with serum potassium also. 4.  Hypophosphatemia. Replace. 5.  Anemia. Follow SPEP and serum free light chain. 6.  Possible metastatic lung cancer as per Medicine and Pulmonary. Oncology is also following. 7.  Hypercalcemia-Zometa given. Ca now lower. Follow.      Julius Santiago MD

## 2022-01-18 NOTE — PROGRESS NOTES
Physician Progress Note      PATIENT:               Frannie Merritt  CSN #:                  [id number]  :                       1945  ADMIT DATE:       2022 8:01 AM  DISCH DATE:  RESPONDING  PROVIDER #:        Yaneth Ramsay MD          QUERY TEXT:    Pt admitted with Obstructive Pneumonia. Pt noted to have leukocytosis,   elevated Procalcitonin, febrile. If possible, please document in the progress   notes and discharge summary if you are evaluating and /or treating any of the   following: The medical record reflects the following:  Risk Factors: PNA, Lung Mass, Severe Malnutrition, Adult Failure to Thrive  Clinical Indicators: Labs on admission 22 WBC 18.7 Lactate Sepsis 1.7     22  WBC 17.3 Bands 5 Procalcitonin 0.37  On 22 Oral Temp 100.3. Treatment: Oncology/Pulmonology/Palliative Care Consults, iv NS, iv Zithromax,   iv Rocephin, CxR, CT Chest/Head/Abd., monitor labs  Options provided:  -- Sepsis, present on admission  -- Sepsis, not present on admission  -- Pneumonia without Sepsis  -- Sepsis was ruled out  -- Other - I will add my own diagnosis  -- Disagree - Not applicable / Not valid  -- Disagree - Clinically unable to determine / Unknown  -- Refer to Clinical Documentation Reviewer    PROVIDER RESPONSE TEXT:    This patient has sepsis which was present on admission.     Query created by: Lux Hernandez on 2022 2:44 PM      Electronically signed by:  Yaneth Ramsay MD 2022 6:01 PM

## 2022-01-18 NOTE — PROGRESS NOTES
PULMONARY AND CRITICAL CARE MEDICINE PROGRESS NOTE      SUBJECTIVE: Patient is seen at bedside. She is on 2 L nasal cannula. Feels tired and fatigued. She is very anxious about bronchoscopy and new diagnosis of possible cancer. REVIEW OF SYSTEMS:   CONSTITUTIONAL SYMPTOMS: The patient denies fever, fatigue, night sweats, weight loss or weight gain. HEENT: No vision changes. No tinnitus, Denies sinus pain. No hoarseness, or dysphagia. CARDIOVASCULAR: Denies chest pain, palpitation, syncope. RESPIRATORY: Denies shortness of breath or cough. GASTROINTESTINAL: Denies nausea, abdominal pain or change in bowel function. SKIN: No rashes or itching. MUSCULOSKELETAL: Denies weakness or bone pain. NEUROLOGICAL: No headaches or seizures. MEDICATIONS:      sodium chloride flush  5-40 mL IntraVENous 2 times per day    sodium chloride  1 g Oral TID WC    furosemide  20 mg Oral BID    enoxaparin  1 mg/kg SubCUTAneous BID    magnesium oxide  400 mg Oral BID    cefTRIAXone (ROCEPHIN) IV  1,000 mg IntraVENous Q24H    [Held by provider] aspirin  81 mg Oral Daily    atorvastatin  40 mg Oral Daily    pantoprazole  40 mg Oral QAM AC    levothyroxine  112 mcg Oral Daily    sodium chloride flush  5-40 mL IntraVENous 2 times per day      sodium chloride      sodium chloride      sodium chloride Stopped (01/14/22 1023)     sodium chloride, sodium chloride flush, sodium chloride, ipratropium-albuterol, oxyCODONE-acetaminophen, sodium chloride flush, sodium chloride, acetaminophen **OR** acetaminophen     ALLERGIES:   Allergies as of 01/13/2022 - Fully Reviewed 01/13/2022   Allergen Reaction Noted    Other Other (See Comments) 01/24/2019    Trazodone  01/25/2019    Venlafaxine  01/25/2019        OBJECTIVE:   height is 5' 4\" (1.626 m) and weight is 135 lb 3.2 oz (61.3 kg). Her oral temperature is 97.3 °F (36.3 °C). Her blood pressure is 103/63 and her pulse is 90.  Her respiration is 20 and oxygen saturation is 97%. No intake/output data recorded. PHYSICAL EXAM:  CONSTITUTIONAL: She is a 67y.o.-year-old who appears her stated age. She is alert and oriented x 3 and in no acute distress. CARDIOVASCULAR: S1 S2 RRR. Without murmer  RESPIRATORY & CHEST: Lungs are clear to auscultation and percussion. No wheezing, no crackles. Good air movement  GASTROINTESTINAL & ABDOMEN: Soft, nontender, positive bowel sounds in all quadrants, non-distended, without hepatosplenomegaly. GENITOURINARY: Deferred. MUSCULOSKELETAL: No tenderness to palpation of the axial skeleton. There is no clubbing. No cyanosis. No edema of the lower extremities. SKIN OF BODY: No rash or jaundice. PSYCHIATRIC EVALUATION: Normal affect. Patient answers questions appropriately. HEMATOLOGIC/LYMPHATIC/ IMMUNOLOGIC: No palpable lymphadenopathy. NEUROLOGIC: Alert and oriented x 3. Groslly non-focal. Motor strength is 5+/5 in all muscle groups. The patient has a normal sensorium globally.       LABS:   Lab Results   Component Value Date    WBC 17.4 (H) 01/18/2022    HGB 7.2 (L) 01/18/2022    HCT 22.2 (L) 01/18/2022     01/18/2022    CHOL 111 07/30/2019    TRIG 56 07/30/2019    HDL 51 07/30/2019    ALT 17 01/13/2022    AST 22 01/13/2022     (L) 01/18/2022    K 3.8 01/18/2022     01/18/2022    CREATININE 1.0 01/18/2022    BUN 11 01/18/2022    CO2 21 01/18/2022    INR 1.04 08/12/2019       Lab Results   Component Value Date    GLUCOSE 108 (H) 01/18/2022    CALCIUM 7.5 (L) 01/18/2022     (L) 01/18/2022    K 3.8 01/18/2022    CO2 21 01/18/2022     01/18/2022    BUN 11 01/18/2022    CREATININE 1.0 01/18/2022       Lab Results   Component Value Date    GLUCOSE 108 (H) 01/18/2022    CALCIUM 7.5 (L) 01/18/2022     (L) 01/18/2022    K 3.8 01/18/2022    CO2 21 01/18/2022     01/18/2022    BUN 11 01/18/2022    CREATININE 1.0 01/18/2022     Recent Labs     01/17/22  1817   PHART 7.372   PGR3BBZ 32.6* PO2ART 83.7   AOT3WXV 18.9*   Y0DSEWEX 97.0   BEART -5.7*       IMAGING:   I reviewed the CT chest and my interpretation is as follows. Large left upper lobe mass with mediastinal lymphadenopathy and postobstructive pneumonia. Right middle lobe lung nodule. IMPRESSION:   1. Left upper lobe mass 6.3 cm. Highly suspicious for malignancy  2. Mediastinal lymphadenopathy  3. Postobstructive pneumonia  4. Right middle lobe lung nodule  5. Bilateral adrenal nodules  6. L2 lytic lesion  7. Left breast mass  8. Hypercalcemia      RECOMMENDATION:  CT images look like bronchogenic carcinoma with postobstructive pneumonia and mediastinal adenopathy. Looks like we are dealing with metastatic cancer as patient also has bilateral adrenal nodules with L2 lytic lesion. The mass would be accessible to navigational bronchoscopy with endobronchial ultrasound biopsy of mediastinal lymph nodes. It is also accessible to IR needle biopsy which has already been ordered. However, therefore IR needle biopsy has not been decided yet. Patient was on Xarelto as outpatient for history of A. fib and DVTs. Last dose of Xarelto was on 1/14/2022. Bronchoscopy could be performed on 1/20/2022 (which will be 5 days from last dose of Xarelto). However, if IR can perform biopsy the same day we can continue with IR biopsy. The nurse will verify with IR and let us know. At the same time, she was also noted to have left breast mass which requires outpatient biopsy and work-up per oncology. She likely has 2 primary malignancies (lung as well as breast). Patient was noted to have elevated procalcitonin and has received Rocephin for 5 days. Rocephin can be discontinued now. Patient is very anxious about her diagnosis. She lives here by herself. Her daughter, granddaughter and brother do not live in [place]. I would recommend palliative care and  consultation.         Sabrina Olivo MD  Pulmonary Critical Care and Sleep Medicine  1/18/2022, 10:29 AM    This note was completed using dragon medical speech recognition software. Grammatical errors, random word insertions, pronoun errors and incomplete sentences are occasional consequences of this technology due to software limitations. If there are questions or concerns about the content of this note of information contained within the body of this dictation they should be addressed with the provider for clarification.

## 2022-01-18 NOTE — PLAN OF CARE
Problem: Falls - Risk of:  Goal: Will remain free from falls  Description: Will remain free from falls  Outcome: Ongoing  Goal: Absence of physical injury  Description: Absence of physical injury  Outcome: Ongoing     Problem: Skin Integrity:  Goal: Will show no infection signs and symptoms  Description: Will show no infection signs and symptoms  Outcome: Ongoing  Goal: Absence of new skin breakdown  Description: Absence of new skin breakdown  Outcome: Ongoing     Problem: Pain:  Goal: Pain level will decrease  Description: Pain level will decrease  Outcome: Ongoing  Goal: Control of acute pain  Description: Control of acute pain  Outcome: Ongoing  Goal: Control of chronic pain  Description: Control of chronic pain  Outcome: Ongoing     Problem: Nutrition  Goal: Optimal nutrition therapy  1/18/2022 1857 by Josey Muniz RN  Outcome: Ongoing  1/18/2022 1137 by Rogelio Lees RD, LD  Outcome: Ongoing     Problem: SAFETY  Goal: Free from accidental physical injury  Outcome: Ongoing  Goal: Free from intentional harm  Outcome: Ongoing     Problem: DAILY CARE  Goal: Daily care needs are met  Outcome: Ongoing     Problem: PAIN  Goal: Patient's pain/discomfort is manageable  Outcome: Ongoing     Problem: SKIN INTEGRITY  Goal: Skin integrity is maintained or improved  Outcome: Ongoing     Problem: KNOWLEDGE DEFICIT  Goal: Patient/S.O. demonstrates understanding of disease process, treatment plan, medications, and discharge instructions.   Outcome: Ongoing     Problem: DISCHARGE BARRIERS  Goal: Patient's continuum of care needs are met  Outcome: Ongoing     Problem: Gas Exchange - Impaired:  Goal: Levels of oxygenation will improve  Description: Levels of oxygenation will improve  Outcome: Ongoing     Problem: Coping:  Goal: Family's ability to cope with current situation will improve  Description: Family's ability to cope with current situation will improve  Outcome: Ongoing     Problem: Health Behavior:  Goal: Ability to identify and utilize available support systems will improve  Description: Ability to identify and utilize available support systems will improve  Outcome: Ongoing

## 2022-01-18 NOTE — PROGRESS NOTES
Speech Language Pathology  Dysphagia Treatment Note    Name:  Pranay Gum  :   1945  Medical Diagnosis:  Left breast mass [N63.20]  PNA (pneumonia) [J18.9]  Suspected malignant neoplasm [R68.89]  Fatigue, unspecified type [R53.83]  Pneumonia due to infectious organism, unspecified laterality, unspecified part of lung [J18.9]  Treatment Diagnosis: Oropharyngeal Dysphagia  Pain level: None reported    Diet level prior to treatment: Regular (with use of strategies to reduce fatigue with task) with Thin liquids, meds whole in puree as tolerated  Tolerance of Current Diet Level: No reported concerns per chart review, RN reports continued reduced intake    Assessment of Texture Tolerance:  -Impressions: Pt alert and receptive, positioned upright in chair at bedside. On 2L via NC, continued SOB. Pt agreeable to limited PO presentations of thin liquids and puree despite encouragement of multiple additional presentations. Oral phase characterized by adequate oral acceptance and labial seal, adequate manipulation and A-P transit. Pt with no immediate or delayed overt clinical s/s aspiration across consistencies. Pt independently demonstrating slow rate of intake and small bites/sips, verbalized understanding of strategies re: fatigue and WOB. Pt continues to decline diet modification, education provided for selection of softer consistencies off of regular textured diet menu. Continue regular textured diet with thin liquids and aspiration precautions. If respiratory status worsens and/or overt s/s of aspiration arise, hold PO and notify SLP for re-evaluation. Diet and Treatment Recommendations 2022:  Regular (with use of strategies to reduce fatigue with task) with Thin liquids, meds whole in puree as tolerated    Compensatory strategies:  Alternate solids/liquids , Upright as possible with all PO intake , Small bites/sips , Eat/feed slowly, Remain upright 30-45 min, Take breaks as needed to minimize fatigue with task    Dysphagia Goals: Speech therapy for dysphagia tx 2-4 times per week during acute care stay. 1. Pt will functionally tolerate recommended diet with no overt clinical s/s of aspiration. (ongoing 1/18/2022)  2. Pt will demonstrate understanding of aspiration risk and precautions via education/demonstration with occasional prompting. (ongoing 1/18/2022)    Plan: Continued daily Dysphagia treatment with goals per plan of care. Patient/Family Education: Education given to the Pt and nurse, who verbalized understanding. Discharge Recommendations:  Pt will benefit from continued skilled Speech Therapy for Dysphagia services, prior to returning home. Timed Code Treatment: 0    Total Treatment Time: 13    If patient discharges prior to next session this note will serve as a discharge summary.        Signature:   [place], [id number] [facility]  Speech-Language Pathologist  [place]. [id number]

## 2022-01-18 NOTE — CONSULTS
[facility]                     [address] [facility], [address]                                  CONSULTATION    PATIENT NAME: Kristy Brown                     :        1945  MED REC NO:   [id number]                          ROOM:       7813  ACCOUNT NO:   [de-identified]                           ADMIT DATE: 2022  PROVIDER:     Anita Joy MD    CONSULT DATE:  2022    CONSULTING PHYSICIAN:  Anita Joy MD    REFERRING PROVIDER:  Michelle Thurston MD    REASON FOR CONSULTATION:  Hyponatremia, hypokalemia, hypophosphatemia. HISTORY OF PRESENT ILLNESS:  The patient is a 25-year-old female with  history of spondylolisthesis, chronic pain syndrome, hypertension, heart  disease, CVA, thyroid disorder who was admitted to the hospital on  2022 secondary to progressive weakness with an admission and  diagnoses of post-obstructive pneumonia and possible metastatic lung  cancer. She also had hypercalcemia with calcium up to 12.9. I am asked  to see the patient since today her sodium level is down to 127 from 133  on presentation with a potassium of 3.1. Creatinine is normal at 0.9. Systolic blood pressure is ranging in the 90s to 130s range. The  patient denies any nausea nor vomiting. Positive constipation. She has  continued to smoke. No history of seizures. Her sodium level going  back from 2019 was ranging from 128 to 134. No other complaints. PAST MEDICAL HISTORY:  Includes hyponatremia, acquired  spondylolisthesis, chronic pain syndrome, DVT/blood clots, hypertension,  heart disease, CVA, thyroid disorder. ALLERGIES:  TRAZODONE and VENLAFAXINE. CURRENT MEDICATIONS:  Includes iron sucrose, aspirin, atorvastatin,  azithromycin, ceftriaxone, enoxaparin, levothyroxine, and pantoprazole. SOCIAL HISTORY:  Continues to smoke. No alcohol or drug abuse. FAMILY HISTORY:  Includes brain cancer, breast cancer and hypertension.     REVIEW OF SYSTEMS:  GENERAL:  Positive malaise. SKIN:  No skin rash,  itching or discharge. NEUROLOGIC:  No headaches or focal deficits. No  seizures. CARDIOVASCULAR:  No chest pain or palpitations. PULMONARY:   Positive shortness of breath and coughing. No hemoptysis. GI:  No  abdominal pain. No nausea, no vomiting, no diarrhea. Positive  constipation. RENAL:  Denies any gross hematuria or change in urine  output. ENDOCRINE:  No history of diabetes. No heat or cold  intolerance. ID:  No fever or chills. MUSCULOSKELETAL:  Denies active  joint pain. PHYSICAL EXAMINATION:  VITAL SIGNS:  Blood pressure 120/75, pulse 88, respirations 16,  temperature 97.6, saturating 96%. Weight listed at 58.7 kg. Urine  output recorded 900 mL, positive 5.5 liters since admission. GENERAL:  Appears tired. HEENT:  Anicteric sclera. Clear conjunctivae. SKIN:  Anicteric. No rash. CHEST:  Clear. HEART:  Regular. ABDOMEN:  Soft, nontender. No rebound or involuntary guarding. EXTREMITIES:  Shows no edema. NECK:  No JVD. LABORATORY DATA:  Sodium 127, potassium 3.1, chloride 101, bicarb 19,  BUN 13, creatinine 0.9, magnesium 1.6, glucose 98, phosphorus 1.5. WBC  16, hemoglobin 6, hematocrit 18.4, platelet count 668,949. Iron  saturation 14% with a ferritin of 595. Urinalysis, specific gravity  1.015, pH is 6, protein is 30, blood is negative. Venous blood gas pH  7.216, pCO2 41.6. Chest x-ray shows focal consolidation within the left upper lobe,  pneumonia as primary considered. ASSESSMENT AND PLAN:  1. Acute-on-chronic hyponatremia. Follow urine osmolality. The  patient does have a possibility of metastatic lung cancer which may  increase ADH release. Lower serum potassium will lower serum sodium  also. We will start salt tablets. Replace potassium aggressively. Goal serum sodium in the low 130.  2.  Non anion gap metabolic acidosis in the setting of hypokalemia. No  diarrhea.   No need for bicarbonate replacement at this time. Check  urine anion gap. 3.  Hypomagnesemia. Replace. It should help with serum potassium also. 4.  Hypophosphatemia. Replace. 5.  Anemia. Check SPEP and serum free light chain. 6.  Possible metastatic lung cancer as per Medicine. Oncology is  following. Thank you very much for this consult. We will follow with you.         Malu Perry MD    D: 01/17/2022 14:51:26       T: 01/17/2022 14:55:18     [email or web address]  Job#: [id number]     Doc#: [id number]    CC:

## 2022-01-18 NOTE — PROGRESS NOTES
Occupational Therapy  Facility/Department: [facility] 3A NURSING  Daily Treatment Note  NAME: Dat Quintana  : 1945  MRN: [id number]    Date of Service: 2022    Discharge Recommendations: Dat Quintana scored a 14/24 on the AM-PAC ADL Inpatient form. Current research shows that an AM-PAC score of 17 or less is typically not associated with a discharge to the patient's home setting. Based on the patient's AM-PAC score and their current ADL deficits, it is recommended that the patient have 3-5 sessions per week of Occupational Therapy at d/c to increase the patient's independence. Please see assessment section for further patient specific details. If patient discharges prior to next session this note will serve as a discharge summary. Please see below for the latest assessment towards goals. OT Equipment Recommendations  Other: defer to next level of care. May benefit from a life alert button, grab bar in bathtub    Assessment   Performance deficits / Impairments: Decreased functional mobility ; Decreased safe awareness;Decreased balance;Decreased ADL status; Decreased endurance;Decreased high-level IADLs  Assessment: Patient is not at baseline level following admission for weakness, pneumonia and lung mass (tests pending). Patient would benefit from continued skilled OT services to address deficits. Treatment Diagnosis: Debility and decreased ADLs due to pneumonia, weakness  Prognosis: Good  History: Indep ADLs, ambulates with RW, friend reports 3+ falls a week, home alone 10+ hours a day  OT Education: OT Role;Plan of Care;ADL Adaptive Strategies;Transfer Training  Patient Education: Patient verbalized understanding but would benefit from reinforcement  Barriers to Learning: Mild cognitive deficits  REQUIRES OT FOLLOW UP: Yes  Activity Tolerance  Activity Tolerance: Patient Tolerated treatment well;Patient limited by fatigue  Safety Devices  Safety Devices in place: Yes  Type of devices:  All fall risk precautions in place;Nurse notified;Call light within reach; Chair alarm in place; Left in chair;Patient at risk for falls  Restraints  Initially in place: No         Patient Diagnosis(es): The primary encounter diagnosis was Fatigue, unspecified type. Diagnoses of Suspected malignant neoplasm, Pneumonia due to infectious organism, unspecified laterality, unspecified part of lung, and Left breast mass were also pertinent to this visit. has a past medical history of Acquired spondylolisthesis, Arthritis, Cataract, Chronic pain syndrome, Degeneration of lumbar or lumbosacral intervertebral disc, Depressive disorder, not elsewhere classified, Displacement of lumbar intervertebral disc without myelopathy, H/O blood clots, HBP (high blood pressure), Heart disease, Spinal stenosis, lumbar region, without neurogenic claudication, Sprain of lumbosacral (joint) (ligament), Sprain of neck, Stroke [facility]), Thyroid disorder, and Thyroid disorder. has a past surgical history that includes partial hysterectomy (cervix not removed) (1964); Hysterectomy, total abdominal (1972); and lumbar fusion. Restrictions  Restrictions/Precautions  Restrictions/Precautions: Fall Risk (high fall risk)  Required Braces or Orthoses?: No  Position Activity Restriction  Other position/activity restrictions: 67 y.o. female with past medical history of thyroid disease, heart disease, CVA, thyroid disorder, DVT, A. fib, presents from home in view of progressive weakness. Patient is a poor historian. She states she has been getting weak. She also describes stabbing pain that goes from her chest to her back when she lies down. States pain has been there for few months. She states that she has been getting all of her care from a nurse practitioner who visits her at home every few months. She has been living with her friend Alex for the last few months. I have discussed this presentation with Sharron Bull over the phone.   According to her, patient Orientation  Orientation  Overall Orientation Status: Within Functional Limits  Objective    ADL  Grooming: Setup;Stand by assistance (oral care seated in chair)  UE Bathing: Minimal assistance (pt washed chest, B UE w/bath wipes)  LE Bathing: Maximum assistance (washing legs with wipes and application of lotion)  UE Dressing: Minimal assistance (donning and doffing gown sitting at chair)  LE Dressing: Maximum assistance (brief change)  Toileting: Moderate assistance (pericare and managing brief following voided bladder and bowels at toilet)  Additional Comments: Performed sponge bathing, grooming, and UE/LE dressing from chair due to fatigue and pain. Balance  Sitting Balance: Stand by assistance  Standing Balance: Minimal assistance  Standing Balance  Time: ~5 min total  Activity: functional mobility, transfers  Comment: RW  Functional Mobility  Functional - Mobility Device: Rolling Walker  Activity: To/from bathroom  Assist Level: Minimal assistance  Functional Mobility Comments: assist for RW management, verbal cues for thoracic and LE extension; pt requiring verbal encouragement due to hesitancy/fear during mobility  Toilet Transfers  Toilet - Technique: Ambulating  Equipment Used: Standard bedside commode (placed over toilet)  Toilet Transfer: Minimal assistance  Toilet Transfers Comments: decreased eccentric control  Bed mobility  Supine to Sit: Stand by assistance  Scooting: Contact guard assistance  Comment: VCs to keep pt on task. Transfers  Stand Step Transfers: Minimal assistance  Sit to stand: Minimal assistance  Stand to sit: Minimal assistance  Transfer Comments: EOB x2, chair x1, toilet x1                       Cognition  Overall Cognitive Status: Exceptions  Arousal/Alertness: Appropriate responses to stimuli  Following Commands:  Follows one step commands with repetition  Attention Span: Appears intact  Safety Judgement: Decreased awareness of need for safety;Decreased awareness of need for assistance  Problem Solving: Decreased awareness of errors  Insights: Decreased awareness of deficits  Initiation: Requires cues for some  Sequencing: Requires cues for some  Cognition Comment: mild perseveration in conversation regarding frustration about timing of meds/staff assist                                         Plan   Plan  Times per week: 3-5  Current Treatment Recommendations: Endurance Training,Patient/Caregiver Education & Training,Self-Care / ADL,Functional Mobility Training,Safety Education & Training  G-Code     OutComes Score                                                  AM-PAC Score        AM-PAC Inpatient Daily Activity Raw Score: 14 (01/18/22 1755)  AM-PAC Inpatient ADL T-Scale Score : 33.39 (01/18/22 1755)  ADL Inpatient CMS 0-100% Score: 59.67 (01/18/22 1755)  ADL Inpatient CMS G-Code Modifier : CK (01/18/22 1755)    Goals  Short term goals  Time Frame for Short term goals: Until discharge  Short term goal 1: Minimal assist bathing- min UB/max LB 1/18  Short term goal 2: Minimal assist dressing- min UB/max LB 1/18  Short term goal 3: Minimal assist toileting- mod A 1/18  Short term goal 4: CGA functional transfers with RW- min A 1/18  Short term goal 5: SBA functional mobility-  min A 1/18  Long term goals  Time Frame for Long term goals : STGs= LTGs  Patient Goals   Patient goals : Get stronger and go back home with friend       Therapy Time   Individual Concurrent Group Co-treatment   Time In 7716         Time Out 1507         Minutes 68         Timed Code Treatment Minutes: [address] [facility], [facility] 126

## 2022-01-18 NOTE — CARE COORDINATION
Patient was provided with the list of the [facility] approved SNF to choose from.

## 2022-01-18 NOTE — PROGRESS NOTES
Hospitalist Progress Note      PCP: [address],     Date of Admission: 1/13/2022    Chief Complaint: Progressive weakness    Hospital Course: This 67 y.o. female with PMHx of  thyroid disease, heart disease, CVA, thyroid disorder, DVT, A. fib presented with progressive weakness. On admission,CT of the chest showed 6.3 cm left upper lobe suprahilar mass suspicious for malignancy, mediastinal lymphadenopathy and 5 x 7 mm right middle lobe nodule. There are bilateral adrenal nodules suspicious for metastasis and L2 lytic lesion. Patient is also found to be hyperglycemic. She has leukocytosis and concern for postobstructive pneumonia on CT imaging pain         Interval history      Pt seen and examined today. Overnight events noted, interval ancillary notes and labs reviewed. Remains on 2 L nasal cannula satting around 97%: Wean as tolerated keep sats above 92%  Afebrile overnight, WBCs remains elevated at 17.4, procalcitonin elevated to 0.37  Reported that she is feeling tired but denied cough, SOB, chest pain, nausea, vomiting or abdominal pain  Plan for IR guided biopsy versus bronchoscopy with endobronchial ultrasound biopsy of mediastinal lymph node.   IR consulted for biopsy as patient last dose of Xarelto was on 1/14/22 and pulmonology endobronchial ultrasound-guided biopsy would be possible on 1/20/2022 (after holding Xarelto for 5 days)        Assessment/Plan:    Postobstructive pneumonia likely secondary to bronchogenic carcinoma  Patient afebrile, with elevated WBCs and procalcitonin admission  Pulmonology on board: Appreciate their recs  Status post 5-day course of Rocephin     Left upper lobe, suprahilar mass with mediastinal lymphadenopathy, bilateral adrenal metastasis and lytic bony lesions  Pulmonology and oncology on board: Appreciate recs  CT head and abdomen pelvis  for staging  Plan for IR guided biopsy versus bronchoscopy with endobronchial ultrasound biopsy of mediastinal lymph node.  Palliative care consulted    2.3 cm left breast mass:  Oncology on board: Outpatient biopsy recommended    Anemia of chronic disease: Status post packed RBCs on 1/17/2022  Monitor hemoglobin closely  Transfuse with hemoglobin less than 7    Hypercalcemia: Likely secondary to malignancy: Resolved  Status post Zometa on 1/14/22  Monitor calcium level closely    Hyponatremia: Acute on chronic likely secondary to increased ADH release in the setting of metastatic lung cancer: Improved  Nephrology on board: Appreciate their recs  Continue sodium tabs twice daily and p.o. Lasix  Restrict dietary free water to 1800ml    Hypothyroidism: Continue Synthroid    Failure to thrive: In the setting of malignancy     History of DVT: On Xarelto at home currently on hold     History of paroxysmal A. fib: On Xarelto currently on hold      DVT Prophylaxis: Lovenox  Diet: ADULT ORAL NUTRITION SUPPLEMENT; Breakfast, Dinner; Standard High Calorie/High Protein Oral Supplement  ADULT ORAL NUTRITION SUPPLEMENT; Breakfast, Lunch, Dinner, HS Snack; Standard High Calorie/High Protein Oral Supplement  ADULT DIET;  Regular; 1200 ml  Code Status: Full Code              Medications:  Reviewed    Infusion Medications    sodium chloride      sodium chloride      sodium chloride Stopped (01/14/22 1023)     Scheduled Medications    sodium chloride flush  5-40 mL IntraVENous 2 times per day    sodium chloride  1 g Oral TID WC    furosemide  20 mg Oral BID    enoxaparin  1 mg/kg SubCUTAneous BID    magnesium oxide  400 mg Oral BID    cefTRIAXone (ROCEPHIN) IV  1,000 mg IntraVENous Q24H    [Held by provider] aspirin  81 mg Oral Daily    atorvastatin  40 mg Oral Daily    pantoprazole  40 mg Oral QAM AC    levothyroxine  112 mcg Oral Daily    sodium chloride flush  5-40 mL IntraVENous 2 times per day     PRN Meds: sodium chloride, sodium chloride flush, sodium chloride, ipratropium-albuterol, oxyCODONE-acetaminophen, sodium chloride flush, sodium chloride, acetaminophen **OR** acetaminophen      Intake/Output Summary (Last 24 hours) at 1/18/2022 0912  Last data filed at 1/18/2022 0515  Gross per 24 hour   Intake 780 ml   Output 1700 ml   Net -920 ml       Physical Exam Performed:    /71   Pulse 77   Temp 97.3 °F (36.3 °C) (Oral)   Resp 18   Ht 5' 4\" (1.626 m)   Wt 135 lb 3.2 oz (61.3 kg)   SpO2 97%   BMI 23.21 kg/m²     General appearance: No apparent distress, appears stated age and cooperative. HEENT: Pupils equal, round, and reactive to light. Conjunctivae/corneas clear. Neck: Supple, with full range of motion. No jugular venous distention. Trachea midline. Respiratory:  Normal respiratory effort. Clear to auscultation, bilaterally without Rales/Wheezes/Rhonchi. Cardiovascular: Regular rate and rhythm with normal S1/S2 without murmurs, rubs or gallops. Abdomen: Soft, non-tender, non-distended with normal bowel sounds. Musculoskeletal: No clubbing, cyanosis or edema bilaterally. Full range of motion without deformity. Skin: Skin color, texture, turgor normal.  No rashes or lesions. Neurologic:  Neurovascularly intact without any focal sensory/motor deficits. Cranial nerves: II-XII intact, grossly non-focal.  Psychiatric: Alert and oriented, thought content appropriate, normal insight  Capillary Refill: Brisk,< 3 seconds   Peripheral Pulses: +2 palpable, equal bilaterally       Labs:   Recent Labs     01/16/22  1001 01/16/22  1001 01/17/22  0513 01/17/22  2330 01/18/22  0532   WBC 17.3*  --  16.0*  --  17.4*   HGB 7.4*   < > 6.0* 7.8* 7.2*   HCT 23.2*   < > 18.4* 23.9* 22.2*     --  305  --  284    < > = values in this interval not displayed.      Recent Labs     01/16/22  1002 01/17/22  0513 01/18/22  0532   * 127* 134*   K 2.8* 3.1* 3.8    101 104   CO2 15* 19* 21   BUN 16 13 11   CREATININE 1.0 0.9 1.0   CALCIUM 9.3 8.6 7.5*   PHOS 2.1* 1.5* 2.4*     No results for input(s): AST, ALT, BILIDIR, BILITOT, ALKPHOS in the last 72 hours. No results for input(s): INR in the last 72 hours. No results for input(s): Garfield Min in the last 72 hours. Urinalysis:      Lab Results   Component Value Date    NITRU Negative 01/13/2022    WBCUA 18 01/13/2022    BACTERIA RARE 01/13/2022    RBCUA 1 01/13/2022    BLOODU Negative 01/13/2022    SPECGRAV 1.015 01/13/2022    GLUCOSEU Negative 01/13/2022       Radiology:  XR CHEST PORTABLE   Final Result   Focal consolidations seen within the left upper lobe. Pneumonia is primarily   considered. However, that must be followed to resolution to ensure that   there is no underlying neoplasm. CT ABDOMEN PELVIS W IV CONTRAST Additional Contrast? Oral   Final Result   1. Bilateral adrenal masses, likely metastatic. No other evidence of   abdominal or pelvic metastatic disease. 2. Lytic L2 vertebral body lesion concerning for metastatic disease. 3. 2.3 cm left breast mass likely malignant. 4. Cholelithiasis with no acute features. 5. Nonobstructive right nephrolithiasis. CT HEAD W WO CONTRAST   Final Result   No acute intracranial abnormality. No definite evidence of intracranial metastatic disease. It is noted that CT   imaging is insensitive for small metastatic lesions. If possible further   evaluation with MRI should be considered for better characterization. RECOMMENDATIONS:   Unavailable         CT CHEST WO CONTRAST   Final Result   1. 6.3 cm left upper lobe, suprahilar mass, suspicious for malignancy. There   may be a postobstructive component of pneumonia, versus transbronchial spread   of tumor in the apex of the left upper lobe. 2. AP window mediastinal adenopathy, likely metastatic. 3. 9 x 7 mm right middle lobe nodule, indeterminate for inflammation, primary   or metastatic neoplasm. 4. Bilateral adrenal nodules, suspicious for metastasis.       RECOMMENDATIONS:   Unavailable         XR CHEST PORTABLE   Final Result New left upper lobe opacity concerning for pneumonia versus malignancy. RECOMMENDATION:   Chest CT is recommended for further evaluation.          CT GUIDED NEEDLE PLACEMENT    (Results Pending)           Active Hospital Problems    Diagnosis     Severe malnutrition ([facility] [address] [E43]     PNA (pneumonia) [J18.9]            Electronically signed by Yaneth Ramsay MD on 1/18/2022 at 9:12 AM

## 2022-01-18 NOTE — PROGRESS NOTES
Physical Therapy  Facility/Department: [facility] 3A NURSING  Daily Treatment Note  NAME: Ramonita Blue  : 1945  MRN: [id number]    Date of Service: 2022    Discharge Recommendations:Karu Johnson scored a 14/24 on the AM-PAC short mobility form. Current research shows that an AM-PAC score of 17 or less is typically not associated with a discharge to the patient's home setting. Based on the patient's AM-PAC score and their current functional mobility deficits, it is recommended that the patient have 3-5 sessions per week of Physical Therapy at d/c to increase the patient's independence. Please see assessment section for further patient specific details. If patient discharges prior to next session this note will serve as a discharge summary. Please see below for the latest assessment towards goals. PT Equipment Recommendations  Equipment Needed: No    Assessment   Body structures, Functions, Activity limitations: Decreased functional mobility ; Decreased ADL status; Decreased strength;Decreased endurance;Decreased balance;Decreased posture; Increased pain  Assessment: Pt SBA bed mob, min A transfers and gait with RW. Pt fatigues and needing much additional time to complete task. Pt weak and needing skilled PT services to address these issues. PT Education: Goals;PT Role;Plan of Care;Transfer Training;Functional Mobility Training  Patient Education: Additional education was provided detailing d/c recommendation. Patient and roommate both verbalized understanding. REQUIRES PT FOLLOW UP: Yes  Activity Tolerance  Activity Tolerance: Patient limited by fatigue;Patient limited by endurance; Patient limited by pain  Activity Tolerance: Pt needing encouragement, fearful of walking. Patient Diagnosis(es): The primary encounter diagnosis was Fatigue, unspecified type.  Diagnoses of Suspected malignant neoplasm, Pneumonia due to infectious organism, unspecified laterality, unspecified part of lung, and Left breast mass were also pertinent to this visit. has a past medical history of Acquired spondylolisthesis, Arthritis, Cataract, Chronic pain syndrome, Degeneration of lumbar or lumbosacral intervertebral disc, Depressive disorder, not elsewhere classified, Displacement of lumbar intervertebral disc without myelopathy, H/O blood clots, HBP (high blood pressure), Heart disease, Spinal stenosis, lumbar region, without neurogenic claudication, Sprain of lumbosacral (joint) (ligament), Sprain of neck, Stroke [facility]), Thyroid disorder, and Thyroid disorder. has a past surgical history that includes partial hysterectomy (cervix not removed) (1964); Hysterectomy, total abdominal (1972); and lumbar fusion. Restrictions  Restrictions/Precautions  Restrictions/Precautions: Fall Risk  Required Braces or Orthoses?: No  Position Activity Restriction  Other position/activity restrictions: 67 y.o. female with past medical history of thyroid disease, heart disease, CVA, thyroid disorder, DVT, A. fib, presents from home in view of progressive weakness. Patient is a poor historian. She states she has been getting weak. She also describes stabbing pain that goes from her chest to her back when she lies down. States pain has been there for few months. She states that she has been getting all of her care from a nurse practitioner who visits her at home every few months. She has been living with her friend Alex for the last few months. I have discussed this presentation with Emeka Sagastume over the phone. According to her, patient has been getting progressively weak over the last couple of months. She has not been eating much. She has been spending increasingly more time in bed and has not really gotten out of bed for over a week now. Patient has been managing her own medications. She has been somewhat confused over the past week but does not have memory problems at baseline.   Upon chart review it appears that patient has not (2LO2, IV.)  Assistance: Minimal assistance  Quality of Gait: flexed trunk, UE lean on RW, some difficulty advancing LE. Assist for RW guidance and to keep RW close. Gait Deviations: Slow Mona;Decreased step length;Decreased step height  Distance: Pt amb 3 ft, then 15 ft x 2 with RW and min A. Comments: Pt amb to chair and bathed with assist with wipes, gown changed. Pt then amb to toilet and had BM, assisted with hygiene and brief change. Pt then returned to seated in chair. O2 94% after amb to chair. Unable to get reading after amb to bathroom. Stairs/Curb  Stairs?: No     Balance  Posture: Fair (forward head and rounded shoulders.)  Sitting - Static: Good;-  Sitting - Dynamic: Good;-  Standing - Static: Fair (with RW)  Standing - Dynamic: Fair (with RW)  Comments: trunk flexion increased with postural fatigue. Comment: bathing, dressing, toileting, hygiene              G-Code     OutComes Score                                                     AM-PAC Score  AM-PAC Inpatient Mobility Raw Score : 14 (01/18/22 1524)  AM-PAC Inpatient T-Scale Score : 38.1 (01/18/22 1524)  Mobility Inpatient CMS 0-100% Score: 61.29 (01/18/22 1524)  Mobility Inpatient CMS G-Code Modifier : CL (01/18/22 1524)          Goals  Short term goals  Time Frame for Short term goals: Upon d/c  Short term goal 1: Patient will perform bed mobility with CGA. - Goal met 1/18  Short term goal 2: Patient will perform sit<>stand transfers with CGA and LRAD. Short term goal 3: Patient will ambulate 20 ft with Min A x1 and LRAD. Short term goal 4: Pt to perform bed mob with supervision. Patient Goals   Patient goals : Patient did not specify any goals wished to be achieved.     Plan    Plan  Times per week: 3-5x  Times per day: Daily  Current Treatment Recommendations: Julianne Vargas Mobility Training,Transfer Training,Gait Training,Endurance Training,Neuromuscular Re-education,Patient/Caregiver Education & Training,Equipment Evaluation, Education, & procurement,ADL/Self-care Training  Safety Devices  Type of devices:  All fall risk precautions in place,Left in chair,Call light within reach,Chair alarm in place,Gait belt,Nurse notified,Patient at risk for falls  Restraints  Initially in place: No     Therapy Time   Individual Concurrent Group Co-treatment   Time In       1412   Time Out       1507   Minutes       55   Timed Code Treatment Minutes: [address] [facility], [id number]

## 2022-01-18 NOTE — RT PROTOCOL NOTE
RT Inhaler-Nebulizer Bronchodilator Protocol Note    There is a bronchodilator order in the chart from a provider indicating to follow the RT Bronchodilator Protocol and there is an Initiate RT Inhaler-Nebulizer Bronchodilator Protocol order as well (see protocol at bottom of note). CXR Findings:  No results found. The findings from the last RT Protocol Assessment were as follows:   History Pulmonary Disease: None or smoker <15 pack years  Respiratory Pattern: Regular pattern and RR 12-20 bpm  Breath Sounds: Clear breath sounds  Cough: Strong, spontaneous, non-productive  Indication for Bronchodilator Therapy:    Bronchodilator Assessment Score: 0    Aerosolized bronchodilator medication orders have been revised according to the RT Inhaler-Nebulizer Bronchodilator Protocol below. Respiratory Therapist to perform RT Therapy Protocol Assessment initially then follow the protocol. Repeat RT Therapy Protocol Assessment PRN for score 0-3 or on second treatment, BID, and PRN for scores above 3. No Indications - adjust the frequency to every 6 hours PRN wheezing or bronchospasm, if no treatments needed after 48 hours then discontinue using Per Protocol order mode. If indication present, adjust the RT bronchodilator orders based on the Bronchodilator Assessment Score as indicated below. Use Inhaler orders unless patient has one or more of the following: on home nebulizer, not able to hold breath for 10 seconds, is not alert and oriented, cannot activate and use MDI correctly, or respiratory rate 25 breaths per minute or more, then use the equivalent nebulizer order(s) with same Frequency and PRN reasons based on the score. If a patient is on this medication at home then do not decrease Frequency below that used at home.     0-3 - enter or revise RT bronchodilator order(s) to equivalent RT Bronchodilator order with Frequency of every 4 hours PRN for wheezing or increased work of breathing using Per Protocol order mode. 4-6 - enter or revise RT Bronchodilator order(s) to two equivalent RT bronchodilator orders with one order with BID Frequency and one order with Frequency of every 4 hours PRN wheezing or increased work of breathing using Per Protocol order mode. 7-10 - enter or revise RT Bronchodilator order(s) to two equivalent RT bronchodilator orders with one order with TID Frequency and one order with Frequency of every 4 hours PRN wheezing or increased work of breathing using Per Protocol order mode. 11-13 - enter or revise RT Bronchodilator order(s) to one equivalent RT bronchodilator order with QID Frequency and an Albuterol order with Frequency of every 4 hours PRN wheezing or increased work of breathing using Per Protocol order mode. Greater than 13 - enter or revise RT Bronchodilator order(s) to one equivalent RT bronchodilator order with every 4 hours Frequency and an Albuterol order with Frequency of every 2 hours PRN wheezing or increased work of breathing using Per Protocol order mode. RT to enter RT Home Evaluation for COPD & MDI Assessment order using Per Protocol order mode.     Electronically signed by Sondra Tavares RCP on 1/18/2022 at 5:47 PM

## 2022-01-18 NOTE — PROGRESS NOTES
[facility]   Progress Note      1/18/2022 8:31 AM        Name: Livia Nieves .              Admitted: 1/13/2022    SUBJECTIVE:  She continues to feel weak, she c/o chest pain with coughing, she has occasional SOB, she remains on 4L of O2 per NC. Reviewed interval ancillary notes    Current Medications  0.9 % sodium chloride infusion, PRN  sodium chloride flush 0.9 % injection 5-40 mL, 2 times per day  sodium chloride flush 0.9 % injection 5-40 mL, PRN  0.9 % sodium chloride infusion, PRN  sodium chloride tablet 1 g, TID WC  furosemide (LASIX) tablet 20 mg, BID  enoxaparin (LOVENOX) injection 60 mg, BID  magnesium oxide (MAG-OX) tablet 400 mg, BID  ipratropium-albuterol (DUONEB) nebulizer solution 1 ampule, Q4H PRN  oxyCODONE-acetaminophen (PERCOCET) 5-325 MG per tablet 1 tablet, Q8H PRN  cefTRIAXone (ROCEPHIN) 1000 mg in sterile water 10 mL IV syringe, Q24H  [Held by provider] aspirin EC tablet 81 mg, Daily  atorvastatin (LIPITOR) tablet 40 mg, Daily  pantoprazole (PROTONIX) tablet 40 mg, QAM AC  levothyroxine (SYNTHROID) tablet 112 mcg, Daily  sodium chloride flush 0.9 % injection 5-40 mL, 2 times per day  sodium chloride flush 0.9 % injection 5-40 mL, PRN  0.9 % sodium chloride infusion, PRN  acetaminophen (TYLENOL) tablet 650 mg, Q6H PRN   Or  acetaminophen (TYLENOL) suppository 650 mg, Q6H PRN        Objective:  /71   Pulse 77   Temp 97.3 °F (36.3 °C) (Oral)   Resp 18   Ht 5' 4\" (1.626 m)   Wt 135 lb 3.2 oz (61.3 kg)   SpO2 97%   BMI 23.21 kg/m²     Intake/Output Summary (Last 24 hours) at 1/18/2022 0831  Last data filed at 1/18/2022 0515  Gross per 24 hour   Intake 780 ml   Output 1700 ml   Net -920 ml      Wt Readings from Last 3 Encounters:   01/18/22 135 lb 3.2 oz (61.3 kg)   12/06/19 128 lb 12.8 oz (58.4 kg)   10/04/19 122 lb (55.3 kg)       General appearance:  Appears comfortable  Eyes: Sclera clear. Pupils equal.  ENT: Moist oral mucosa.  Trachea midline, no adenopathy. Cardiovascular: Regular rhythm, normal S1, S2. No murmur. No edema in lower extremities  Respiratory: Not using accessory muscles. Good inspiratory effort. Clear to auscultation bilaterally, no wheeze or crackles. GI: Abdomen soft, no tenderness, not distended  Musculoskeletal: No cyanosis in digits, neck supple  Neurology: CN 2-12 grossly intact. No speech or motor deficits  Psych: Normal affect. Alert and oriented in time, place and person  Skin: Warm, dry, normal turgor    Labs and Tests:  CBC:   Recent Labs     01/16/22  1001 01/16/22  1001 01/17/22  0513 01/17/22  2330 01/18/22  0532   WBC 17.3*  --  16.0*  --  17.4*   HGB 7.4*   < > 6.0* 7.8* 7.2*     --  305  --  284    < > = values in this interval not displayed. BMP:    Recent Labs     01/16/22  1002 01/17/22  0513 01/18/22  0532   * 127* 134*   K 2.8* 3.1* 3.8    101 104   CO2 15* 19* 21   BUN 16 13 11   CREATININE 1.0 0.9 1.0   GLUCOSE 98 98 108*     Hepatic: No results for input(s): AST, ALT, ALB, BILITOT, ALKPHOS in the last 72 hours. ASSESSMENT AND PLAN    Active Problems:    Severe malnutrition (HCC)    PNA (pneumonia)  Resolved Problems:    * No resolved hospital problems. *      1. Left upper lobe, suprahilar mass with mediastinal lymphadenopathy, bilateral adrenal metastasis and lytic bony lesions  - she is not a candidate for bronchoscopy d/t respiratory status  - CT guided biopsy planned when she has been off ASA x 5 days  - ASA held starting 1/18/22     2.  2.3 cm left breast mass  - may need biopsy which can be done as outpatient     3. Hypercalcemia  - s/p Zometa on 1/14/21  - calcium 7.5 today     4. Anemia of chronic disease.   Transfuse PRBCs if hemoglobin is less than 7  - s/p pRBCs on 1/17/21  - hgb 7.2 today       [facility]  [facility]

## 2022-01-18 NOTE — FLOWSHEET NOTE
Message sent to dr. Buster Chang via perfect serve concerning IR lung biopsy. Aspirin needs to be held for 5 days per Dr. Adriane Alejo. Pt received aspirin yesterday 1/17/22.

## 2022-01-19 ENCOUNTER — ANESTHESIA EVENT (OUTPATIENT)
Dept: ENDOSCOPY | Age: 77
DRG: 871 | End: 2022-01-19
Payer: MEDICARE

## 2022-01-19 LAB
ALBUMIN SERPL-MCNC: 1.9 G/DL (ref 3.1–4.9)
ALPHA-1-GLOBULIN: 0.4 G/DL (ref 0.2–0.4)
ALPHA-2-GLOBULIN: 0.7 G/DL (ref 0.4–1.1)
ANION GAP SERPL CALCULATED.3IONS-SCNC: 12 MMOL/L (ref 3–16)
ANISOCYTOSIS: ABNORMAL
BANDED NEUTROPHILS RELATIVE PERCENT: 5 % (ref 0–7)
BASOPHILS ABSOLUTE: 0 K/UL (ref 0–0.2)
BASOPHILS RELATIVE PERCENT: 0 %
BETA GLOBULIN: 0.8 G/DL (ref 0.9–1.6)
BLOOD BANK DISPENSE STATUS: NORMAL
BLOOD BANK DISPENSE STATUS: NORMAL
BLOOD BANK PRODUCT CODE: NORMAL
BLOOD BANK PRODUCT CODE: NORMAL
BPU ID: NORMAL
BPU ID: NORMAL
BUN BLDV-MCNC: 11 MG/DL (ref 7–20)
CALCIUM SERPL-MCNC: 6.7 MG/DL (ref 8.3–10.6)
CHLORIDE BLD-SCNC: 102 MMOL/L (ref 99–110)
CO2: 19 MMOL/L (ref 21–32)
CREAT SERPL-MCNC: 0.9 MG/DL (ref 0.6–1.2)
DESCRIPTION BLOOD BANK: NORMAL
DESCRIPTION BLOOD BANK: NORMAL
EOSINOPHILS ABSOLUTE: 0.2 K/UL (ref 0–0.6)
EOSINOPHILS RELATIVE PERCENT: 1 %
GAMMA GLOBULIN: 1.6 G/DL (ref 0.6–1.8)
GFR AFRICAN AMERICAN: >60
GFR NON-AFRICAN AMERICAN: >60
GLUCOSE BLD-MCNC: 78 MG/DL (ref 70–99)
HCT VFR BLD CALC: 22.1 % (ref 36–48)
HCT VFR BLD CALC: 28.6 % (ref 36–48)
HEMOGLOBIN: 7.2 G/DL (ref 12–16)
HEMOGLOBIN: 9.3 G/DL (ref 12–16)
KAPPA, FREE LIGHT CHAINS, SERUM: 68.24 MG/L (ref 3.3–19.4)
KAPPA/LAMBDA RATIO: 1.01 (ref 0.26–1.65)
KAPPA/LAMBDA TEST COMMENT: ABNORMAL
LAMBDA, FREE LIGHT CHAINS, SERUM: 67.59 MG/L (ref 5.71–26.3)
LYMPHOCYTES ABSOLUTE: 0.3 K/UL (ref 1–5.1)
LYMPHOCYTES RELATIVE PERCENT: 2 %
MAGNESIUM: 1.9 MG/DL (ref 1.8–2.4)
MCH RBC QN AUTO: 30.9 PG (ref 26–34)
MCHC RBC AUTO-ENTMCNC: 32.6 G/DL (ref 31–36)
MCV RBC AUTO: 94.7 FL (ref 80–100)
METAMYELOCYTES RELATIVE PERCENT: 3 %
MONOCYTES ABSOLUTE: 1.1 K/UL (ref 0–1.3)
MONOCYTES RELATIVE PERCENT: 7 %
NEUTROPHILS ABSOLUTE: 14.1 K/UL (ref 1.7–7.7)
NEUTROPHILS RELATIVE PERCENT: 82 %
PDW BLD-RTO: 16.4 % (ref 12.4–15.4)
PHOSPHORUS: 2.3 MG/DL (ref 2.5–4.9)
PLATELET # BLD: 264 K/UL (ref 135–450)
PMV BLD AUTO: 7 FL (ref 5–10.5)
POLYCHROMASIA: ABNORMAL
POTASSIUM SERPL-SCNC: 3.4 MMOL/L (ref 3.5–5.1)
PROCALCITONIN: 0.35 NG/ML (ref 0–0.15)
RBC # BLD: 2.34 M/UL (ref 4–5.2)
SODIUM BLD-SCNC: 133 MMOL/L (ref 136–145)
TOTAL PROTEIN: 5.3 G/DL (ref 6.4–8.2)
WBC # BLD: 15.7 K/UL (ref 4–11)

## 2022-01-19 PROCEDURE — 1200000000 HC SEMI PRIVATE

## 2022-01-19 PROCEDURE — 6370000000 HC RX 637 (ALT 250 FOR IP): Performed by: INTERNAL MEDICINE

## 2022-01-19 PROCEDURE — 36430 TRANSFUSION BLD/BLD COMPNT: CPT

## 2022-01-19 PROCEDURE — 84100 ASSAY OF PHOSPHORUS: CPT

## 2022-01-19 PROCEDURE — 2580000003 HC RX 258: Performed by: INTERNAL MEDICINE

## 2022-01-19 PROCEDURE — 36415 COLL VENOUS BLD VENIPUNCTURE: CPT

## 2022-01-19 PROCEDURE — 97530 THERAPEUTIC ACTIVITIES: CPT

## 2022-01-19 PROCEDURE — 83735 ASSAY OF MAGNESIUM: CPT

## 2022-01-19 PROCEDURE — 80048 BASIC METABOLIC PNL TOTAL CA: CPT

## 2022-01-19 PROCEDURE — 92526 ORAL FUNCTION THERAPY: CPT

## 2022-01-19 PROCEDURE — 99233 SBSQ HOSP IP/OBS HIGH 50: CPT | Performed by: INTERNAL MEDICINE

## 2022-01-19 PROCEDURE — 6370000000 HC RX 637 (ALT 250 FOR IP): Performed by: HOSPITALIST

## 2022-01-19 PROCEDURE — 85025 COMPLETE CBC W/AUTO DIFF WBC: CPT

## 2022-01-19 PROCEDURE — 84145 PROCALCITONIN (PCT): CPT

## 2022-01-19 PROCEDURE — 97116 GAIT TRAINING THERAPY: CPT

## 2022-01-19 PROCEDURE — 2500000003 HC RX 250 WO HCPCS: Performed by: INTERNAL MEDICINE

## 2022-01-19 PROCEDURE — 85018 HEMOGLOBIN: CPT

## 2022-01-19 PROCEDURE — P9016 RBC LEUKOCYTES REDUCED: HCPCS

## 2022-01-19 PROCEDURE — 97535 SELF CARE MNGMENT TRAINING: CPT

## 2022-01-19 PROCEDURE — 97110 THERAPEUTIC EXERCISES: CPT

## 2022-01-19 PROCEDURE — 85014 HEMATOCRIT: CPT

## 2022-01-19 RX ORDER — POTASSIUM CHLORIDE 20 MEQ/1
40 TABLET, EXTENDED RELEASE ORAL EVERY 4 HOURS
Status: DISCONTINUED | OUTPATIENT
Start: 2022-01-19 | End: 2022-01-19

## 2022-01-19 RX ORDER — POTASSIUM CHLORIDE 20 MEQ/1
40 TABLET, EXTENDED RELEASE ORAL PRN
Status: DISCONTINUED | OUTPATIENT
Start: 2022-01-19 | End: 2022-01-26 | Stop reason: HOSPADM

## 2022-01-19 RX ORDER — SODIUM CHLORIDE 9 MG/ML
INJECTION, SOLUTION INTRAVENOUS PRN
Status: DISCONTINUED | OUTPATIENT
Start: 2022-01-19 | End: 2022-01-26 | Stop reason: HOSPADM

## 2022-01-19 RX ORDER — POTASSIUM CHLORIDE 7.45 MG/ML
10 INJECTION INTRAVENOUS PRN
Status: DISCONTINUED | OUTPATIENT
Start: 2022-01-19 | End: 2022-01-24

## 2022-01-19 RX ADMIN — POTASSIUM BICARBONATE 40 MEQ: 782 TABLET, EFFERVESCENT ORAL at 10:03

## 2022-01-19 RX ADMIN — Medication 10 ML: at 20:59

## 2022-01-19 RX ADMIN — Medication 400 MG: at 08:51

## 2022-01-19 RX ADMIN — FUROSEMIDE 20 MG: 20 TABLET ORAL at 08:52

## 2022-01-19 RX ADMIN — LEVOTHYROXINE SODIUM 112 MCG: 0.11 TABLET ORAL at 08:52

## 2022-01-19 RX ADMIN — OXYCODONE AND ACETAMINOPHEN 1 TABLET: 5; 325 TABLET ORAL at 18:42

## 2022-01-19 RX ADMIN — Medication 400 MG: at 20:58

## 2022-01-19 RX ADMIN — SODIUM CHLORIDE TAB 1 GM 1 G: 1 TAB at 08:51

## 2022-01-19 RX ADMIN — ATORVASTATIN CALCIUM 40 MG: 40 TABLET, FILM COATED ORAL at 08:51

## 2022-01-19 RX ADMIN — FUROSEMIDE 20 MG: 20 TABLET ORAL at 17:38

## 2022-01-19 RX ADMIN — OXYCODONE AND ACETAMINOPHEN 1 TABLET: 5; 325 TABLET ORAL at 08:51

## 2022-01-19 RX ADMIN — SODIUM CHLORIDE TAB 1 GM 1 G: 1 TAB at 17:38

## 2022-01-19 RX ADMIN — Medication 10 ML: at 09:44

## 2022-01-19 RX ADMIN — PANTOPRAZOLE SODIUM 40 MG: 40 TABLET, DELAYED RELEASE ORAL at 06:20

## 2022-01-19 RX ADMIN — SODIUM CHLORIDE 25 ML: 9 INJECTION, SOLUTION INTRAVENOUS at 16:38

## 2022-01-19 RX ADMIN — SODIUM PHOSPHATE, MONOBASIC, MONOHYDRATE 20 MMOL: 276; 142 INJECTION, SOLUTION INTRAVENOUS at 16:39

## 2022-01-19 RX ADMIN — Medication 10 ML: at 09:45

## 2022-01-19 ASSESSMENT — PAIN DESCRIPTION - PROGRESSION

## 2022-01-19 ASSESSMENT — PAIN SCALES - GENERAL
PAINLEVEL_OUTOF10: 0
PAINLEVEL_OUTOF10: 6
PAINLEVEL_OUTOF10: 5
PAINLEVEL_OUTOF10: 6
PAINLEVEL_OUTOF10: 4
PAINLEVEL_OUTOF10: 0
PAINLEVEL_OUTOF10: 7
PAINLEVEL_OUTOF10: 4
PAINLEVEL_OUTOF10: 6
PAINLEVEL_OUTOF10: 6

## 2022-01-19 ASSESSMENT — PAIN DESCRIPTION - PAIN TYPE
TYPE: CHRONIC PAIN

## 2022-01-19 ASSESSMENT — PAIN DESCRIPTION - ONSET
ONSET: ON-GOING

## 2022-01-19 ASSESSMENT — PAIN DESCRIPTION - FREQUENCY
FREQUENCY: CONTINUOUS

## 2022-01-19 ASSESSMENT — PAIN DESCRIPTION - LOCATION
LOCATION: CHEST;BACK
LOCATION: CHEST;SHOULDER
LOCATION: NECK;BACK
LOCATION: BREAST;BACK
LOCATION: BACK

## 2022-01-19 ASSESSMENT — ENCOUNTER SYMPTOMS
ABDOMINAL DISTENTION: 0
EYE DISCHARGE: 0
DIARRHEA: 0
FACIAL SWELLING: 0
PHOTOPHOBIA: 0
BLOOD IN STOOL: 0
NAUSEA: 0
SHORTNESS OF BREATH: 1
COUGH: 0
CHEST TIGHTNESS: 0
CONSTIPATION: 0
VOMITING: 0
EYE REDNESS: 0
ABDOMINAL PAIN: 0

## 2022-01-19 ASSESSMENT — PAIN DESCRIPTION - ORIENTATION
ORIENTATION: LEFT;UPPER
ORIENTATION: MID
ORIENTATION: MID
ORIENTATION: LOWER;MID

## 2022-01-19 ASSESSMENT — PAIN DESCRIPTION - DESCRIPTORS: DESCRIPTORS: ACHING

## 2022-01-19 ASSESSMENT — PAIN - FUNCTIONAL ASSESSMENT: PAIN_FUNCTIONAL_ASSESSMENT: ACTIVITIES ARE NOT PREVENTED

## 2022-01-19 ASSESSMENT — PAIN DESCRIPTION - DIRECTION: RADIATING_TOWARDS: SHOULDER BLADES

## 2022-01-19 NOTE — PROGRESS NOTES
PULMONARY AND CRITICAL CARE MEDICINE PROGRESS NOTE      SUBJECTIVE: Patient is sitting comfortably in the chair. She is on 2 L nasal cannula. REVIEW OF SYSTEMS:   CONSTITUTIONAL SYMPTOMS: The patient denies fever, fatigue, night sweats, weight loss or weight gain. HEENT: No vision changes. No tinnitus, Denies sinus pain. No hoarseness, or dysphagia. CARDIOVASCULAR: Denies chest pain, palpitation, syncope. RESPIRATORY: Denies shortness of breath or cough. GASTROINTESTINAL: Denies nausea, abdominal pain or change in bowel function. SKIN: No rashes or itching. MUSCULOSKELETAL: Denies weakness or bone pain. NEUROLOGICAL: No headaches or seizures. MEDICATIONS:      sodium chloride  1 g Oral BID WC    sodium chloride flush  5-40 mL IntraVENous 2 times per day    furosemide  20 mg Oral BID    [Held by provider] enoxaparin  1 mg/kg SubCUTAneous BID    magnesium oxide  400 mg Oral BID    [Held by provider] aspirin  81 mg Oral Daily    atorvastatin  40 mg Oral Daily    pantoprazole  40 mg Oral QAM AC    levothyroxine  112 mcg Oral Daily    sodium chloride flush  5-40 mL IntraVENous 2 times per day      sodium chloride      sodium chloride      sodium chloride 25 mL (01/18/22 1229)     potassium chloride **OR** potassium alternative oral replacement **OR** potassium chloride, sodium chloride, sodium chloride flush, sodium chloride, ipratropium-albuterol, oxyCODONE-acetaminophen, sodium chloride flush, sodium chloride, acetaminophen **OR** acetaminophen     ALLERGIES:   Allergies as of 01/13/2022 - Fully Reviewed 01/13/2022   Allergen Reaction Noted    Other Other (See Comments) 01/24/2019    Trazodone  01/25/2019    Venlafaxine  01/25/2019        OBJECTIVE:   height is 5' 4\" (1.626 m) and weight is 135 lb 1.6 oz (61.3 kg). Her oral temperature is 98.2 °F (36.8 °C). Her blood pressure is 100/63 and her pulse is 99. Her respiration is 20 and oxygen saturation is 96%. I/O this shift:   In: 480 [P.O.:480]  Out: -      PHYSICAL EXAM:  CONSTITUTIONAL: She is a 67y.o.-year-old who appears her stated age. She is alert and oriented x 3 and in no acute distress. Cachectic   CARDIOVASCULAR: S1 S2 RRR. Without murmer  RESPIRATORY & CHEST: Lungs are clear to auscultation and percussion. No wheezing, no crackles. Good air movement  GASTROINTESTINAL & ABDOMEN: Soft, nontender, positive bowel sounds in all quadrants, non-distended, without hepatosplenomegaly. GENITOURINARY: Deferred. MUSCULOSKELETAL: No tenderness to palpation of the axial skeleton. There is no clubbing. No cyanosis. No edema of the lower extremities. SKIN OF BODY: No rash or jaundice. PSYCHIATRIC EVALUATION: Normal affect. Patient answers questions appropriately. HEMATOLOGIC/LYMPHATIC/ IMMUNOLOGIC: No palpable lymphadenopathy. NEUROLOGIC: Alert and oriented x 3. Groslly non-focal. Motor strength is 5+/5 in all muscle groups. The patient has a normal sensorium globally.       LABS:   Lab Results   Component Value Date    WBC 15.7 (H) 01/19/2022    HGB 7.2 (L) 01/19/2022    HCT 22.1 (L) 01/19/2022     01/19/2022    CHOL 111 07/30/2019    TRIG 56 07/30/2019    HDL 51 07/30/2019    ALT 17 01/13/2022    AST 22 01/13/2022     (L) 01/19/2022    K 3.4 (L) 01/19/2022     01/19/2022    CREATININE 0.9 01/19/2022    BUN 11 01/19/2022    CO2 19 (L) 01/19/2022    INR 1.04 08/12/2019       Lab Results   Component Value Date    GLUCOSE 78 01/19/2022    CALCIUM 6.7 (L) 01/19/2022     (L) 01/19/2022    K 3.4 (L) 01/19/2022    CO2 19 (L) 01/19/2022     01/19/2022    BUN 11 01/19/2022    CREATININE 0.9 01/19/2022       Lab Results   Component Value Date    GLUCOSE 78 01/19/2022    CALCIUM 6.7 (L) 01/19/2022     (L) 01/19/2022    K 3.4 (L) 01/19/2022    CO2 19 (L) 01/19/2022     01/19/2022    BUN 11 01/19/2022    CREATININE 0.9 01/19/2022     Recent Labs     01/17/22  1817   PHART 7.372   [id number] 32.6*   PO2ART 83.7   [id number] 18.9*   [id number] 97.0   BEART -5.7*       IMAGING:   I reviewed the CT chest and my interpretation is as follows. Large left upper lobe mass with mediastinal lymphadenopathy and postobstructive pneumonia. Right middle lobe lung nodule.        IMPRESSION:   1. Left upper lobe mass 6.3 cm.  Highly suspicious for malignancy  2. Mediastinal lymphadenopathy  3. Postobstructive pneumonia  4. Right middle lobe lung nodule  5. Bilateral adrenal nodules  6. L2 lytic lesion  7. Left breast mass  8. Hypercalcemia        RECOMMENDATION:  CT images look like bronchogenic carcinoma with postobstructive pneumonia and mediastinal adenopathy. Looks like we are dealing with metastatic cancer as patient also has bilateral adrenal nodules with L2 lytic lesion. The mass would be accessible to navigational bronchoscopy with endobronchial ultrasound biopsy of mediastinal lymph nodes. It is also accessible to IR needle biopsy which has already been ordered. However, IR cannot perform biopsy until next week as patient was on aspirin 81 mg daily which now has been held  Patient was on Xarelto as outpatient for history of A. fib and DVTs. Last dose of Xarelto was on 1/14/2022. Bronchoscopy will be performed on 1/20/2022 (which will be 5 days from last dose of Xarelto). Please keep patient n.p.o. past midnight. Hold Lovenox. 1 unit PRBC transfusion as hemoglobin is 7.2.     At the same time, she was also noted to have left breast mass which requires outpatient biopsy and work-up per oncology. She likely has 2 primary malignancies (lung as well as breast).     Patient was noted to have elevated procalcitonin and has received Rocephin for 5 days.      Patient is very anxious about her diagnosis. She lives here by herself. Her daughter, granddaughter and brother do not live in [place]. I would recommend palliative care and  consultation.         Amilcar Wright MD  Pulmonary Critical Care and Sleep Medicine  1/19/2022, 11:28 AM    This note was completed using dragon medical speech recognition software. Grammatical errors, random word insertions, pronoun errors and incomplete sentences are occasional consequences of this technology due to software limitations. If there are questions or concerns about the content of this note of information contained within the body of this dictation they should be addressed with the provider for clarification.

## 2022-01-19 NOTE — PROGRESS NOTES
Referral received for emotional/spiritual support. Saw patient over 2 separate visits. Multiple interruptions for nursing care/procedures and PT. Patient able to verbalize her concerns about Ghislaine Reusing will happen to me next\". Seems to not have good relationships with family members and her daughter lives out of state. She has been told she \"has\" to have a family member involved and that is causing her anxiety. Attempted to reassure and encourage her. Also prayed with her and gave her a prayer shawl. Update to nurse.

## 2022-01-19 NOTE — PROGRESS NOTES
Occupational Therapy  Facility/Department: [address] NURSING  Daily Treatment Note  NAME: Tian Morales  : 1945  MRN: [id number]    Date of Service: 2022    Discharge Recommendations:      Tian Morales scored a 14/24 on the AM-PAC ADL Inpatient form. Current research shows that an AM-PAC score of 17 or less is typically not associated with a discharge to the patient's home setting. Based on the patient's AM-PAC score and their current ADL deficits, it is recommended that the patient have 3-5 sessions per week of Occupational Therapy at d/c to increase the patient's independence. Please see assessment section for further patient specific details. If patient discharges prior to next session this note will serve as a discharge summary. Please see below for the latest assessment towards goals. OT Equipment Recommendations  Equipment Needed: No  Other: defer to next level of care. May benefit from a life alert button, grab bar in bathtub    Assessment   Performance deficits / Impairments: Decreased functional mobility ; Decreased safe awareness;Decreased balance;Decreased ADL status; Decreased endurance;Decreased high-level IADLs  Assessment: Pt limited by anxiety this session, perseverates during conversation/cueing to attend to task throughout session. Pt unable to ambulate this date/use of rachell STEDY to perform transfers for ADL participation. Patient is not at baseline level following admission for weakness, pneumonia and lung mass (tests pending). Patient would benefit from continued skilled OT services to address deficits.   Treatment Diagnosis: Debility and decreased ADLs due to pneumonia, weakness  Prognosis: Good  History: Indep ADLs, ambulates with RW, friend reports 3+ falls a week, home alone 10+ hours a day  OT Education: OT Role;Plan of Care;ADL Adaptive Strategies;Transfer Training;Energy Conservation;Equipment  Patient Education: importance of OOB activity-Patient verbalized understanding but would benefit from reinforcement  Barriers to Learning: cognition  REQUIRES OT FOLLOW UP: Yes  Activity Tolerance  Activity Tolerance: Patient Tolerated treatment well;Patient limited by fatigue  Activity Tolerance: increased fatigue/SOB following activity-Pt on 3L O2, SpO2 92-96% throughout session  Safety Devices  Safety Devices in place: Yes  Type of devices: All fall risk precautions in place;Nurse notified;Call light within reach; Chair alarm in place; Left in chair;Patient at risk for falls;Gait belt  Restraints  Initially in place: No         Patient Diagnosis(es): The primary encounter diagnosis was Fatigue, unspecified type. Diagnoses of Suspected malignant neoplasm, Pneumonia due to infectious organism, unspecified laterality, unspecified part of lung, and Left breast mass were also pertinent to this visit. has a past medical history of Acquired spondylolisthesis, Arthritis, Cataract, Chronic pain syndrome, Degeneration of lumbar or lumbosacral intervertebral disc, Depressive disorder, not elsewhere classified, Displacement of lumbar intervertebral disc without myelopathy, H/O blood clots, HBP (high blood pressure), Heart disease, Spinal stenosis, lumbar region, without neurogenic claudication, Sprain of lumbosacral (joint) (ligament), Sprain of neck, Stroke [facility]), Thyroid disorder, and Thyroid disorder. has a past surgical history that includes partial hysterectomy (cervix not removed) (1964); Hysterectomy, total abdominal (1972); and lumbar fusion. Restrictions  Restrictions/Precautions  Restrictions/Precautions:  (High Fall Risk)  Required Braces or Orthoses?: No  Position Activity Restriction  Other position/activity restrictions: 67 y.o. female with past medical history of thyroid disease, heart disease, CVA, thyroid disorder, DVT, A. fib, presents from home in view of progressive weakness. Patient is a poor historian. She states she has been getting weak.   She also describes stabbing pain that goes from her chest to her back when she lies down. States pain has been there for few months. She states that she has been getting all of her care from a nurse practitioner who visits her at home every few months. She has been living with her friend Alex for the last few months. I have discussed this presentation with Mirna Jacobs over the phone. According to her, patient has been getting progressively weak over the last couple of months. She has not been eating much. She has been spending increasingly more time in bed and has not really gotten out of bed for over a week now. Patient has been managing her own medications. She has been somewhat confused over the past week but does not have memory problems at baseline. Upon chart review it appears that patient has not seen her primary care provider since 2019. Recent communication documented between patient's NP and Dr. Dez Lees. There is being requested for imaging in view of concern for lung cancer. I attempted to reach out to Remi Freed NP at [phone]. She is not available today. I am expecting a call back from another NP with some information from her chart. ADD noncontrast CT of the chest shows 6.3 cm left upper lobe suprahilar mass suspicious for malignancy. There is also mediastinal adenopathy. There is a 5 x 7 mm right middle lobe nodule. There are bilateral adrenal nodules suspicious for metastasis. Patient is also found to be hyperglycemic.   She has leukocytosis and concern for postobstructive pneumonia on CT imaging pain  Subjective   General  Chart Reviewed: Yes  Patient assessed for rehabilitation services?: Yes  Additional Pertinent Hx: h/o stroke, left side residual weakness  Response to previous treatment: Patient reporting fatigue but able to participate  Family / Caregiver Present: No  Diagnosis: Left breast mass  Subjective  Subjective: Patient supine in bed upon arrival; fatigued at beginning of session with improved temperament as treatment continued. Pt reporting 4/10 pain initially, however reporting 6/10 pain upon RN arrival when administering pain medications. Pt able to continue with session  General Comment  Comments: Patient c/o 5/10 pain between shoulder blades  Pain Assessment  Pain Assessment: 0-10  Pain Level: 4  Pain Type: Chronic pain  Pain Location: Chest;Shoulder  Pain Orientation: Left;Upper  Pain Frequency: Continuous  Pain Onset: On-going  Vital Signs  Patient Currently in Pain: Yes   Orientation  Orientation  Overall Orientation Status: Within Functional Limits  Objective    ADL  UE Bathing: Setup;Stand by assistance (Pt washed B UEs with wipes)  LE Bathing: Setup;Stand by assistance (pt applied lotion and washed B LE w/bath wipes seated in chair)  UE Dressing: Minimal assistance (donning and doffing gown sitting at chair)  LE Dressing: Maximum assistance;Dependent/Total  Toileting: Dependent/Total;Maximum assistance (purwick catheter present, pt requiring max A to perform colette care/rear hygiene, total A for clothing management)  Additional Comments: use of rachell STEDY to transfer to toilet, pt unable to void. Further ADL taksks performed-UB/LB bathing and dressing tasks completed in high back chair        Balance  Sitting Balance: Stand by assistance  Standing Balance: Minimal assistance (stance at RW to don and doff depends)  Standing Balance  Time: ~ 1 min total  Activity: functional mobility, transfers, stance for ADL completion  Comment: RW/use of rachell STEDY  Functional Mobility  Functional - Mobility Device:  (rachell STEDY)  Activity: To/from bathroom  Assist Level: Dependent/Total  Functional Mobility Comments: initial sit <> stand performed from EOB CGA, pt reporting increased fatigue in stance, declined mobility, use of STEDY for safety  Toilet Transfers  Toilet - Technique:  (use of rachell STEDY)  Equipment Used: Standard toilet  Toilet Transfer:  Moderate assistance  Toilet Transfers Comments: decreased eccentric control  Bed mobility  Supine to Sit: Stand by assistance  Scooting: Contact guard assistance  Comment: verbal cueing for sequence  Transfers  Sit to stand: Contact guard assistance  Stand to sit: Contact guard assistance  Transfer Comments: sit <> stand performed EOB x1 w/RW. EOB w/ rachell STEDY from elevated bed height     Cognition  Overall Cognitive Status: Exceptions  Arousal/Alertness: Appropriate responses to stimuli  Following Commands:  Follows one step commands consistently  Attention Span: Appears intact  Safety Judgement: Decreased awareness of need for assistance;Decreased awareness of need for safety  Insights: Decreased awareness of deficits  Initiation: Requires cues for some  Sequencing: Does not require cues  Cognition Comment: mild perseveration in conversation regarding frustration about timing of meds/staff assist and situations regarding relationships outside the hospital       Plan   Plan  Times per week: 3-5  Times per day: Daily  Current Treatment Recommendations: Endurance Training,Patient/Caregiver Education & Training,Self-Care / ADL,Functional Mobility Training,Safety Education & Training    AM-PAC Score        AM-[facility] Inpatient Daily Activity Raw Score: 14 (01/19/22 1100)  AM-PAC Inpatient ADL T-Scale Score : 33.39 (01/19/22 1100)  ADL Inpatient CMS 0-100% Score: 59.67 (01/19/22 1100)  ADL Inpatient CMS G-Code Modifier : CK (01/19/22 1100)    Goals  Short term goals  Time Frame for Short term goals: Until discharge  Short term goal 1: Minimal assist UB/LB bathing-ongoing 1/19  Short term goal 2: Minimal assist UB/LB- ongoing 1/19  Short term goal 3: Minimal assist toileting- max/dependent 1/19  Short term goal 4: CGA functional transfers with RW- min A w/ RW, dependent for further transfers of STEDY 1/19  Short term goal 5: SBA functional mobility-  Dependent of rachell STEDY 1/19  Long term goals  Time Frame for Long term goals : STGs= LTGs  Patient Goals   Patient goals : Get stronger and go back home with friend       Therapy Time   Individual Concurrent Group Co-treatment   Time In 0827         Time [facility] 0410         Minutes 98            Timed Code Treatment Minutes:  98 Minutes  Total Treatment Minutes:  [address] [facility], [address] [facility]

## 2022-01-19 NOTE — ANESTHESIA PRE PROCEDURE
Department of Anesthesiology  Preprocedure Note       Name:  Barb Villa   Age:  67 y.o.  :  1945                                          MRN:  [id number]         Date:  2022      Surgeon: Justin Willams):  Saman Leija MD    Procedure: Procedure(s):  BRONCHOSCOPY ENDOBRONCHIAL ULTRASOUND  BRONCHOSCOPY ADD ON COMPUTER ASSISTED    Medications prior to admission:   Prior to Admission medications    Medication Sig Start Date End Date Taking? Authorizing Provider   potassium chloride (KLOR-CON M) 20 MEQ extended release tablet Take 20 mEq by mouth 2 times daily   Yes Historical Provider, MD   gabapentin (NEURONTIN) 100 MG capsule Take 200 mg by mouth nightly. Yes Historical Provider, MD   XARELTO 10 MG TABS tablet TAKE 1 TABLET BY MOUTH EVERY 24 HOURS  Patient taking differently: 10 mg daily  21  Yes Stephanie Marquez DO   atorvastatin (LIPITOR) 40 MG tablet TAKE 1 TABLET BY MOUTH DAILY 21  Yes Stephanie Marquez DO   levothyroxine (SYNTHROID) 112 MCG tablet TAKE 1 TABLET BY MOUTH DAILY 21 Yes Stephanie Marquez DO   carvedilol (COREG) 3.125 MG tablet TAKE 1 TABLET BY MOUTH TWICE DAILY 20   Stephanie Marquez DO   Misc. Devices (QUAD CANE) MISC 1 each by Does not apply route daily 19   Stephanie Marquez DO   mirtazapine (REMERON) 15 MG tablet Take 1 tablet by mouth nightly 19   Nikole Leach MD   nitroGLYCERIN (NITROSTAT) 0.4 MG SL tablet Place 0.4 mg under the tongue every 5 minutes as needed for Chest pain up to max of 3 total doses. If no relief after 1 dose, call 911.     Historical Provider, MD   Cholecalciferol (VITAMIN D3) 5000 units TABS Take 1 tablet by mouth daily     Historical Provider, MD   amitriptyline (ELAVIL) 25 MG tablet Take 1 tablet by mouth nightly 19   Stephanie Marquez DO   esomeprazole (NEXIUM) 40 MG delayed release capsule Take 1 capsule by mouth every morning (before breakfast) 19   Stephanie Marquez DO   aspirin 81 MG EC tablet Take 81 mg by mouth daily.     Historical Provider, MD       Current medications:    Current Facility-Administered Medications   Medication Dose Route Frequency Provider Last Rate Last Admin    potassium chloride (KLOR-CON M) extended release tablet 40 mEq  40 mEq Oral PRN Gino Cameron MD        Or    potassium bicarb-citric acid (EFFER-K) effervescent tablet 40 mEq  40 mEq Oral PRN Gino Cameron MD        Or    potassium chloride 10 mEq/100 mL IVPB (Peripheral Line)  10 mEq IntraVENous PRN Gino Cameron MD        sodium chloride tablet 1 g  1 g Oral BID  Mahesh Will MD   1 g at 01/19/22 0851    0.9 % sodium chloride infusion   IntraVENous PRN Evelin Mckenna MD        sodium chloride flush 0.9 % injection 5-40 mL  5-40 mL IntraVENous 2 times per day [facility] MD RINKU   10 mL at 01/19/22 0945    sodium chloride flush 0.9 % injection 5-40 mL  5-40 mL IntraVENous PRN Sea DOBBS MD   10 mL at 01/18/22 1231    0.9 % sodium chloride infusion  25 mL IntraVENous PRN Sea Ghosh MD        furosemide (LASIX) tablet 20 mg  20 mg Oral BID Mahesh Will MD   20 mg at 01/19/22 0852    [Held by provider] enoxaparin (LOVENOX) injection 60 mg  1 mg/kg SubCUTAneous BID Sea DOBBS MD   60 mg at 01/18/22 2123    magnesium oxide (MAG-OX) tablet 400 mg  400 mg Oral BID Mahesh Will MD   400 mg at 01/19/22 0851    ipratropium-albuterol (DUONEB) nebulizer solution 1 ampule  1 ampule Inhalation Q4H PRN Wilton Hammond MD        oxyCODONE-acetaminophen (PERCOCET) 5-325 MG per tablet 1 tablet  1 tablet Oral Q8H PRN Kristian Beck MD   1 tablet at 01/19/22 0851    [Held by provider] aspirin EC tablet 81 mg  81 mg Oral Daily Sea DOBBS MD   81 mg at 01/17/22 0923    atorvastatin (LIPITOR) tablet 40 mg  40 mg Oral Daily Sea DOBBS MD   40 mg at 01/19/22 0851    pantoprazole (PROTONIX) tablet 40 mg  40 mg Oral YON  Searoseanne DOBBS MD   40 mg at 01/19/22 1040  levothyroxine (SYNTHROID) tablet 112 mcg  112 mcg Oral Daily Sea DOBBS MD   112 mcg at 01/19/22 0852    sodium chloride flush 0.9 % injection 5-40 mL  5-40 mL IntraVENous 2 times per day Fabio DOBBS MD   10 mL at 01/19/22 0944    sodium chloride flush 0.9 % injection 5-40 mL  5-40 mL IntraVENous PRN Sea DOBBS MD   10 mL at 01/18/22 1231    0.9 % sodium chloride infusion  25 mL IntraVENous PRN Sea DOBBS  mL/hr at 01/18/22 1229 25 mL at 01/18/22 1229    acetaminophen (TYLENOL) tablet 650 mg  650 mg Oral Q6H PRN Sea Colon MD   650 mg at 01/18/22 0021    Or    acetaminophen (TYLENOL) suppository 650 mg  650 mg Rectal Q6H PRN Sea Colon MD           Allergies: Allergies   Allergen Reactions    Other Other (See Comments)     Anti depression cause black outs     Trazodone      Sedation?  Venlafaxine      Sedation? Problem List:    Patient Active Problem List   Diagnosis Code    Sprain of ligament of lumbosacral joint S33. 9XXA    [facility] Neck sprain and strain S13. 9XXA    [facility] Degeneration of lumbar or lumbosacral intervertebral disc M51.37    [facility] Acquired spondylolisthesis M43.10    [facility] Displacement of lumbar intervertebral disc without myelopathy M51.26    [facility] Spinal stenosis, lumbar region, without neurogenic claudication M48.061    [facility] Depressive disorder, not elsewhere classified F32.9    Chronic pain syndrome G89.4    Lupus (HCC) M32.9    Coronary artery disease due to lipid rich plaque I25.10, I25.83    Hyperlipidemia LDL goal <70 E78.5    Paroxysmal atrial fibrillation (HCC) I48.0    History of stroke Z86.73    History of DVT (deep vein thrombosis) Z86.718    Gastroesophageal reflux disease without esophagitis K21.9    Acquired hypothyroidism E03.9    Pulmonary nodule R91.1    Tobacco use Z72.0    Elevated ferritin R79.89    Chronic insomnia F51.04    Anxiety disorder F41.9    Radiolucent lesion of bone M89.8X9    Chronic low back pain M54.50, G89.29    Breast tenderness N64.4    Bad taste in mouth R43.8    Decreased appetite R63.0    Closed fracture of upper end of left humerus S42.202A    General weakness R53.1    Severe malnutrition (HCC) E43    Anemia D64.9    Hypoxia R09.02    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    Hypergammaglobulinemia, 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    PNA (pneumonia) J18.9       Past Medical History:        Diagnosis Date    Acquired spondylolisthesis     Arthritis     Cataract     Chronic pain syndrome     Degeneration of lumbar or lumbosacral intervertebral disc     Depressive disorder, not elsewhere classified     Displacement of lumbar intervertebral disc without myelopathy     H/O blood clots     HBP (high blood pressure)     Heart disease     Spinal stenosis, lumbar region, without neurogenic claudication     Sprain of lumbosacral (joint) (ligament)     Sprain of neck     Stroke (HCC)     Thyroid disorder     Thyroid disorder        Past Surgical History:        Procedure Laterality Date    HYSTERECTOMY, TOTAL ABDOMINAL  1972    LUMBAR FUSION      L4-5    PARTIAL HYSTERECTOMY  1964       Social History:    Social History     Tobacco Use    Smoking status: Current Every Day Smoker     Packs/day: 0.50     Years: 57.00     Pack years: 28.50     Start date: 1960    Smokeless tobacco: Never Used   Substance Use Topics    Alcohol use: Not Currently                                Ready to quit: Not Answered  Counseling given: Not Answered      Vital Signs (Current):   Vitals:    01/18/22 1852 01/18/22 2013 01/18/22 2310 01/19/22 0400   BP:  117/76 125/76 120/75   Pulse: 92 86 93 80   Resp:  18 18 18   Temp:  36.3 °C (97.3 °F) 36.8 °C (98.3 °F) 36.9 °C (98.4 °F)   TempSrc:  Oral Oral Oral   SpO2:  100% 97% 98%   Weight:    135 lb 1.6 oz (61.3 kg)   Height:                                                  BP Readings from Last 3 Encounters:   01/19/22 120/75   12/06/19 122/64   10/04/19 116/71       NPO Status:                                                                                 BMI:   Wt Readings from Last 3 Encounters:   01/19/22 135 lb 1.6 oz (61.3 kg)   12/06/19 128 lb 12.8 oz (58.4 kg)   10/04/19 122 lb (55.3 kg)     Body mass index is 23.19 kg/m². CBC:   Lab Results   Component Value Date    WBC 15.7 01/19/2022    RBC 2.34 01/19/2022    HGB 7.2 01/19/2022    HCT 22.1 01/19/2022    MCV 94.7 01/19/2022    RDW 16.4 01/19/2022     01/19/2022       CMP:   Lab Results   Component Value Date     01/19/2022    K 3.4 01/19/2022    K 3.9 01/13/2022     01/19/2022    CO2 19 01/19/2022    BUN 11 01/19/2022    CREATININE 0.9 01/19/2022    GFRAA >60 01/19/2022    AGRATIO 0.6 01/13/2022    LABGLOM >60 01/19/2022    LABGLOM 51 02/16/2011    GLUCOSE 78 01/19/2022    PROT 5.3 01/18/2022    PROT 7.6 02/16/2011    CALCIUM 6.7 01/19/2022    BILITOT 0.4 01/13/2022    ALKPHOS 103 01/13/2022    AST 22 01/13/2022    ALT 17 01/13/2022       POC Tests: No results for input(s): POCGLU, POCNA, POCK, POCCL, POCBUN, POCHEMO, POCHCT in the last 72 hours.     Coags:   Lab Results   Component Value Date    PROTIME 11.8 08/12/2019    INR 1.04 08/12/2019       HCG (If Applicable): No results found for: PREGTESTUR, PREGSERUM, HCG, HCGQUANT     ABGs:   Lab Results   Component Value Date    PHART 7.372 01/17/2022    PO2ART 83.7 01/17/2022    LGB0QVA 32.6 01/17/2022    PSF8VFC 18.9 01/17/2022    BEART -5.7 01/17/2022    V3BKULGW 97.0 01/17/2022        Type & Screen (If Applicable):  No results found for: LABABO, LABRH    Drug/Infectious Status (If Applicable):  No results found for: HIV, HEPCAB    COVID-19 Screening (If Applicable): No results found for: COVID19        Anesthesia Evaluation  Patient summary reviewed and Nursing notes reviewed  Airway:         Dental:          Pulmonary: breath sounds clear to auscultation  (+) pneumonia:                            ROS comment: Admitted with obstructive pneumonia, with subsequent sepsis   Cardiovascular:    (+) hypertension:, CAD:, hyperlipidemia      ECG reviewed  Rhythm: regular  Rate: normal  Echocardiogram reviewed               ROS comment: 08/14/2019 Echo:      -Normal global systolic function with an ejection fraction estimated at 65%. -No regional wall motion abnormalities noted.   -Aortic valve appears sclerotic but opens adequately. No evidence of aortic   valve regurgitation.   -There is trivial tricuspid regurgitation with a RVSP estimation of 39 mmHg.   -Normal diastolic function. Avg. E/e'=9.1     Neuro/Psych:   (+) CVA:, neuromuscular disease:, psychiatric history:            GI/Hepatic/Renal:   (+) GERD:,           Endo/Other:    (+) Diabetes, hypothyroidism: arthritis:., malignancy/cancer. ROS comment: 1. Left upper lobe mass 6.3 cm.  Highly suspicious for malignancy  2. Mediastinal lymphadenopathy  3. Postobstructive pneumonia  4. Right middle lobe lung nodule  5. Bilateral adrenal nodules  6. L2 lytic lesion  7. Left breast mass  8. Hypercalcemia Abdominal:       Abdomen: soft. Vascular: Other Findings:             Anesthesia Plan      general     ASA 4       Induction: intravenous and rapid sequence. MIPS: Postoperative opioids intended, Prophylactic antiemetics administered and Postoperative trial extubation. Plan discussed with attending.                   Burrell Cushing, APRN - CRNA   1/19/2022

## 2022-01-19 NOTE — PROGRESS NOTES
Physical Therapy  Facility/Department: [address] NURSING  Daily Treatment Note  NAME: Cindi Figueroa  : 1945  MRN: [id number]    Date of Service: 2022  Danyell Johnson scored a 16/24 on the AM-PAC short mobility form. Current research shows that an AM-PAC score of 17 or less is typically not associated with a discharge to the patient's home setting. Based on the patient's AM-PAC score and their current functional mobility deficits, it is recommended that the patient have 3-5 sessions per week of Physical Therapy at d/c to increase the patient's independence. Please see assessment section for further patient specific details. If patient discharges prior to next session this note will serve as a discharge summary. Please see below for the latest assessment towards goals. Discharge Recommendations:      PT Equipment Recommendations  Equipment Needed: No    Assessment   Assessment: Pt. weak and fatigues quickly. Limited ambulation. Pt. continues to benefit from skilled PT to improve functional mobility. Anticipated the need for low frequency PT at d/c. Treatment Diagnosis: Reduced endurance and balance impairing ability to perform functional mobility  Prognosis: Good  Clinical Presentation: Evolving  PT Education: Goals;PT Role;Plan of Care;Transfer Training;Functional Mobility Training;Gait Training  Patient Education: Verbalized understanding  Barriers to Learning: None  REQUIRES PT FOLLOW UP: Yes  Activity Tolerance  Activity Tolerance: Patient limited by fatigue;Patient limited by endurance; Patient limited by pain  Activity Tolerance: Pt needing encouragement, fearful of walking. Patient Diagnosis(es): The primary encounter diagnosis was Fatigue, unspecified type. Diagnoses of Suspected malignant neoplasm, Pneumonia due to infectious organism, unspecified laterality, unspecified part of lung, and Left breast mass were also pertinent to this visit.      has a past medical history of Acquired spondylolisthesis, Arthritis, Cataract, Chronic pain syndrome, Degeneration of lumbar or lumbosacral intervertebral disc, Depressive disorder, not elsewhere classified, Displacement of lumbar intervertebral disc without myelopathy, H/O blood clots, HBP (high blood pressure), Heart disease, Spinal stenosis, lumbar region, without neurogenic claudication, Sprain of lumbosacral (joint) (ligament), Sprain of neck, Stroke [facility]), Thyroid disorder, and Thyroid disorder. has a past surgical history that includes partial hysterectomy (cervix not removed) (1964); Hysterectomy, total abdominal (1972); and lumbar fusion. Restrictions  Restrictions/Precautions  Restrictions/Precautions: Fall Risk (High)  Required Braces or Orthoses?: No  Position Activity Restriction  Other position/activity restrictions: 67 y.o. female with past medical history of thyroid disease, heart disease, CVA, thyroid disorder, DVT, A. fib, presents from home in view of progressive weakness. Patient is a poor historian. She states she has been getting weak. She also describes stabbing pain that goes from her chest to her back when she lies down. States pain has been there for few months. She states that she has been getting all of her care from a nurse practitioner who visits her at home every few months. She has been living with her friend Alex for the last few months. I have discussed this presentation with Sharron Bull over the phone. According to her, patient has been getting progressively weak over the last couple of months. She has not been eating much. She has been spending increasingly more time in bed and has not really gotten out of bed for over a week now. Patient has been managing her own medications. She has been somewhat confused over the past week but does not have memory problems at baseline. Upon chart review it appears that patient has not seen her primary care provider since 2019.   Recent communication documented between patient's NP and Dr. Yuliya Hearn. There is being requested for imaging in view of concern for lung cancer. I attempted to reach out to Makayla Escalante NP at [phone]. She is not available today. I am expecting a call back from another NP with some information from her chart. ADD noncontrast CT of the chest shows 6.3 cm left upper lobe suprahilar mass suspicious for malignancy. There is also mediastinal adenopathy. There is a 5 x 7 mm right middle lobe nodule. There are bilateral adrenal nodules suspicious for metastasis. Patient is also found to be hyperglycemic. She has leukocytosis and concern for postobstructive pneumonia on CT imaging pain  Subjective   General  Chart Reviewed: Yes  Response To Previous Treatment: Patient with no complaints from previous session. Family / Caregiver Present: No  Subjective  Subjective: Pt. reports 6-7/10 pain with deep breath on L side from rib cage through to scapula  General Comment  Comments: Supine in bed, agreeble with encouragement. Pt. reports feeling very fatigued          Orientation  Orientation  Overall Orientation Status: Within Functional Limits  Cognition      Objective   Bed mobility  Supine to Sit: Supervision  Sit to Supine: Supervision  Scooting: Supervision  Comment: increased time and verbal cueing for sequencing  Transfers  Sit to Stand: Minimal Assistance  Stand to sit: Minimal Assistance  Ambulation  Ambulation?: Yes  Ambulation 1  Surface: level tile  Device: Rolling Walker  Other Apparatus: O2 (3L, IV and actively getting Blood products)  Assistance: Minimal assistance  Quality of Gait: Mild unsteadiness  Gait Deviations: Slow Mona;Decreased step length;Decreased step height  Distance: Pt. ambulated 8' x 2 with FWW  Comments: Pt. encouraged to walk to the door but very anxious about this d/t fear of LE's giviing out. Agreeable to walk forward and backward staying close to the bed. SpO2 92-96% with activity.   Stairs/Curb  Stairs?: No     Balance  Posture: Fair  Sitting - Static: Good;-  Sitting - Dynamic: Good;-  Standing - Static: Fair  Standing - Dynamic: Fair  Exercises  Heelslides: x 10 B LE  Hip Abduction: x 10 B LE  Ankle Pumps: x 10 B LE  Comments: Needs breaks d/t L rib pain that goes through to scapula         Comment: Set up for comfort in the BS chair                                                                     AM-PAC Score  AM-PAC Inpatient Mobility Raw Score : 16 (01/19/22 1524)  AM-PAC Inpatient T-Scale Score : 40.78 (01/19/22 1524)  Mobility Inpatient CMS 0-100% Score: 54.16 (01/19/22 1524)  Mobility Inpatient CMS G-Code Modifier : CK (01/19/22 1524)          Goals  Short term goals  Time Frame for Short term goals: Upon d/c  Short term goal 1: Patient will perform bed mobility with CGA. - Goal met 1/18  Short term goal 2: Patient will perform sit<>stand transfers with CGA and LRAD. Short term goal 3: Patient will ambulate 20 ft with Min A x1 and LRAD. Short term goal 4: Pt to perform bed mob with supervision. Patient Goals   Patient goals : Patient did not specify any goals wished to be achieved. Plan    Plan  Times per week: 3-5x  Times per day: Daily  Current Treatment Recommendations: Strengthening,Balance Training,Functional Mobility Training,Transfer Training,Gait Training,Endurance Training,Neuromuscular Re-education,Patient/Caregiver Education & Training,Equipment Evaluation, Education, & procurement,ADL/Self-care Training  Safety Devices  Type of devices:  All fall risk precautions in place,Call light within reach,Gait belt,Nurse notified,Patient at risk for falls,Bed alarm in place,Left in bed  Restraints  Initially in place: No     Therapy Time   Individual Concurrent Group Co-treatment   Time In 3038         Time Out 1510         Minutes 25         Timed Code Treatment Minutes: [address], [address] [facility], [facility]

## 2022-01-19 NOTE — PROGRESS NOTES
Speech Language Pathology  Dysphagia Treatment Note    Name:  Cate Jessica  :   1945  Medical Diagnosis:  Left breast mass [N63.20]  PNA (pneumonia) [J18.9]  Suspected malignant neoplasm [R68.89]  Fatigue, unspecified type [R53.83]  Pneumonia due to infectious organism, unspecified laterality, unspecified part of lung [J18.9]  Treatment Diagnosis: Oropharyngeal Dysphagia  Pain level: None reported    Diet level prior to treatment: Regular (with use of strategies to reduce fatigue with task) with Thin liquids, meds whole in puree as tolerated  Tolerance of Current Diet Level: No reported concerns per chart review, RN reports increased SOB with pills and water this morning    Assessment of Texture Tolerance:  -Impressions: Pt alert and receptive, positioned in bed with HOB elevated. On 3L via NC, continued SOB. Pt agreeable to limited PO presentations of thin liquids and soft solids. Additional solids offered to assess diet tolerance of regular textures, pt declined. Oral phase characterized by adequate oral acceptance and labial seal, adequate manipulation and A-P transit. Pt with no immediate or delayed overt clinical s/s aspiration across consistencies. Pt independently paused when feeling increased SOB. Pt independently demonstrating slow rate of intake and small bites/sips. Continue regular textured diet with thin liquids per pt preference and aspiration precautions. If respiratory status worsens and/or overt s/s of aspiration arise, hold PO and notify SLP for re-evaluation. Diet and Treatment Recommendations 2022:  Regular (with use of strategies to reduce fatigue with task) with Thin liquids, meds whole in puree as tolerated    Compensatory strategies:  Alternate solids/liquids , Upright as possible with all PO intake , Small bites/sips , Eat/feed slowly, Remain upright 30-45 min, Take breaks as needed to minimize fatigue with task    Dysphagia Goals: Speech therapy for dysphagia tx 2-4 times per week during acute care stay. 1. Pt will functionally tolerate recommended diet with no overt clinical s/s of aspiration. (ongoing 1/19/2022)  2. Pt will demonstrate understanding of aspiration risk and precautions via education/demonstration with occasional prompting. (ongoing 1/19/2022)    Plan: Continued daily Dysphagia treatment with goals per plan of care. Patient/Family Education: Education given to the Pt and nurse, who verbalized understanding. Discharge Recommendations:  Pt will benefit from continued skilled Speech Therapy for Dysphagia services, prior to returning home. Timed Code Treatment: 0    Total Treatment Time: 11    If patient discharges prior to next session this note will serve as a discharge summary.        Signature:   Saba Salgado, [address] [facility]  Speech-Language Pathologist  [place]. [phone]

## 2022-01-19 NOTE — PROGRESS NOTES
[facility] Note    Patient Active Problem List   Diagnosis    Sprain of ligament of lumbosacral joint    BWC Neck sprain and strain    BWC Degeneration of lumbar or lumbosacral intervertebral disc    BWC Acquired spondylolisthesis    BWC Displacement of lumbar intervertebral disc without myelopathy    BWC Spinal stenosis, lumbar region, without neurogenic claudication    BWC Depressive disorder, not elsewhere classified    Chronic pain syndrome    Lupus ([address]    Coronary artery disease due to lipid rich plaque    Hyperlipidemia LDL goal <70    Paroxysmal atrial fibrillation (HCC)    History of stroke    History of DVT (deep vein thrombosis)    Gastroesophageal reflux disease without esophagitis    Acquired hypothyroidism    Pulmonary nodule    Tobacco use    Elevated ferritin    Chronic insomnia    Anxiety disorder    Radiolucent lesion of bone    Chronic low back pain    Breast tenderness    Bad taste in mouth    Decreased appetite    Closed fracture of upper end of left humerus    General weakness    Severe malnutrition (HCC)    Anemia    Hypoxia    Autoimmune diabetes (HCC)    Hypergammaglobulinemia, unspecified    PNA (pneumonia)       Past Medical History:   has a past medical history of Acquired spondylolisthesis, Arthritis, Cataract, Chronic pain syndrome, Degeneration of lumbar or lumbosacral intervertebral disc, Depressive disorder, not elsewhere classified, Displacement of lumbar intervertebral disc without myelopathy, H/O blood clots, HBP (high blood pressure), Heart disease, Spinal stenosis, lumbar region, without neurogenic claudication, Sprain of lumbosacral (joint) (ligament), Sprain of neck, Stroke ([address] Thyroid disorder, and Thyroid disorder. Past Social History:   reports that she has been smoking. She started smoking about 62 years ago. She has a 28.50 pack-year smoking history.  She has never used 01/19/2022 264 135 - 450 K/uL Final     Lab Results   Component Value Date    CREATININE 0.9 01/19/2022    BUN 11 01/19/2022     (L) 01/19/2022    K 3.4 (L) 01/19/2022     01/19/2022    CO2 19 (L) 01/19/2022     Uosm 225  Lon 44    Kappa and lamda elevated but normal ratio  SPEP neg    Assessment/Plan:  1. Acute-on-chronic hyponatremia. Increase ADH state. The  patient does have a possibility of metastatic lung cancer which may  increase ADH release. Lower serum potassium would lower serum sodium also. Better and at goal.  Continue NaCl tabs, decreased to bid and po Lasix. 2.  Non anion gap metabolic acidosis in the setting of hypokalemia. No  diarrhea. No need for bicarbonate replacement at this time. Positive  urine anion gap. May have type 2 RTA. Replace K.   3.  Hypomagnesemia. Replaced. It should help with serum potassium also. 4.  Hypophosphatemia. Replace. 5.  Anemia. Follow SPEP and serum free light chain. 6.  Possible metastatic lung cancer as per Medicine and Pulmonary. Oncology is also following. 7.  Hypercalcemia-Zometa given. Ca now lower, asymptomatic. Check ionized Ca.      Jorge Jesus MD

## 2022-01-19 NOTE — PROGRESS NOTES
Hospitalist Progress Note      PCP: Jennifer Barnett DO    Date of Admission: 1/13/2022    Chief Complaint: Progressive weakness    Hospital Course: This 67 y.o. female with PMHx of  thyroid disease, heart disease, CVA, thyroid disorder, DVT, A. fib presented with progressive weakness. On admission,CT of the chest showed 6.3 cm left upper lobe suprahilar mass suspicious for malignancy, mediastinal lymphadenopathy and 5 x 7 mm right middle lobe nodule. There are bilateral adrenal nodules suspicious for metastasis and L2 lytic lesion. Patient is also found to be hyperglycemic. She has leukocytosis and concern for postobstructive pneumonia on CT imaging pain         Interval history  Pt seen and examined today. Overnight events noted, interval ancillary notes and labs reviewed. On 3 L nasal cannula satting around 98%; wean as tolerated keep sats above 92  Afebrile overnight, WBCs down to 15.7, cultures negative to date, repeat procalcitonin ordered  Low potassium this morning; replacement ordered  Up in chair; anxious about the bronchoscopy and biopsy   denied cough, SOB, chest pain, nausea, vomiting or abdominal pain  Plan for for bronchoscopy tomorrow; n.p.o. for midnight  Hold Lovenox; 1 unit of packed RBCs ordered          Assessment/Plan:    Postobstructive pneumonia likely secondary to bronchogenic carcinoma  Patient afebrile, with elevated WBCs and procalcitonin admission  Pulmonology on board: Appreciate their recs  Status post 5-day course of Rocephin     Left upper lobe, suprahilar mass with mediastinal lymphadenopathy, bilateral adrenal metastasis and lytic bony lesions  Pulmonology and oncology on board: Appreciate recs  CT head and abdomen pelvis  for staging  Plan for bronchoscopy with endobronchial ultrasound biopsy of mediastinal lymph node tomorrow  Palliative care consulted    2.3 cm left breast mass:  Oncology on board:  Outpatient biopsy recommended    Anemia of chronic disease: Status post packed RBCs on 1/17/2022  Monitor hemoglobin closely  Transfuse with hemoglobin less than 7    Hypercalcemia: Likely secondary to malignancy: Resolved  Status post Zometa on 1/14/22  Monitor calcium level closely    Hyponatremia: Acute on chronic likely secondary to increased ADH release in the setting of metastatic lung cancer: Improved  Nephrology on board: Appreciate their recs  Continue sodium tabs twice daily and p.o. Lasix  Restrict dietary free water to 1800ml    Hypothyroidism: Continue Synthroid    Failure to thrive: In the setting of malignancy     History of DVT: On Xarelto at home currently on hold     History of paroxysmal A. fib: On Xarelto currently on hold      DVT Prophylaxis: Lovenox  Diet: ADULT ORAL NUTRITION SUPPLEMENT; Breakfast, Dinner; Standard High Calorie/High Protein Oral Supplement  ADULT ORAL NUTRITION SUPPLEMENT; Breakfast, Lunch, Dinner, HS Snack; Standard High Calorie/High Protein Oral Supplement  ADULT DIET; Regular; 1200 ml  Code Status: Full Code      I have discussed with the patient the rationale for blood component transfusion; its benefits in treating or preventing fatigue, organ damage, or death; and its risk which includes mild transfusion reactions, rare risk of blood borne infection, or more serious but rare reactions. I have discussed the alternatives to transfusion, including the risk and consequences of not receiving transfusion. The patient had an opportunity to ask questions and had agreed to proceed with transfusion of blood components.         Medications:  Reviewed    Infusion Medications    sodium chloride      sodium chloride      sodium chloride 25 mL (01/18/22 1229)     Scheduled Medications    potassium chloride  40 mEq Oral Q4H    sodium chloride  1 g Oral BID WC    sodium chloride flush  5-40 mL IntraVENous 2 times per day    furosemide  20 mg Oral BID    enoxaparin  1 mg/kg SubCUTAneous BID    magnesium oxide  400 mg Oral BID    [Held by provider] aspirin  81 mg Oral Daily    atorvastatin  40 mg Oral Daily    pantoprazole  40 mg Oral QAM AC    levothyroxine  112 mcg Oral Daily    sodium chloride flush  5-40 mL IntraVENous 2 times per day     PRN Meds: sodium chloride, sodium chloride flush, sodium chloride, ipratropium-albuterol, oxyCODONE-acetaminophen, sodium chloride flush, sodium chloride, acetaminophen **OR** acetaminophen      Intake/Output Summary (Last 24 hours) at 1/19/2022 0837  Last data filed at 1/18/2022 0954  Gross per 24 hour   Intake 240 ml   Output --   Net 240 ml       Physical Exam Performed:    /75   Pulse 80   Temp 98.4 °F (36.9 °C) (Oral)   Resp 18   Ht 5' 4\" (1.626 m)   Wt 135 lb 1.6 oz (61.3 kg)   SpO2 98%   BMI 23.19 kg/m²     General appearance: No apparent distress, appears stated age and cooperative. HEENT: Pupils equal, round, and reactive to light. Conjunctivae/corneas clear. Neck: Supple, with full range of motion. No jugular venous distention. Trachea midline. Respiratory:  Normal respiratory effort. Clear to auscultation, bilaterally without Rales/Wheezes/Rhonchi. Cardiovascular: Regular rate and rhythm with normal S1/S2 without murmurs, rubs or gallops. Abdomen: Soft, non-tender, non-distended with normal bowel sounds. Musculoskeletal: No clubbing, cyanosis or edema bilaterally. Full range of motion without deformity. Skin: Skin color, texture, turgor normal.  No rashes or lesions. Neurologic:  Neurovascularly intact without any focal sensory/motor deficits.  Cranial nerves: II-XII intact, grossly non-focal.  Psychiatric: Alert and oriented, thought content appropriate, normal insight  Capillary Refill: Brisk,< 3 seconds   Peripheral Pulses: +2 palpable, equal bilaterally       Labs:   Recent Labs     01/17/22  0513 01/17/22  0513 01/17/22  2330 01/18/22  0532 01/19/22  0625   WBC 16.0*  --   --  17.4* 15.7*   HGB 6.0*   < > 7.8* 7.2* 7.2*   HCT 18.4*   < > 23.9* 22.2* 22.1*     -- --  284 264    < > = values in this interval not displayed. Recent Labs     01/17/22  0513 01/18/22  0532 01/19/22  0625   * 134* 133*   K 3.1* 3.8 3.4*    104 102   CO2 19* 21 19*   BUN 13 11 11   CREATININE 0.9 1.0 0.9   CALCIUM 8.6 7.5* 6.7*   PHOS 1.5* 2.4* 2.3*     No results for input(s): AST, ALT, BILIDIR, BILITOT, ALKPHOS in the last 72 hours. No results for input(s): INR in the last 72 hours. No results for input(s): Bharat Gibson in the last 72 hours. Urinalysis:      Lab Results   Component Value Date    NITRU Negative 01/13/2022    WBCUA 18 01/13/2022    BACTERIA RARE 01/13/2022    RBCUA 1 01/13/2022    BLOODU Negative 01/13/2022    SPECGRAV 1.015 01/13/2022    GLUCOSEU Negative 01/13/2022       Radiology:  XR CHEST PORTABLE   Final Result   Focal consolidations seen within the left upper lobe. Pneumonia is primarily   considered. However, that must be followed to resolution to ensure that   there is no underlying neoplasm. CT ABDOMEN PELVIS W IV CONTRAST Additional Contrast? Oral   Final Result   1. Bilateral adrenal masses, likely metastatic. No other evidence of   abdominal or pelvic metastatic disease. 2. Lytic L2 vertebral body lesion concerning for metastatic disease. 3. 2.3 cm left breast mass likely malignant. 4. Cholelithiasis with no acute features. 5. Nonobstructive right nephrolithiasis. CT HEAD W WO CONTRAST   Final Result   No acute intracranial abnormality. No definite evidence of intracranial metastatic disease. It is noted that CT   imaging is insensitive for small metastatic lesions. If possible further   evaluation with MRI should be considered for better characterization. RECOMMENDATIONS:   Unavailable         CT CHEST WO CONTRAST   Final Result   1. 6.3 cm left upper lobe, suprahilar mass, suspicious for malignancy.   There   may be a postobstructive component of pneumonia, versus transbronchial spread   of tumor in the apex of the left upper lobe. 2. AP window mediastinal adenopathy, likely metastatic. 3. 9 x 7 mm right middle lobe nodule, indeterminate for inflammation, primary   or metastatic neoplasm. 4. Bilateral adrenal nodules, suspicious for metastasis. RECOMMENDATIONS:   Unavailable         XR CHEST PORTABLE   Final Result   New left upper lobe opacity concerning for pneumonia versus malignancy. RECOMMENDATION:   Chest CT is recommended for further evaluation.          CT GUIDED NEEDLE PLACEMENT    (Results Pending)           Active Hospital Problems    Diagnosis     Severe malnutrition ([facility] [address] [E43]     PNA (pneumonia) [J18.9]            Electronically signed by Jose Figueroa MD on 1/19/2022 at 8:37 AM

## 2022-01-20 ENCOUNTER — ANESTHESIA (OUTPATIENT)
Dept: ENDOSCOPY | Age: 77
DRG: 871 | End: 2022-01-20
Payer: MEDICARE

## 2022-01-20 VITALS
SYSTOLIC BLOOD PRESSURE: 123 MMHG | OXYGEN SATURATION: 100 % | DIASTOLIC BLOOD PRESSURE: 60 MMHG | TEMPERATURE: 96.8 F | RESPIRATION RATE: 4 BRPM

## 2022-01-20 PROBLEM — R91.8 LUNG MASS: Status: ACTIVE | Noted: 2019-07-30

## 2022-01-20 LAB
ABO/RH: NORMAL
ANION GAP SERPL CALCULATED.3IONS-SCNC: 10 MMOL/L (ref 3–16)
ANTIBODY SCREEN: NORMAL
APTT: 33.7 SEC (ref 26.2–38.6)
BASOPHILS ABSOLUTE: 0 K/UL (ref 0–0.2)
BASOPHILS RELATIVE PERCENT: 0.1 %
BUN BLDV-MCNC: 10 MG/DL (ref 7–20)
CALCIUM IONIZED: 0.91 MMOL/L (ref 1.12–1.32)
CALCIUM SERPL-MCNC: 6.6 MG/DL (ref 8.3–10.6)
CHLORIDE BLD-SCNC: 96 MMOL/L (ref 99–110)
CO2: 22 MMOL/L (ref 21–32)
CREAT SERPL-MCNC: 1 MG/DL (ref 0.6–1.2)
EOSINOPHILS ABSOLUTE: 0.2 K/UL (ref 0–0.6)
EOSINOPHILS RELATIVE PERCENT: 1.1 %
GFR AFRICAN AMERICAN: >60
GFR NON-AFRICAN AMERICAN: 54
GLUCOSE BLD-MCNC: 99 MG/DL (ref 70–99)
HCT VFR BLD CALC: 32.9 % (ref 36–48)
HEMOGLOBIN: 10.8 G/DL (ref 12–16)
INR BLD: 1.02 (ref 0.88–1.12)
LYMPHOCYTES ABSOLUTE: 0.6 K/UL (ref 1–5.1)
LYMPHOCYTES RELATIVE PERCENT: 3.6 %
MAGNESIUM: 1.9 MG/DL (ref 1.8–2.4)
MCH RBC QN AUTO: 30.4 PG (ref 26–34)
MCHC RBC AUTO-ENTMCNC: 32.8 G/DL (ref 31–36)
MCV RBC AUTO: 92.5 FL (ref 80–100)
MONOCYTES ABSOLUTE: 1.7 K/UL (ref 0–1.3)
MONOCYTES RELATIVE PERCENT: 9.3 %
NEUTROPHILS ABSOLUTE: 15.4 K/UL (ref 1.7–7.7)
NEUTROPHILS RELATIVE PERCENT: 85.9 %
PDW BLD-RTO: 16.7 % (ref 12.4–15.4)
PH VENOUS: 7.38 (ref 7.35–7.45)
PHOSPHORUS: 2.4 MG/DL (ref 2.5–4.9)
PLATELET # BLD: 248 K/UL (ref 135–450)
PMV BLD AUTO: 6.3 FL (ref 5–10.5)
POTASSIUM SERPL-SCNC: 3.8 MMOL/L (ref 3.5–5.1)
PROTHROMBIN TIME: 11.5 SEC (ref 9.9–12.7)
PTH RELATED PEPTIDE: 11.3 PMOL/L (ref 0–3.4)
RBC # BLD: 3.56 M/UL (ref 4–5.2)
SARS-COV-2, NAAT: NOT DETECTED
SODIUM BLD-SCNC: 128 MMOL/L (ref 136–145)
WBC # BLD: 17.9 K/UL (ref 4–11)

## 2022-01-20 PROCEDURE — 83735 ASSAY OF MAGNESIUM: CPT

## 2022-01-20 PROCEDURE — 84100 ASSAY OF PHOSPHORUS: CPT

## 2022-01-20 PROCEDURE — 85730 THROMBOPLASTIN TIME PARTIAL: CPT

## 2022-01-20 PROCEDURE — 6370000000 HC RX 637 (ALT 250 FOR IP): Performed by: INTERNAL MEDICINE

## 2022-01-20 PROCEDURE — 86900 BLOOD TYPING SEROLOGIC ABO: CPT

## 2022-01-20 PROCEDURE — 88177 CYTP FNA EVAL EA ADDL: CPT

## 2022-01-20 PROCEDURE — 36415 COLL VENOUS BLD VENIPUNCTURE: CPT

## 2022-01-20 PROCEDURE — C1725 CATH, TRANSLUMIN NON-LASER: HCPCS | Performed by: INTERNAL MEDICINE

## 2022-01-20 PROCEDURE — 3609011100 HC BRONCHOSCOPY BRUSHINGS: Performed by: INTERNAL MEDICINE

## 2022-01-20 PROCEDURE — 7100000001 HC PACU RECOVERY - ADDTL 15 MIN: Performed by: INTERNAL MEDICINE

## 2022-01-20 PROCEDURE — 2500000003 HC RX 250 WO HCPCS: Performed by: INTERNAL MEDICINE

## 2022-01-20 PROCEDURE — 6360000002 HC RX W HCPCS: Performed by: INTERNAL MEDICINE

## 2022-01-20 PROCEDURE — 3609020000 HC BRONCHOSCOPY W/EBUS FNA: Performed by: INTERNAL MEDICINE

## 2022-01-20 PROCEDURE — 88173 CYTOPATH EVAL FNA REPORT: CPT

## 2022-01-20 PROCEDURE — 3700000001 HC ADD 15 MINUTES (ANESTHESIA): Performed by: INTERNAL MEDICINE

## 2022-01-20 PROCEDURE — 85610 PROTHROMBIN TIME: CPT

## 2022-01-20 PROCEDURE — 99233 SBSQ HOSP IP/OBS HIGH 50: CPT | Performed by: INTERNAL MEDICINE

## 2022-01-20 PROCEDURE — 6360000002 HC RX W HCPCS

## 2022-01-20 PROCEDURE — 86901 BLOOD TYPING SEROLOGIC RH(D): CPT

## 2022-01-20 PROCEDURE — 2720000010 HC SURG SUPPLY STERILE: Performed by: INTERNAL MEDICINE

## 2022-01-20 PROCEDURE — 88305 TISSUE EXAM BY PATHOLOGIST: CPT

## 2022-01-20 PROCEDURE — 0B9G8ZX DRAINAGE OF LEFT UPPER LUNG LOBE, VIA NATURAL OR ARTIFICIAL OPENING ENDOSCOPIC, DIAGNOSTIC: ICD-10-PCS | Performed by: INTERNAL MEDICINE

## 2022-01-20 PROCEDURE — 82330 ASSAY OF CALCIUM: CPT

## 2022-01-20 PROCEDURE — 2500000003 HC RX 250 WO HCPCS: Performed by: NURSE ANESTHETIST, CERTIFIED REGISTERED

## 2022-01-20 PROCEDURE — 88112 CYTOPATH CELL ENHANCE TECH: CPT

## 2022-01-20 PROCEDURE — 2580000003 HC RX 258: Performed by: INTERNAL MEDICINE

## 2022-01-20 PROCEDURE — 2709999900 HC NON-CHARGEABLE SUPPLY: Performed by: INTERNAL MEDICINE

## 2022-01-20 PROCEDURE — 80048 BASIC METABOLIC PNL TOTAL CA: CPT

## 2022-01-20 PROCEDURE — 86850 RBC ANTIBODY SCREEN: CPT

## 2022-01-20 PROCEDURE — 1200000000 HC SEMI PRIVATE

## 2022-01-20 PROCEDURE — 88341 IMHCHEM/IMCYTCHM EA ADD ANTB: CPT

## 2022-01-20 PROCEDURE — 88342 IMHCHEM/IMCYTCHM 1ST ANTB: CPT

## 2022-01-20 PROCEDURE — 85025 COMPLETE CBC W/AUTO DIFF WBC: CPT

## 2022-01-20 PROCEDURE — 88172 CYTP DX EVAL FNA 1ST EA SITE: CPT

## 2022-01-20 PROCEDURE — 6370000000 HC RX 637 (ALT 250 FOR IP): Performed by: HOSPITALIST

## 2022-01-20 PROCEDURE — 87635 SARS-COV-2 COVID-19 AMP PRB: CPT

## 2022-01-20 PROCEDURE — 92526 ORAL FUNCTION THERAPY: CPT

## 2022-01-20 PROCEDURE — 7100000000 HC PACU RECOVERY - FIRST 15 MIN: Performed by: INTERNAL MEDICINE

## 2022-01-20 PROCEDURE — 6360000002 HC RX W HCPCS: Performed by: NURSE ANESTHETIST, CERTIFIED REGISTERED

## 2022-01-20 PROCEDURE — 2580000003 HC RX 258: Performed by: ANESTHESIOLOGY

## 2022-01-20 PROCEDURE — 36592 COLLECT BLOOD FROM PICC: CPT

## 2022-01-20 PROCEDURE — 88104 CYTOPATH FL NONGYN SMEARS: CPT

## 2022-01-20 PROCEDURE — 3700000000 HC ANESTHESIA ATTENDED CARE: Performed by: INTERNAL MEDICINE

## 2022-01-20 PROCEDURE — 2580000003 HC RX 258: Performed by: NURSE ANESTHETIST, CERTIFIED REGISTERED

## 2022-01-20 RX ORDER — CALCIUM GLUCONATE 20 MG/ML
2000 INJECTION, SOLUTION INTRAVENOUS ONCE
Status: COMPLETED | OUTPATIENT
Start: 2022-01-20 | End: 2022-01-20

## 2022-01-20 RX ORDER — LIDOCAINE HYDROCHLORIDE 20 MG/ML
INJECTION, SOLUTION EPIDURAL; INFILTRATION; INTRACAUDAL; PERINEURAL PRN
Status: DISCONTINUED | OUTPATIENT
Start: 2022-01-20 | End: 2022-01-20 | Stop reason: SDUPTHER

## 2022-01-20 RX ORDER — DEXAMETHASONE SODIUM PHOSPHATE 4 MG/ML
INJECTION, SOLUTION INTRA-ARTICULAR; INTRALESIONAL; INTRAMUSCULAR; INTRAVENOUS; SOFT TISSUE PRN
Status: DISCONTINUED | OUTPATIENT
Start: 2022-01-20 | End: 2022-01-20 | Stop reason: SDUPTHER

## 2022-01-20 RX ORDER — LABETALOL HYDROCHLORIDE 5 MG/ML
5 INJECTION, SOLUTION INTRAVENOUS EVERY 10 MIN PRN
Status: DISCONTINUED | OUTPATIENT
Start: 2022-01-20 | End: 2022-01-20 | Stop reason: HOSPADM

## 2022-01-20 RX ORDER — ONDANSETRON 2 MG/ML
INJECTION INTRAMUSCULAR; INTRAVENOUS PRN
Status: DISCONTINUED | OUTPATIENT
Start: 2022-01-20 | End: 2022-01-20 | Stop reason: SDUPTHER

## 2022-01-20 RX ORDER — SODIUM CHLORIDE 9 MG/ML
INJECTION, SOLUTION INTRAVENOUS CONTINUOUS PRN
Status: DISCONTINUED | OUTPATIENT
Start: 2022-01-20 | End: 2022-01-20 | Stop reason: SDUPTHER

## 2022-01-20 RX ORDER — HYDRALAZINE HYDROCHLORIDE 20 MG/ML
5 INJECTION INTRAMUSCULAR; INTRAVENOUS EVERY 10 MIN PRN
Status: DISCONTINUED | OUTPATIENT
Start: 2022-01-20 | End: 2022-01-20 | Stop reason: HOSPADM

## 2022-01-20 RX ORDER — LIDOCAINE HYDROCHLORIDE 40 MG/ML
SOLUTION TOPICAL PRN
Status: DISCONTINUED | OUTPATIENT
Start: 2022-01-20 | End: 2022-01-20 | Stop reason: ALTCHOICE

## 2022-01-20 RX ORDER — ALBUTEROL SULFATE 2.5 MG/3ML
SOLUTION RESPIRATORY (INHALATION)
Status: COMPLETED
Start: 2022-01-20 | End: 2022-01-20

## 2022-01-20 RX ORDER — ONDANSETRON 2 MG/ML
INJECTION INTRAMUSCULAR; INTRAVENOUS
Status: COMPLETED
Start: 2022-01-20 | End: 2022-01-20

## 2022-01-20 RX ORDER — PROPOFOL 10 MG/ML
INJECTION, EMULSION INTRAVENOUS PRN
Status: DISCONTINUED | OUTPATIENT
Start: 2022-01-20 | End: 2022-01-20 | Stop reason: SDUPTHER

## 2022-01-20 RX ORDER — SODIUM CHLORIDE 9 MG/ML
INJECTION, SOLUTION INTRAVENOUS CONTINUOUS
Status: DISCONTINUED | OUTPATIENT
Start: 2022-01-20 | End: 2022-01-21

## 2022-01-20 RX ORDER — ALBUTEROL SULFATE 2.5 MG/3ML
2.5 SOLUTION RESPIRATORY (INHALATION) ONCE
Status: COMPLETED | OUTPATIENT
Start: 2022-01-20 | End: 2022-01-20

## 2022-01-20 RX ORDER — ONDANSETRON 2 MG/ML
4 INJECTION INTRAMUSCULAR; INTRAVENOUS EVERY 6 HOURS PRN
Status: DISCONTINUED | OUTPATIENT
Start: 2022-01-20 | End: 2022-01-26 | Stop reason: HOSPADM

## 2022-01-20 RX ORDER — FENTANYL CITRATE 50 UG/ML
INJECTION, SOLUTION INTRAMUSCULAR; INTRAVENOUS PRN
Status: DISCONTINUED | OUTPATIENT
Start: 2022-01-20 | End: 2022-01-20 | Stop reason: SDUPTHER

## 2022-01-20 RX ORDER — FENTANYL CITRATE 50 UG/ML
25 INJECTION, SOLUTION INTRAMUSCULAR; INTRAVENOUS EVERY 5 MIN PRN
Status: DISCONTINUED | OUTPATIENT
Start: 2022-01-20 | End: 2022-01-20 | Stop reason: HOSPADM

## 2022-01-20 RX ORDER — ONDANSETRON 2 MG/ML
4 INJECTION INTRAMUSCULAR; INTRAVENOUS
Status: DISCONTINUED | OUTPATIENT
Start: 2022-01-20 | End: 2022-01-20 | Stop reason: HOSPADM

## 2022-01-20 RX ORDER — PROMETHAZINE HYDROCHLORIDE 25 MG/ML
6.25 INJECTION, SOLUTION INTRAMUSCULAR; INTRAVENOUS EVERY 6 HOURS PRN
Status: DISCONTINUED | OUTPATIENT
Start: 2022-01-20 | End: 2022-01-26 | Stop reason: HOSPADM

## 2022-01-20 RX ORDER — PROCHLORPERAZINE EDISYLATE 5 MG/ML
5 INJECTION INTRAMUSCULAR; INTRAVENOUS
Status: DISCONTINUED | OUTPATIENT
Start: 2022-01-20 | End: 2022-01-20 | Stop reason: HOSPADM

## 2022-01-20 RX ORDER — SODIUM CHLORIDE 1000 MG
1 TABLET, SOLUBLE MISCELLANEOUS
Status: DISCONTINUED | OUTPATIENT
Start: 2022-01-20 | End: 2022-01-21

## 2022-01-20 RX ORDER — PHENYLEPHRINE HCL IN 0.9% NACL 1 MG/10 ML
SYRINGE (ML) INTRAVENOUS PRN
Status: DISCONTINUED | OUTPATIENT
Start: 2022-01-20 | End: 2022-01-20 | Stop reason: SDUPTHER

## 2022-01-20 RX ADMIN — ATORVASTATIN CALCIUM 40 MG: 40 TABLET, FILM COATED ORAL at 15:18

## 2022-01-20 RX ADMIN — Medication 100 MCG: at 11:56

## 2022-01-20 RX ADMIN — Medication 100 MCG: at 12:00

## 2022-01-20 RX ADMIN — ALBUTEROL SULFATE 2.5 MG: 2.5 SOLUTION RESPIRATORY (INHALATION) at 13:10

## 2022-01-20 RX ADMIN — Medication 10 ML: at 20:02

## 2022-01-20 RX ADMIN — Medication 100 MCG: at 12:03

## 2022-01-20 RX ADMIN — POTASSIUM PHOSPHATE, MONOBASIC AND POTASSIUM PHOSPHATE, DIBASIC 20 MMOL: 224; 236 INJECTION, SOLUTION, CONCENTRATE INTRAVENOUS at 18:31

## 2022-01-20 RX ADMIN — ONDANSETRON 4 MG: 2 INJECTION INTRAMUSCULAR; INTRAVENOUS at 04:45

## 2022-01-20 RX ADMIN — SODIUM CHLORIDE: 9 INJECTION, SOLUTION INTRAVENOUS at 15:17

## 2022-01-20 RX ADMIN — LIDOCAINE HYDROCHLORIDE 60 MG: 20 INJECTION, SOLUTION EPIDURAL; INFILTRATION; INTRACAUDAL; PERINEURAL at 11:45

## 2022-01-20 RX ADMIN — Medication 400 MG: at 15:18

## 2022-01-20 RX ADMIN — Medication 100 MCG: at 12:13

## 2022-01-20 RX ADMIN — SODIUM CHLORIDE TAB 1 GM 1 G: 1 TAB at 20:03

## 2022-01-20 RX ADMIN — Medication 100 MCG: at 12:33

## 2022-01-20 RX ADMIN — Medication 10 ML: at 09:00

## 2022-01-20 RX ADMIN — Medication 100 MCG: at 11:58

## 2022-01-20 RX ADMIN — PANTOPRAZOLE SODIUM 40 MG: 40 TABLET, DELAYED RELEASE ORAL at 06:13

## 2022-01-20 RX ADMIN — FENTANYL CITRATE 25 MCG: 50 INJECTION, SOLUTION INTRAMUSCULAR; INTRAVENOUS at 11:42

## 2022-01-20 RX ADMIN — PROPOFOL 100 MG: 10 INJECTION, EMULSION INTRAVENOUS at 11:45

## 2022-01-20 RX ADMIN — ENOXAPARIN SODIUM 60 MG: 100 INJECTION SUBCUTANEOUS at 20:01

## 2022-01-20 RX ADMIN — ACETAMINOPHEN 650 MG: 325 TABLET ORAL at 20:03

## 2022-01-20 RX ADMIN — OXYCODONE AND ACETAMINOPHEN 1 TABLET: 5; 325 TABLET ORAL at 04:07

## 2022-01-20 RX ADMIN — CALCIUM GLUCONATE 2000 MG: 20 INJECTION, SOLUTION INTRAVENOUS at 18:02

## 2022-01-20 RX ADMIN — FENTANYL CITRATE 25 MCG: 50 INJECTION, SOLUTION INTRAMUSCULAR; INTRAVENOUS at 12:25

## 2022-01-20 RX ADMIN — Medication 400 MG: at 20:02

## 2022-01-20 RX ADMIN — FUROSEMIDE 20 MG: 20 TABLET ORAL at 18:02

## 2022-01-20 RX ADMIN — Medication 100 MCG: at 12:08

## 2022-01-20 RX ADMIN — Medication 100 MCG: at 12:43

## 2022-01-20 RX ADMIN — OXYCODONE AND ACETAMINOPHEN 1 TABLET: 5; 325 TABLET ORAL at 20:29

## 2022-01-20 RX ADMIN — DEXAMETHASONE SODIUM PHOSPHATE 4 MG: 4 INJECTION, SOLUTION INTRAMUSCULAR; INTRAVENOUS at 11:55

## 2022-01-20 RX ADMIN — ONDANSETRON 4 MG: 2 INJECTION INTRAMUSCULAR; INTRAVENOUS at 11:55

## 2022-01-20 RX ADMIN — FENTANYL CITRATE 12.5 MCG: 50 INJECTION, SOLUTION INTRAMUSCULAR; INTRAVENOUS at 12:12

## 2022-01-20 RX ADMIN — SODIUM CHLORIDE TAB 1 GM 1 G: 1 TAB at 15:19

## 2022-01-20 RX ADMIN — SODIUM CHLORIDE: 9 INJECTION, SOLUTION INTRAVENOUS at 11:34

## 2022-01-20 RX ADMIN — LEVOTHYROXINE SODIUM 112 MCG: 0.11 TABLET ORAL at 15:18

## 2022-01-20 ASSESSMENT — PAIN SCALES - GENERAL
PAINLEVEL_OUTOF10: 4
PAINLEVEL_OUTOF10: 10
PAINLEVEL_OUTOF10: 0
PAINLEVEL_OUTOF10: 3
PAINLEVEL_OUTOF10: 0
PAINLEVEL_OUTOF10: 3
PAINLEVEL_OUTOF10: 6

## 2022-01-20 ASSESSMENT — PULMONARY FUNCTION TESTS
PIF_VALUE: 4
PIF_VALUE: 5
PIF_VALUE: 3
PIF_VALUE: 3
PIF_VALUE: 2
PIF_VALUE: 3
PIF_VALUE: 14
PIF_VALUE: 3
PIF_VALUE: 0
PIF_VALUE: 0
PIF_VALUE: 3
PIF_VALUE: 3
PIF_VALUE: 4
PIF_VALUE: 3
PIF_VALUE: 5
PIF_VALUE: 3
PIF_VALUE: 4
PIF_VALUE: 2
PIF_VALUE: 3
PIF_VALUE: 2
PIF_VALUE: 6
PIF_VALUE: 5
PIF_VALUE: 0
PIF_VALUE: 3
PIF_VALUE: 2
PIF_VALUE: 2
PIF_VALUE: 18
PIF_VALUE: 4
PIF_VALUE: 1
PIF_VALUE: 3
PIF_VALUE: 5
PIF_VALUE: 4
PIF_VALUE: 3
PIF_VALUE: 3
PIF_VALUE: 2
PIF_VALUE: 0
PIF_VALUE: 0
PIF_VALUE: 3
PIF_VALUE: 3
PIF_VALUE: 1
PIF_VALUE: 1
PIF_VALUE: 2
PIF_VALUE: 4
PIF_VALUE: 0
PIF_VALUE: 2
PIF_VALUE: 3
PIF_VALUE: 3
PIF_VALUE: 13
PIF_VALUE: 3
PIF_VALUE: 1
PIF_VALUE: 3
PIF_VALUE: 4
PIF_VALUE: 2
PIF_VALUE: 3
PIF_VALUE: 2
PIF_VALUE: 1
PIF_VALUE: 4
PIF_VALUE: 4
PIF_VALUE: 2
PIF_VALUE: 2
PIF_VALUE: 4
PIF_VALUE: 3
PIF_VALUE: 1
PIF_VALUE: 6
PIF_VALUE: 1
PIF_VALUE: 2
PIF_VALUE: 4
PIF_VALUE: 2
PIF_VALUE: 2
PIF_VALUE: 4

## 2022-01-20 ASSESSMENT — ENCOUNTER SYMPTOMS
CONSTIPATION: 0
EYE DISCHARGE: 0
VOMITING: 0
DIARRHEA: 0
COUGH: 0
PHOTOPHOBIA: 0
SHORTNESS OF BREATH: 1
EYE REDNESS: 0
BLOOD IN STOOL: 0
SHORTNESS OF BREATH: 1
NAUSEA: 0
ABDOMINAL PAIN: 0
CHEST TIGHTNESS: 0
FACIAL SWELLING: 0
ABDOMINAL DISTENTION: 0

## 2022-01-20 ASSESSMENT — PAIN SCALES - WONG BAKER: WONGBAKER_NUMERICALRESPONSE: 6

## 2022-01-20 NOTE — PROGRESS NOTES
Speech Language Pathology  Dysphagia Treatment Note    Name:  Emir Figueroa  :   1945  Medical Diagnosis:  Left breast mass [N63.20]  PNA (pneumonia) [J18.9]  Suspected malignant neoplasm [R68.89]  Fatigue, unspecified type [R53.83]  Pneumonia due to infectious organism, unspecified laterality, unspecified part of lung [J18.9]  Treatment Diagnosis: Oropharyngeal Dysphagia  Pain level: None reported    Diet level prior to treatment: Regular (with use of strategies to reduce fatigue with task) with Thin liquids, meds whole in puree as tolerated  Tolerance of Current Diet Level: No reported concerns per chart review    Assessment of Texture Tolerance:  -Impressions: Pt alert and receptive, positioned in bed with HOB elevated. On 3L via NC, mildly SOB and intermittent dry cough at baseline. Pt agreeable to limited PO presentations of thin liquids, soft solids, and regular solids. Oral phase characterized by adequate oral acceptance and labial seal, mildly prolonged but adequate manipulation and A-P transit. Pt with no immediate or delayed overt clinical s/s aspiration across consistencies. Pt continues to independently demonstrating slow rate of intake and small bites/sips. Additional education provided re: selecting softer consistencies to reduce fatigue during intake. At this time, pt with limited intake but appears to tolerate accepted PO and implement safe swallowing strategies. Recommend continuation of current diet, no further dysphagia intervention indicated at this time. Diet and Treatment Recommendations 2022:  Regular (with use of strategies to reduce fatigue with task) with Thin liquids, meds whole in puree as tolerated    Compensatory strategies:  Alternate solids/liquids , Upright as possible with all PO intake , Small bites/sips , Eat/feed slowly, Remain upright 30-45 min, Take breaks as needed to minimize fatigue with task    Dysphagia Goals: Speech therapy for dysphagia tx 2-4 times per week during acute care stay. 1. Pt will functionally tolerate recommended diet with no overt clinical s/s of aspiration. (MET 1/20/2022)  2. Pt will demonstrate understanding of aspiration risk and precautions via education/demonstration with occasional prompting. (MET 1/20/2022)    Plan: No further dysphagia intervention indicated at this time, please re-consult speech service for concerns with swallowing function/diet tolerance. Patient/Family Education: Education given to the Pt and nurse, who verbalized understanding. Discharge Recommendations: Discharge from speech services, please re-consult as clinically indicated. Timed Code Treatment: 0  Total Treatment Time: 16    If patient discharges prior to next session this note will serve as a discharge summary. Signature:     Raul Ruiz, [facility]  Speech-Language Pathology Graduate Clinician    The speech-language pathologist was present, directed the patient's care, made skilled judgment and was responsible for assessment and treatment.     Michael Chu, [address] [facility]  Speech-Language Pathologist  [place] [phone]

## 2022-01-20 NOTE — CARE COORDINATION
Patient states still awaiting for her friend to help her with  SNFs choices. She still has the list. .  Will continue to follow. Currently patient is going for Bronchoscopy.

## 2022-01-20 NOTE — PROGRESS NOTES
[facility] Pulmonary/CCM Progress note      Admit Date: 1/13/2022    Chief Complaint: Generalized fatigue and weakness    Subjective: Interval History: Still appears fatigued, but overall alertness and wellbeing has somewhat improved compared to admission. Awake and alert, has consented for bronchoscopy and EBUS. Denies any significant shortness of breath or chest pain. Currently between 2 to 3 L O2. Hemodynamically stable. Sodium 128.     Scheduled Meds:   sodium chloride  1 g Oral TID WC    enoxaparin  1 mg/kg SubCUTAneous BID    sodium chloride flush  5-40 mL IntraVENous 2 times per day    furosemide  20 mg Oral BID    enoxaparin  1 mg/kg SubCUTAneous BID    magnesium oxide  400 mg Oral BID    [Held by provider] aspirin  81 mg Oral Daily    atorvastatin  40 mg Oral Daily    pantoprazole  40 mg Oral QAM AC    levothyroxine  112 mcg Oral Daily    sodium chloride flush  5-40 mL IntraVENous 2 times per day     Continuous Infusions:   sodium chloride      sodium chloride      sodium chloride      sodium chloride      sodium chloride Stopped (01/19/22 2129)     PRN Meds:ondansetron, promethazine, fentanNYL, ondansetron, prochlorperazine, labetalol, hydrALAZINE, lidocaine, potassium chloride **OR** potassium alternative oral replacement **OR** potassium chloride, sodium chloride, sodium chloride, sodium chloride flush, sodium chloride, ipratropium-albuterol, oxyCODONE-acetaminophen, sodium chloride flush, sodium chloride, acetaminophen **OR** acetaminophen    Review of Systems  Constitutional: Fatigue, malaise, cachexia, weight loss  Ears, nose, mouth, throat: negative for ear drainage, epistaxis, hoarseness, nasal congestion, sore throat and voice change  Respiratory: negative except for cough and shortness of breath  Cardiovascular: negative for chest pain, chest pressure/discomfort, irregular heart beat, lower extremity edema and palpitations  Gastrointestinal: negative for abdominal pain, constipation, diarrhea, jaundice, melena, odynophagia, reflux symptoms and vomiting  Hematologic/lymphatic: negative for bleeding, easy bruising, lymphadenopathy and petechiae  Musculoskeletal:negative for arthralgias, bone pain, muscle weakness, neck pain and stiff joints  Neurological: negative for dizziness, gait problems, headaches, seizures, speech problems, tremors and weakness  Behavioral/Psych: negative for anxiety, behavior problems, depression, fatigue and sleep disturbance  Endocrine: negative for diabetic symptoms including none, neuropathy, polyphagia, polyuria, polydipsia, vomiting and diarrhea and temperature intolerance  Allergic/Immunologic: negative for anaphylaxis, angioedema, hay fever and urticaria    Objective:     Patient Vitals for the past 8 hrs:   BP Temp Temp src Pulse Resp SpO2   01/20/22 1315 120/72 -- -- 96 19 99 %   01/20/22 1305 130/82 97.8 °F (36.6 °C) Temporal 99 20 100 %   01/20/22 1300 130/76 -- -- 104 14 94 %   01/20/22 1255 119/88 -- -- 101 28 99 %   01/20/22 1254 117/75 97.9 °F (36.6 °C) Temporal 101 26 99 %   01/20/22 1120 131/78 98.2 °F (36.8 °C) Temporal 84 22 100 %   01/20/22 0815 108/71 97.5 °F (36.4 °C) Oral 81 22 94 %   01/20/22 0613 -- -- -- 84 -- --     I/O last 3 completed shifts: In: 1766.8 [P.O.:720; I.V.:496.8; Blood:300; IV Piggyback:250]  Out: 910 [Urine:900; Blood:10]  I/O this shift:   In: 500 [I.V.:500]  Out: -     General Appearance: alert and oriented to person, place and time, well developed and well- nourished, in no acute distress  Skin: warm and dry, no rash or erythema  Head: normocephalic and atraumatic  Eyes: pupils equal, round, and reactive to light, extraocular eye movements intact, conjunctivae normal  ENT: external ear and ear canal normal bilaterally, nose without deformity, nasal mucosa and turbinates normal  Neck: supple and non-tender without mass, no cervical lymphadenopathy  Pulmonary/Chest: clear to auscultation bilaterally- no wheezes, rales or rhonchi, normal air movement, no respiratory distress  Cardiovascular: normal rate, regular rhythm,  no murmurs, rubs, distal pulses intact, no carotid bruits  Abdomen: soft, non-tender, non-distended, normal bowel sounds, no masses or organomegaly  Lymph Nodes: Cervical, supraclavicular normal  Extremities: no cyanosis, clubbing or edema  Musculoskeletal: normal range of motion, no joint swelling, deformity or tenderness  Neurologic: alert, no focal neurologic deficits    Data Review:  CBC:   Lab Results   Component Value Date    WBC 17.9 01/20/2022    RBC 3.56 01/20/2022     BMP:   Lab Results   Component Value Date    GLUCOSE 99 01/20/2022    CO2 22 01/20/2022    BUN 10 01/20/2022    CREATININE 1.0 01/20/2022    CALCIUM 6.6 01/20/2022     ABG:   Lab Results   Component Value Date    QOU0IMJ 18.9 01/17/2022    BEART -5.7 01/17/2022    I2BRNTSF 97.0 01/17/2022    PHART 7.372 01/17/2022    MYR4FLV 32.6 01/17/2022    PO2ART 83.7 01/17/2022    DBZ0CUT 44.7 01/17/2022       Radiology: All pertinent images / reports were reviewed as a part of this visit. Narrative   EXAMINATION:   CT OF THE CHEST WITHOUT CONTRAST 1/13/2022 12:16 pm       TECHNIQUE:   CT of the chest was performed without the administration of intravenous   contrast. Multiplanar reformatted images are provided for review.  Dose   modulation, iterative reconstruction, and/or weight based adjustment of the   mA/kV was utilized to reduce the radiation dose to as low as reasonably   achievable.       COMPARISON:   Chest x-ray, 3 hours ago       HISTORY:   ORDERING SYSTEM PROVIDED HISTORY: mass   TECHNOLOGIST PROVIDED HISTORY:   Reason for exam:->mass   Decision Support Exception - unselect if not a suspected or confirmed   emergency medical condition->Emergency Medical Condition (MA)   Reason for Exam: mass       FINDINGS:   Mediastinum: There are several AP window nodes for example, mild 2 measuring   19 and 18 mm in short axis.     sampling of the left paratracheal mass using 19 G needle, reveals non-small cell cancer-await CytoLyt processing. Overall patient tolerated the procedure well, though noted to be somewhat confused during emergence from anesthesia. O2 saturations are acceptable and patient is hemodynamically stable. Okay to start prescribed anticoagulation with Lovenox tonight.     Elisabeth Fuentes MD

## 2022-01-20 NOTE — PROGRESS NOTES
Speech Language Pathology    Attempted to see patient on this date. She is currently NPO and going to a bronchoscopy. Will follow up with patient at a later time.     Tiana Boyd M.S., CCC-SLP #[id number] 1/20/2022 11:05 AM  Speech-Language Pathologist

## 2022-01-20 NOTE — PROGRESS NOTES
Palliative Care:     Visit with patient. Selected three options for skilled therapies. First choice [address], second is [facility], third [address] [facility] [place]. Information shared to Donald Peters. Call to daughter Zeb Gorman. Overall update provided and discussed possible SNIF options. Biopsy from today Bronchoscopy pending. Palliative will continue to follow.

## 2022-01-20 NOTE — PROGRESS NOTES
Physical Therapy  Attempted to see Pt. Who was off the floor at Bronchoscopy with biopsy. Will reattempt later if time permits and if Pt. Is medically able.   Kathi Bob, [place], [id number]

## 2022-01-20 NOTE — PROGRESS NOTES
Hospitalist Progress Note      PCP: Mary Bearden DO    Date of Admission: 1/13/2022    Chief Complaint: Progressive weakness    Hospital Course: This 67 y.o. female with PMHx of  thyroid disease, heart disease, CVA, thyroid disorder, DVT, A. fib presented with progressive weakness. On admission,CT of the chest showed 6.3 cm left upper lobe suprahilar mass suspicious for malignancy, mediastinal lymphadenopathy and 5 x 7 mm right middle lobe nodule. There are bilateral adrenal nodules suspicious for metastasis and L2 lytic lesion. Patient is also found to be hyperglycemic. She has leukocytosis and concern for postobstructive pneumonia on CT imaging pain         Interval history  Pt seen and examined today. Overnight events noted, interval ancillary notes and labs reviewed. On 2 L nasal cannula satting around 94%  Afebrile overnight, WBCs elevated to 17.9, procalcitonin mildly elevated, cultures negative to date  Hemoglobin 10.8 this morning status post 1 unit of packed RBCs yesterday  Sodium up to 128 this morning  Plan for bronc with biopsy today  Stated that she is ready to get over with the procedure. Denied cough, SOB, chest pain, nausea, vomiting or abdominal pain  Plan for the patient to be discharged this next tomorrow if medically stable      Assessment/Plan:    Postobstructive pneumonia likely secondary to bronchogenic carcinoma  Patient afebrile, with elevated WBCs and procalcitonin admission  Pulmonology on board: Appreciate their recs  Status post 5-day course of Rocephin     Left upper lobe, suprahilar mass with mediastinal lymphadenopathy, bilateral adrenal metastasis and lytic bony lesions  Pulmonology and oncology on board: Appreciate recs  CT head and abdomen pelvis  for staging  Plan for bronchoscopy with endobronchial ultrasound biopsy of mediastinal lymph node tomorrow  Palliative care consulted    2.3 cm left breast mass:  Oncology on board:  Outpatient biopsy recommended    Anemia of chronic disease: Status post packed RBCs on 1/17/2022  Monitor hemoglobin closely  Transfuse with hemoglobin less than 7    Hypercalcemia: Likely secondary to malignancy: Resolved  PTH Rh level noted  Status post Zometa on 1/14/22  Monitor calcium level closely    Hyponatremia: Acute on chronic likely secondary to increased ADH release in the setting of metastatic lung cancer:   Nephrology on board: Appreciate their recs  Continue sodium tabs twice daily and p.o. Lasix  Restrict dietary free water to 1800ml    Hypothyroidism: Continue Synthroid    Failure to thrive: In the setting of malignancy     History of DVT: On Xarelto at home currently on hold     History of paroxysmal A. fib: On Xarelto currently on hold      DVT Prophylaxis: Lovenox  Diet: ADULT ORAL NUTRITION SUPPLEMENT; Breakfast, Dinner; Standard High Calorie/High Protein Oral Supplement  ADULT ORAL NUTRITION SUPPLEMENT; Breakfast, Lunch, Dinner, HS Snack; Standard High Calorie/High Protein Oral Supplement  ADULT DIET; Regular; 1200 ml  Code Status: Full Code      I have discussed with the patient the rationale for blood component transfusion; its benefits in treating or preventing fatigue, organ damage, or death; and its risk which includes mild transfusion reactions, rare risk of blood borne infection, or more serious but rare reactions. I have discussed the alternatives to transfusion, including the risk and consequences of not receiving transfusion. The patient had an opportunity to ask questions and had agreed to proceed with transfusion of blood components.         Medications:  Reviewed    Infusion Medications    sodium chloride      sodium chloride      sodium chloride      sodium chloride Stopped (01/19/22 2129)     Scheduled Medications    sodium chloride  1 g Oral BID WC    sodium chloride flush  5-40 mL IntraVENous 2 times per day    furosemide  20 mg Oral BID    [Held by provider] enoxaparin  1 mg/kg SubCUTAneous BID    magnesium oxide  400 mg Oral BID    [Held by provider] aspirin  81 mg Oral Daily    atorvastatin  40 mg Oral Daily    pantoprazole  40 mg Oral QAM AC    levothyroxine  112 mcg Oral Daily    sodium chloride flush  5-40 mL IntraVENous 2 times per day     PRN Meds: ondansetron, promethazine, potassium chloride **OR** potassium alternative oral replacement **OR** potassium chloride, sodium chloride, sodium chloride, sodium chloride flush, sodium chloride, ipratropium-albuterol, oxyCODONE-acetaminophen, sodium chloride flush, sodium chloride, acetaminophen **OR** acetaminophen      Intake/Output Summary (Last 24 hours) at 1/20/2022 0829  Last data filed at 1/20/2022 0445  Gross per 24 hour   Intake 1766.78 ml   Output 910 ml   Net 856.78 ml       Physical Exam Performed:    /71   Pulse 81   Temp 97.5 °F (36.4 °C) (Oral)   Resp 22   Ht 5' 4\" (1.626 m)   Wt 138 lb 9.6 oz (62.9 kg)   SpO2 94%   BMI 23.79 kg/m²     General appearance: No apparent distress, appears stated age and cooperative. HEENT: Pupils equal, round, and reactive to light. Conjunctivae/corneas clear. Neck: Supple, with full range of motion. No jugular venous distention. Trachea midline. Respiratory:  Normal respiratory effort. Clear to auscultation, bilaterally without Rales/Wheezes/Rhonchi. Cardiovascular: Regular rate and rhythm with normal S1/S2 without murmurs, rubs or gallops. Abdomen: Soft, non-tender, non-distended with normal bowel sounds. Musculoskeletal: No clubbing, cyanosis or edema bilaterally. Full range of motion without deformity. Skin: Skin color, texture, turgor normal.  No rashes or lesions. Neurologic:  Neurovascularly intact without any focal sensory/motor deficits.  Cranial nerves: II-XII intact, grossly non-focal.  Psychiatric: Alert and oriented, thought content appropriate, normal insight  Capillary Refill: Brisk,< 3 seconds   Peripheral Pulses: +2 palpable, equal bilaterally       Labs:   Recent Labs 01/18/22  0532 01/18/22  0532 01/19/22  0625 01/19/22  1849 01/20/22  0435   WBC 17.4*  --  15.7*  --  17.9*   HGB 7.2*   < > 7.2* 9.3* 10.8*   HCT 22.2*   < > 22.1* 28.6* 32.9*     --  264  --  248    < > = values in this interval not displayed. Recent Labs     01/18/22  0532 01/19/22  0625 01/20/22  0435   * 133* 128*   K 3.8 3.4* 3.8    102 96*   CO2 21 19* 22   BUN 11 11 10   CREATININE 1.0 0.9 1.0   CALCIUM 7.5* 6.7* 6.6*   PHOS 2.4* 2.3* 2.4*     No results for input(s): AST, ALT, BILIDIR, BILITOT, ALKPHOS in the last 72 hours. No results for input(s): INR in the last 72 hours. No results for input(s): Ethelene Soulier in the last 72 hours. Urinalysis:      Lab Results   Component Value Date    NITRU Negative 01/13/2022    WBCUA 18 01/13/2022    BACTERIA RARE 01/13/2022    RBCUA 1 01/13/2022    BLOODU Negative 01/13/2022    SPECGRAV 1.015 01/13/2022    GLUCOSEU Negative 01/13/2022       Radiology:  XR CHEST PORTABLE   Final Result   Focal consolidations seen within the left upper lobe. Pneumonia is primarily   considered. However, that must be followed to resolution to ensure that   there is no underlying neoplasm. CT ABDOMEN PELVIS W IV CONTRAST Additional Contrast? Oral   Final Result   1. Bilateral adrenal masses, likely metastatic. No other evidence of   abdominal or pelvic metastatic disease. 2. Lytic L2 vertebral body lesion concerning for metastatic disease. 3. 2.3 cm left breast mass likely malignant. 4. Cholelithiasis with no acute features. 5. Nonobstructive right nephrolithiasis. CT HEAD W WO CONTRAST   Final Result   No acute intracranial abnormality. No definite evidence of intracranial metastatic disease. It is noted that CT   imaging is insensitive for small metastatic lesions. If possible further   evaluation with MRI should be considered for better characterization.       RECOMMENDATIONS:   Unavailable         CT CHEST WO CONTRAST Final Result   1. 6.3 cm left upper lobe, suprahilar mass, suspicious for malignancy. There   may be a postobstructive component of pneumonia, versus transbronchial spread   of tumor in the apex of the left upper lobe. 2. AP window mediastinal adenopathy, likely metastatic. 3. 9 x 7 mm right middle lobe nodule, indeterminate for inflammation, primary   or metastatic neoplasm. 4. Bilateral adrenal nodules, suspicious for metastasis. RECOMMENDATIONS:   Unavailable         XR CHEST PORTABLE   Final Result   New left upper lobe opacity concerning for pneumonia versus malignancy. RECOMMENDATION:   Chest CT is recommended for further evaluation.          CT GUIDED NEEDLE PLACEMENT    (Results Pending)           Active Hospital Problems    Diagnosis     Severe malnutrition ([facility] [address] [E43]     PNA (pneumonia) [J18.9]            Electronically signed by Jose Miguel Bhat MD on 1/20/2022 at 8:29 AM

## 2022-01-20 NOTE — PROGRESS NOTES
Pt arrived from procedure, report from crna/rn. Pt had bronchoscopy with FNA mass. Pt awakening and restless upon arrival, respirations shallow, even and labored, simple mask 6 liters in place.

## 2022-01-20 NOTE — PROGRESS NOTES
Located within [facility] Note    Patient Active Problem List   Diagnosis    Sprain of ligament of lumbosacral joint    [facility] Neck sprain and strain    [facility] Degeneration of lumbar or lumbosacral intervertebral disc    [facility] Acquired spondylolisthesis    [facility] Displacement of lumbar intervertebral disc without myelopathy    [facility] Spinal stenosis, lumbar region, without neurogenic claudication    [facility] Depressive disorder, not elsewhere classified    Chronic pain syndrome    Lupus ([address]    Coronary artery disease due to lipid rich plaque    Hyperlipidemia LDL goal <70    Paroxysmal atrial fibrillation (HCC)    History of stroke    History of DVT (deep vein thrombosis)    Gastroesophageal reflux disease without esophagitis    Acquired hypothyroidism    Lung mass    Tobacco use    Elevated ferritin    Chronic insomnia    Anxiety disorder    Radiolucent lesion of bone    Chronic low back pain    Breast tenderness    Bad taste in mouth    Decreased appetite    Closed fracture of upper end of left humerus    General weakness    Severe malnutrition (HCC)    Anemia    Hypoxia    Autoimmune diabetes (HCC)    Hypergammaglobulinemia, unspecified    PNA (pneumonia)    Hypercalcemia       Past Medical History:   has a past medical history of Acquired spondylolisthesis, Arthritis, Cataract, Chronic pain syndrome, Degeneration of lumbar or lumbosacral intervertebral disc, Depressive disorder, not elsewhere classified, Displacement of lumbar intervertebral disc without myelopathy, H/O blood clots, HBP (high blood pressure), Heart disease, Spinal stenosis, lumbar region, without neurogenic claudication, Sprain of lumbosacral (joint) (ligament), Sprain of neck, Stroke ([address] Thyroid disorder, and Thyroid disorder. Past Social History:   reports that she has been smoking. She started smoking about 62 years ago. She has a 28.50 pack-year smoking history.  She has never used smokeless tobacco. She reports previous alcohol use. She reports that she does not use drugs. Subjective:  Na lower today. No neurologic symptoms. Bronchoscopy done. Review of Systems   Constitutional: Positive for fatigue. Negative for activity change, appetite change, chills, fever and unexpected weight change. HENT: Negative for congestion and facial swelling. Eyes: Negative for photophobia, discharge and redness. Respiratory: Positive for shortness of breath. Negative for cough and chest tightness. Cardiovascular: Negative for chest pain, palpitations and leg swelling. Gastrointestinal: Negative for abdominal distention, abdominal pain, blood in stool, constipation, diarrhea, nausea and vomiting. Endocrine: Negative for cold intolerance, heat intolerance and polyuria. Genitourinary: Negative for decreased urine volume, difficulty urinating, flank pain and hematuria. Musculoskeletal: Negative for joint swelling and neck pain. Neurological: Negative for dizziness, seizures, syncope, speech difficulty, light-headedness and headaches. Hematological: Does not bruise/bleed easily. Psychiatric/Behavioral: Negative for agitation, confusion and hallucinations.        Objective:      /69   Pulse 96   Temp 97.8 °F (36.6 °C) (Oral)   Resp 20   Ht 5' 4\" (1.626 m)   Wt 138 lb 9.6 oz (62.9 kg)   SpO2 96%   BMI 23.79 kg/m²     Wt Readings from Last 3 Encounters:   01/20/22 138 lb 9.6 oz (62.9 kg)   12/06/19 128 lb 12.8 oz (58.4 kg)   10/04/19 122 lb (55.3 kg)       BP Readings from Last 3 Encounters:   01/20/22 120/69   12/06/19 122/64   10/04/19 116/71     Chest- clear  Heart-regular  Abd-soft  Ext- no edema    Labs  Hemoglobin   Date Value Ref Range Status   01/20/2022 10.8 (L) 12.0 - 16.0 g/dL Final     Hematocrit   Date Value Ref Range Status   01/20/2022 32.9 (L) 36.0 - 48.0 % Final     WBC   Date Value Ref Range Status   01/20/2022 17.9 (H) 4.0 - 11.0 K/uL Final Platelets   Date Value Ref Range Status   01/20/2022 248 135 - 450 K/uL Final     Lab Results   Component Value Date    CREATININE 1.0 01/20/2022    BUN 10 01/20/2022     (L) 01/20/2022    K 3.8 01/20/2022    CL 96 (L) 01/20/2022    CO2 22 01/20/2022     Uosm 225  Lon 44    Kappa and lamda elevated but normal ratio  SPEP neg    Assessment/Plan:  1. Acute-on-chronic hyponatremia. Increase ADH state. The  patient does have a possibility of metastatic lung cancer which may  increase ADH release. Na lower today. Continue NaCl tabs and Lasix bid(decrease medullary concentrating gradient. Can continue NSS since higher osm than Uosm. Follow Na tomorrow. 2.  Non anion gap metabolic acidosis in the setting of hypokalemia. No  diarrhea. No need for bicarbonate replacement at this time. Positive  urine anion gap. May have type 2 RTA. Replaced K.   3.  Hypomagnesemia. Replaced. It should help with serum potassium also. 4.  Hypophosphatemia. Replace. 5.  Anemia. Follow SPEP and serum free light chain. 6.  Possible metastatic lung cancer as per Medicine and Pulmonary. Oncology is also following. S/P bronchoscopy. 7.  Hypercalcemia-Zometa given. Ca now lower. Replace judiciously.      Jacqueline Gamble MD

## 2022-01-20 NOTE — ANESTHESIA POSTPROCEDURE EVALUATION
Department of Anesthesiology  Postprocedure Note    Patient: Tashi Sequeira  MRN: [id number]  YOB: 1945  Date of evaluation: 1/20/2022  Time:  1:42 PM     Procedure Summary     Date: 01/20/22 Room / Location: [address] [place] [address]    Anesthesia Start: 1134 Anesthesia Stop: 1257    Procedures:       BRONCHOSCOPY BRUSHINGS      BRONCHOSCOPY W/EBUS FNA Diagnosis: (Abnormal CT)    Surgeons: Anthony Baker MD Responsible Provider: Arnulfo Bhandari MD    Anesthesia Type: general ASA Status: 4          Anesthesia Type: general    Haile Phase I: Haile Score: 8    Haile Phase II:      Last vitals: Reviewed and per EMR flowsheets.        Anesthesia Post Evaluation    Patient location during evaluation: PACU  Patient participation: complete - patient participated  Level of consciousness: awake and alert  Airway patency: patent  Nausea & Vomiting: no nausea and no vomiting  Complications: no  Cardiovascular status: hemodynamically stable  Respiratory status: acceptable  Hydration status: stable  Multimodal analgesia pain management approach

## 2022-01-20 NOTE — ANESTHESIA PRE PROCEDURE
Department of Anesthesiology  Preprocedure Note       Name:  Nehemias Miller   Age:  67 y.o.  :  1945                                          MRN:  [id number]         Date:  2022      Surgeon: Torin Gonzalez):  Tianna Rutherford MD    Procedure: Procedure(s):  BRONCHOSCOPY ENDOBRONCHIAL ULTRASOUND    Medications prior to admission:   Prior to Admission medications    Medication Sig Start Date End Date Taking? Authorizing Provider   potassium chloride (KLOR-CON M) 20 MEQ extended release tablet Take 20 mEq by mouth 2 times daily    Historical Provider, MD   gabapentin (NEURONTIN) 100 MG capsule Take 200 mg by mouth nightly. Historical Provider, MD   XARELTO 10 MG TABS tablet TAKE 1 TABLET BY MOUTH EVERY 24 HOURS  Patient taking differently: 10 mg daily  21   Humberto Broad, DO   atorvastatin (LIPITOR) 40 MG tablet TAKE 1 TABLET BY MOUTH DAILY 21   Humberto Broad, DO   levothyroxine (SYNTHROID) 112 MCG tablet TAKE 1 TABLET BY MOUTH DAILY 21  Humberto Broad, DO   carvedilol (COREG) 3.125 MG tablet TAKE 1 TABLET BY MOUTH TWICE DAILY 20   Humberto Broad, DO   Misc. Devices (QUAD CANE) MISC 1 each by Does not apply route daily 19   Humberto Broad, DO   mirtazapine (REMERON) 15 MG tablet Take 1 tablet by mouth nightly 19   Michael Young MD   nitroGLYCERIN (NITROSTAT) 0.4 MG SL tablet Place 0.4 mg under the tongue every 5 minutes as needed for Chest pain up to max of 3 total doses. If no relief after 1 dose, call 911. Historical Provider, MD   Cholecalciferol (VITAMIN D3) 5000 units TABS Take 1 tablet by mouth daily     Historical Provider, MD   amitriptyline (ELAVIL) 25 MG tablet Take 1 tablet by mouth nightly 19   Humberto Broad, DO   esomeprazole (NEXIUM) 40 MG delayed release capsule Take 1 capsule by mouth every morning (before breakfast) 19   Humberto Broad, DO   aspirin 81 MG EC tablet Take 81 mg by mouth daily.     Historical Provider, MD       Current medications:    No current facility-administered medications for this visit. No current outpatient medications on file.      Facility-Administered Medications Ordered in Other Visits   Medication Dose Route Frequency Provider Last Rate Last Admin    ondansetron (ZOFRAN) injection 4 mg  4 mg IntraVENous Q6H PRN Beba Hernandez MD   4 mg at 01/20/22 0445    promethazine (PHENERGAN) injection 6.25 mg  6.25 mg IntraMUSCular Q6H PRN Beba Hernandez MD        sodium chloride tablet 1 g  1 g Oral TID  Mahesh Solitario MD        potassium chloride (KLOR-CON M) extended release tablet 40 mEq  40 mEq Oral PRN Harish Rascon MD        Or    potassium bicarb-citric acid (EFFER-K) effervescent tablet 40 mEq  40 mEq Oral PRN Harish Rascon MD   40 mEq at 01/19/22 1003    Or    potassium chloride 10 mEq/100 mL IVPB (Peripheral Line)  10 mEq IntraVENous PRN Harish Rascon MD        0.9 % sodium chloride infusion   IntraVENous PRN Cruzito Morejon MD        0.9 % sodium chloride infusion   IntraVENous PRN Parvin Campos MD        sodium chloride flush 0.9 % injection 5-40 mL  5-40 mL IntraVENous 2 times per day [facility] MD RINKU   10 mL at 01/19/22 2059    sodium chloride flush 0.9 % injection 5-40 mL  5-40 mL IntraVENous PRN Sea DOBBS MD   10 mL at 01/18/22 1231    0.9 % sodium chloride infusion  25 mL IntraVENous PRN Sea Mack MD        furosemide (LASIX) tablet 20 mg  20 mg Oral BID Mahesh Solitario MD   20 mg at 01/19/22 1738    [Held by provider] enoxaparin (LOVENOX) injection 60 mg  1 mg/kg SubCUTAneous BID Sea DOBBS MD   60 mg at 01/18/22 2123    magnesium oxide (MAG-OX) tablet 400 mg  400 mg Oral BID Mahesh Solitario MD   400 mg at 01/19/22 2058    ipratropium-albuterol (DUONEB) nebulizer solution 1 ampule  1 ampule Inhalation Q4H PRN Mauricio Underwood MD        oxyCODONE-acetaminophen (PERCOCET) 5-325 MG per tablet 1 tablet  1 tablet Oral Q8H PRN Zachariah Bello MD   1 tablet at 01/20/22 0407    [Held by provider] aspirin EC tablet 81 mg  81 mg Oral Daily Sea DOBBS MD   81 mg at 01/17/22 0923    atorvastatin (LIPITOR) tablet 40 mg  40 mg Oral Daily Sea DOBBS MD   40 mg at 01/19/22 0851    pantoprazole (PROTONIX) tablet 40 mg  40 mg Oral QAM AC Sea DOBBS MD   40 mg at 01/20/22 0613    levothyroxine (SYNTHROID) tablet 112 mcg  112 mcg Oral Daily Sea DOBBS MD   112 mcg at 01/19/22 0852    sodium chloride flush 0.9 % injection 5-40 mL  5-40 mL IntraVENous 2 times per day Steven DOBBS MD   10 mL at 01/19/22 0944    sodium chloride flush 0.9 % injection 5-40 mL  5-40 mL IntraVENous PRN Sea DOBBS MD   10 mL at 01/18/22 1231    0.9 % sodium chloride infusion  25 mL IntraVENous PRN Sea Trejo MD   Stopped at 01/19/22 2129    acetaminophen (TYLENOL) tablet 650 mg  650 mg Oral Q6H PRN Sea DOBBS MD   650 mg at 01/18/22 0021    Or    acetaminophen (TYLENOL) suppository 650 mg  650 mg Rectal Q6H PRN Sea Trejo MD           Allergies: Allergies   Allergen Reactions    Other Other (See Comments)     Anti depression cause black outs     Trazodone      Sedation?  Venlafaxine      Sedation? Problem List:    Patient Active Problem List   Diagnosis Code    Sprain of ligament of lumbosacral joint S33. 9XXA    [facility] Neck sprain and strain S13. 9XXA    [facility] Degeneration of lumbar or lumbosacral intervertebral disc M51.37    [facility] Acquired spondylolisthesis M43.10    [facility] Displacement of lumbar intervertebral disc without myelopathy M51.26    [facility] Spinal stenosis, lumbar region, without neurogenic claudication M48.061    [facility] Depressive disorder, not elsewhere classified F32.9    Chronic pain syndrome G89.4    Lupus (HCC) M32.9    Coronary artery disease due to lipid rich plaque I25.10, I25.83    Hyperlipidemia LDL goal <70 E78.5    Paroxysmal atrial fibrillation (HCC) I48.0    History of stroke Z80.78    History of DVT (deep vein thrombosis) Z86.718    Gastroesophageal reflux disease without esophagitis K21.9    Acquired hypothyroidism E03.9    Pulmonary nodule R91.1    Tobacco use Z72.0    Elevated ferritin R79.89    Chronic insomnia F51.04    Anxiety disorder F41.9    Radiolucent lesion of bone M89.8X9    Chronic low back pain M54.50, G89.29    Breast tenderness N64.4    Bad taste in mouth R43.8    Decreased appetite R63.0    Closed fracture of upper end of left humerus S42.202A    General weakness R53.1    Severe malnutrition (HCC) E43    Anemia D64.9    Hypoxia R09.02    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    Hypergammaglobulinemia, 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    PNA (pneumonia) J18.9       Past Medical History:        Diagnosis Date    Acquired spondylolisthesis     Arthritis     Cataract     Chronic pain syndrome     Degeneration of lumbar or lumbosacral intervertebral disc     Depressive disorder, not elsewhere classified     Displacement of lumbar intervertebral disc without myelopathy     H/O blood clots     HBP (high blood pressure)     Heart disease     Spinal stenosis, lumbar region, without neurogenic claudication     Sprain of lumbosacral (joint) (ligament)     Sprain of neck     Stroke (HCC)     Thyroid disorder     Thyroid disorder        Past Surgical History:        Procedure Laterality Date    HYSTERECTOMY, TOTAL ABDOMINAL  1972    LUMBAR FUSION      L4-5    PARTIAL HYSTERECTOMY  1964       Social History:    Social History     Tobacco Use    Smoking status: Current Every Day Smoker     Packs/day: 0.50     Years: 57.00     Pack years: 28.50     Start date: 1960    Smokeless tobacco: Never Used   Substance Use Topics    Alcohol use: Not Currently                                Ready to quit: Not Answered  Counseling given: Not Answered      Vital Signs (Current): There were no vitals filed for this visit. BP Readings from Last 3 Encounters:   01/20/22 108/71   12/06/19 122/64   10/04/19 116/71       NPO Status:                                                                                 BMI:   Wt Readings from Last 3 Encounters:   01/20/22 138 lb 9.6 oz (62.9 kg)   12/06/19 128 lb 12.8 oz (58.4 kg)   10/04/19 122 lb (55.3 kg)     There is no height or weight on file to calculate BMI.    CBC:   Lab Results   Component Value Date    WBC 17.9 01/20/2022    RBC 3.56 01/20/2022    HGB 10.8 01/20/2022    HCT 32.9 01/20/2022    MCV 92.5 01/20/2022    RDW 16.7 01/20/2022     01/20/2022       CMP:   Lab Results   Component Value Date     01/20/2022    K 3.8 01/20/2022    K 3.9 01/13/2022    CL 96 01/20/2022    CO2 22 01/20/2022    BUN 10 01/20/2022    CREATININE 1.0 01/20/2022    GFRAA >60 01/20/2022    AGRATIO 0.6 01/13/2022    LABGLOM 54 01/20/2022    LABGLOM 51 02/16/2011    GLUCOSE 99 01/20/2022    PROT 5.3 01/18/2022    PROT 7.6 02/16/2011    CALCIUM 6.6 01/20/2022    BILITOT 0.4 01/13/2022    ALKPHOS 103 01/13/2022    AST 22 01/13/2022    ALT 17 01/13/2022       POC Tests: No results for input(s): POCGLU, POCNA, POCK, POCCL, POCBUN, POCHEMO, POCHCT in the last 72 hours.     Coags:   Lab Results   Component Value Date    PROTIME 11.5 01/20/2022    INR 1.02 01/20/2022    APTT 33.7 01/20/2022       HCG (If Applicable): No results found for: PREGTESTUR, PREGSERUM, HCG, HCGQUANT     ABGs:   Lab Results   Component Value Date    PHART 7.372 01/17/2022    PO2ART 83.7 01/17/2022    GAM5EWT 32.6 01/17/2022    EZR2HSD 18.9 01/17/2022    BEART -5.7 01/17/2022    H4GCGKKW 97.0 01/17/2022        Type & Screen (If Applicable):  No results found for: LABABO, LABRH    Drug/Infectious Status (If Applicable):  No results found for: HIV, HEPCAB    COVID-19 Screening (If Applicable):   Lab Results   Component Value Date    COVID19 Not Detected 01/20/2022           Anesthesia Evaluation  Patient summary reviewed and Nursing notes reviewed no history of anesthetic complications:   Airway: Mallampati: II  TM distance: >3 FB   Neck ROM: full  Mouth opening: > = 3 FB Dental: normal exam         Pulmonary:   (+) pneumonia:  shortness of breath:  decreased breath sounds,      (-) asthma                          ROS comment: Admitted with post obstructive pneumonia 2/2 bronchogenic CA, with subsequent sepsis   Cardiovascular:    (+) hypertension:, CAD:, hyperlipidemia    (-)  angina    ECG reviewed      Echocardiogram reviewed               ROS comment: 08/14/2019 Echo:      -Normal global systolic function with an ejection fraction estimated at 65%. -No regional wall motion abnormalities noted.   -Aortic valve appears sclerotic but opens adequately. No evidence of aortic   valve regurgitation.   -There is trivial tricuspid regurgitation with a RVSP estimation of 39 mmHg.   -Normal diastolic function. Avg. E/e'=9.1     Neuro/Psych:   (+) CVA:, neuromuscular disease:, psychiatric history:            GI/Hepatic/Renal:   (+) GERD:,      (-) liver disease       Endo/Other:    (+) DiabetesType II DM, , hypothyroidism: arthritis:., electrolyte abnormalities, malignancy/cancer. ROS comment: 1. Left upper lobe mass 6.3 cm.  Highly suspicious for malignancy  2. Mediastinal lymphadenopathy  3. Postobstructive pneumonia  4. Right middle lobe lung nodule  5. Bilateral adrenal nodules  6. L2 lytic lesion  7. Left breast mass  8. Hypercalcemia Abdominal:             Vascular:   + DVT, .  - PVD. Other Findings:             Anesthesia Plan      general     ASA 4     (Significant SOB, pt states worse than when she arrived. duoneb pre op. Discussed possibility of post op intubation)  Induction: intravenous. MIPS: Postoperative opioids intended, Prophylactic antiemetics administered and Postoperative trial extubation. Plan discussed with CRNA.                 Arnulfo Bhandari MD   1/20/2022

## 2022-01-20 NOTE — PROGRESS NOTES
Pt respirations unlabored, wheezing stopped at this time. Pt placed on nasal cannula 4 liters, tolerating well. 100% saturation on monitor.

## 2022-01-21 LAB
ANION GAP SERPL CALCULATED.3IONS-SCNC: 13 MMOL/L (ref 3–16)
BASOPHILS ABSOLUTE: 0.1 K/UL (ref 0–0.2)
BASOPHILS RELATIVE PERCENT: 0.4 %
BUN BLDV-MCNC: 13 MG/DL (ref 7–20)
CALCIUM IONIZED: 0.89 MMOL/L (ref 1.12–1.32)
CALCIUM SERPL-MCNC: 6.4 MG/DL (ref 8.3–10.6)
CHLORIDE BLD-SCNC: 98 MMOL/L (ref 99–110)
CO2: 19 MMOL/L (ref 21–32)
CREAT SERPL-MCNC: 1 MG/DL (ref 0.6–1.2)
EOSINOPHILS ABSOLUTE: 0 K/UL (ref 0–0.6)
EOSINOPHILS RELATIVE PERCENT: 0 %
GFR AFRICAN AMERICAN: >60
GFR NON-AFRICAN AMERICAN: 54
GLUCOSE BLD-MCNC: 152 MG/DL (ref 70–99)
HCT VFR BLD CALC: 26.5 % (ref 36–48)
HEMOGLOBIN: 8.6 G/DL (ref 12–16)
LYMPHOCYTES ABSOLUTE: 0.4 K/UL (ref 1–5.1)
LYMPHOCYTES RELATIVE PERCENT: 1.6 %
MAGNESIUM: 1.9 MG/DL (ref 1.8–2.4)
MCH RBC QN AUTO: 30.4 PG (ref 26–34)
MCHC RBC AUTO-ENTMCNC: 32.3 G/DL (ref 31–36)
MCV RBC AUTO: 94.1 FL (ref 80–100)
MONOCYTES ABSOLUTE: 0.8 K/UL (ref 0–1.3)
MONOCYTES RELATIVE PERCENT: 2.9 %
NEUTROPHILS ABSOLUTE: 25.3 K/UL (ref 1.7–7.7)
NEUTROPHILS RELATIVE PERCENT: 95.1 %
PDW BLD-RTO: 16.9 % (ref 12.4–15.4)
PH VENOUS: 7.3 (ref 7.35–7.45)
PHOSPHORUS: 3.8 MG/DL (ref 2.5–4.9)
PLATELET # BLD: 228 K/UL (ref 135–450)
PMV BLD AUTO: 6.7 FL (ref 5–10.5)
POTASSIUM SERPL-SCNC: 4.7 MMOL/L (ref 3.5–5.1)
RBC # BLD: 2.81 M/UL (ref 4–5.2)
SODIUM BLD-SCNC: 130 MMOL/L (ref 136–145)
SPE/IFE INTERPRETATION: NORMAL
WBC # BLD: 26.7 K/UL (ref 4–11)

## 2022-01-21 PROCEDURE — 6370000000 HC RX 637 (ALT 250 FOR IP): Performed by: INTERNAL MEDICINE

## 2022-01-21 PROCEDURE — 6360000002 HC RX W HCPCS: Performed by: INTERNAL MEDICINE

## 2022-01-21 PROCEDURE — 80048 BASIC METABOLIC PNL TOTAL CA: CPT

## 2022-01-21 PROCEDURE — 2580000003 HC RX 258: Performed by: INTERNAL MEDICINE

## 2022-01-21 PROCEDURE — 85025 COMPLETE CBC W/AUTO DIFF WBC: CPT

## 2022-01-21 PROCEDURE — 97530 THERAPEUTIC ACTIVITIES: CPT

## 2022-01-21 PROCEDURE — 94761 N-INVAS EAR/PLS OXIMETRY MLT: CPT

## 2022-01-21 PROCEDURE — 94640 AIRWAY INHALATION TREATMENT: CPT

## 2022-01-21 PROCEDURE — 6370000000 HC RX 637 (ALT 250 FOR IP): Performed by: HOSPITALIST

## 2022-01-21 PROCEDURE — 2700000000 HC OXYGEN THERAPY PER DAY

## 2022-01-21 PROCEDURE — 99232 SBSQ HOSP IP/OBS MODERATE 35: CPT | Performed by: INTERNAL MEDICINE

## 2022-01-21 PROCEDURE — 1200000000 HC SEMI PRIVATE

## 2022-01-21 PROCEDURE — 82330 ASSAY OF CALCIUM: CPT

## 2022-01-21 PROCEDURE — 84100 ASSAY OF PHOSPHORUS: CPT

## 2022-01-21 PROCEDURE — 83735 ASSAY OF MAGNESIUM: CPT

## 2022-01-21 PROCEDURE — 94760 N-INVAS EAR/PLS OXIMETRY 1: CPT

## 2022-01-21 RX ORDER — IPRATROPIUM BROMIDE AND ALBUTEROL SULFATE 2.5; .5 MG/3ML; MG/3ML
1 SOLUTION RESPIRATORY (INHALATION)
Status: DISCONTINUED | OUTPATIENT
Start: 2022-01-21 | End: 2022-01-21

## 2022-01-21 RX ORDER — IPRATROPIUM BROMIDE AND ALBUTEROL SULFATE 2.5; .5 MG/3ML; MG/3ML
1 SOLUTION RESPIRATORY (INHALATION) EVERY 4 HOURS
Status: DISCONTINUED | OUTPATIENT
Start: 2022-01-21 | End: 2022-01-24

## 2022-01-21 RX ORDER — FUROSEMIDE 40 MG/1
40 TABLET ORAL 2 TIMES DAILY
Status: DISCONTINUED | OUTPATIENT
Start: 2022-01-21 | End: 2022-01-25

## 2022-01-21 RX ORDER — SODIUM CHLORIDE 1000 MG
1 TABLET, SOLUBLE MISCELLANEOUS 2 TIMES DAILY WITH MEALS
Status: DISCONTINUED | OUTPATIENT
Start: 2022-01-21 | End: 2022-01-26

## 2022-01-21 RX ADMIN — ENOXAPARIN SODIUM 60 MG: 100 INJECTION SUBCUTANEOUS at 21:03

## 2022-01-21 RX ADMIN — IPRATROPIUM BROMIDE AND ALBUTEROL SULFATE 1 AMPULE: .5; 3 SOLUTION RESPIRATORY (INHALATION) at 21:08

## 2022-01-21 RX ADMIN — Medication 400 MG: at 09:26

## 2022-01-21 RX ADMIN — PANTOPRAZOLE SODIUM 40 MG: 40 TABLET, DELAYED RELEASE ORAL at 06:42

## 2022-01-21 RX ADMIN — Medication 10 ML: at 09:42

## 2022-01-21 RX ADMIN — Medication 400 MG: at 21:04

## 2022-01-21 RX ADMIN — ACETAMINOPHEN 650 MG: 325 TABLET ORAL at 03:37

## 2022-01-21 RX ADMIN — FUROSEMIDE 40 MG: 40 TABLET ORAL at 18:13

## 2022-01-21 RX ADMIN — SODIUM CHLORIDE TAB 1 GM 1 G: 1 TAB at 09:46

## 2022-01-21 RX ADMIN — CALCIUM GLUCONATE 3000 MG: 98 INJECTION, SOLUTION INTRAVENOUS at 13:18

## 2022-01-21 RX ADMIN — IPRATROPIUM BROMIDE AND ALBUTEROL SULFATE 1 AMPULE: .5; 3 SOLUTION RESPIRATORY (INHALATION) at 16:22

## 2022-01-21 RX ADMIN — Medication 10 ML: at 21:04

## 2022-01-21 RX ADMIN — PIPERACILLIN AND TAZOBACTAM 3375 MG: 3; .375 INJECTION, POWDER, LYOPHILIZED, FOR SOLUTION INTRAVENOUS at 18:15

## 2022-01-21 RX ADMIN — ENOXAPARIN SODIUM 60 MG: 100 INJECTION SUBCUTANEOUS at 09:26

## 2022-01-21 RX ADMIN — SODIUM CHLORIDE TAB 1 GM 1 G: 1 TAB at 18:13

## 2022-01-21 RX ADMIN — OXYCODONE AND ACETAMINOPHEN 1 TABLET: 5; 325 TABLET ORAL at 03:37

## 2022-01-21 RX ADMIN — FUROSEMIDE 20 MG: 20 TABLET ORAL at 09:33

## 2022-01-21 RX ADMIN — IPRATROPIUM BROMIDE AND ALBUTEROL SULFATE 1 AMPULE: .5; 3 SOLUTION RESPIRATORY (INHALATION) at 12:43

## 2022-01-21 RX ADMIN — OXYCODONE AND ACETAMINOPHEN 1 TABLET: 5; 325 TABLET ORAL at 12:38

## 2022-01-21 RX ADMIN — PIPERACILLIN AND TAZOBACTAM 3375 MG: 3; .375 INJECTION, POWDER, LYOPHILIZED, FOR SOLUTION INTRAVENOUS at 09:41

## 2022-01-21 RX ADMIN — IPRATROPIUM BROMIDE AND ALBUTEROL SULFATE 1 AMPULE: .5; 3 SOLUTION RESPIRATORY (INHALATION) at 10:30

## 2022-01-21 RX ADMIN — ATORVASTATIN CALCIUM 40 MG: 40 TABLET, FILM COATED ORAL at 09:26

## 2022-01-21 RX ADMIN — LEVOTHYROXINE SODIUM 112 MCG: 0.11 TABLET ORAL at 09:26

## 2022-01-21 ASSESSMENT — ENCOUNTER SYMPTOMS
NAUSEA: 0
BLOOD IN STOOL: 0
PHOTOPHOBIA: 0
DIARRHEA: 0
EYE REDNESS: 0
CONSTIPATION: 0
VOMITING: 0
CHEST TIGHTNESS: 0
EYE DISCHARGE: 0
ABDOMINAL PAIN: 0
COUGH: 0
SHORTNESS OF BREATH: 1
ABDOMINAL DISTENTION: 0
FACIAL SWELLING: 0

## 2022-01-21 ASSESSMENT — PAIN SCALES - GENERAL
PAINLEVEL_OUTOF10: 6
PAINLEVEL_OUTOF10: 0
PAINLEVEL_OUTOF10: 3
PAINLEVEL_OUTOF10: 6

## 2022-01-21 ASSESSMENT — PAIN DESCRIPTION - PAIN TYPE
TYPE: CHRONIC PAIN
TYPE: CHRONIC PAIN

## 2022-01-21 ASSESSMENT — PAIN DESCRIPTION - FREQUENCY
FREQUENCY: CONTINUOUS
FREQUENCY: CONTINUOUS

## 2022-01-21 ASSESSMENT — PAIN DESCRIPTION - LOCATION
LOCATION: BACK
LOCATION: BACK

## 2022-01-21 ASSESSMENT — PAIN DESCRIPTION - PROGRESSION
CLINICAL_PROGRESSION: GRADUALLY IMPROVING
CLINICAL_PROGRESSION: GRADUALLY IMPROVING

## 2022-01-21 ASSESSMENT — PAIN DESCRIPTION - ORIENTATION
ORIENTATION: MID
ORIENTATION: MID

## 2022-01-21 NOTE — PROGRESS NOTES
Palliative Care:     Discussed with patient Advance Care Planning and would she want to complete forms to list daughter as David Jackson. Patient understands that by default her daughter would be acting DPOA when she is unable to make her own medical decisions as Zane Daily is her next of kin/only daughter. Patient denies offer to complete forms at this time.

## 2022-01-21 NOTE — PROGRESS NOTES
Hospitalist Progress Note      PCP: Effie Nascimento DO    Date of Admission: 1/13/2022    Chief Complaint: Progressive weakness    Hospital Course: This 67 y.o. female with PMHx of  thyroid disease, heart disease, CVA, thyroid disorder, DVT, A. fib presented with progressive weakness. On admission,CT of the chest showed 6.3 cm left upper lobe suprahilar mass suspicious for malignancy, mediastinal lymphadenopathy and 5 x 7 mm right middle lobe nodule. There are bilateral adrenal nodules suspicious for metastasis and L2 lytic lesion. Patient is also found to be hyperglycemic. She has leukocytosis and concern for postobstructive pneumonia on CT imaging pain         Interval history  Pt seen and examined today. Overnight events noted, interval ancillary notes and labs reviewed. Status post bronc and EBUS; awaiting results  Afebrile overnight, WBCs up to 26.7; started on Zosyn for possible postobstructive pneumonia  Reported shortness of breath but denied cough, chest pain, nausea, vomiting or abdominal pain      Assessment/Plan:    Postobstructive pneumonia likely secondary to bronchogenic carcinoma  Patient afebrile, with elevated WBCs and procalcitonin admission  Pulmonology on board: Appreciate their recs  Status post 5-day course of Rocephin     Left upper lobe, suprahilar mass with mediastinal lymphadenopathy, bilateral adrenal metastasis and lytic bony lesions  Pulmonology and oncology on board: Appreciate recs  CT head and abdomen pelvis  for staging  Plan for bronchoscopy with endobronchial ultrasound biopsy of mediastinal lymph node tomorrow  Palliative care consulted    2.3 cm left breast mass:  Oncology on board:  Outpatient biopsy recommended    Anemia of chronic disease: Status post packed RBCs on 1/17/2022  Monitor hemoglobin closely  Transfuse with hemoglobin less than 7    Hypercalcemia: Likely secondary to malignancy: Resolved  PTH Rh level noted  Status post Zometa on 1/14/22  Monitor calcium level closely    Hyponatremia: Acute on chronic likely secondary to increased ADH release in the setting of metastatic lung cancer:   Nephrology on board: Appreciate their recs  Continue sodium tabs twice daily and p.o. Lasix  Restrict dietary free water to 1800ml    Hypothyroidism: Continue Synthroid    Failure to thrive: In the setting of malignancy     History of DVT: On Xarelto at home currently on hold     History of paroxysmal A. fib: On Xarelto currently on hold      DVT Prophylaxis: Lovenox  Diet: ADULT ORAL NUTRITION SUPPLEMENT; Breakfast, Dinner; Standard High Calorie/High Protein Oral Supplement  ADULT ORAL NUTRITION SUPPLEMENT; Breakfast, Lunch, Dinner, HS Snack; Standard High Calorie/High Protein Oral Supplement  ADULT DIET; Regular; 1200 ml  Code Status: Full Code      I have discussed with the patient the rationale for blood component transfusion; its benefits in treating or preventing fatigue, organ damage, or death; and its risk which includes mild transfusion reactions, rare risk of blood borne infection, or more serious but rare reactions. I have discussed the alternatives to transfusion, including the risk and consequences of not receiving transfusion. The patient had an opportunity to ask questions and had agreed to proceed with transfusion of blood components.         Medications:  Reviewed    Infusion Medications    sodium chloride 100 mL/hr at 01/20/22 1517    sodium chloride      sodium chloride      sodium chloride      sodium chloride Stopped (01/19/22 2129)     Scheduled Medications    sodium chloride  1 g Oral TID WC    sodium chloride flush  5-40 mL IntraVENous 2 times per day    furosemide  20 mg Oral BID    enoxaparin  1 mg/kg SubCUTAneous BID    magnesium oxide  400 mg Oral BID    [Held by provider] aspirin  81 mg Oral Daily    atorvastatin  40 mg Oral Daily    pantoprazole  40 mg Oral QAM AC    levothyroxine  112 mcg Oral Daily    sodium chloride flush  5-40 mL IntraVENous 2 times per day     PRN Meds: ondansetron, promethazine, potassium chloride **OR** potassium alternative oral replacement **OR** potassium chloride, sodium chloride, sodium chloride, sodium chloride flush, sodium chloride, ipratropium-albuterol, oxyCODONE-acetaminophen, sodium chloride flush, sodium chloride, acetaminophen **OR** acetaminophen      Intake/Output Summary (Last 24 hours) at 1/21/2022 0808  Last data filed at 1/21/2022 0323  Gross per 24 hour   Intake 1460 ml   Output 1900 ml   Net -440 ml       Physical Exam Performed:    /75   Pulse 74   Temp 97.4 °F (36.3 °C) (Oral)   Resp 16   Ht 5' 4\" (1.626 m)   Wt 138 lb 11.2 oz (62.9 kg)   SpO2 97%   BMI 23.81 kg/m²     General appearance: No apparent distress, appears stated age and cooperative. HEENT: Pupils equal, round, and reactive to light. Conjunctivae/corneas clear. Neck: Supple, with full range of motion. No jugular venous distention. Trachea midline. Respiratory:  Normal respiratory effort. Clear to auscultation, bilaterally without Rales/Wheezes/Rhonchi. Cardiovascular: Regular rate and rhythm with normal S1/S2 without murmurs, rubs or gallops. Abdomen: Soft, non-tender, non-distended with normal bowel sounds. Musculoskeletal: No clubbing, cyanosis or edema bilaterally. Full range of motion without deformity. Skin: Skin color, texture, turgor normal.  No rashes or lesions. Neurologic:  Neurovascularly intact without any focal sensory/motor deficits.  Cranial nerves: II-XII intact, grossly non-focal.  Psychiatric: Alert and oriented, thought content appropriate, normal insight  Capillary Refill: Brisk,< 3 seconds   Peripheral Pulses: +2 palpable, equal bilaterally       Labs:   Recent Labs     01/19/22  0625 01/19/22  0625 01/19/22  1849 01/20/22  0435 01/21/22  0515   WBC 15.7*  --   --  17.9* 26.7*   HGB 7.2*   < > 9.3* 10.8* 8.6*   HCT 22.1*   < > 28.6* 32.9* 26.5*     --   --  248 228    < > = values in this interval not displayed. Recent Labs     01/19/22  0625 01/20/22  0435 01/21/22  0515   * 128* 130*   K 3.4* 3.8 4.7    96* 98*   CO2 19* 22 19*   BUN 11 10 13   CREATININE 0.9 1.0 1.0   CALCIUM 6.7* 6.6* 6.4*   PHOS 2.3* 2.4* 3.8     No results for input(s): AST, ALT, BILIDIR, BILITOT, ALKPHOS in the last 72 hours. Recent Labs     01/20/22  1024   INR 1.02     No results for input(s): Fry Sol in the last 72 hours. Urinalysis:      Lab Results   Component Value Date    NITRU Negative 01/13/2022    WBCUA 18 01/13/2022    BACTERIA RARE 01/13/2022    RBCUA 1 01/13/2022    BLOODU Negative 01/13/2022    SPECGRAV 1.015 01/13/2022    GLUCOSEU Negative 01/13/2022       Radiology:  XR CHEST PORTABLE   Final Result   Focal consolidations seen within the left upper lobe. Pneumonia is primarily   considered. However, that must be followed to resolution to ensure that   there is no underlying neoplasm. CT ABDOMEN PELVIS W IV CONTRAST Additional Contrast? Oral   Final Result   1. Bilateral adrenal masses, likely metastatic. No other evidence of   abdominal or pelvic metastatic disease. 2. Lytic L2 vertebral body lesion concerning for metastatic disease. 3. 2.3 cm left breast mass likely malignant. 4. Cholelithiasis with no acute features. 5. Nonobstructive right nephrolithiasis. CT HEAD W WO CONTRAST   Final Result   No acute intracranial abnormality. No definite evidence of intracranial metastatic disease. It is noted that CT   imaging is insensitive for small metastatic lesions. If possible further   evaluation with MRI should be considered for better characterization. RECOMMENDATIONS:   Unavailable         CT CHEST WO CONTRAST   Final Result   1. 6.3 cm left upper lobe, suprahilar mass, suspicious for malignancy. There   may be a postobstructive component of pneumonia, versus transbronchial spread   of tumor in the apex of the left upper lobe.    2. AP window mediastinal adenopathy, likely metastatic. 3. 9 x 7 mm right middle lobe nodule, indeterminate for inflammation, primary   or metastatic neoplasm. 4. Bilateral adrenal nodules, suspicious for metastasis. RECOMMENDATIONS:   Unavailable         XR CHEST PORTABLE   Final Result   New left upper lobe opacity concerning for pneumonia versus malignancy. RECOMMENDATION:   Chest CT is recommended for further evaluation.                  Active Hospital Problems    Diagnosis     Hypercalcemia [E83.52]     Severe malnutrition (HCC) [E43]     PNA (pneumonia) [J18.9]     Lung mass [R91.8]            Electronically signed by Joana Wood MD on 1/21/2022 at 8:08 AM

## 2022-01-21 NOTE — PROGRESS NOTES
[facility] Note    Patient Active Problem List   Diagnosis    Sprain of ligament of lumbosacral joint    BWC Neck sprain and strain    BWC Degeneration of lumbar or lumbosacral intervertebral disc    BWC Acquired spondylolisthesis    BWC Displacement of lumbar intervertebral disc without myelopathy    BWC Spinal stenosis, lumbar region, without neurogenic claudication    BWC Depressive disorder, not elsewhere classified    Chronic pain syndrome    Lupus ([address]    Coronary artery disease due to lipid rich plaque    Hyperlipidemia LDL goal <70    Paroxysmal atrial fibrillation (HCC)    History of stroke    History of DVT (deep vein thrombosis)    Gastroesophageal reflux disease without esophagitis    Acquired hypothyroidism    Lung mass    Tobacco use    Elevated ferritin    Chronic insomnia    Anxiety disorder    Radiolucent lesion of bone    Chronic low back pain    Breast tenderness    Bad taste in mouth    Decreased appetite    Closed fracture of upper end of left humerus    General weakness    Severe malnutrition (HCC)    Anemia    Hypoxia    Autoimmune diabetes (HCC)    Hypergammaglobulinemia, unspecified    PNA (pneumonia)    Hypercalcemia       Past Medical History:   has a past medical history of Acquired spondylolisthesis, Arthritis, Cataract, Chronic pain syndrome, Degeneration of lumbar or lumbosacral intervertebral disc, Depressive disorder, not elsewhere classified, Displacement of lumbar intervertebral disc without myelopathy, H/O blood clots, HBP (high blood pressure), Heart disease, Spinal stenosis, lumbar region, without neurogenic claudication, Sprain of lumbosacral (joint) (ligament), Sprain of neck, Stroke ([address] Thyroid disorder, and Thyroid disorder. Past Social History:   reports that she has been smoking. She started smoking about 62 years ago. She has a 28.50 pack-year smoking history.  She has Platelets   Date Value Ref Range Status   01/21/2022 228 135 - 450 K/uL Final     Lab Results   Component Value Date    CREATININE 1.0 01/21/2022    BUN 13 01/21/2022     (L) 01/21/2022    K 4.7 01/21/2022    CL 98 (L) 01/21/2022    CO2 19 (L) 01/21/2022     Uosm 225  Lon 44    Kappa and lamda elevated but normal ratio  SPEP neg    Assessment/Plan:  1. Acute-on-chronic hyponatremia. Increase ADH state. The  patient does have a possibility of metastatic lung cancer which may  increase ADH release. Na better today. Stop NSS. Decrease NaCl tabs to bid. Lasix 40mg bid. 2.  Non anion gap metabolic acidosis in the setting of hypokalemia. No  diarrhea. No need for bicarbonate replacement at this time. Positive  urine anion gap. May have type 2 RTA. Replaced K.   3.  Hypomagnesemia. Replaced. It should help with serum potassium also. 4.  Hypophosphatemia. Replaced. 5.  Anemia. Follow SPEP and serum free light chain. 6.  Possible metastatic lung cancer as per Medicine and Pulmonary. Oncology is also following. S/P bronchoscopy. 7.  Hypercalcemia-Zometa given. Ca now lower. Replace judiciously.    8.  Stable renal status    Mahesh Sy MD

## 2022-01-21 NOTE — RT PROTOCOL NOTE
bronchodilator orders with one order with TID Frequency and one order with Frequency of every 4 hours PRN wheezing or increased work of breathing using Per Protocol order mode. 11-13 - enter or revise RT Bronchodilator order(s) to one equivalent RT bronchodilator order with QID Frequency and an Albuterol order with Frequency of every 4 hours PRN wheezing or increased work of breathing using Per Protocol order mode. Greater than 13 - enter or revise RT Bronchodilator order(s) to one equivalent RT bronchodilator order with every 4 hours Frequency and an Albuterol order with Frequency of every 2 hours PRN wheezing or increased work of breathing using Per Protocol order mode. RT to enter RT Home Evaluation for COPD & MDI Assessment order using Per Protocol order mode.     Electronically signed by Juarez Michel RCP on 1/21/2022 at 3:22 PM

## 2022-01-21 NOTE — PROGRESS NOTES
Follow-up visit. Patient tired from yesterday and activities this morning but is in better spirits and allowing for some hope as things go forward - in regard to her rehab experience and also with her daughter. She was able to talk to her daughter again since [de-identified] and felt like it went well. Smiling some today. [place] offered.

## 2022-01-21 NOTE — PROGRESS NOTES
[facility]   Progress Note      1/21/2022 8:56 AM        Name: Emir Figueroa .              Admitted: 1/13/2022    SUBJECTIVE:  She is feeling better, she had bronchoscopy yesterday    Reviewed interval ancillary notes    Current Medications  piperacillin-tazobactam (ZOSYN) 3,375 mg in dextrose 5 % 50 mL IVPB extended infusion (mini-bag), Q8H  ondansetron (ZOFRAN) injection 4 mg, Q6H PRN  promethazine (PHENERGAN) injection 6.25 mg, Q6H PRN  sodium chloride tablet 1 g, TID WC  0.9 % sodium chloride infusion, Continuous  potassium chloride (KLOR-CON M) extended release tablet 40 mEq, PRN   Or  potassium bicarb-citric acid (EFFER-K) effervescent tablet 40 mEq, PRN   Or  potassium chloride 10 mEq/100 mL IVPB (Peripheral Line), PRN  0.9 % sodium chloride infusion, PRN  0.9 % sodium chloride infusion, PRN  sodium chloride flush 0.9 % injection 5-40 mL, 2 times per day  sodium chloride flush 0.9 % injection 5-40 mL, PRN  0.9 % sodium chloride infusion, PRN  furosemide (LASIX) tablet 20 mg, BID  enoxaparin (LOVENOX) injection 60 mg, BID  magnesium oxide (MAG-OX) tablet 400 mg, BID  ipratropium-albuterol (DUONEB) nebulizer solution 1 ampule, Q4H PRN  oxyCODONE-acetaminophen (PERCOCET) 5-325 MG per tablet 1 tablet, Q8H PRN  [Held by provider] aspirin EC tablet 81 mg, Daily  atorvastatin (LIPITOR) tablet 40 mg, Daily  pantoprazole (PROTONIX) tablet 40 mg, QAM AC  levothyroxine (SYNTHROID) tablet 112 mcg, Daily  sodium chloride flush 0.9 % injection 5-40 mL, 2 times per day  sodium chloride flush 0.9 % injection 5-40 mL, PRN  0.9 % sodium chloride infusion, PRN  acetaminophen (TYLENOL) tablet 650 mg, Q6H PRN   Or  acetaminophen (TYLENOL) suppository 650 mg, Q6H PRN        Objective:  /75   Pulse 74   Temp 97.4 °F (36.3 °C) (Oral)   Resp 16   Ht 5' 4\" (1.626 m)   Wt 138 lb 11.2 oz (62.9 kg)   SpO2 97%   BMI 23.81 kg/m²     Intake/Output Summary (Last 24 hours) at 1/21/2022 0309  Last data filed at 1/21/2022 0323  Gross per 24 hour   Intake 1460 ml   Output 1900 ml   Net -440 ml      Wt Readings from Last 3 Encounters:   01/21/22 138 lb 11.2 oz (62.9 kg)   12/06/19 128 lb 12.8 oz (58.4 kg)   10/04/19 122 lb (55.3 kg)       General appearance:  Appears comfortable  Eyes: Sclera clear. Pupils equal.  ENT: Moist oral mucosa. Trachea midline, no adenopathy. Cardiovascular: Regular rhythm, normal S1, S2. No murmur. No edema in lower extremities  Respiratory: Not using accessory muscles. Good inspiratory effort. Clear to auscultation bilaterally, no wheeze or crackles. GI: Abdomen soft, no tenderness, not distended  Musculoskeletal: No cyanosis in digits, neck supple  Neurology: CN 2-12 grossly intact. No speech or motor deficits  Psych: Normal affect. Alert and oriented in time, place and person  Skin: Warm, dry, normal turgor    Labs and Tests:  CBC:   Recent Labs     01/19/22  0625 01/19/22  0625 01/19/22  1849 01/20/22  0435 01/21/22  0515   WBC 15.7*  --   --  17.9* 26.7*   HGB 7.2*   < > 9.3* 10.8* 8.6*     --   --  248 228    < > = values in this interval not displayed. BMP:    Recent Labs     01/19/22  0625 01/20/22  0435 01/21/22  0515   * 128* 130*   K 3.4* 3.8 4.7    96* 98*   CO2 19* 22 19*   BUN 11 10 13   CREATININE 0.9 1.0 1.0   GLUCOSE 78 99 152*     Hepatic: No results for input(s): AST, ALT, ALB, BILITOT, ALKPHOS in the last 72 hours. ASSESSMENT AND PLAN    Active Problems:    Lung mass    Severe malnutrition (HCC)    PNA (pneumonia)    Hypercalcemia  Resolved Problems:    * No resolved hospital problems. *      1. Left upper lobe, suprahilar mass with mediastinal lymphadenopathy, bilateral adrenal metastasis and lytic bony lesions  - s/p bronchoscopy with EBUS 1/20/22, pathology pending     2. 2.3 cm left breast mass  - may need biopsy which can be done as outpatient     3. Hypercalcemia  - s/p Zometa on 1/14/21     4. Anemia of chronic disease.   Transfuse PRBCs if hemoglobin is less than 7  - s/p pRBCs on 1/17/21  - hgb 8.6        Dharmesh Gonzalez, [facility]  [facility]    Patient was seen with JIMBO this morning. Feeling better. Tolerated bronchoscopy and biopsy yesterday. Pathology is pending. Will discuss with the patient about his treatment once the pathology becomes available.      Ashwin Morrell MD

## 2022-01-21 NOTE — PROGRESS NOTES
[facility] Pulmonary/CCM Progress note      Admit Date: 1/13/2022    Chief Complaint: Generalized fatigue and weakness    Subjective: Interval History: Appears about the same. Awake and more alert. Status post bronchoscopy and EBUS yesterday. Currently on 3 L. No significant cough or hemoptysis noted.     Scheduled Meds:   piperacillin-tazobactam  3,375 mg IntraVENous Q8H    sodium chloride  1 g Oral BID WC    furosemide  40 mg Oral BID    ipratropium-albuterol  1 ampule Inhalation Q4H    sodium chloride flush  5-40 mL IntraVENous 2 times per day    enoxaparin  1 mg/kg SubCUTAneous BID    magnesium oxide  400 mg Oral BID    [Held by provider] aspirin  81 mg Oral Daily    atorvastatin  40 mg Oral Daily    pantoprazole  40 mg Oral QAM AC    levothyroxine  112 mcg Oral Daily    sodium chloride flush  5-40 mL IntraVENous 2 times per day     Continuous Infusions:   sodium chloride      sodium chloride      sodium chloride Stopped (01/19/22 2129)     PRN Meds:albuterol, ondansetron, promethazine, potassium chloride **OR** potassium alternative oral replacement **OR** potassium chloride, sodium chloride, sodium chloride flush, sodium chloride, ipratropium-albuterol, oxyCODONE-acetaminophen, sodium chloride flush, sodium chloride, acetaminophen **OR** acetaminophen    Review of Systems  Constitutional: Fatigue, malaise, cachexia, weight loss  Ears, nose, mouth, throat: negative for ear drainage, epistaxis, hoarseness, nasal congestion, sore throat and voice change  Respiratory: negative except for cough and shortness of breath  Cardiovascular: negative for chest pain, chest pressure/discomfort, irregular heart beat, lower extremity edema and palpitations  Gastrointestinal: negative for abdominal pain, constipation, diarrhea, jaundice, melena, odynophagia, reflux symptoms and vomiting  Hematologic/lymphatic: negative for bleeding, easy bruising, lymphadenopathy and petechiae  Musculoskeletal:negative for arthralgias, bone pain, muscle weakness, neck pain and stiff joints  Neurological: negative for dizziness, gait problems, headaches, seizures, speech problems, tremors and weakness  Behavioral/Psych: negative for anxiety, behavior problems, depression, fatigue and sleep disturbance  Endocrine: negative for diabetic symptoms including none, neuropathy, polyphagia, polyuria, polydipsia, vomiting and diarrhea and temperature intolerance  Allergic/Immunologic: negative for anaphylaxis, angioedema, hay fever and urticaria    Objective:     Patient Vitals for the past 8 hrs:   BP Temp Temp src Pulse Resp SpO2   01/21/22 1522 (!) 144/79 97.8 °F (36.6 °C) Oral 85 18 98 %   01/21/22 1100 136/78 97.5 °F (36.4 °C) Oral 86 18 98 %   01/21/22 1030 -- -- -- -- 18 97 %   01/21/22 0924 (!) 143/82 97.5 °F (36.4 °C) Oral 83 18 99 %     I/O last 3 completed shifts: In: 2206.8 [P.O.:960; I.V.:996.8; IV Piggyback:250]  Out: 2810 [Urine:2800; Blood:10]  I/O this shift:   In: 480 [P.O.:480]  Out: 700 [Urine:700]    General Appearance: alert and oriented to person, place and time, well developed and well- nourished, in no acute distress  Skin: warm and dry, no rash or erythema  Head: normocephalic and atraumatic  Eyes: pupils equal, round, and reactive to light, extraocular eye movements intact, conjunctivae normal  ENT: external ear and ear canal normal bilaterally, nose without deformity, nasal mucosa and turbinates normal  Neck: supple and non-tender without mass, no cervical lymphadenopathy  Pulmonary/Chest: clear to auscultation bilaterally- no wheezes, rales or rhonchi, normal air movement, no respiratory distress  Cardiovascular: normal rate, regular rhythm,  no murmurs, rubs, distal pulses intact, no carotid bruits  Abdomen: soft, non-tender, non-distended, normal bowel sounds, no masses or organomegaly  Lymph Nodes: Cervical, supraclavicular normal  Extremities: no cyanosis, clubbing or edema  Musculoskeletal: normal range of motion, no joint swelling, deformity or tenderness  Neurologic: alert, no focal neurologic deficits    Data Review:  CBC:   Lab Results   Component Value Date    WBC 26.7 01/21/2022    RBC 2.81 01/21/2022     BMP:   Lab Results   Component Value Date    GLUCOSE 152 01/21/2022    CO2 19 01/21/2022    BUN 13 01/21/2022    CREATININE 1.0 01/21/2022    CALCIUM 6.4 01/21/2022     ABG:   Lab Results   Component Value Date    JNO5LLR 18.9 01/17/2022    BEART -5.7 01/17/2022    R0RQZIIV 97.0 01/17/2022    PHART 7.372 01/17/2022    LES2PSV 32.6 01/17/2022    PO2ART 83.7 01/17/2022    FCU2AFA 44.7 01/17/2022       Radiology: All pertinent images / reports were reviewed as a part of this visit. Narrative   EXAMINATION:   CT OF THE CHEST WITHOUT CONTRAST 1/13/2022 12:16 pm       TECHNIQUE:   CT of the chest was performed without the administration of intravenous   contrast. Multiplanar reformatted images are provided for review. Dose   modulation, iterative reconstruction, and/or weight based adjustment of the   mA/kV was utilized to reduce the radiation dose to as low as reasonably   achievable.       COMPARISON:   Chest x-ray, 3 hours ago       HISTORY:   ORDERING SYSTEM PROVIDED HISTORY: mass   TECHNOLOGIST PROVIDED HISTORY:   Reason for exam:->mass   Decision Support Exception - unselect if not a suspected or confirmed   emergency medical condition->Emergency Medical Condition (MA)   Reason for Exam: mass       FINDINGS:   Mediastinum: There are several AP window nodes for example, mild 2 measuring   19 and 18 mm in short axis.       Lungs/pleura: There is a left upper lobe suprahilar mass, measuring 5.2 x 6.3   x 6.2 cm. There are peripheral components of ground-glass and interlobular   septal thickening in the left apex.       In the right middle lobe there is a 9 x 7 mm partially solid nodule. The   solid component is 3 mm. (Image 55, series 3).  Mild dependent atelectasis is   noted.  There is pleural thickening along the lateral aspect of the right   hemithorax, likely fibrotic.       Upper Abdomen: There is a right adrenal nodule measuring 4 cm in diameter. There are 2 left adrenal nodules, measuring 2.6 and 2.5 cm in diameter.       Soft Tissues/Bones: No suspicious lytic or blastic bone lesions are   appreciated.           Impression   1. 6.3 cm left upper lobe, suprahilar mass, suspicious for malignancy.  There   may be a postobstructive component of pneumonia, versus transbronchial spread   of tumor in the apex of the left upper lobe. 2. AP window mediastinal adenopathy, likely metastatic. 3. 9 x 7 mm right middle lobe nodule, indeterminate for inflammation, primary   or metastatic neoplasm. 4. Bilateral adrenal nodules, suspicious for metastasis. Problem List:     Lung mass with possible postobstructive pneumonia  Mediastinal lymphadenopathy  Probable adrenal metastasis  Left breast mass  Hypercalcemia    Assessment/Plan:     Failure to thrive with significant weight loss possibly related to lung malignancy with metastatic disease [noted to have adrenal and bone mets]. Patient also incidentally noted to have a left breast mass which will need further investigations. Hypercalcemia related to malignancy, elevated PTH Rh level noted. ? Squamous cell malignancy. Left upper lobe lung mass invading into the left paratracheal area. Bronchoscopy 1/20 showed complete obstruction of left upper lobe bronchus with extrinsic compression. Bronchial brush + EBUS/TBNA of left paratracheal mass performed, results awaited. WBC 26. ?  Malignancy related. Start Zosyn for presumed postobstructive pneumonia. Was previously treated with Rocephin and Zithromax. Hypercalcemia has resolved. Patient is frail and of poor performance status. Has social issues and will possibly benefit from placement.     Kiran Ledezma MD

## 2022-01-21 NOTE — CARE COORDINATION
Discharge planning note:    Patient has been accepted at:    [address] [place] [address] [address]  [id number]    F [phone]  R [phone]       She is agreeable to transfer tomorrow at 3:30 pm via:    [address] [facility]  [phone]. Will need HENS completed.     Leena Doyle MSN, BSN, RN  Case Management   [phone]

## 2022-01-21 NOTE — PROGRESS NOTES
Physical Therapy  Facility/Department: [address] NURSING  Daily Treatment Note  NAME: Yfn Lauren  : 1945  MRN: [id number]    Date of Service: 2022    Discharge Recommendations:Kaur Johnson scored a 16/24 on the AM-PAC short mobility form. Current research shows that an AM-PAC score of 17 or less is typically not associated with a discharge to the patient's home setting. Based on the patient's AM-PAC score and their current functional mobility deficits, it is recommended that the patient have 3-5 sessions per week of Physical Therapy at d/c to increase the patient's independence. Please see assessment section for further patient specific details. If patient discharges prior to next session this note will serve as a discharge summary. Please see below for the latest assessment towards goals. PT Equipment Recommendations  Equipment Needed: No    Assessment   Body structures, Functions, Activity limitations: Decreased functional mobility ; Decreased ADL status; Decreased strength;Decreased endurance;Decreased balance;Decreased posture; Increased pain  Assessment: Pt. weak and fatigues quickly. Declined ambulation this date and requiring extensive time to recover from minimal movement, though SpO2 remaining >90% during session on 3L O2. Pt. continues to benefit from skilled PT to improve functional mobility. Anticipated the need for low frequency PT at d/c. Treatment Diagnosis: Reduced endurance and balance impairing ability to perform functional mobility  Prognosis: Good  Clinical Presentation: Evolving  PT Education: Goals;PT Role;Plan of Care;Transfer Training;Functional Mobility Training;Gait Training  Patient Education: Verbalized understanding  Barriers to Learning: None  REQUIRES PT FOLLOW UP: Yes  Activity Tolerance  Activity Tolerance: Patient limited by fatigue;Patient limited by endurance; Patient limited by pain  Activity Tolerance: Pt needing encouragement, fearful of walking. Patient Diagnosis(es): The primary encounter diagnosis was Fatigue, unspecified type. Diagnoses of Suspected malignant neoplasm, Pneumonia due to infectious organism, unspecified laterality, unspecified part of lung, and Left breast mass were also pertinent to this visit. has a past medical history of Acquired spondylolisthesis, Arthritis, Cataract, Chronic pain syndrome, Degeneration of lumbar or lumbosacral intervertebral disc, Depressive disorder, not elsewhere classified, Displacement of lumbar intervertebral disc without myelopathy, H/O blood clots, HBP (high blood pressure), Heart disease, Spinal stenosis, lumbar region, without neurogenic claudication, Sprain of lumbosacral (joint) (ligament), Sprain of neck, Stroke [facility]), Thyroid disorder, and Thyroid disorder. has a past surgical history that includes partial hysterectomy (cervix not removed) (1964); Hysterectomy, total abdominal (1972); lumbar fusion; bronchoscopy (1/20/2022); and bronchoscopy (1/20/2022). Restrictions  Restrictions/Precautions  Restrictions/Precautions: Fall Risk (High)  Required Braces or Orthoses?: No  Position Activity Restriction  Other position/activity restrictions: 67 y.o. female with past medical history of thyroid disease, heart disease, CVA, thyroid disorder, DVT, A. fib, presents from home in view of progressive weakness. Patient is a poor historian. She states she has been getting weak. She also describes stabbing pain that goes from her chest to her back when she lies down. States pain has been there for few months. She states that she has been getting all of her care from a nurse practitioner who visits her at home every few months. She has been living with her friend Alex for the last few months. I have discussed this presentation with Susi Hernández over the phone. According to her, patient has been getting progressively weak over the last couple of months. She has not been eating much.   She has been spending increasingly more time in bed and has not really gotten out of bed for over a week now. Patient has been managing her own medications. She has been somewhat confused over the past week but does not have memory problems at baseline. Upon chart review it appears that patient has not seen her primary care provider since 2019. Recent communication documented between patient's NP and Dr. Aaron Lutz. There is being requested for imaging in view of concern for lung cancer. I attempted to reach out to [facility] NP at [phone]. She is not available today. I am expecting a call back from another NP with some information from her chart. ADD noncontrast CT of the chest shows 6.3 cm left upper lobe suprahilar mass suspicious for malignancy. There is also mediastinal adenopathy. There is a 5 x 7 mm right middle lobe nodule. There are bilateral adrenal nodules suspicious for metastasis. Patient is also found to be hyperglycemic. She has leukocytosis and concern for postobstructive pneumonia on CT imaging pain  Subjective   General  Chart Reviewed: Yes  Response To Previous Treatment: Patient with no complaints from previous session. Family / Caregiver Present: No  Subjective  Subjective: Pt reports pain to R shoulder and increased fatigue following bed linen change and pericare with assistance from RN.   General Comment  Comments: Pt supine in bed, agreeable to PT treatment with encouragement, RN present providing medications  Pain Screening  Patient Currently in Pain: Yes  Pain Assessment  Pain Assessment: 0-10  Pain Level:  (pt did not rate pain to therapist. RN present and administering medications upon arrival)  Vital Signs  Patient Currently in Pain: Yes       Orientation  Orientation  Overall Orientation Status: Within Functional Limits  Cognition      Objective   Bed mobility  Supine to Sit: Supervision  Sit to Supine: Supervision  Scooting: Supervision  Transfers  Sit to Stand: Contact guard assistance;Minimal Assistance (CGA from EOB, min A from bedside chair)  Stand to sit: Minimal Assistance (poor eccentric control)  Bed to Chair: Minimal assistance (Pt completing 270 degree pivot to chair, despite cues from therapist for safe transfer)  Ambulation  Ambulation?: No     Balance  Posture: Fair  Sitting - Static: Good;-  Sitting - Dynamic: Good;-  Standing - Static: Fair  Standing - Dynamic: Fair            Comment: Pt sitting in bedisde chair and assisted with LB bathing and application of lotion secondary to significant dry skin. Pt then requesting return to bed and respiratory arriving for breathing treatment. Increased time required to recover SOB following each transfer              G-Code     OutComes Score                                                     AM-PAC Score  AM-PAC Inpatient Mobility Raw Score : 16 (01/21/22 1107)  AM-PAC Inpatient T-Scale Score : 40.78 (01/21/22 1107)  Mobility Inpatient CMS 0-100% Score: 54.16 (01/21/22 1107)  Mobility Inpatient CMS G-Code Modifier : CK (01/21/22 1107)          Goals  Short term goals  Time Frame for Short term goals: Upon d/c  Short term goal 1: Patient will perform bed mobility with CGA. - Goal met 1/18  Short term goal 2: Patient will perform sit<>stand transfers with CGA and LRAD. Short term goal 3: Patient will ambulate 20 ft with Min A x1 and LRAD. Short term goal 4: Pt to perform bed mob with supervision. Patient Goals   Patient goals : Patient did not specify any goals wished to be achieved. **none met    Plan    Plan  Times per week: 3-5x  Times per day: Daily  Current Treatment Recommendations: Strengthening,Balance Training,Functional Mobility Training,Transfer Training,Gait Training,Endurance Training,Neuromuscular Re-education,Patient/Caregiver Education & Training,Equipment Evaluation, Education, & procurement,ADL/Self-care Training  Safety Devices  Type of devices:  All fall risk precautions in place,Call light within reach,Gait belt,Nurse notified,Patient at risk for falls,Bed alarm in place,Left in bed  Restraints  Initially in place: No     Therapy Time   Individual Concurrent Group Co-treatment   Time In 0930         Time Out 1025         Minutes 55         Timed Code Treatment Minutes: [address], [place], DPT, [id number]  Myron Whitfield, PT

## 2022-01-22 LAB
ANION GAP SERPL CALCULATED.3IONS-SCNC: 11 MMOL/L (ref 3–16)
BASOPHILS ABSOLUTE: 0.1 K/UL (ref 0–0.2)
BASOPHILS RELATIVE PERCENT: 0.4 %
BUN BLDV-MCNC: 12 MG/DL (ref 7–20)
CALCIUM IONIZED: 0.96 MMOL/L (ref 1.12–1.32)
CALCIUM SERPL-MCNC: 6.9 MG/DL (ref 8.3–10.6)
CHLORIDE BLD-SCNC: 101 MMOL/L (ref 99–110)
CO2: 22 MMOL/L (ref 21–32)
CREAT SERPL-MCNC: 1.1 MG/DL (ref 0.6–1.2)
EOSINOPHILS ABSOLUTE: 0 K/UL (ref 0–0.6)
EOSINOPHILS RELATIVE PERCENT: 0.2 %
GFR AFRICAN AMERICAN: 58
GFR NON-AFRICAN AMERICAN: 48
GLUCOSE BLD-MCNC: 89 MG/DL (ref 70–99)
HCT VFR BLD CALC: 26.3 % (ref 36–48)
HEMOGLOBIN: 8.5 G/DL (ref 12–16)
LYMPHOCYTES ABSOLUTE: 0.9 K/UL (ref 1–5.1)
LYMPHOCYTES RELATIVE PERCENT: 5.2 %
MAGNESIUM: 1.9 MG/DL (ref 1.8–2.4)
MCH RBC QN AUTO: 30.5 PG (ref 26–34)
MCHC RBC AUTO-ENTMCNC: 32.3 G/DL (ref 31–36)
MCV RBC AUTO: 94.5 FL (ref 80–100)
MONOCYTES ABSOLUTE: 1.2 K/UL (ref 0–1.3)
MONOCYTES RELATIVE PERCENT: 6.6 %
NEUTROPHILS ABSOLUTE: 16 K/UL (ref 1.7–7.7)
NEUTROPHILS RELATIVE PERCENT: 87.6 %
PDW BLD-RTO: 16.7 % (ref 12.4–15.4)
PH VENOUS: 7.39 (ref 7.35–7.45)
PHOSPHORUS: 1.8 MG/DL (ref 2.5–4.9)
PLATELET # BLD: 253 K/UL (ref 135–450)
PMV BLD AUTO: 6.4 FL (ref 5–10.5)
POTASSIUM SERPL-SCNC: 3.7 MMOL/L (ref 3.5–5.1)
RBC # BLD: 2.79 M/UL (ref 4–5.2)
SODIUM BLD-SCNC: 134 MMOL/L (ref 136–145)
WBC # BLD: 18.2 K/UL (ref 4–11)

## 2022-01-22 PROCEDURE — 6370000000 HC RX 637 (ALT 250 FOR IP): Performed by: INTERNAL MEDICINE

## 2022-01-22 PROCEDURE — 84100 ASSAY OF PHOSPHORUS: CPT

## 2022-01-22 PROCEDURE — 82330 ASSAY OF CALCIUM: CPT

## 2022-01-22 PROCEDURE — 6360000002 HC RX W HCPCS: Performed by: INTERNAL MEDICINE

## 2022-01-22 PROCEDURE — 94761 N-INVAS EAR/PLS OXIMETRY MLT: CPT

## 2022-01-22 PROCEDURE — 1200000000 HC SEMI PRIVATE

## 2022-01-22 PROCEDURE — 83735 ASSAY OF MAGNESIUM: CPT

## 2022-01-22 PROCEDURE — 6370000000 HC RX 637 (ALT 250 FOR IP): Performed by: HOSPITALIST

## 2022-01-22 PROCEDURE — 85025 COMPLETE CBC W/AUTO DIFF WBC: CPT

## 2022-01-22 PROCEDURE — 80048 BASIC METABOLIC PNL TOTAL CA: CPT

## 2022-01-22 PROCEDURE — 94640 AIRWAY INHALATION TREATMENT: CPT

## 2022-01-22 PROCEDURE — 2580000003 HC RX 258: Performed by: INTERNAL MEDICINE

## 2022-01-22 RX ADMIN — Medication 400 MG: at 20:36

## 2022-01-22 RX ADMIN — SODIUM CHLORIDE TAB 1 GM 1 G: 1 TAB at 16:36

## 2022-01-22 RX ADMIN — ACETAMINOPHEN 650 MG: 325 TABLET ORAL at 16:35

## 2022-01-22 RX ADMIN — IPRATROPIUM BROMIDE AND ALBUTEROL SULFATE 1 AMPULE: .5; 3 SOLUTION RESPIRATORY (INHALATION) at 20:09

## 2022-01-22 RX ADMIN — OXYCODONE AND ACETAMINOPHEN 1 TABLET: 5; 325 TABLET ORAL at 00:30

## 2022-01-22 RX ADMIN — Medication 10 ML: at 08:57

## 2022-01-22 RX ADMIN — IPRATROPIUM BROMIDE AND ALBUTEROL SULFATE 1 AMPULE: .5; 3 SOLUTION RESPIRATORY (INHALATION) at 11:21

## 2022-01-22 RX ADMIN — IPRATROPIUM BROMIDE AND ALBUTEROL SULFATE 1 AMPULE: .5; 3 SOLUTION RESPIRATORY (INHALATION) at 15:38

## 2022-01-22 RX ADMIN — ATORVASTATIN CALCIUM 40 MG: 40 TABLET, FILM COATED ORAL at 08:56

## 2022-01-22 RX ADMIN — ENOXAPARIN SODIUM 60 MG: 100 INJECTION SUBCUTANEOUS at 08:57

## 2022-01-22 RX ADMIN — LEVOTHYROXINE SODIUM 112 MCG: 0.11 TABLET ORAL at 08:57

## 2022-01-22 RX ADMIN — OXYCODONE AND ACETAMINOPHEN 1 TABLET: 5; 325 TABLET ORAL at 20:44

## 2022-01-22 RX ADMIN — FUROSEMIDE 40 MG: 40 TABLET ORAL at 08:56

## 2022-01-22 RX ADMIN — PANTOPRAZOLE SODIUM 40 MG: 40 TABLET, DELAYED RELEASE ORAL at 05:53

## 2022-01-22 RX ADMIN — SODIUM CHLORIDE TAB 1 GM 1 G: 1 TAB at 08:56

## 2022-01-22 RX ADMIN — FUROSEMIDE 40 MG: 40 TABLET ORAL at 16:36

## 2022-01-22 RX ADMIN — IPRATROPIUM BROMIDE AND ALBUTEROL SULFATE 1 AMPULE: .5; 3 SOLUTION RESPIRATORY (INHALATION) at 07:25

## 2022-01-22 RX ADMIN — PIPERACILLIN AND TAZOBACTAM 3375 MG: 3; .375 INJECTION, POWDER, LYOPHILIZED, FOR SOLUTION INTRAVENOUS at 00:34

## 2022-01-22 RX ADMIN — ENOXAPARIN SODIUM 60 MG: 100 INJECTION SUBCUTANEOUS at 20:36

## 2022-01-22 RX ADMIN — OXYCODONE AND ACETAMINOPHEN 1 TABLET: 5; 325 TABLET ORAL at 09:13

## 2022-01-22 RX ADMIN — Medication 10 ML: at 20:36

## 2022-01-22 RX ADMIN — PIPERACILLIN AND TAZOBACTAM 3375 MG: 3; .375 INJECTION, POWDER, LYOPHILIZED, FOR SOLUTION INTRAVENOUS at 08:58

## 2022-01-22 RX ADMIN — Medication 400 MG: at 08:57

## 2022-01-22 RX ADMIN — IPRATROPIUM BROMIDE AND ALBUTEROL SULFATE 1 AMPULE: .5; 3 SOLUTION RESPIRATORY (INHALATION) at 00:51

## 2022-01-22 RX ADMIN — PIPERACILLIN AND TAZOBACTAM 3375 MG: 3; .375 INJECTION, POWDER, LYOPHILIZED, FOR SOLUTION INTRAVENOUS at 16:36

## 2022-01-22 ASSESSMENT — PAIN DESCRIPTION - PAIN TYPE
TYPE: CHRONIC PAIN

## 2022-01-22 ASSESSMENT — PAIN SCALES - GENERAL
PAINLEVEL_OUTOF10: 10
PAINLEVEL_OUTOF10: 10
PAINLEVEL_OUTOF10: 6
PAINLEVEL_OUTOF10: 0
PAINLEVEL_OUTOF10: 10
PAINLEVEL_OUTOF10: 5
PAINLEVEL_OUTOF10: 6
PAINLEVEL_OUTOF10: 0
PAINLEVEL_OUTOF10: 6

## 2022-01-22 ASSESSMENT — PAIN DESCRIPTION - LOCATION
LOCATION: BACK

## 2022-01-22 ASSESSMENT — PAIN DESCRIPTION - ORIENTATION
ORIENTATION: UPPER

## 2022-01-22 ASSESSMENT — PAIN DESCRIPTION - DESCRIPTORS
DESCRIPTORS: PATIENT UNABLE TO DESCRIBE
DESCRIPTORS: CONSTANT;DISCOMFORT
DESCRIPTORS: PATIENT UNABLE TO DESCRIBE

## 2022-01-22 ASSESSMENT — PAIN DESCRIPTION - FREQUENCY: FREQUENCY: CONTINUOUS

## 2022-01-22 NOTE — PROGRESS NOTES
Pt awake in bed. VSS, assessment complete and medications given/ Denies any needs. Call light in reach and bed alarm engaged.

## 2022-01-22 NOTE — PROGRESS NOTES
Patient very upset, seems confused, said I didn't get into her room at [facility] like I had promised. She was informed to use call light when needing assist and someone would be in as soon as possible. Assisted to [facility], did not want to be touched. Assisted back to bed. Refused alyssa, informed to call when needing assist again to [facility]. Bed alarm engaged, call light in reach.

## 2022-01-22 NOTE — PLAN OF CARE
Problem: Falls - Risk of:  Goal: Will remain free from falls  Description: Will remain free from falls  1/22/2022 1744 by Shante Mccarthy RN  Outcome: Ongoing   Pt will remain free from falls. Fall precautions in place and alarm engaged. Bedside table and call light within reach. Pt aware to use call light for assistance ambulating. Problem: Skin Integrity:  Goal: Will show no infection signs and symptoms  Description: Will show no infection signs and symptoms  Outcome: Ongoing   Pt will remain free from skin breakdown. Pt turned Q2hrs, skin kept clean and dry, and skin assessed per shift or as needed. Problem: Pain:  Goal: Pain level will decrease  Description: Pain level will decrease  1/22/2022 1744 by Shante Mccarthy RN  Outcome: Ongoing   Pt can rate pain on a 0-10 scale. Pt pain will remain at a level that is tolerable to pt. PRN pain medication as needed.

## 2022-01-22 NOTE — PLAN OF CARE
Problem: Falls - Risk of:  Goal: Will remain free from falls  Description: Will remain free from falls  Outcome: Ongoing   Pt will remain free from falls. Fall precautions in place and alarm engaged. Bedside table and call light within reach. Pt aware to use call light for assistance ambulating. Problem: Pain:  Goal: Pain level will decrease  Description: Pain level will decrease  Outcome: Ongoing   Pt can rate pain on a 0-10 scale. Pt pain will remain at a level that is tolerable to pt. PRN pain medication as needed.

## 2022-01-22 NOTE — PROGRESS NOTES
Hospitalist Progress Note      PCP: Cindy Schroeder DO    Date of Admission: 1/13/2022    Chief Complaint: Progressive weakness    Hospital Course: This 67 y.o. female with PMHx of  thyroid disease, heart disease, CVA, thyroid disorder, DVT, A. fib presented with progressive weakness. On admission,CT of the chest showed 6.3 cm left upper lobe suprahilar mass suspicious for malignancy, mediastinal lymphadenopathy and 5 x 7 mm right middle lobe nodule. There are bilateral adrenal nodules suspicious for metastasis and L2 lytic lesion. Patient is also found to be hyperglycemic. She has leukocytosis and concern for postobstructive pneumonia on CT imaging pain         Interval history  Pt seen and examined today. Overnight events noted, interval ancillary notes and labs reviewed. On room air satting well  Afebrile overnight, WBC trended down to 18.2; on Zosyn   Reported generalized fatigue and SOB but denied any chest pain, nausea, vomiting or abdominal pain  Plan for the patient to be discharged to monitored with us to care if medically stable and white count trending            Assessment/Plan:    Postobstructive pneumonia likely secondary to bronchogenic carcinoma  Patient afebrile, with elevated WBCs and procalcitonin admission  Pulmonology on board: Appreciate their recs  Status post 5-day course of Rocephin  Started back on Zosyn on 1/21/2022 due to elevated white count     Left upper lobe, suprahilar mass with mediastinal lymphadenopathy, bilateral adrenal metastasis and lytic bony lesions  Pulmonology and oncology on board: Appreciate recs  CT head and abdomen pelvis  for staging  Plan for bronchoscopy with endobronchial ultrasound biopsy of mediastinal lymph node tomorrow  Palliative care on board: Appreciate recs    2.3 cm left breast mass:  Oncology on board:  Outpatient biopsy recommended    Anemia of chronic disease: Status post packed RBCs on 1/17/2022  Monitor hemoglobin closely  Transfuse with hemoglobin less than 7    Hypercalcemia: Likely secondary to malignancy: Resolved  PTH Rh level noted  Status post Zometa on 1/14/22  Monitor calcium level closely    Hyponatremia: Acute on chronic likely secondary to increased ADH release in the setting of metastatic lung cancer:   Nephrology on board: Appreciate their recs  Continue sodium tabs twice daily and p.o. Lasix  Restrict dietary free water to 1800ml    Hypothyroidism: Continue Synthroid    Failure to thrive: In the setting of malignancy     History of DVT: On Xarelto at home currently on hold     History of paroxysmal A. fib: On Xarelto currently on hold      DVT Prophylaxis: Lovenox  Diet: ADULT ORAL NUTRITION SUPPLEMENT; Breakfast, Dinner; Standard High Calorie/High Protein Oral Supplement  ADULT ORAL NUTRITION SUPPLEMENT; Breakfast, Lunch, Dinner, HS Snack; Standard High Calorie/High Protein Oral Supplement  ADULT DIET; Regular; 1200 ml  Code Status: Full Code      I have discussed with the patient the rationale for blood component transfusion; its benefits in treating or preventing fatigue, organ damage, or death; and its risk which includes mild transfusion reactions, rare risk of blood borne infection, or more serious but rare reactions. I have discussed the alternatives to transfusion, including the risk and consequences of not receiving transfusion. The patient had an opportunity to ask questions and had agreed to proceed with transfusion of blood components.         Medications:  Reviewed    Infusion Medications    sodium chloride      sodium chloride      sodium chloride Stopped (01/19/22 2129)     Scheduled Medications    piperacillin-tazobactam  3,375 mg IntraVENous Q8H    sodium chloride  1 g Oral BID WC    furosemide  40 mg Oral BID    ipratropium-albuterol  1 ampule Inhalation Q4H    sodium chloride flush  5-40 mL IntraVENous 2 times per day    enoxaparin  1 mg/kg SubCUTAneous BID    magnesium oxide  400 mg Oral BID    [Held by provider] aspirin  81 mg Oral Daily    atorvastatin  40 mg Oral Daily    pantoprazole  40 mg Oral QAM AC    levothyroxine  112 mcg Oral Daily    sodium chloride flush  5-40 mL IntraVENous 2 times per day     PRN Meds: albuterol, ondansetron, promethazine, potassium chloride **OR** potassium alternative oral replacement **OR** potassium chloride, sodium chloride, sodium chloride flush, sodium chloride, ipratropium-albuterol, oxyCODONE-acetaminophen, sodium chloride flush, sodium chloride, acetaminophen **OR** acetaminophen      Intake/Output Summary (Last 24 hours) at 1/22/2022 1040  Last data filed at 1/22/2022 0553  Gross per 24 hour   Intake 637.04 ml   Output 1675 ml   Net -1037.96 ml       Physical Exam Performed:    /79   Pulse 96   Temp 97.7 °F (36.5 °C) (Oral)   Resp 16   Ht 5' 4\" (1.626 m)   Wt 138 lb 9.6 oz (62.9 kg)   SpO2 94%   BMI 23.79 kg/m²     General appearance: No apparent distress, appears stated age and cooperative. HEENT: Pupils equal, round, and reactive to light. Conjunctivae/corneas clear. Neck: Supple, with full range of motion. No jugular venous distention. Trachea midline. Respiratory:  Normal respiratory effort. Clear to auscultation, bilaterally without Rales/Wheezes/Rhonchi. Cardiovascular: Regular rate and rhythm with normal S1/S2 without murmurs, rubs or gallops. Abdomen: Soft, non-tender, non-distended with normal bowel sounds. Musculoskeletal: No clubbing, cyanosis or edema bilaterally. Full range of motion without deformity. Skin: Skin color, texture, turgor normal.  No rashes or lesions. Neurologic:  Neurovascularly intact without any focal sensory/motor deficits.  Cranial nerves: II-XII intact, grossly non-focal.  Psychiatric: Alert and oriented, thought content appropriate, normal insight  Capillary Refill: Brisk,< 3 seconds   Peripheral Pulses: +2 palpable, equal bilaterally       Labs:   Recent Labs     01/20/22  0435 01/21/22  0515 01/22/22  0540   WBC 17.9* 26.7* 18.2*   HGB 10.8* 8.6* 8.5*   HCT 32.9* 26.5* 26.3*    228 253     Recent Labs     01/20/22  0435 01/21/22  0515 01/22/22  0540   * 130* 134*   K 3.8 4.7 3.7   CL 96* 98* 101   CO2 22 19* 22   BUN 10 13 12   CREATININE 1.0 1.0 1.1   CALCIUM 6.6* 6.4* 6.9*   PHOS 2.4* 3.8 1.8*     No results for input(s): AST, ALT, BILIDIR, BILITOT, ALKPHOS in the last 72 hours. Recent Labs     01/20/22  1024   INR 1.02     No results for input(s): Catherine Stanton in the last 72 hours. Urinalysis:      Lab Results   Component Value Date    NITRU Negative 01/13/2022    WBCUA 18 01/13/2022    BACTERIA RARE 01/13/2022    RBCUA 1 01/13/2022    BLOODU Negative 01/13/2022    SPECGRAV 1.015 01/13/2022    GLUCOSEU Negative 01/13/2022       Radiology:  XR CHEST PORTABLE   Final Result   Focal consolidations seen within the left upper lobe. Pneumonia is primarily   considered. However, that must be followed to resolution to ensure that   there is no underlying neoplasm. CT ABDOMEN PELVIS W IV CONTRAST Additional Contrast? Oral   Final Result   1. Bilateral adrenal masses, likely metastatic. No other evidence of   abdominal or pelvic metastatic disease. 2. Lytic L2 vertebral body lesion concerning for metastatic disease. 3. 2.3 cm left breast mass likely malignant. 4. Cholelithiasis with no acute features. 5. Nonobstructive right nephrolithiasis. CT HEAD W WO CONTRAST   Final Result   No acute intracranial abnormality. No definite evidence of intracranial metastatic disease. It is noted that CT   imaging is insensitive for small metastatic lesions. If possible further   evaluation with MRI should be considered for better characterization. RECOMMENDATIONS:   Unavailable         CT CHEST WO CONTRAST   Final Result   1. 6.3 cm left upper lobe, suprahilar mass, suspicious for malignancy.   There   may be a postobstructive component of pneumonia, versus transbronchial spread   of tumor in the apex of the left upper lobe. 2. AP window mediastinal adenopathy, likely metastatic. 3. 9 x 7 mm right middle lobe nodule, indeterminate for inflammation, primary   or metastatic neoplasm. 4. Bilateral adrenal nodules, suspicious for metastasis. RECOMMENDATIONS:   Unavailable         XR CHEST PORTABLE   Final Result   New left upper lobe opacity concerning for pneumonia versus malignancy. RECOMMENDATION:   Chest CT is recommended for further evaluation.                  Active Hospital Problems    Diagnosis     Hypercalcemia [E83.52]     Severe malnutrition (HCC) [E43]     PNA (pneumonia) [J18.9]     Lung mass [R91.8]            Electronically signed by Twila Jensen MD on 1/22/2022 at 10:40 AM

## 2022-01-22 NOTE — PROGRESS NOTES
[facility] Note    Patient Active Problem List   Diagnosis    Sprain of ligament of lumbosacral joint    BWC Neck sprain and strain    BWC Degeneration of lumbar or lumbosacral intervertebral disc    BWC Acquired spondylolisthesis    BWC Displacement of lumbar intervertebral disc without myelopathy    BWC Spinal stenosis, lumbar region, without neurogenic claudication    BWC Depressive disorder, not elsewhere classified    Chronic pain syndrome    Lupus ([address]    Coronary artery disease due to lipid rich plaque    Hyperlipidemia LDL goal <70    Paroxysmal atrial fibrillation (HCC)    History of stroke    History of DVT (deep vein thrombosis)    Gastroesophageal reflux disease without esophagitis    Acquired hypothyroidism    Lung mass    Tobacco use    Elevated ferritin    Chronic insomnia    Anxiety disorder    Radiolucent lesion of bone    Chronic low back pain    Breast tenderness    Bad taste in mouth    Decreased appetite    Closed fracture of upper end of left humerus    General weakness    Severe malnutrition (HCC)    Anemia    Hypoxia    Autoimmune diabetes (HCC)    Hypergammaglobulinemia, unspecified    PNA (pneumonia)    Hypercalcemia       Past Medical History:   has a past medical history of Acquired spondylolisthesis, Arthritis, Cataract, Chronic pain syndrome, Degeneration of lumbar or lumbosacral intervertebral disc, Depressive disorder, not elsewhere classified, Displacement of lumbar intervertebral disc without myelopathy, H/O blood clots, HBP (high blood pressure), Heart disease, Spinal stenosis, lumbar region, without neurogenic claudication, Sprain of lumbosacral (joint) (ligament), Sprain of neck, Stroke ([address] Thyroid disorder, and Thyroid disorder. Past Social History:   reports that she has been smoking. She started smoking about 62 years ago. She has a 28.50 pack-year smoking history.  She has never used smokeless tobacco. She reports previous alcohol use. She reports that she does not use drugs. Subjective:  Na better today. No neurologic symptoms. More leg swelling. Hypoxia better from NC to RA now      Objective:      /79   Pulse 96   Temp 97.7 °F (36.5 °C) (Oral)   Resp 16   Ht 5' 4\" (1.626 m)   Wt 138 lb 9.6 oz (62.9 kg)   SpO2 94%   BMI 23.79 kg/m²     Wt Readings from Last 3 Encounters:   01/22/22 138 lb 9.6 oz (62.9 kg)   12/06/19 128 lb 12.8 oz (58.4 kg)   10/04/19 122 lb (55.3 kg)       BP Readings from Last 3 Encounters:   01/22/22 122/79   01/20/22 123/60   12/06/19 122/64     Chest- clear  Heart-regular  Abd-soft  Ext- 1+ edema    Labs  Hemoglobin   Date Value Ref Range Status   01/22/2022 8.5 (L) 12.0 - 16.0 g/dL Final     Hematocrit   Date Value Ref Range Status   01/22/2022 26.3 (L) 36.0 - 48.0 % Final     WBC   Date Value Ref Range Status   01/22/2022 18.2 (H) 4.0 - 11.0 K/uL Final     Platelets   Date Value Ref Range Status   01/22/2022 253 135 - 450 K/uL Final     Lab Results   Component Value Date    CREATININE 1.1 01/22/2022    BUN 12 01/22/2022     (L) 01/22/2022    K 3.7 01/22/2022     01/22/2022    CO2 22 01/22/2022     Uosm 225  Lon 44    Kappa and lamda elevated but normal ratio  SPEP neg    Assessment/Plan:  1. Acute-on-chronic hyponatremia. Increase ADH state. The  patient does have a possibility of metastatic lung cancer which may  increase ADH release. Na better today. Stop NSS. Decrease NaCl tabs to bid. Lasix 40mg bid. Improving - keep same plan   2. Non anion gap metabolic acidosis in the setting of hypokalemia. No  diarrhea. No need for bicarbonate replacement at this time. Positive  urine anion gap. May have type 2 RTA. Replaced K.   3.  Hypomagnesemia. Replaced. It should help with serum potassium also. 4.  Hypophosphatemia. Replaced. 5.  Anemia. Follow SPEP and serum free light chain.     6.  Possible metastatic lung cancer as per Medicine and Pulmonary. Oncology is also following. S/P bronchoscopy. 7.  Hypercalcemia-Zometa given. Ca now lower. Replace judiciously.    8.  Stable renal status    Roge Bañuelos MD

## 2022-01-23 ENCOUNTER — APPOINTMENT (OUTPATIENT)
Dept: GENERAL RADIOLOGY | Age: 77
DRG: 871 | End: 2022-01-23
Payer: MEDICARE

## 2022-01-23 LAB
ANION GAP SERPL CALCULATED.3IONS-SCNC: 11 MMOL/L (ref 3–16)
BASOPHILS ABSOLUTE: 0.1 K/UL (ref 0–0.2)
BASOPHILS RELATIVE PERCENT: 0.3 %
BUN BLDV-MCNC: 9 MG/DL (ref 7–20)
CALCIUM SERPL-MCNC: 6.4 MG/DL (ref 8.3–10.6)
CHLORIDE BLD-SCNC: 99 MMOL/L (ref 99–110)
CO2: 24 MMOL/L (ref 21–32)
CREAT SERPL-MCNC: 1.1 MG/DL (ref 0.6–1.2)
EOSINOPHILS ABSOLUTE: 0.1 K/UL (ref 0–0.6)
EOSINOPHILS RELATIVE PERCENT: 0.6 %
GFR AFRICAN AMERICAN: 58
GFR NON-AFRICAN AMERICAN: 48
GLUCOSE BLD-MCNC: 80 MG/DL (ref 70–99)
HCT VFR BLD CALC: 28.3 % (ref 36–48)
HEMOGLOBIN: 9.2 G/DL (ref 12–16)
LYMPHOCYTES ABSOLUTE: 0.6 K/UL (ref 1–5.1)
LYMPHOCYTES RELATIVE PERCENT: 3 %
MAGNESIUM: 1.9 MG/DL (ref 1.8–2.4)
MCH RBC QN AUTO: 30.1 PG (ref 26–34)
MCHC RBC AUTO-ENTMCNC: 32.4 G/DL (ref 31–36)
MCV RBC AUTO: 92.9 FL (ref 80–100)
MONOCYTES ABSOLUTE: 1.4 K/UL (ref 0–1.3)
MONOCYTES RELATIVE PERCENT: 6.7 %
NEUTROPHILS ABSOLUTE: 19 K/UL (ref 1.7–7.7)
NEUTROPHILS RELATIVE PERCENT: 89.4 %
PDW BLD-RTO: 16.8 % (ref 12.4–15.4)
PHOSPHORUS: 2 MG/DL (ref 2.5–4.9)
PLATELET # BLD: 259 K/UL (ref 135–450)
PMV BLD AUTO: 6.3 FL (ref 5–10.5)
POTASSIUM SERPL-SCNC: 3.2 MMOL/L (ref 3.5–5.1)
PROCALCITONIN: 0.41 NG/ML (ref 0–0.15)
RBC # BLD: 3.05 M/UL (ref 4–5.2)
SODIUM BLD-SCNC: 134 MMOL/L (ref 136–145)
WBC # BLD: 21.3 K/UL (ref 4–11)

## 2022-01-23 PROCEDURE — 80048 BASIC METABOLIC PNL TOTAL CA: CPT

## 2022-01-23 PROCEDURE — 94640 AIRWAY INHALATION TREATMENT: CPT

## 2022-01-23 PROCEDURE — 6370000000 HC RX 637 (ALT 250 FOR IP): Performed by: INTERNAL MEDICINE

## 2022-01-23 PROCEDURE — 6360000002 HC RX W HCPCS: Performed by: INTERNAL MEDICINE

## 2022-01-23 PROCEDURE — 2580000003 HC RX 258: Performed by: INTERNAL MEDICINE

## 2022-01-23 PROCEDURE — 94761 N-INVAS EAR/PLS OXIMETRY MLT: CPT

## 2022-01-23 PROCEDURE — 85025 COMPLETE CBC W/AUTO DIFF WBC: CPT

## 2022-01-23 PROCEDURE — 84100 ASSAY OF PHOSPHORUS: CPT

## 2022-01-23 PROCEDURE — 71045 X-RAY EXAM CHEST 1 VIEW: CPT

## 2022-01-23 PROCEDURE — 2700000000 HC OXYGEN THERAPY PER DAY

## 2022-01-23 PROCEDURE — 1200000000 HC SEMI PRIVATE

## 2022-01-23 PROCEDURE — 36415 COLL VENOUS BLD VENIPUNCTURE: CPT

## 2022-01-23 PROCEDURE — 6370000000 HC RX 637 (ALT 250 FOR IP): Performed by: HOSPITALIST

## 2022-01-23 PROCEDURE — 83735 ASSAY OF MAGNESIUM: CPT

## 2022-01-23 PROCEDURE — 84145 PROCALCITONIN (PCT): CPT

## 2022-01-23 RX ADMIN — POTASSIUM PHOSPHATE, MONOBASIC 250 MG: 500 TABLET, SOLUBLE ORAL at 16:21

## 2022-01-23 RX ADMIN — ENOXAPARIN SODIUM 60 MG: 100 INJECTION SUBCUTANEOUS at 20:11

## 2022-01-23 RX ADMIN — FUROSEMIDE 40 MG: 40 TABLET ORAL at 08:46

## 2022-01-23 RX ADMIN — Medication 10 ML: at 20:11

## 2022-01-23 RX ADMIN — ENOXAPARIN SODIUM 60 MG: 100 INJECTION SUBCUTANEOUS at 08:47

## 2022-01-23 RX ADMIN — POTASSIUM PHOSPHATE, MONOBASIC 250 MG: 500 TABLET, SOLUBLE ORAL at 12:34

## 2022-01-23 RX ADMIN — Medication 400 MG: at 20:11

## 2022-01-23 RX ADMIN — FUROSEMIDE 40 MG: 40 TABLET ORAL at 16:22

## 2022-01-23 RX ADMIN — OXYCODONE AND ACETAMINOPHEN 1 TABLET: 5; 325 TABLET ORAL at 14:47

## 2022-01-23 RX ADMIN — PIPERACILLIN AND TAZOBACTAM 3375 MG: 3; .375 INJECTION, POWDER, LYOPHILIZED, FOR SOLUTION INTRAVENOUS at 16:23

## 2022-01-23 RX ADMIN — OXYCODONE AND ACETAMINOPHEN 1 TABLET: 5; 325 TABLET ORAL at 05:44

## 2022-01-23 RX ADMIN — POTASSIUM BICARBONATE 40 MEQ: 782 TABLET, EFFERVESCENT ORAL at 08:46

## 2022-01-23 RX ADMIN — PIPERACILLIN AND TAZOBACTAM 3375 MG: 3; .375 INJECTION, POWDER, LYOPHILIZED, FOR SOLUTION INTRAVENOUS at 00:56

## 2022-01-23 RX ADMIN — IPRATROPIUM BROMIDE AND ALBUTEROL SULFATE 1 AMPULE: .5; 3 SOLUTION RESPIRATORY (INHALATION) at 08:44

## 2022-01-23 RX ADMIN — ATORVASTATIN CALCIUM 40 MG: 40 TABLET, FILM COATED ORAL at 08:47

## 2022-01-23 RX ADMIN — SODIUM CHLORIDE TAB 1 GM 1 G: 1 TAB at 08:46

## 2022-01-23 RX ADMIN — Medication 400 MG: at 08:47

## 2022-01-23 RX ADMIN — PANTOPRAZOLE SODIUM 40 MG: 40 TABLET, DELAYED RELEASE ORAL at 05:45

## 2022-01-23 RX ADMIN — PIPERACILLIN AND TAZOBACTAM 3375 MG: 3; .375 INJECTION, POWDER, LYOPHILIZED, FOR SOLUTION INTRAVENOUS at 08:53

## 2022-01-23 RX ADMIN — ACETAMINOPHEN 650 MG: 325 TABLET ORAL at 20:10

## 2022-01-23 RX ADMIN — Medication 10 ML: at 08:47

## 2022-01-23 RX ADMIN — SODIUM CHLORIDE TAB 1 GM 1 G: 1 TAB at 16:22

## 2022-01-23 RX ADMIN — LEVOTHYROXINE SODIUM 112 MCG: 0.11 TABLET ORAL at 08:46

## 2022-01-23 ASSESSMENT — PAIN DESCRIPTION - PAIN TYPE
TYPE: CHRONIC PAIN
TYPE: CHRONIC PAIN

## 2022-01-23 ASSESSMENT — PAIN SCALES - GENERAL
PAINLEVEL_OUTOF10: 0
PAINLEVEL_OUTOF10: 6
PAINLEVEL_OUTOF10: 10
PAINLEVEL_OUTOF10: 6
PAINLEVEL_OUTOF10: 6
PAINLEVEL_OUTOF10: 0

## 2022-01-23 ASSESSMENT — PAIN DESCRIPTION - DESCRIPTORS
DESCRIPTORS: PATIENT UNABLE TO DESCRIBE
DESCRIPTORS: PATIENT UNABLE TO DESCRIBE

## 2022-01-23 ASSESSMENT — PAIN DESCRIPTION - ORIENTATION
ORIENTATION: UPPER
ORIENTATION: UPPER

## 2022-01-23 ASSESSMENT — PAIN DESCRIPTION - LOCATION
LOCATION: BACK
LOCATION: BACK

## 2022-01-23 NOTE — FLOWSHEET NOTE
Pt awake in bed. VSS, assessment complete and medications given. Refusing to turn, call light in reach and bed alarm engaged.

## 2022-01-23 NOTE — PROGRESS NOTES
[facility] Note    Patient Active Problem List   Diagnosis    Sprain of ligament of lumbosacral joint    BWC Neck sprain and strain    BWC Degeneration of lumbar or lumbosacral intervertebral disc    BWC Acquired spondylolisthesis    BWC Displacement of lumbar intervertebral disc without myelopathy    BWC Spinal stenosis, lumbar region, without neurogenic claudication    BWC Depressive disorder, not elsewhere classified    Chronic pain syndrome    Lupus ([address]    Coronary artery disease due to lipid rich plaque    Hyperlipidemia LDL goal <70    Paroxysmal atrial fibrillation (HCC)    History of stroke    History of DVT (deep vein thrombosis)    Gastroesophageal reflux disease without esophagitis    Acquired hypothyroidism    Lung mass    Tobacco use    Elevated ferritin    Chronic insomnia    Anxiety disorder    Radiolucent lesion of bone    Chronic low back pain    Breast tenderness    Bad taste in mouth    Decreased appetite    Closed fracture of upper end of left humerus    General weakness    Severe malnutrition (HCC)    Anemia    Hypoxia    Autoimmune diabetes (HCC)    Hypergammaglobulinemia, unspecified    PNA (pneumonia)    Hypercalcemia       Past Medical History:   has a past medical history of Acquired spondylolisthesis, Arthritis, Cataract, Chronic pain syndrome, Degeneration of lumbar or lumbosacral intervertebral disc, Depressive disorder, not elsewhere classified, Displacement of lumbar intervertebral disc without myelopathy, H/O blood clots, HBP (high blood pressure), Heart disease, Spinal stenosis, lumbar region, without neurogenic claudication, Sprain of lumbosacral (joint) (ligament), Sprain of neck, Stroke ([address] Thyroid disorder, and Thyroid disorder. Past Social History:   reports that she has been smoking. She started smoking about 62 years ago. She has a 28.50 pack-year smoking history.  She has never used smokeless tobacco. She reports previous alcohol use. She reports that she does not use drugs. Subjective:  Na better today. No neurologic symptoms. More leg swelling. Hypoxia better overall     Objective:      /81   Pulse 76   Temp 98 °F (36.7 °C) (Axillary)   Resp 20   Ht 5' 4\" (1.626 m)   Wt 130 lb 1.6 oz (59 kg)   SpO2 95%   BMI 22.33 kg/m²     Wt Readings from Last 3 Encounters:   01/23/22 130 lb 1.6 oz (59 kg)   12/06/19 128 lb 12.8 oz (58.4 kg)   10/04/19 122 lb (55.3 kg)       BP Readings from Last 3 Encounters:   01/23/22 126/81   01/20/22 123/60   12/06/19 122/64     Chest- clear  Heart-regular  Abd-soft  Ext- 1+ edema    Labs  Hemoglobin   Date Value Ref Range Status   01/23/2022 9.2 (L) 12.0 - 16.0 g/dL Final     Hematocrit   Date Value Ref Range Status   01/23/2022 28.3 (L) 36.0 - 48.0 % Final     WBC   Date Value Ref Range Status   01/23/2022 21.3 (H) 4.0 - 11.0 K/uL Final     Platelets   Date Value Ref Range Status   01/23/2022 259 135 - 450 K/uL Final     Lab Results   Component Value Date    CREATININE 1.1 01/23/2022    BUN 9 01/23/2022     (L) 01/23/2022    K 3.2 (L) 01/23/2022    CL 99 01/23/2022    CO2 24 01/23/2022     Uosm 225  Lon 44    Kappa and lamda elevated but normal ratio  SPEP neg    Assessment/Plan:  1. Acute-on-chronic hyponatremia. Increase ADH state. The  patient does have a possibility of metastatic lung cancer which may  increase ADH release. Na better today. Stop NSS. Decrease NaCl tabs to bid. Lasix 40mg bid. Improving - keep same   2. Non anion gap metabolic acidosis in the setting of hypokalemia. No  diarrhea. No need for bicarbonate replacement at this time. Positive  urine anion gap. May have type 2 RTA. Replaced K.   3.  Hypokalemia - needing repletion   Hypomagnesemia. Replaced. It should help with serum potassium also. 4.  Hypophosphatemia. Replaced. 5.  Anemia. Follow SPEP and serum free light chain.     6. Possible metastatic lung cancer as per Medicine and Pulmonary. Oncology is also following. S/P bronchoscopy. 7.  Hypercalcemia-Zometa given. Ca now lower. Replace judiciously.    8.  Stable renal status    Ananya Andrea MD

## 2022-01-23 NOTE — PROGRESS NOTES
Hospitalist Progress Note      PCP: [address],     Date of Admission: 1/13/2022    Chief Complaint: Progressive weakness    Hospital Course: This 67 y.o. female with PMHx of  thyroid disease, heart disease, CVA, thyroid disorder, DVT, A. fib presented with progressive weakness. On admission,CT of the chest showed 6.3 cm left upper lobe suprahilar mass suspicious for malignancy, mediastinal lymphadenopathy and 5 x 7 mm right middle lobe nodule. There are bilateral adrenal nodules suspicious for metastasis and L2 lytic lesion. Patient is also found to be hyperglycemic. She has leukocytosis and concern for postobstructive pneumonia on CT imaging pain         Interval history  Pt seen and examined today. Overnight events noted, interval ancillary notes and labs reviewed. On 2 L nasal cannula satting around 95%; wean as tolerated keep sats above 92%  Low-grade fever of 100.5, WBCs elevated to 21.3 on Zosyn;  Reported generalized weakness, shortness of breath and cough denied chest pain, nausea, vomiting or abdominal pain      Assessment/Plan:    Postobstructive pneumonia likely secondary to bronchogenic carcinoma  Patient afebrile, with elevated WBCs and procalcitonin admission  Pulmonology on board: Appreciate their recs  Status post 5-day course of Rocephin  Started back on Zosyn on 1/21/2022 due to elevated white count     Left upper lobe, suprahilar mass with mediastinal lymphadenopathy, bilateral adrenal metastasis and lytic bony lesions  Pulmonology and oncology on board: Appreciate recs  CT head and abdomen pelvis  for staging  Plan for bronchoscopy with endobronchial ultrasound biopsy of mediastinal lymph node tomorrow  Palliative care on board: Appreciate recs    2.3 cm left breast mass:  Oncology on board:  Outpatient biopsy recommended    Anemia of chronic disease: Status post packed RBCs on 1/17/2022  Monitor hemoglobin closely  Transfuse with hemoglobin less than 7    Hypercalcemia: Likely secondary to malignancy: Resolved  PTH Rh level noted  Status post Zometa on 1/14/22  Monitor calcium level closely    Hyponatremia: Acute on chronic likely secondary to increased ADH release in the setting of metastatic lung cancer:   Nephrology on board: Appreciate their recs  Continue sodium tabs twice daily and p.o. Lasix  Restrict dietary free water to 1800ml    Hypothyroidism: Continue Synthroid    Failure to thrive: In the setting of malignancy     History of DVT: On Xarelto at home currently on hold     History of paroxysmal A. fib: On Xarelto currently on hold      DVT Prophylaxis: Lovenox  Diet: ADULT ORAL NUTRITION SUPPLEMENT; Breakfast, Dinner; Standard High Calorie/High Protein Oral Supplement  ADULT ORAL NUTRITION SUPPLEMENT; Breakfast, Lunch, Dinner, HS Snack; Standard High Calorie/High Protein Oral Supplement  ADULT DIET; Regular; 1200 ml  Code Status: Full Code      I have discussed with the patient the rationale for blood component transfusion; its benefits in treating or preventing fatigue, organ damage, or death; and its risk which includes mild transfusion reactions, rare risk of blood borne infection, or more serious but rare reactions. I have discussed the alternatives to transfusion, including the risk and consequences of not receiving transfusion. The patient had an opportunity to ask questions and had agreed to proceed with transfusion of blood components.         Medications:  Reviewed    Infusion Medications    sodium chloride      sodium chloride      sodium chloride Stopped (01/19/22 2129)     Scheduled Medications    piperacillin-tazobactam  3,375 mg IntraVENous Q8H    sodium chloride  1 g Oral BID WC    furosemide  40 mg Oral BID    ipratropium-albuterol  1 ampule Inhalation Q4H    sodium chloride flush  5-40 mL IntraVENous 2 times per day    enoxaparin  1 mg/kg SubCUTAneous BID    magnesium oxide  400 mg Oral BID    [Held by provider] aspirin  81 mg Oral Daily    atorvastatin  40 mg Oral Daily    pantoprazole  40 mg Oral QAM AC    levothyroxine  112 mcg Oral Daily    sodium chloride flush  5-40 mL IntraVENous 2 times per day     PRN Meds: albuterol, ondansetron, promethazine, potassium chloride **OR** potassium alternative oral replacement **OR** potassium chloride, sodium chloride, sodium chloride flush, sodium chloride, ipratropium-albuterol, oxyCODONE-acetaminophen, sodium chloride flush, sodium chloride, acetaminophen **OR** acetaminophen      Intake/Output Summary (Last 24 hours) at 1/23/2022 0909  Last data filed at 1/23/2022 0551  Gross per 24 hour   Intake 429.03 ml   Output 3650 ml   Net -3220.97 ml       Physical Exam Performed:    /81   Pulse 76   Temp 98 °F (36.7 °C) (Axillary)   Resp 20   Ht 5' 4\" (1.626 m)   Wt 130 lb 1.6 oz (59 kg)   SpO2 95%   BMI 22.33 kg/m²     General appearance: No apparent distress, appears stated age and cooperative. HEENT: Pupils equal, round, and reactive to light. Conjunctivae/corneas clear. Neck: Supple, with full range of motion. No jugular venous distention. Trachea midline. Respiratory:  Normal respiratory effort. Clear to auscultation, bilaterally without Rales/Wheezes/Rhonchi. Cardiovascular: Regular rate and rhythm with normal S1/S2 without murmurs, rubs or gallops. Abdomen: Soft, non-tender, non-distended with normal bowel sounds. Musculoskeletal: No clubbing, cyanosis or edema bilaterally. Full range of motion without deformity. Skin: Skin color, texture, turgor normal.  No rashes or lesions. Neurologic:  Neurovascularly intact without any focal sensory/motor deficits.  Cranial nerves: II-XII intact, grossly non-focal.  Psychiatric: Alert and oriented, thought content appropriate, normal insight  Capillary Refill: Brisk,< 3 seconds   Peripheral Pulses: +2 palpable, equal bilaterally       Labs:   Recent Labs     01/21/22  0515 01/22/22  0540 01/23/22  0555   WBC 26.7* 18.2* 21.3*   HGB 8.6* 8.5* 9.2* HCT 26.5* 26.3* 28.3*    253 259     Recent Labs     01/21/22  0515 01/22/22  0540 01/23/22  0555   * 134* 134*   K 4.7 3.7 3.2*   CL 98* 101 99   CO2 19* 22 24   BUN 13 12 9   CREATININE 1.0 1.1 1.1   CALCIUM 6.4* 6.9* 6.4*   PHOS 3.8 1.8* 2.0*     No results for input(s): AST, ALT, BILIDIR, BILITOT, ALKPHOS in the last 72 hours. Recent Labs     01/20/22  1024   INR 1.02     No results for input(s): Tiffany Councilman in the last 72 hours. Urinalysis:      Lab Results   Component Value Date    NITRU Negative 01/13/2022    WBCUA 18 01/13/2022    BACTERIA RARE 01/13/2022    RBCUA 1 01/13/2022    BLOODU Negative 01/13/2022    SPECGRAV 1.015 01/13/2022    GLUCOSEU Negative 01/13/2022       Radiology:  XR CHEST PORTABLE   Final Result   Focal consolidations seen within the left upper lobe. Pneumonia is primarily   considered. However, that must be followed to resolution to ensure that   there is no underlying neoplasm. CT ABDOMEN PELVIS W IV CONTRAST Additional Contrast? Oral   Final Result   1. Bilateral adrenal masses, likely metastatic. No other evidence of   abdominal or pelvic metastatic disease. 2. Lytic L2 vertebral body lesion concerning for metastatic disease. 3. 2.3 cm left breast mass likely malignant. 4. Cholelithiasis with no acute features. 5. Nonobstructive right nephrolithiasis. CT HEAD W WO CONTRAST   Final Result   No acute intracranial abnormality. No definite evidence of intracranial metastatic disease. It is noted that CT   imaging is insensitive for small metastatic lesions. If possible further   evaluation with MRI should be considered for better characterization. RECOMMENDATIONS:   Unavailable         CT CHEST WO CONTRAST   Final Result   1. 6.3 cm left upper lobe, suprahilar mass, suspicious for malignancy. There   may be a postobstructive component of pneumonia, versus transbronchial spread   of tumor in the apex of the left upper lobe. 2. AP window mediastinal adenopathy, likely metastatic. 3. 9 x 7 mm right middle lobe nodule, indeterminate for inflammation, primary   or metastatic neoplasm. 4. Bilateral adrenal nodules, suspicious for metastasis. RECOMMENDATIONS:   Unavailable         XR CHEST PORTABLE   Final Result   New left upper lobe opacity concerning for pneumonia versus malignancy. RECOMMENDATION:   Chest CT is recommended for further evaluation.                  Active Hospital Problems    Diagnosis     Hypercalcemia [E83.52]     Severe malnutrition (HCC) [E43]     PNA (pneumonia) [J18.9]     Lung mass [R91.8]            Electronically signed by Jose Miguel Bhat MD on 1/23/2022 at 9:09 AM

## 2022-01-24 ENCOUNTER — APPOINTMENT (OUTPATIENT)
Dept: NUCLEAR MEDICINE | Age: 77
DRG: 871 | End: 2022-01-24
Payer: MEDICARE

## 2022-01-24 LAB
ANION GAP SERPL CALCULATED.3IONS-SCNC: 13 MMOL/L (ref 3–16)
BASOPHILS ABSOLUTE: 0.3 K/UL (ref 0–0.2)
BASOPHILS RELATIVE PERCENT: 1.2 %
BUN BLDV-MCNC: 10 MG/DL (ref 7–20)
CALCIUM SERPL-MCNC: 6.4 MG/DL (ref 8.3–10.6)
CHLORIDE BLD-SCNC: 95 MMOL/L (ref 99–110)
CO2: 25 MMOL/L (ref 21–32)
CREAT SERPL-MCNC: 1.1 MG/DL (ref 0.6–1.2)
CREATININE URINE: 39.1 MG/DL (ref 28–259)
EOSINOPHILS ABSOLUTE: 0.2 K/UL (ref 0–0.6)
EOSINOPHILS RELATIVE PERCENT: 0.7 %
GFR AFRICAN AMERICAN: 58
GFR NON-AFRICAN AMERICAN: 48
GLUCOSE BLD-MCNC: 74 MG/DL (ref 70–99)
HCT VFR BLD CALC: 27.8 % (ref 36–48)
HEMOGLOBIN: 9.1 G/DL (ref 12–16)
LACTIC ACID: 2 MMOL/L (ref 0.4–2)
LYMPHOCYTES ABSOLUTE: 0.9 K/UL (ref 1–5.1)
LYMPHOCYTES RELATIVE PERCENT: 3.5 %
MAGNESIUM: 1.8 MG/DL (ref 1.8–2.4)
MCH RBC QN AUTO: 30.4 PG (ref 26–34)
MCHC RBC AUTO-ENTMCNC: 32.7 G/DL (ref 31–36)
MCV RBC AUTO: 93 FL (ref 80–100)
MONOCYTES ABSOLUTE: 1.4 K/UL (ref 0–1.3)
MONOCYTES RELATIVE PERCENT: 5.4 %
NEUTROPHILS ABSOLUTE: 22.5 K/UL (ref 1.7–7.7)
NEUTROPHILS RELATIVE PERCENT: 89.2 %
PDW BLD-RTO: 17.3 % (ref 12.4–15.4)
PHOSPHORUS  URINE: 12.2 MG/DL (ref 40–136)
PHOSPHORUS: 1.9 MG/DL (ref 2.5–4.9)
PLATELET # BLD: 257 K/UL (ref 135–450)
PMV BLD AUTO: 6.6 FL (ref 5–10.5)
POTASSIUM SERPL-SCNC: 3.1 MMOL/L (ref 3.5–5.1)
RBC # BLD: 2.99 M/UL (ref 4–5.2)
SODIUM BLD-SCNC: 133 MMOL/L (ref 136–145)
WBC # BLD: 25.3 K/UL (ref 4–11)

## 2022-01-24 PROCEDURE — 6370000000 HC RX 637 (ALT 250 FOR IP): Performed by: INTERNAL MEDICINE

## 2022-01-24 PROCEDURE — 97530 THERAPEUTIC ACTIVITIES: CPT

## 2022-01-24 PROCEDURE — 6370000000 HC RX 637 (ALT 250 FOR IP): Performed by: NURSE PRACTITIONER

## 2022-01-24 PROCEDURE — 1200000000 HC SEMI PRIVATE

## 2022-01-24 PROCEDURE — 2580000003 HC RX 258: Performed by: INTERNAL MEDICINE

## 2022-01-24 PROCEDURE — 6360000002 HC RX W HCPCS: Performed by: INTERNAL MEDICINE

## 2022-01-24 PROCEDURE — 99223 1ST HOSP IP/OBS HIGH 75: CPT | Performed by: INTERNAL MEDICINE

## 2022-01-24 PROCEDURE — 6360000002 HC RX W HCPCS: Performed by: NURSE PRACTITIONER

## 2022-01-24 PROCEDURE — 85025 COMPLETE CBC W/AUTO DIFF WBC: CPT

## 2022-01-24 PROCEDURE — A9503 TC99M MEDRONATE: HCPCS | Performed by: INTERNAL MEDICINE

## 2022-01-24 PROCEDURE — 87641 MR-STAPH DNA AMP PROBE: CPT

## 2022-01-24 PROCEDURE — 84105 ASSAY OF URINE PHOSPHORUS: CPT

## 2022-01-24 PROCEDURE — 99222 1ST HOSP IP/OBS MODERATE 55: CPT | Performed by: INTERNAL MEDICINE

## 2022-01-24 PROCEDURE — 84100 ASSAY OF PHOSPHORUS: CPT

## 2022-01-24 PROCEDURE — 3430000000 HC RX DIAGNOSTIC RADIOPHARMACEUTICAL: Performed by: INTERNAL MEDICINE

## 2022-01-24 PROCEDURE — 83605 ASSAY OF LACTIC ACID: CPT

## 2022-01-24 PROCEDURE — 82570 ASSAY OF URINE CREATININE: CPT

## 2022-01-24 PROCEDURE — 94760 N-INVAS EAR/PLS OXIMETRY 1: CPT

## 2022-01-24 PROCEDURE — 80048 BASIC METABOLIC PNL TOTAL CA: CPT

## 2022-01-24 PROCEDURE — 2500000003 HC RX 250 WO HCPCS: Performed by: INTERNAL MEDICINE

## 2022-01-24 PROCEDURE — 78306 BONE IMAGING WHOLE BODY: CPT

## 2022-01-24 PROCEDURE — 87086 URINE CULTURE/COLONY COUNT: CPT

## 2022-01-24 PROCEDURE — 87077 CULTURE AEROBIC IDENTIFY: CPT

## 2022-01-24 PROCEDURE — 6370000000 HC RX 637 (ALT 250 FOR IP): Performed by: HOSPITALIST

## 2022-01-24 PROCEDURE — 83735 ASSAY OF MAGNESIUM: CPT

## 2022-01-24 PROCEDURE — 87186 SC STD MICRODIL/AGAR DIL: CPT

## 2022-01-24 RX ORDER — SODIUM CHLORIDE 0.9 % (FLUSH) 0.9 %
10 SYRINGE (ML) INJECTION PRN
Status: DISCONTINUED | OUTPATIENT
Start: 2022-01-24 | End: 2022-01-26 | Stop reason: HOSPADM

## 2022-01-24 RX ORDER — OXYCODONE HYDROCHLORIDE AND ACETAMINOPHEN 5; 325 MG/1; MG/1
2 TABLET ORAL EVERY 4 HOURS PRN
Status: DISCONTINUED | OUTPATIENT
Start: 2022-01-24 | End: 2022-01-26 | Stop reason: HOSPADM

## 2022-01-24 RX ORDER — CYANOCOBALAMIN 1000 UG/ML
1000 INJECTION INTRAMUSCULAR; SUBCUTANEOUS ONCE
Status: COMPLETED | OUTPATIENT
Start: 2022-01-24 | End: 2022-01-24

## 2022-01-24 RX ORDER — OXYCODONE HYDROCHLORIDE AND ACETAMINOPHEN 5; 325 MG/1; MG/1
1 TABLET ORAL EVERY 4 HOURS PRN
Status: DISCONTINUED | OUTPATIENT
Start: 2022-01-24 | End: 2022-01-26 | Stop reason: HOSPADM

## 2022-01-24 RX ORDER — FOLIC ACID 1 MG/1
1 TABLET ORAL DAILY
Status: DISCONTINUED | OUTPATIENT
Start: 2022-01-24 | End: 2022-01-26 | Stop reason: HOSPADM

## 2022-01-24 RX ORDER — LINEZOLID 2 MG/ML
600 INJECTION, SOLUTION INTRAVENOUS EVERY 12 HOURS
Status: DISCONTINUED | OUTPATIENT
Start: 2022-01-24 | End: 2022-01-26

## 2022-01-24 RX ORDER — TC 99M MEDRONATE 20 MG/10ML
28.3 INJECTION, POWDER, LYOPHILIZED, FOR SOLUTION INTRAVENOUS
Status: COMPLETED | OUTPATIENT
Start: 2022-01-24 | End: 2022-01-24

## 2022-01-24 RX ORDER — IPRATROPIUM BROMIDE AND ALBUTEROL SULFATE 2.5; .5 MG/3ML; MG/3ML
1 SOLUTION RESPIRATORY (INHALATION) EVERY 4 HOURS PRN
Status: DISCONTINUED | OUTPATIENT
Start: 2022-01-24 | End: 2022-01-26 | Stop reason: HOSPADM

## 2022-01-24 RX ADMIN — ASPIRIN 81 MG: 81 TABLET, COATED ORAL at 09:06

## 2022-01-24 RX ADMIN — OXYCODONE AND ACETAMINOPHEN 1 TABLET: 5; 325 TABLET ORAL at 00:45

## 2022-01-24 RX ADMIN — LINEZOLID 600 MG: 2 INJECTION, SOLUTION INTRAVENOUS at 22:59

## 2022-01-24 RX ADMIN — POTASSIUM BICARBONATE 40 MEQ: 782 TABLET, EFFERVESCENT ORAL at 09:07

## 2022-01-24 RX ADMIN — Medication 250 MG: at 22:56

## 2022-01-24 RX ADMIN — POTASSIUM PHOSPHATE, MONOBASIC AND POTASSIUM PHOSPHATE, DIBASIC 30 MMOL: 224; 236 INJECTION, SOLUTION, CONCENTRATE INTRAVENOUS at 09:11

## 2022-01-24 RX ADMIN — Medication 400 MG: at 22:26

## 2022-01-24 RX ADMIN — Medication 250 MG: at 10:24

## 2022-01-24 RX ADMIN — CALCIUM GLUCONATE 4000 MG: 98 INJECTION, SOLUTION INTRAVENOUS at 09:13

## 2022-01-24 RX ADMIN — FUROSEMIDE 40 MG: 40 TABLET ORAL at 16:15

## 2022-01-24 RX ADMIN — Medication 10 ML: at 08:56

## 2022-01-24 RX ADMIN — FOLIC ACID 1 MG: 1 TABLET ORAL at 10:23

## 2022-01-24 RX ADMIN — PIPERACILLIN AND TAZOBACTAM 3375 MG: 3; .375 INJECTION, POWDER, LYOPHILIZED, FOR SOLUTION INTRAVENOUS at 00:41

## 2022-01-24 RX ADMIN — Medication 10 ML: at 09:14

## 2022-01-24 RX ADMIN — LINEZOLID 600 MG: 2 INJECTION, SOLUTION INTRAVENOUS at 10:32

## 2022-01-24 RX ADMIN — LEVOTHYROXINE SODIUM 112 MCG: 0.11 TABLET ORAL at 09:06

## 2022-01-24 RX ADMIN — ENOXAPARIN SODIUM 60 MG: 100 INJECTION SUBCUTANEOUS at 09:07

## 2022-01-24 RX ADMIN — Medication 10 ML: at 23:04

## 2022-01-24 RX ADMIN — ENOXAPARIN SODIUM 60 MG: 100 INJECTION SUBCUTANEOUS at 22:25

## 2022-01-24 RX ADMIN — Medication 400 MG: at 09:06

## 2022-01-24 RX ADMIN — Medication 10 ML: at 09:07

## 2022-01-24 RX ADMIN — ACETAMINOPHEN 650 MG: 325 TABLET ORAL at 22:26

## 2022-01-24 RX ADMIN — OXYCODONE AND ACETAMINOPHEN 1 TABLET: 5; 325 TABLET ORAL at 17:06

## 2022-01-24 RX ADMIN — TC 99M MEDRONATE 28.3 MILLICURIE: 20 INJECTION, POWDER, LYOPHILIZED, FOR SOLUTION INTRAVENOUS at 08:55

## 2022-01-24 RX ADMIN — OXYCODONE AND ACETAMINOPHEN 1 TABLET: 5; 325 TABLET ORAL at 09:06

## 2022-01-24 RX ADMIN — SODIUM CHLORIDE TAB 1 GM 1 G: 1 TAB at 16:15

## 2022-01-24 RX ADMIN — PANTOPRAZOLE SODIUM 40 MG: 40 TABLET, DELAYED RELEASE ORAL at 06:33

## 2022-01-24 RX ADMIN — ACETAMINOPHEN 650 MG: 325 TABLET ORAL at 13:36

## 2022-01-24 RX ADMIN — SODIUM CHLORIDE TAB 1 GM 1 G: 1 TAB at 09:07

## 2022-01-24 RX ADMIN — CYANOCOBALAMIN 1000 MCG: 1000 INJECTION, SOLUTION INTRAMUSCULAR; SUBCUTANEOUS at 10:22

## 2022-01-24 RX ADMIN — ATORVASTATIN CALCIUM 40 MG: 40 TABLET, FILM COATED ORAL at 09:06

## 2022-01-24 ASSESSMENT — PAIN DESCRIPTION - ORIENTATION
ORIENTATION: UPPER

## 2022-01-24 ASSESSMENT — PAIN DESCRIPTION - PAIN TYPE
TYPE: ACUTE PAIN
TYPE: CHRONIC PAIN
TYPE: CHRONIC PAIN

## 2022-01-24 ASSESSMENT — ENCOUNTER SYMPTOMS
COUGH: 0
COLOR CHANGE: 0
FACIAL SWELLING: 0
EYE REDNESS: 0
ABDOMINAL PAIN: 0
PHOTOPHOBIA: 0
BLOOD IN STOOL: 0
CHOKING: 0
STRIDOR: 0
TROUBLE SWALLOWING: 0
NAUSEA: 0
EYE DISCHARGE: 0
DIARRHEA: 1
RHINORRHEA: 0
SHORTNESS OF BREATH: 0
APNEA: 0
CHEST TIGHTNESS: 0

## 2022-01-24 ASSESSMENT — PAIN DESCRIPTION - FREQUENCY: FREQUENCY: CONTINUOUS

## 2022-01-24 ASSESSMENT — PAIN DESCRIPTION - ONSET: ONSET: ON-GOING

## 2022-01-24 ASSESSMENT — PAIN SCALES - WONG BAKER: WONGBAKER_NUMERICALRESPONSE: 8

## 2022-01-24 ASSESSMENT — PAIN SCALES - GENERAL
PAINLEVEL_OUTOF10: 10
PAINLEVEL_OUTOF10: 10
PAINLEVEL_OUTOF10: 6
PAINLEVEL_OUTOF10: 6
PAINLEVEL_OUTOF10: 0
PAINLEVEL_OUTOF10: 0
PAINLEVEL_OUTOF10: 6
PAINLEVEL_OUTOF10: 10

## 2022-01-24 ASSESSMENT — PAIN DESCRIPTION - DESCRIPTORS: DESCRIPTORS: PATIENT UNABLE TO DESCRIBE

## 2022-01-24 ASSESSMENT — PAIN DESCRIPTION - LOCATION
LOCATION: BACK

## 2022-01-24 NOTE — PLAN OF CARE
Nutrition Problem #1: Inadequate oral intake  Intervention: Food and/or Nutrient Delivery: Continue Current Diet,Continue Oral Nutrition Supplement  Nutritional Goals: Pt will eat 50% or greater of meals and supplements

## 2022-01-24 NOTE — CARE COORDINATION
LORI informed pt was in need of transportation to/from an outpatient CT Simulation and Radiation Therapy. Spoke with Ava Orr at [facility], [phone], who stated they have an appointment available at the [facility] in the basement tomorrow morning at [address] called [facility] and booked transport at 9:30am for a sit, wait and return back to [facility].  Transport confirmed they will wait the 30 minutes they appointment will take then transport pt back. LORI called Johnathan Jordan at [facility], [phone], informing that patient may need radiation treatments during SNF stay. LORI stated I did not have the details on when, where and how often but if pt needed it then the SNF would need to provide transport as pt's only dtr and support lives in [place]. Esau asked for a call with full details and they will see if they can assist with this.     Electronically signed by AMANDA Sanchez, CORTNEYW on 1/24/2022 at 3:11 PM

## 2022-01-24 NOTE — PROGRESS NOTES
[facility] Note    Patient Active Problem List   Diagnosis    Sprain of ligament of lumbosacral joint    BWC Neck sprain and strain    BWC Degeneration of lumbar or lumbosacral intervertebral disc    BWC Acquired spondylolisthesis    BWC Displacement of lumbar intervertebral disc without myelopathy    BWC Spinal stenosis, lumbar region, without neurogenic claudication    BWC Depressive disorder, not elsewhere classified    Chronic pain syndrome    Lupus ([address]    Coronary artery disease due to lipid rich plaque    Hyperlipidemia LDL goal <70    Paroxysmal atrial fibrillation (HCC)    History of stroke    History of DVT (deep vein thrombosis)    Gastroesophageal reflux disease without esophagitis    Acquired hypothyroidism    Lung mass    Tobacco use    Elevated ferritin    Chronic insomnia    Anxiety disorder    Radiolucent lesion of bone    Chronic low back pain    Breast tenderness    Bad taste in mouth    Decreased appetite    Closed fracture of upper end of left humerus    Hyponatremia    Generalized weakness    Severe malnutrition (HCC)    Anemia    Hypoxia    Autoimmune diabetes (HCC)    Hypergammaglobulinemia, unspecified    PNA (pneumonia)    Hypercalcemia    Sepsis ([address]    Left breast mass    Lymphadenopathy    Right nephrolithiasis    Cholelithiasis without cholecystitis       Past Medical History:   has a past medical history of Acquired spondylolisthesis, Arthritis, Cataract, Chronic pain syndrome, Degeneration of lumbar or lumbosacral intervertebral disc, Depressive disorder, not elsewhere classified, Displacement of lumbar intervertebral disc without myelopathy, H/O blood clots, HBP (high blood pressure), Heart disease, Spinal stenosis, lumbar region, without neurogenic claudication, Sprain of lumbosacral (joint) (ligament), Sprain of neck, Stroke ([address] Thyroid disorder, and Thyroid disorder.     Past Social History:   reports that she has been smoking. She started smoking about 62 years ago. She has a 28.50 pack-year smoking history. She has never used smokeless tobacco. She reports previous alcohol use. She reports that she does not use drugs. Subjective:    No complaints today     Objective:      BP 94/64   Pulse 105   Temp 97.7 °F (36.5 °C) (Oral)   Resp 18   Ht 5' 4\" (1.626 m)   Wt 130 lb 11.2 oz (59.3 kg)   SpO2 96%   BMI 22.43 kg/m²     Wt Readings from Last 3 Encounters:   01/24/22 130 lb 11.2 oz (59.3 kg)   12/06/19 128 lb 12.8 oz (58.4 kg)   10/04/19 122 lb (55.3 kg)       BP Readings from Last 3 Encounters:   01/24/22 94/64   01/20/22 123/60   12/06/19 122/64     Chest- clear  Heart-regular  Abd-soft  Ext- 1+ edema    Labs  Hemoglobin   Date Value Ref Range Status   01/24/2022 9.1 (L) 12.0 - 16.0 g/dL Final     Hematocrit   Date Value Ref Range Status   01/24/2022 27.8 (L) 36.0 - 48.0 % Final     WBC   Date Value Ref Range Status   01/24/2022 25.3 (H) 4.0 - 11.0 K/uL Final     Platelets   Date Value Ref Range Status   01/24/2022 257 135 - 450 K/uL Final     Lab Results   Component Value Date    CREATININE 1.1 01/24/2022    BUN 10 01/24/2022     (L) 01/24/2022    K 3.1 (L) 01/24/2022    CL 95 (L) 01/24/2022    CO2 25 01/24/2022     Uosm 225  Lon 44    Kappa and lamda elevated but normal ratio  SPEP neg    Assessment/Plan:  1. Acute-on-chronic hyponatremia. Increase ADH state. The  patient does have a possibility of metastatic lung cancer which may  increase ADH release. Na better today. Stopped NSS. Decreased NaCl tabs to bid. Lasix 40mg bid. Improving - keep same   2. Non anion gap metabolic acidosis in the setting of hypokalemia. No  diarrhea. No need for bicarbonate replacement at this time. Positive  urine anion gap. May have type 2 RTA. Replace K.   3.  Hypokalemia - needing repletion. Replace per protocol. Replace po. Hypomagnesemia. Replaced.   It should help with serum potassium also. 4.  Hypophosphatemia. Replace. 5.  Anemia. SPEP and serum free light chain result noted. 6.  Possible metastatic lung cancer as per Medicine and Pulmonary. Oncology is also following. S/P bronchoscopy. 7.  Hypercalcemia-Zometa given. Ca now lower. Replace judiciously.    8.  Stable renal status    Mahesh Salinas MD

## 2022-01-24 NOTE — PROGRESS NOTES
Occupational Therapy  Facility/Department: [facility] 3A NURSING  Daily Treatment Note  NAME: Rachele Oropeza  : 1945  MRN: [id number]    Date of Service: 2022    Discharge Recommendations:    Rachele Oropeza scored a 14 on the AM-PAC ADL Inpatient form. Current research shows that an AM-PAC score of 17 or less is typically not associated with a discharge to the patient's home setting. Based on the patient's AM-PAC score and their current ADL deficits, it is recommended that the patient have 3-5 sessions per week of Occupational Therapy at d/c to increase the patient's independence. Please see assessment section for further patient specific details. If patient discharges prior to next session this note will serve as a discharge summary. Please see below for the latest assessment towards goals. OT Equipment Recommendations  Other: defer to next level of care. May benefit from a life alert button, grab bar in bathtub    Assessment   Performance deficits / Impairments: Decreased functional mobility ; Decreased safe awareness;Decreased balance;Decreased ADL status; Decreased endurance;Decreased high-level IADLs  Assessment: Pt limited by anxiety this session, perseverates during conversation/cueing to attend to task throughout session. Pt unable to ambulate this date/use of rachell STEDY to perform transfers for ADL participation. Patient is not at baseline level following admission for weakness, pneumonia and lung mass (tests pending). Patient would benefit from continued skilled OT services to address deficits.   Treatment Diagnosis: Debility and decreased ADLs due to pneumonia, weakness  Prognosis: Fair  OT Education: OT Role;Plan of Care;ADL Adaptive Strategies;Transfer Training;Energy Conservation;Equipment  Patient Education: Patient verbalized understanding but would benefit from reinforcement  Barriers to Learning: cognition, emotional  REQUIRES OT FOLLOW UP: Yes  Activity Tolerance  Activity Tolerance: Patient limited by fatigue  Activity Tolerance: reports feeling dizzy and fatigued  Safety Devices  Safety Devices in place: Yes  Type of devices: Call light within reach; Bed alarm in place; Left in bed;Nurse notified  Restraints  Initially in place: No         Patient Diagnosis(es): The primary encounter diagnosis was Fatigue, unspecified type. Diagnoses of Suspected malignant neoplasm, Pneumonia due to infectious organism, unspecified laterality, unspecified part of lung, and Left breast mass were also pertinent to this visit. has a past medical history of Acquired spondylolisthesis, Arthritis, Cataract, Chronic pain syndrome, Degeneration of lumbar or lumbosacral intervertebral disc, Depressive disorder, not elsewhere classified, Displacement of lumbar intervertebral disc without myelopathy, H/O blood clots, HBP (high blood pressure), Heart disease, Spinal stenosis, lumbar region, without neurogenic claudication, Sprain of lumbosacral (joint) (ligament), Sprain of neck, Stroke [facility]), Thyroid disorder, and Thyroid disorder. has a past surgical history that includes partial hysterectomy (cervix not removed) (1964); Hysterectomy, total abdominal (1972); lumbar fusion; bronchoscopy (1/20/2022); and bronchoscopy (1/20/2022). Restrictions  Restrictions/Precautions  Restrictions/Precautions: Fall Risk (High fall risk)  Required Braces or Orthoses?: No  Position Activity Restriction  Other position/activity restrictions: 67 y.o. female with past medical history of thyroid disease, heart disease, CVA, thyroid disorder, DVT, A. fib, presents from home in view of progressive weakness. Patient is a poor historian. She states she has been getting weak. She also describes stabbing pain that goes from her chest to her back when she lies down. States pain has been there for few months. She states that she has been getting all of her care from a nurse practitioner who visits her at home every few months.   She has been living with her friend Alex for the last few months. I have discussed this presentation with Alice Robles over the phone. According to her, patient has been getting progressively weak over the last couple of months. She has not been eating much. She has been spending increasingly more time in bed and has not really gotten out of bed for over a week now. Patient has been managing her own medications. She has been somewhat confused over the past week but does not have memory problems at baseline. Upon chart review it appears that patient has not seen her primary care provider since 2019. Recent communication documented between patient's NP and Dr. Grant Vargas. There is being requested for imaging in view of concern for lung cancer. I attempted to reach out to Lucero Lugo NP at [phone]. She is not available today. I am expecting a call back from another NP with some information from her chart. ADD noncontrast CT of the chest shows 6.3 cm left upper lobe suprahilar mass suspicious for malignancy. There is also mediastinal adenopathy. There is a 5 x 7 mm right middle lobe nodule. There are bilateral adrenal nodules suspicious for metastasis. Patient is also found to be hyperglycemic.   She has leukocytosis and concern for postobstructive pneumonia on CT imaging pain  Subjective   General  Chart Reviewed: Yes  Patient assessed for rehabilitation services?: Yes  Additional Pertinent Hx: h/o stroke, left side residual weakness  Response to previous treatment: Patient reporting fatigue but able to participate  Family / Caregiver Present: No  Diagnosis: Left breast mass/Stage 4 cancer  Subjective  Subjective: pt in chair on arrival - tearful and upset throughout - states she is having significant pain in shoulders and neck - reports some relief with return to bed and RN notified  General Comment  Comments: Patient c/o 5/10 pain between shoulder blades    Patient very talkative regarding relationship with daughter, current living situation, not wanting to upset her friend regarding her diagnosis - provided emotional support during session. Orientation  Orientation  Overall Orientation Status: Within Functional Limits  Objective    ADL  Additional Comments: declines ADLs at this time - assisted to return to bed           Bed mobility  Sit to Supine: Stand by assistance  Scooting: Stand by assistance  Transfers  Sit to stand: Contact guard assistance  Stand to sit: Contact guard assistance  Transfer Comments: SPT from high back chair to [facility]  Times per week: 3-5  Times per day: Daily  Current Treatment Recommendations: Endurance Training,Patient/Caregiver Education & Training,Self-Care / ADL,Functional Mobility Training,Safety Education & Training    AM-PAC Score        AM-PAC Inpatient Daily Activity Raw Score: 14 (01/24/22 1053)  AM-PAC Inpatient ADL T-Scale Score : 33.39 (01/24/22 1053)  ADL Inpatient CMS 0-100% Score: 59.67 (01/24/22 1053)  ADL Inpatient CMS G-Code Modifier : CK (01/24/22 1053)    Goals  Short term goals  Time Frame for Short term goals: Until discharge  Short term goal 1: Minimal assist UB/LB bathing-ongoing 1/24  Short term goal 2: Minimal assist UB/LB- ongoing 1/24  Short term goal 3: Minimal assist toileting- max/dependent 1/19  Short term goal 4: CGA functional transfers with RW- SPT CGA 1/24  Short term goal 5: SBA functional mobility-  Dependent of rachell ZARAGOZA 1/19  Long term goals  Time Frame for Long term goals : STGs= LTGs  Patient Goals   Patient goals : Get stronger and go back home with friend       Therapy Time   Individual Concurrent Group Co-treatment   Time In 0935         Time Out 1001         Minutes 26         Timed Code Treatment Minutes: 26 Minutes   Total minutes 26    Alex Saeed, OT   Alex Dow OTR/L [id number], 1/24/2022, 10:55 AM

## 2022-01-24 NOTE — CONSULTS
Infectious Diseases   Consult Note        Admission Date: 1/13/2022  Hospital Day: Hospital Day: 12   Attending: Mikayla Jimenez MD  Date of service: 1/24/22     Reason for admission: Left breast mass [N63.20]  PNA (pneumonia) [J18.9]  Suspected malignant neoplasm [R68.89]  Fatigue, unspecified type [R53.83]  Pneumonia due to infectious organism, unspecified laterality, unspecified part of lung [J18.9]    Chief complaint/ Reason for consult: Sepsis, unclear source    Microbiology:        I have reviewed allavailable micro lab data and cultures    · Blood culture (2/2) - collected on 1/13/2022: Negative    · Urine culture  - collected on 1/13/2022: Negative      Antibiotics and immunizations:       Current antibiotics: All antibiotics and their doses were reviewed by me    Recent Abx Admin                   piperacillin-tazobactam (ZOSYN) 3,375 mg in dextrose 5 % 50 mL IVPB extended infusion (mini-bag) (mg) 3,375 mg New Bag 01/24/22 0041     3,375 mg New Bag 01/23/22 1623                  Immunization History: All immunization history was reviewed by me today. Immunization History   Administered Date(s) Administered    Influenza, High Dose (Fluzone 65 yrs and older) 10/13/2018    Pneumococcal Conjugate 13-valent ([id number]) 10/28/2015    Pneumococcal Conjugate 7-valent (Prevnar7) 10/28/2015       Known drug allergies: All allergies were reviewed and updated    Allergies   Allergen Reactions    Other Other (See Comments)     Anti depression cause black outs     Trazodone      Sedation?  Venlafaxine      Sedation? Social history:     Social History:  All social andepidemiologic history was reviewed and updated by me today as needed. · Tobacco use:   reports that she has been smoking. She started smoking about 62 years ago. She has a 28.50 pack-year smoking history. She has never used smokeless tobacco.  · Alcohol use:   reports previous alcohol use.   · [facility] [address]  ·  reports no history of drug use. COVID VACCINATION AND LAB RESULT RECORDS:     Internal Administration   First Dose      Second Dose           Last COVID Lab SARS-CoV-2, NAAT (no units)   Date Value   01/20/2022 Not Detected            Assessment:     The patient is a 67 y.o. old female who  has a past medical history of Acquired spondylolisthesis, Arthritis, Cataract, Chronic pain syndrome, Degeneration of lumbar or lumbosacral intervertebral disc, Depressive disorder, not elsewhere classified, Displacement of lumbar intervertebral disc without myelopathy, H/O blood clots, HBP (high blood pressure), Heart disease, Spinal stenosis, lumbar region, without neurogenic claudication, Sprain of lumbosacral (joint) (ligament), Sprain of neck, Stroke ([facility] [address] Thyroid disorder, and Thyroid disorder. with following problems:    · Sepsis with leukocytosis, tachycardia, hypotension  · On and off fever  · Generalized weakness on admission  · Mild hyponatremia  · Paratracheal lymphadenopathy, s/p bronchoscopy with EBUS and fine-needle aspiration cytology of lymph node, surgical path consistent with non-small cell carcinoma  · Negative COVID 19 NAAT on 1/20/2022, no PCR available  · Slightly elevated procalcitonin of 0.41 on 1/24/22  · Bilateral adrenal masses and lytic L2 vertebral body lesion likely metastatic, likely from non-small cell lung cancer, noted on CT scan of her abdomen pelvis IV contrast on 1/14/2022  · Left breast mass, likely malignant or metastatic, noted on CT scan 1/14/2022  · Right-sided nephrolithiasis  · Cholelithiasis without cholecystitis      Discussion:      The patient has had persistent leukocytosis. He had fevers up to 100.5 on January 22, which have come down.     The patient has had ongoing leukocytosis, has been tachycardic and hypotensive earlier today    She has newly diagnosed metastatic non-small cell lung cancer with metastasis to paratracheal lymph nodes, bilateral adrenal areas, are no current isolations documented for this patient. Level of complexity of consult: High     Risk of Complications/Morbidity: High     · Illness(es)/ Infection present that pose threat to life/bodily function. · There is potential for severe exacerbation of infection/side effects of treatment. · Therapy requires intensive monitoring for antimicrobial agent toxicity. Thank you for involving me in the care of your patient. I will continue to follow. If you have any additional questions, please do not hesitate to contact me. Subjective:     Presenting complaint in ER:     Chief Complaint   Patient presents with    Fatigue     Pt in via EMS from home. Pts neighbor called 911 because the pt has been getting weaker. Pt has been feeling weak for the past few months. Pt endorses no pain but chest pressure that goes through to her back. Pt has hx of stroke and angioplasty        HPI: Susan Mukherjee is a 67 y.o. female patient, who was seen at the request of Dr. Trinidad Vargas MD.    History was obtained from chart review and the patient. The patient was admitted on 1/13/2022. I have been consulted to see the patient for above mentioned reason(s). The patient has multiple medical comorbidities, and presented to the ER for generalized weakness. The patient was admitted. She has been in the hospital for more than 10 days. Her white cell count was 18,700 on admission. She was started on empiric IV ceftriaxone azithromycin by primary team and received azithromycin for 5 days and ceftriaxone for 6 days. Her white cell count remained grossly unchanged and went up to 26,700 on January 21. She was started on empiric IV Zosyn by primary team.  White cell count is 25,300 today. She has had on and off fevers for the past 2 days    Blood cultures from admission have been negative    I have been asked for my opinion for management for this patient.                    Past Medical History: All past medical history reviewed today. Past Medical History:   Diagnosis Date    Acquired spondylolisthesis     Arthritis     Cataract     Chronic pain syndrome     Degeneration of lumbar or lumbosacral intervertebral disc     Depressive disorder, not elsewhere classified     Displacement of lumbar intervertebral disc without myelopathy     H/O blood clots     HBP (high blood pressure)     Heart disease     Spinal stenosis, lumbar region, without neurogenic claudication     Sprain of lumbosacral (joint) (ligament)     Sprain of neck     Stroke ([facility] [address]     Thyroid disorder     Thyroid disorder          Past Surgical History: All pastsurgical history was reviewed today. Past Surgical History:   Procedure Laterality Date    BRONCHOSCOPY  1/20/2022    BRONCHOSCOPY BRUSHINGS performed by Jose Mcdaniel MD at [address]  1/20/2022    BRONCHOSCOPY W/EBUS FNA performed by Jose Mcdaniel MD at [address]      L4-5    PARTIAL HYSTERECTOMY  1964         Family History: All family history was reviewed today. Problem Relation Age of Onset    Cancer Father         Brain Cancer     Breast Cancer Sister     High Blood Pressure Sister          Medications: All current and past medications were reviewed. Medications Prior to Admission: potassium chloride (KLOR-CON M) 20 MEQ extended release tablet, Take 20 mEq by mouth 2 times daily  gabapentin (NEURONTIN) 100 MG capsule, Take 200 mg by mouth nightly. XARELTO 10 MG TABS tablet, TAKE 1 TABLET BY MOUTH EVERY 24 HOURS (Patient taking differently: 10 mg daily )  atorvastatin (LIPITOR) 40 MG tablet, TAKE 1 TABLET BY MOUTH DAILY  levothyroxine (SYNTHROID) 112 MCG tablet, TAKE 1 TABLET BY MOUTH DAILY  carvedilol (COREG) 3.125 MG tablet, TAKE 1 TABLET BY MOUTH TWICE DAILY  Misc.  Devices (QUAD CANE) MISC, 1 each by Does not apply route daily  mirtazapine (REMERON) 15 MG tablet, Take 1 tablet by mouth nightly  nitroGLYCERIN (NITROSTAT) 0.4 MG SL tablet, Place 0.4 mg under the tongue every 5 minutes as needed for Chest pain up to max of 3 total doses. If no relief after 1 dose, call 911. Cholecalciferol (VITAMIN D3) 5000 units TABS, Take 1 tablet by mouth daily   amitriptyline (ELAVIL) 25 MG tablet, Take 1 tablet by mouth nightly  esomeprazole (NEXIUM) 40 MG delayed release capsule, Take 1 capsule by mouth every morning (before breakfast)  aspirin 81 MG EC tablet, Take 81 mg by mouth daily.  potassium phosphate IVPB  30 mmol IntraVENous Once    folic acid  1 mg Oral Daily    cyanocobalamin  1,000 mcg SubCUTAneous Once    linezolid  600 mg IntraVENous Q12H    vancomycin  250 mg Oral 2 times per day    sodium chloride  1 g Oral BID WC    furosemide  40 mg Oral BID    sodium chloride flush  5-40 mL IntraVENous 2 times per day    enoxaparin  1 mg/kg SubCUTAneous BID    magnesium oxide  400 mg Oral BID    aspirin  81 mg Oral Daily    atorvastatin  40 mg Oral Daily    pantoprazole  40 mg Oral QAM AC    levothyroxine  112 mcg Oral Daily    sodium chloride flush  5-40 mL IntraVENous 2 times per day          REVIEW OF SYSTEMS:       Review of Systems   Constitutional: Positive for fatigue. Negative for chills, diaphoresis and unexpected weight change. HENT: Negative for congestion, ear discharge, ear pain, facial swelling, hearing loss, rhinorrhea and trouble swallowing. Eyes: Negative for photophobia, discharge, redness and visual disturbance. Respiratory: Negative for apnea, cough, choking, chest tightness, shortness of breath and stridor. Cardiovascular: Negative for chest pain and palpitations. Gastrointestinal: Positive for diarrhea. Negative for abdominal pain, blood in stool and nausea. Endocrine: Negative for polydipsia, polyphagia and polyuria.    Genitourinary: Negative for difficulty urinating, dysuria, frequency, hematuria, menstrual problem and vaginal discharge. Musculoskeletal: Negative for arthralgias, joint swelling, myalgias and neck stiffness. Skin: Negative for color change and rash. Allergic/Immunologic: Negative for immunocompromised state. Neurological: Negative for dizziness, seizures, speech difficulty, light-headedness and headaches. Hematological: Negative for adenopathy. Psychiatric/Behavioral: Negative for agitation, hallucinations and suicidal ideas. Objective:       PHYSICAL EXAM:      Vitals:   Vitals:    01/24/22 0629 01/24/22 0743 01/24/22 0845 01/24/22 0915   BP: 124/75  97/66 94/64   Pulse: 81  105    Resp: 16 16 18    Temp: 97.8 °F (36.6 °C)  97.7 °F (36.5 °C)    TempSrc: Oral  Oral    SpO2: 94% 92% 96%    Weight: 130 lb 11.2 oz (59.3 kg)      Height:           Physical Exam  Vitals and nursing note reviewed. Constitutional:       General: She is not in acute distress. Appearance: She is well-developed. She is not diaphoretic. Comments: Appears tired. HENT:      Head: Normocephalic. Right Ear: External ear normal.      Left Ear: External ear normal.      Nose: Nose normal.   Eyes:      General: No scleral icterus. Right eye: No discharge. Left eye: No discharge. Conjunctiva/sclera: Conjunctivae normal.      Pupils: Pupils are equal, round, and reactive to light. Cardiovascular:      Rate and Rhythm: Normal rate and regular rhythm. Heart sounds: No murmur heard. No friction rub. Pulmonary:      Effort: Pulmonary effort is normal.      Breath sounds: No stridor. No wheezing or rales. Chest:      Chest wall: No tenderness. Abdominal:      Palpations: Abdomen is soft. There is no mass. Tenderness: There is no abdominal tenderness. There is no guarding or rebound. Musculoskeletal:         General: No tenderness. Cervical back: Normal range of motion and neck supple.    Lymphadenopathy:      Cervical: No cervical adenopathy. Skin:     General: Skin is warm and dry. Findings: No erythema or rash. Neurological:      Mental Status: She is alert and oriented to person, place, and time. Motor: No abnormal muscle tone. Psychiatric:         Judgment: Judgment normal.          Lines and drains: All vascular access sites are healthy with no local erythema, discharge or tenderness. Intake and output:     I/O last 3 completed shifts: In: 362.7 [IV Piggyback:362.7]  Out: 4200 [Urine:4200]    Lab Data:   All available labs were reviewed by me today. CBC:   Recent Labs     01/22/22  0540 01/23/22  0555 01/24/22  0630   WBC 18.2* 21.3* 25.3*   RBC 2.79* 3.05* 2.99*   HGB 8.5* 9.2* 9.1*   HCT 26.3* 28.3* 27.8*    259 257   MCV 94.5 92.9 93.0   MCH 30.5 30.1 30.4   MCHC 32.3 32.4 32.7   RDW 16.7* 16.8* 17.3*        BMP:  Recent Labs     01/22/22  0540 01/23/22  0555 01/24/22  0630   * 134* 133*   K 3.7 3.2* 3.1*    99 95*   CO2 22 24 25   BUN 12 9 10   CREATININE 1.1 1.1 1.1   CALCIUM 6.9* 6.4* 6.4*   GLUCOSE 89 80 74        Hepatic FunctionPanel:   Lab Results   Component Value Date    ALKPHOS 103 01/13/2022    ALT 17 01/13/2022    AST 22 01/13/2022    PROT 5.3 01/18/2022    PROT 7.6 02/16/2011    BILITOT 0.4 01/13/2022    BILIDIR <0.2 08/14/2019    IBILI see below 08/14/2019    LABALBU 1.9 01/18/2022       CPK: No results found for: CKTOTAL  ESR:   Lab Results   Component Value Date    SEDRATE 31 (H) 08/14/2019     CRP:   Lab Results   Component Value Date    CRP 1.9 07/30/2019         Imaging: All pertinent images and reports for the current visit were reviewed by meduring this visit. XR CHEST PORTABLE   Final Result   1. No significant change. XR CHEST PORTABLE   Final Result   Focal consolidations seen within the left upper lobe. Pneumonia is primarily   considered. However, that must be followed to resolution to ensure that   there is no underlying neoplasm.          CT ABDOMEN PELVIS W IV CONTRAST Additional Contrast? Oral   Final Result   1. Bilateral adrenal masses, likely metastatic. No other evidence of   abdominal or pelvic metastatic disease. 2. Lytic L2 vertebral body lesion concerning for metastatic disease. 3. 2.3 cm left breast mass likely malignant. 4. Cholelithiasis with no acute features. 5. Nonobstructive right nephrolithiasis. CT HEAD W WO CONTRAST   Final Result   No acute intracranial abnormality. No definite evidence of intracranial metastatic disease. It is noted that CT   imaging is insensitive for small metastatic lesions. If possible further   evaluation with MRI should be considered for better characterization. RECOMMENDATIONS:   Unavailable         CT CHEST WO CONTRAST   Final Result   1. 6.3 cm left upper lobe, suprahilar mass, suspicious for malignancy. There   may be a postobstructive component of pneumonia, versus transbronchial spread   of tumor in the apex of the left upper lobe. 2. AP window mediastinal adenopathy, likely metastatic. 3. 9 x 7 mm right middle lobe nodule, indeterminate for inflammation, primary   or metastatic neoplasm. 4. Bilateral adrenal nodules, suspicious for metastasis. RECOMMENDATIONS:   Unavailable         XR CHEST PORTABLE   Final Result   New left upper lobe opacity concerning for pneumonia versus malignancy. RECOMMENDATION:   Chest CT is recommended for further evaluation. NM BONE SCAN WHOLE BODY    (Results Pending)       Outside records:    Labs, Microbiology, Radiology and pertinent results from Care everywhere, if available, were reviewed as a part ofthe consultation. Problem list:       Patient Active Problem List   Diagnosis Code    Sprain of ligament of lumbosacral joint S33. 9XXA    [facility] Neck sprain and strain S13. 9XXA    [facility] Degeneration of lumbar or lumbosacral intervertebral disc M51.37    [facility] Acquired spondylolisthesis M43.10    [facility] Displacement of lumbar intervertebral disc without myelopathy M51.26    [facility] Spinal stenosis, lumbar region, without neurogenic claudication M48.061    [facility] Depressive disorder, not elsewhere classified F32.9    Chronic pain syndrome G89.4    Lupus (HCC) M32.9    Coronary artery disease due to lipid rich plaque I25.10, I25.83    Hyperlipidemia LDL goal <70 E78.5    Paroxysmal atrial fibrillation (HCC) I48.0    History of stroke Z86.73    History of DVT (deep vein thrombosis) Z86.718    Gastroesophageal reflux disease without esophagitis K21.9    Acquired hypothyroidism E03.9    Lung mass R91.8    Tobacco use Z72.0    Elevated ferritin R79.89    Chronic insomnia F51.04    Anxiety disorder F41.9    Radiolucent lesion of bone M89.8X9    Chronic low back pain M54.50, G89.29    Breast tenderness N64.4    Bad taste in mouth R43.8    Decreased appetite R63.0    Closed fracture of upper end of left humerus S42.202A    Hyponatremia E87.1    Generalized weakness R53.1    Severe malnutrition (HCC) E43    Anemia D64.9    Hypoxia R09.02    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 Although every effort was made to ensure the accuracy of this automated transcription, some errors in transcription may have occurred inadvertently. If you may need any clarification, please do not hesitate to contact me through EPIC or at the phone number provided below with my electronic signature. Any pictures or media included in this note were obtained after taking informed verbal consent from the patient and with their approval to include those in the patient's medical record.         Gilma Kevin MD, MPH, FACP, TRIP  1/24/2022, 10:16 AM  [facility]   [address], [address] [facility]  Office: [phone]  Fax: [phone]  Clinic days:  Tuesday & Thursday

## 2022-01-24 NOTE — PROGRESS NOTES
Hospitalist Progress Note      PCP: Rudi Mills DO    Date of Admission: 1/13/2022    Chief Complaint: Progressive weakness    Hospital Course: This 67 y.o. female with PMHx of  thyroid disease, heart disease, CVA, thyroid disorder, DVT, A. fib presented with progressive weakness. On admission,CT of the chest showed 6.3 cm left upper lobe suprahilar mass suspicious for malignancy, mediastinal lymphadenopathy and 5 x 7 mm right middle lobe nodule. There are bilateral adrenal nodules suspicious for metastasis and L2 lytic lesion. Patient is also found to be hyperglycemic. She has leukocytosis and concern for postobstructive pneumonia on CT imaging pain         Interval history  Pt seen and examined today. Overnight events noted, interval ancillary notes and labs reviewed. On room air; satting well  Afebrile overnight, WBCs elevated to 25.3  Low potassium this morning: Replacement ordered  Reported generalized fatigue and pain but denied cough, SOB, chest pain, nausea, vomiting or abdominal pain        Assessment/Plan:    Left upper lobe, suprahilar mass with mediastinal lymphadenopathy, bilateral adrenal metastasis and lytic bony lesions  Pulmonology and oncology on board: Appreciate recs  S/p bronchoscopy with EBUS on 1/20/22 and biopsy of mediastinal lymph node:pathology consistent with poorly differentiated adenocarcinoma of lung  CT head negative for any mets  NM bone scan today and plan for vein mapping tomorrow  Radiation oncology consulted for palliative radiation.       Sepsis likely 2/2 postobstructive pneumonia due to metastatic lung cancer  Fevers, persistent leukocytosis and elevated procalcitonin  Received 5-day course of Rocephin;  switch to Zosyn on 1/21/2022 due to persistent leukocytosis  Infectious disease consulted due to persistent leukocytosis and soft pressures  Zosyn discontinued on 1/24/22: Started on IV Zyvox  3 sets of blood cultures ordered    2.3 cm left breast mass:  Oncology on board: Outpatient biopsy recommended    Anemia of chronic disease: Status post packed RBCs on 1/17/2022  Monitor hemoglobin closely  Transfuse with hemoglobin less than 7    Hypercalcemia: Likely secondary to malignancy: Resolved  PTH Rh level noted  Status post Zometa on 1/14/22  Monitor calcium level closely    Hyponatremia: Acute on chronic likely secondary to increased ADH release in the setting of metastatic lung cancer:   Nephrology on board: Appreciate their recs  Continue sodium tabs twice daily and p.o. Lasix  Restrict dietary free water to 1800ml    Hypothyroidism: Continue Synthroid    Failure to thrive: In the setting of malignancy     History of DVT: On Xarelto at home currently on hold     History of paroxysmal A. fib: On Xarelto currently on hold      DVT Prophylaxis: Lovenox  Diet: ADULT ORAL NUTRITION SUPPLEMENT; Breakfast, Dinner; Standard High Calorie/High Protein Oral Supplement  ADULT ORAL NUTRITION SUPPLEMENT; Breakfast, Lunch, Dinner, HS Snack; Standard High Calorie/High Protein Oral Supplement  ADULT DIET; Regular; 1200 ml  Code Status: Full Code      I have discussed with the patient the rationale for blood component transfusion; its benefits in treating or preventing fatigue, organ damage, or death; and its risk which includes mild transfusion reactions, rare risk of blood borne infection, or more serious but rare reactions. I have discussed the alternatives to transfusion, including the risk and consequences of not receiving transfusion. The patient had an opportunity to ask questions and had agreed to proceed with transfusion of blood components.         Medications:  Reviewed    Infusion Medications    sodium chloride      sodium chloride      sodium chloride Stopped (01/19/22 2129)     Scheduled Medications    potassium phosphate IVPB  30 mmol IntraVENous Once    calcium gluconate IVPB  4,000 mg IntraVENous Once    piperacillin-tazobactam  3,375 mg IntraVENous Q8H  sodium chloride  1 g Oral BID     furosemide  40 mg Oral BID    sodium chloride flush  5-40 mL IntraVENous 2 times per day    enoxaparin  1 mg/kg SubCUTAneous BID    magnesium oxide  400 mg Oral BID    aspirin  81 mg Oral Daily    atorvastatin  40 mg Oral Daily    pantoprazole  40 mg Oral QAM AC    levothyroxine  112 mcg Oral Daily    sodium chloride flush  5-40 mL IntraVENous 2 times per day     PRN Meds: ipratropium-albuterol, sodium chloride flush, albuterol, ondansetron, promethazine, potassium chloride **OR** potassium alternative oral replacement **OR** potassium chloride, sodium chloride, sodium chloride flush, sodium chloride, ipratropium-albuterol, oxyCODONE-acetaminophen, sodium chloride flush, sodium chloride, acetaminophen **OR** acetaminophen      Intake/Output Summary (Last 24 hours) at 1/24/2022 0859  Last data filed at 1/24/2022 7183  Gross per 24 hour   Intake 173.66 ml   Output 1950 ml   Net -1776.34 ml       Physical Exam Performed:    /75   Pulse 81   Temp 97.8 °F (36.6 °C) (Oral)   Resp 16   Ht 5' 4\" (1.626 m)   Wt 130 lb 11.2 oz (59.3 kg)   SpO2 92%   BMI 22.43 kg/m²     General appearance: No apparent distress, appears stated age and cooperative. HEENT: Pupils equal, round, and reactive to light. Conjunctivae/corneas clear. Neck: Supple, with full range of motion. No jugular venous distention. Trachea midline. Respiratory:  Normal respiratory effort. Clear to auscultation, bilaterally without Rales/Wheezes/Rhonchi. Cardiovascular: Regular rate and rhythm with normal S1/S2 without murmurs, rubs or gallops. Abdomen: Soft, non-tender, non-distended with normal bowel sounds. Musculoskeletal: No clubbing, cyanosis or edema bilaterally. Full range of motion without deformity. Skin: Skin color, texture, turgor normal.  No rashes or lesions. Neurologic:  Neurovascularly intact without any focal sensory/motor deficits.  Cranial nerves: II-XII intact, grossly non-focal.  Psychiatric: Alert and oriented, thought content appropriate, normal insight  Capillary Refill: Brisk,< 3 seconds   Peripheral Pulses: +2 palpable, equal bilaterally       Labs:   Recent Labs     01/22/22  0540 01/23/22  0555 01/24/22  0630   WBC 18.2* 21.3* 25.3*   HGB 8.5* 9.2* 9.1*   HCT 26.3* 28.3* 27.8*    259 257     Recent Labs     01/22/22  0540 01/23/22  0555 01/24/22  0630   * 134* 133*   K 3.7 3.2* 3.1*    99 95*   CO2 22 24 25   BUN 12 9 10   CREATININE 1.1 1.1 1.1   CALCIUM 6.9* 6.4* 6.4*   PHOS 1.8* 2.0* 1.9*     No results for input(s): AST, ALT, BILIDIR, BILITOT, ALKPHOS in the last 72 hours. No results for input(s): INR in the last 72 hours. No results for input(s): Frida Anchors in the last 72 hours. Urinalysis:      Lab Results   Component Value Date    NITRU Negative 01/13/2022    WBCUA 18 01/13/2022    BACTERIA RARE 01/13/2022    RBCUA 1 01/13/2022    BLOODU Negative 01/13/2022    SPECGRAV 1.015 01/13/2022    GLUCOSEU Negative 01/13/2022       Radiology:  XR CHEST PORTABLE   Final Result   1. No significant change. XR CHEST PORTABLE   Final Result   Focal consolidations seen within the left upper lobe. Pneumonia is primarily   considered. However, that must be followed to resolution to ensure that   there is no underlying neoplasm. CT ABDOMEN PELVIS W IV CONTRAST Additional Contrast? Oral   Final Result   1. Bilateral adrenal masses, likely metastatic. No other evidence of   abdominal or pelvic metastatic disease. 2. Lytic L2 vertebral body lesion concerning for metastatic disease. 3. 2.3 cm left breast mass likely malignant. 4. Cholelithiasis with no acute features. 5. Nonobstructive right nephrolithiasis. CT HEAD W WO CONTRAST   Final Result   No acute intracranial abnormality. No definite evidence of intracranial metastatic disease. It is noted that CT   imaging is insensitive for small metastatic lesions.   If possible further   evaluation with MRI should be considered for better characterization. RECOMMENDATIONS:   Unavailable         CT CHEST WO CONTRAST   Final Result   1. 6.3 cm left upper lobe, suprahilar mass, suspicious for malignancy. There   may be a postobstructive component of pneumonia, versus transbronchial spread   of tumor in the apex of the left upper lobe. 2. AP window mediastinal adenopathy, likely metastatic. 3. 9 x 7 mm right middle lobe nodule, indeterminate for inflammation, primary   or metastatic neoplasm. 4. Bilateral adrenal nodules, suspicious for metastasis. RECOMMENDATIONS:   Unavailable         XR CHEST PORTABLE   Final Result   New left upper lobe opacity concerning for pneumonia versus malignancy. RECOMMENDATION:   Chest CT is recommended for further evaluation.          NM BONE SCAN WHOLE BODY    (Results Pending)           Active Hospital Problems    Diagnosis     Hypercalcemia [E83.52]     Severe malnutrition ([address] [E43]     PNA (pneumonia) [J18.9]     Lung mass [R91.8]            Electronically signed by Edilson Palm MD on 1/24/2022 at 8:59 AM

## 2022-01-24 NOTE — CONSULTS
Department of Radiation Oncology  Attending Consult Note      Reason for Consult:  Newly diagnosed stage IV NSCLC with obstructive pneumonia and back pain  Requesting Physician:  Sanjuana Sen MD    CHIEF COMPLAINT:  Back pain and short of breath    History Obtained From:  patient, electronic medical record    HISTORY OF PRESENT ILLNESS:      Ms. Michael Silva is a 48-year-old female who presented to the emergency room approximately 2 weeks ago with increasing shortness of breath, fatigue, and generalized failure to thrive. She underwent an extensive work-up which included a CT of the chest which discovered a 6.3 cm left upper lobe mass with AP window lymphadenopathy. The mass also causes left upper lobe obstruction and obstructive pneumonia. Also noted was a lytic lesion in L2 and bilateral adrenal nodules. She underwent a biopsy and pathology revealed a poorly differentiated non-small cell carcinoma. She is somewhat overwhelmed with his new diagnosis. Radiation oncology is consulted to consider palliative radiation therapy to the left upper lobe mass and attempt to relieve the obstruction and help improve her breathing. Also to the L2 lesion as this may be contributing to some of her back pain.     Cancer Treatment History:  Bx only  Past Medical History:        Diagnosis Date    Acquired spondylolisthesis     Arthritis     Cataract     Chronic pain syndrome     Degeneration of lumbar or lumbosacral intervertebral disc     Depressive disorder, not elsewhere classified     Displacement of lumbar intervertebral disc without myelopathy     H/O blood clots     HBP (high blood pressure)     Heart disease     Spinal stenosis, lumbar region, without neurogenic claudication     Sprain of lumbosacral (joint) (ligament)     Sprain of neck     Stroke ([facility] [address]     Thyroid disorder     Thyroid disorder      Past Surgical History:        Procedure Laterality Date    BRONCHOSCOPY  1/20/2022    BRONCHOSCOPY BRUSHINGS performed by Jose Mcdaniel MD at [address]  1/20/2022    BRONCHOSCOPY W/EBUS FNA performed by Jose Mcdaniel MD at [address]      L4-5    PARTIAL HYSTERECTOMY  1964       Current Medications:    Current Facility-Administered Medications: ipratropium-albuterol (DUONEB) nebulizer solution 1 ampule, 1 ampule, Inhalation, Q4H PRN  potassium phosphate 30 mmol in dextrose 5 % 250 mL IVPB, 30 mmol, IntraVENous, Once  sodium chloride flush 0.9 % injection 10 mL, 10 mL, IntraVENous, PRN  folic acid (FOLVITE) tablet 1 mg, 1 mg, Oral, Daily  linezolid (ZYVOX) IVPB 600 mg, 600 mg, IntraVENous, Q12H  vancomycin (FIRVANQ) 50 MG/ML oral solution 250 mg, 250 mg, Oral, 2 times per day  sodium chloride tablet 1 g, 1 g, Oral, BID WC  furosemide (LASIX) tablet 40 mg, 40 mg, Oral, BID  albuterol (PROVENTIL) nebulizer solution 2.5 mg, 2.5 mg, Nebulization, Q6H PRN  ondansetron (ZOFRAN) injection 4 mg, 4 mg, IntraVENous, Q6H PRN  promethazine (PHENERGAN) injection 6.25 mg, 6.25 mg, IntraMUSCular, Q6H PRN  potassium chloride (KLOR-CON M) extended release tablet 40 mEq, 40 mEq, Oral, PRN **OR** potassium bicarb-citric acid (EFFER-K) effervescent tablet 40 mEq, 40 mEq, Oral, PRN **OR** [DISCONTINUED] potassium chloride 10 mEq/100 mL IVPB (Peripheral Line), 10 mEq, IntraVENous, PRN  0.9 % sodium chloride infusion, , IntraVENous, PRN  sodium chloride flush 0.9 % injection 5-40 mL, 5-40 mL, IntraVENous, 2 times per day  sodium chloride flush 0.9 % injection 5-40 mL, 5-40 mL, IntraVENous, PRN  0.9 % sodium chloride infusion, 25 mL, IntraVENous, PRN  enoxaparin (LOVENOX) injection 60 mg, 1 mg/kg, SubCUTAneous, BID  magnesium oxide (MAG-OX) tablet 400 mg, 400 mg, Oral, BID  ipratropium-albuterol (DUONEB) nebulizer solution 1 ampule, 1 ampule, Inhalation, Q4H PRN  oxyCODONE-acetaminophen (PERCOCET) 5-325 MG per tablet 1 tablet, 1 tablet, Oral, Q8H PRN  aspirin EC tablet 81 mg, 81 mg, Oral, Daily  atorvastatin (LIPITOR) tablet 40 mg, 40 mg, Oral, Daily  pantoprazole (PROTONIX) tablet 40 mg, 40 mg, Oral, QAM AC  levothyroxine (SYNTHROID) tablet 112 mcg, 112 mcg, Oral, Daily  sodium chloride flush 0.9 % injection 5-40 mL, 5-40 mL, IntraVENous, 2 times per day  sodium chloride flush 0.9 % injection 5-40 mL, 5-40 mL, IntraVENous, PRN  0.9 % sodium chloride infusion, 25 mL, IntraVENous, PRN  acetaminophen (TYLENOL) tablet 650 mg, 650 mg, Oral, Q6H PRN **OR** acetaminophen (TYLENOL) suppository 650 mg, 650 mg, Rectal, Q6H PRN    Allergies:  Review of patient's allergies indicates no known allergies. Social History:    Social History     Socioeconomic History    Marital status:      Spouse name: Not on file    Number of children: 1    Years of education: Not on file    Highest education level: Not on file   Occupational History    Occupation: Retired   Tobacco Use    Smoking status: Current Every Day Smoker     Packs/day: 0.50     Years: 57.00     Pack years: 28.50     Start date: 1960    Smokeless tobacco: Never Used   Vaping Use    Vaping Use: Never used   Substance and Sexual Activity    Alcohol use: Not Currently    Drug use: No    Sexual activity: Not Currently   Other Topics Concern    Not on file   Social History Narrative    Not on file     Social Determinants of Health     Financial Resource Strain:     Difficulty of Paying Living Expenses: Not on file   Food Insecurity:     Worried About [address] in the Last Year: Not on file    [address] in the Last Year: Not on file   Transportation Needs:     Lack of Transportation (Medical): Not on file    Lack of Transportation (Non-Medical):  Not on file   Physical Activity:     Days of Exercise per Week: Not on file    Minutes of Exercise per Session: Not on file   Stress:     Feeling of Stress : Not on file   Social Connections:     Frequency of Communication with Friends and Family: Not on file    Frequency of Social Gatherings with Friends and Family: Not on file    Attends Shinto Services: Not on file    Active Member of Clubs or Organizations: Not on file    Attends Club or Organization Meetings: Not on file    Marital Status: Not on file   Intimate Partner Violence:     Fear of Current or Ex-Partner: Not on file    Emotionally Abused: Not on file    Physically Abused: Not on file    Sexually Abused: Not on file   Housing Stability:     Unable to Pay for Housing in the Last Year: Not on file    Number of [place] in the Last Year: Not on file    Unstable Housing in the Last Year: Not on file       Family History:       Problem Relation Age of Onset    Cancer Father         Brain Cancer     Breast Cancer Sister     High Blood Pressure Sister      REVIEW OF SYSTEMS:    As outlined in HPI and chart review, otherwise review of general, eyes, nose, throat, pulmonary, cardiac, GI, , musculoskeletal, neurological, and integumentary systems is negative. PHYSICAL EXAM:      Vitals:    Vitals:    01/24/22 0915   BP: 94/64   Pulse:    Resp:    Temp:    SpO2:        Sitting in chair. Mildly short of breath. Answers questions approp. Appears to have some insight into her disease. somewhat anxious/overwhelmed with dx and its implications. DATA:      CT HEAD W WO CONTRAST    Result Date: 1/14/2022  EXAMINATION: CT OF THE HEAD WITH AND WITHOUT CONTRAST  1/14/2022 12:18 pm TECHNIQUE: CT of the head/brain was performed without and with the administration of intravenous contrast. Multiplanar reformatted images are provided for review. Dose modulation, iterative reconstruction, and/or weight based adjustment of the mA/kV was utilized to reduce the radiation dose to as low as reasonably achievable. COMPARISON: None.  HISTORY: ORDERING SYSTEM PROVIDED HISTORY: R/o mets TECHNOLOGIST PROVIDED HISTORY: Reason for exam:->R/o mets Reason for Exam: r/o mets FINDINGS: BRAIN/VENTRICLES: There is no acute intracranial hemorrhage, mass effect or midline shift. No abnormal extra-axial fluid collection. The gray-white differentiation is maintained without evidence of an acute infarct. There is no evidence of hydrocephalus. No definite areas of postcontrast enhancement are identified. Encephalomalacia in left cerebellar hemisphere is consistent with remote insult. ORBITS: The visualized portion of the orbits demonstrate no acute abnormality. SINUSES: The visualized paranasal sinuses and mastoid air cells demonstrate no acute abnormality. SOFT TISSUES/SKULL:  No acute abnormality of the visualized skull or soft tissues. No acute intracranial abnormality. No definite evidence of intracranial metastatic disease. It is noted that CT imaging is insensitive for small metastatic lesions. If possible further evaluation with MRI should be considered for better characterization. RECOMMENDATIONS: Unavailable     CT CHEST WO CONTRAST    Result Date: 1/13/2022  EXAMINATION: CT OF THE CHEST WITHOUT CONTRAST 1/13/2022 12:16 pm TECHNIQUE: CT of the chest was performed without the administration of intravenous contrast. Multiplanar reformatted images are provided for review. Dose modulation, iterative reconstruction, and/or weight based adjustment of the mA/kV was utilized to reduce the radiation dose to as low as reasonably achievable. COMPARISON: Chest x-ray, 3 hours ago HISTORY: ORDERING SYSTEM PROVIDED HISTORY: mass TECHNOLOGIST PROVIDED HISTORY: Reason for exam:->mass Decision Support Exception - unselect if not a suspected or confirmed emergency medical condition->Emergency Medical Condition (MA) Reason for Exam: mass FINDINGS: Mediastinum: There are several AP window nodes for example, mild 2 measuring 19 and 18 mm in short axis. Lungs/pleura: There is a left upper lobe suprahilar mass, measuring 5.2 x 6.3 x 6.2 cm.  There are peripheral components of ground-glass and interlobular septal thickening in the left apex. In the right middle lobe there is a 9 x 7 mm partially solid nodule. The solid component is 3 mm. (Image 55, series 3). Mild dependent atelectasis is noted. There is pleural thickening along the lateral aspect of the right hemithorax, likely fibrotic. Upper Abdomen: There is a right adrenal nodule measuring 4 cm in diameter. There are 2 left adrenal nodules, measuring 2.6 and 2.5 cm in diameter. Soft Tissues/Bones: No suspicious lytic or blastic bone lesions are appreciated. 1. 6.3 cm left upper lobe, suprahilar mass, suspicious for malignancy. There may be a postobstructive component of pneumonia, versus transbronchial spread of tumor in the apex of the left upper lobe. 2. AP window mediastinal adenopathy, likely metastatic. 3. 9 x 7 mm right middle lobe nodule, indeterminate for inflammation, primary or metastatic neoplasm. 4. Bilateral adrenal nodules, suspicious for metastasis. RECOMMENDATIONS: Unavailable     CT ABDOMEN PELVIS W IV CONTRAST Additional Contrast? Oral    Result Date: 1/15/2022  EXAMINATION: CT OF THE ABDOMEN AND PELVIS WITH CONTRAST 1/14/2022 12:18 pm TECHNIQUE: CT of the abdomen and pelvis was performed with the administration of intravenous contrast. Multiplanar reformatted images are provided for review. Dose modulation, iterative reconstruction, and/or weight based adjustment of the mA/kV was utilized to reduce the radiation dose to as low as reasonably achievable. COMPARISON: CT of the chest 01/13/2022. HISTORY: ORDERING SYSTEM PROVIDED HISTORY: R/O mets TECHNOLOGIST PROVIDED HISTORY: Reason for exam:->r/o met Additional Contrast?->Oral Reason for Exam: R/O mets FINDINGS: Lower Chest: No acute infiltrate at the lung bases. Stable 5 mm noncalcified right lower lobe pulmonary nodule. Coronary vascular calcification. Organs: Mild hepatic steatosis with no focal abnormality. Cholelithiasis with no biliary dilatation.   The spleen and pancreas are unremarkable. 2.7 x 4.8 cm right adrenal mass. Two left adrenal lesions measuring 1.1 x 2.5 cm and 1.6 x 2.6 cm.  3.5 cm right renal cyst.  Nonobstructive right nephrolithiasis. No solid renal mass or significant hydronephrosis. GI/Bowel: No pericolonic inflammatory changes. No small bowel distension. Stomach and duodenal sweep are unremarkable. Pelvis: No pelvic mass or free pelvic fluid. Previous hysterectomy. Mild distention of the urinary bladder. Peritoneum/Retroperitoneum: The abdominal aorta is normal in caliber with moderate diffuse calcified plaque. No pathologic retroperitoneal adenopathy or significant upper abdominal ascites. Bones/Soft Tissues: 2.1 x 2.3 cm left breast mass. No other focal soft tissue abnormality. There is a lytic lesion in the L2 vertebral body concerning for metastatic disease. Moderate degenerative disc disease throughout the lumbar spine with posterior fusion at L4-5. Sclerosis within the superior aspect of the femoral heads bilaterally. 1. Bilateral adrenal masses, likely metastatic. No other evidence of abdominal or pelvic metastatic disease. 2. Lytic L2 vertebral body lesion concerning for metastatic disease. 3. 2.3 cm left breast mass likely malignant. 4. Cholelithiasis with no acute features. 5. Nonobstructive right nephrolithiasis. XR CHEST PORTABLE    Result Date: 1/23/2022  EXAMINATION: ONE XRAY VIEW OF THE CHEST 1/23/2022 9:26 am COMPARISON: 01/15/2022 HISTORY: ORDERING SYSTEM PROVIDED HISTORY: PNEUMONIA TECHNOLOGIST PROVIDED HISTORY: Reason for exam:->PNEUMONIA Reason for Exam: PNEUMONIA FINDINGS: The left PICC terminates in the distal SVC. No change in the left upper lobe consolidation either due to pneumonia or malignancy. There is bibasilar scarring. The cardiac silhouette is within normal limits. There is no pneumothorax or pleural effusion. 1. No significant change.      XR CHEST PORTABLE    Result Date: 1/15/2022  EXAMINATION: ONE XRAY VIEW OF THE CHEST 1/15/2022 11:09 am COMPARISON: 01/13/2022 HISTORY: ORDERING SYSTEM PROVIDED HISTORY: SOB TECHNOLOGIST PROVIDED HISTORY: Reason for exam:->SOB Reason for Exam: Fatigue (Pt in via EMS from home. Pts neighbor called 911 because the pt has been getting weaker. Pt has been feeling weak for the past few months. Pt endorses no pain but chest pressure that goes through to her back. Pt has hx of stroke and angioplasty) FINDINGS: Large area of consolidation seen within the left upper lobe, which appears mildly increased when compared to the previous exam.  Chronic interstitial opacities are seen throughout the remainder of the lungs. No other infiltrates are seen. No pneumothorax. No free air. Cardial pericardial silhouette is unremarkable for age. Focal consolidations seen within the left upper lobe. Pneumonia is primarily considered. However, that must be followed to resolution to ensure that there is no underlying neoplasm. XR CHEST PORTABLE    Result Date: 1/13/2022  EXAMINATION: ONE XRAY VIEW OF THE CHEST 1/13/2022 8:46 am COMPARISON: Chest x-ray dated 08/15/2019 HISTORY: ORDERING SYSTEM PROVIDED HISTORY: other TECHNOLOGIST PROVIDED HISTORY: Reason for exam:->other Reason for Exam: fatigue FINDINGS: New left upper lobe opacity concerning for pneumonia versus malignancy. The right lung is clear. No pleural effusion or pneumothorax. Cardiac silhouette is unremarkable. Degenerative disease of the visualized osseous structures. New left upper lobe opacity concerning for pneumonia versus malignancy. RECOMMENDATION: Chest CT is recommended for further evaluation. IMPRESSION/RECOMMENDATIONS:      I met with Ms. Allen and discuss the findings to date of a stage IV non-small cell lung cancer. She is somewhat overwhelmed with this diagnosis.   We discussed palliative radiation therapy to the left hilar area and L2 in attempt to help improve her breathing and her back pain. We discussed the logistics of treatment planning and delivery and the need for CT simulation for target localization. She is anticipating getting discharged tomorrow as scheduled today already for a bone scan and they are unable to arrange transportation for simulation. We will schedule her for simulation tomorrow upon discharge, assuming she can get herself to [facility] to undergo simulation. We hope to start her treatment the following day at a [facility] office. I would anticipate 10 treatments over the course of 2 weeks delivering a dose of 30 Gray to the left upper lobe mass and the L2 vertebral body metastasis. We discussed potential side effects occluding esophagitis, nausea, and fatigue. We discussed that this would likely be followed by chemotherapy once molecular profiling of the tumor is become available. Overall, she appeared to understand potential risks and benefits of undergoing radiation therapy and was willing to proceed. Hopefully transportation simulation will be able to be arranged for tomorrow. Thank you for inviting me to participate in Mrs Johnson's care. Deloris Braswell MD  [facility] Radiation Oncology  [phone]    ECOG Performance Status (ECOG Scale 0-4): Score 3:  Patient is capable of only limited self-care, confined to bed or chair greater than 50% of waking hours.

## 2022-01-24 NOTE — PROGRESS NOTES
Pt has refused all breathing txs during this night shift. She is generally diminished with a few scattered crackles. Attempted to convince her to take at least one, but she says they \"stir her up\" and she is comfortable.

## 2022-01-24 NOTE — PROGRESS NOTES
Comprehensive Nutrition Assessment    Type and Reason for Visit:  Reassess    Nutrition Recommendations/Plan:   Boost pudding BID  Monitor PO/ONS intake  If po doesn't improve may consider Megace     Nutrition Assessment:  Pt was very upset when I entered her room. She states she was told she has stage 4 lung cancer. She was very upset. Suggested she may want to talk with a  and she said it has been ordered for a  to visit her. States po intake is 50%. She does not like the food at the hospital. She drinks Ensure at home and is refusing to drink them now. Will try Boost pudding BID. Continue to monitor po/ons intake throughout hospital stay. Malnutrition Assessment:  Malnutrition Status:  Severe malnutrition    Context:  Chronic Illness       Estimated Daily Nutrient Needs:  Energy (kcal):  0836-6634 (25-30kcal/59.3kg); Weight Used for Energy Requirements:  Current     Protein (g):  71-89 g (1.2-1.5g/59.3kg); Weight Used for Protein Requirements:  Current        Fluid (ml/day):   ; Method Used for Fluid Requirements:  1 ml/kcal      Nutrition Related Findings:  LBM 1/21, +1 pitting BLE, , K 3.1, Phos 1.9      Wounds:   (L breast wound, scattered abrasions)       Current Nutrition Therapies:    ADULT DIET; Regular    Anthropometric Measures:  · Height: 5' 4\" (162.6 cm)  · Current Body Weight: 130 lb (59 kg)   · Admission Body Weight: 117 lb (53.1 kg)    · Ideal Body Weight: 120 lbs; % Ideal Body Weight 108.3 %   · BMI: 22.3  · BMI Categories: Normal Weight (BMI 18.5-24. 9)       Nutrition Diagnosis:   · Inadequate oral intake related to pain as evidenced by weight loss 7.5% in 3 months,severe muscle loss      Nutrition Interventions:   Food and/or Nutrient Delivery:  Continue Current Diet,Continue Oral Nutrition Supplement  Nutrition Education/Counseling:  Education not indicated   Coordination of Nutrition Care:  Continue to monitor while inpatient    Goals:  Pt will eat 50% or greater of meals and supplements       Nutrition Monitoring and Evaluation:   Behavioral-Environmental Outcomes:  None Identified   Food/Nutrient Intake Outcomes:  Food and Nutrient Intake,Supplement Intake  Physical Signs/Symptoms Outcomes:  Fluid Status or Edema,Weight,Biochemical Data     Discharge Planning:    No discharge needs at this time     Electronically signed by Larry Nuñez RD, LD on 1/24/22 at 10:35 AM EST    Contact: [phone]

## 2022-01-24 NOTE — PROGRESS NOTES
Physical Therapy  Facility/Department: [facility] 3A NURSING  Daily Treatment Note  NAME: Ramonita Blue  : 1945  MRN: [id number]    Date of Service: 2022    Discharge Recommendations:Kaur Johnson scored a  on the AM-PAC short mobility form. Current research shows that an AM-PAC score of 17 or less is typically not associated with a discharge to the patient's home setting. Based on the patient's AM-PAC score and their current functional mobility deficits, it is recommended that the patient have 3-5 sessions per week of Physical Therapy at d/c to increase the patient's independence. Please see assessment section for further patient specific details. If patient discharges prior to next session this note will serve as a discharge summary. Please see below for the latest assessment towards goals. PT Equipment Recommendations  Equipment Needed: No    Assessment   Body structures, Functions, Activity limitations: Decreased functional mobility ; Decreased ADL status; Decreased strength;Decreased endurance;Decreased balance;Decreased posture; Increased pain  Assessment: Pt min A bed mob, and transfers with RW. Pt. weak and fatigues quickly. Pt. continues to benefit from skilled PT to improve functional mobility. Anticipated the need for low frequency PT at d/c. Treatment Diagnosis: Reduced endurance and balance impairing ability to perform functional mobility  Prognosis: Good  Clinical Presentation: Evolving  PT Education: Goals;PT Role;Plan of Care;Transfer Training;Functional Mobility Training;Gait Training  Patient Education: Verbalized understanding  Barriers to Learning: None  REQUIRES PT FOLLOW UP: Yes  Activity Tolerance  Activity Tolerance: Patient limited by fatigue;Patient limited by endurance; Patient limited by pain     Patient Diagnosis(es): The primary encounter diagnosis was Fatigue, unspecified type.  Diagnoses of Suspected malignant neoplasm, Pneumonia due to infectious organism, unspecified laterality, unspecified part of lung, and Left breast mass were also pertinent to this visit. has a past medical history of Acquired spondylolisthesis, Arthritis, Cataract, Chronic pain syndrome, Degeneration of lumbar or lumbosacral intervertebral disc, Depressive disorder, not elsewhere classified, Displacement of lumbar intervertebral disc without myelopathy, H/O blood clots, HBP (high blood pressure), Heart disease, Spinal stenosis, lumbar region, without neurogenic claudication, Sprain of lumbosacral (joint) (ligament), Sprain of neck, Stroke [facility]), Thyroid disorder, and Thyroid disorder. has a past surgical history that includes partial hysterectomy (cervix not removed) (1964); Hysterectomy, total abdominal (1972); lumbar fusion; bronchoscopy (1/20/2022); and bronchoscopy (1/20/2022). Restrictions  Restrictions/Precautions  Restrictions/Precautions: Fall Risk (High fall risk)  Required Braces or Orthoses?: No  Position Activity Restriction  Other position/activity restrictions: 67 y.o. female with past medical history of thyroid disease, heart disease, CVA, thyroid disorder, DVT, A. fib, presents from home in view of progressive weakness. Patient is a poor historian. She states she has been getting weak. She also describes stabbing pain that goes from her chest to her back when she lies down. States pain has been there for few months. She states that she has been getting all of her care from a nurse practitioner who visits her at home every few months. She has been living with her friend Alex for the last few months. I have discussed this presentation with [facility] over the phone. According to her, patient has been getting progressively weak over the last couple of months. She has not been eating much. She has been spending increasingly more time in bed and has not really gotten out of bed for over a week now. Patient has been managing her own medications.   She has been somewhat confused over the past week but does not have memory problems at baseline. Upon chart review it appears that patient has not seen her primary care provider since 2019. Recent communication documented between patient's NP and Dr. Pat Braden. There is being requested for imaging in view of concern for lung cancer. I attempted to reach out to Debo Moreno NP at [phone]. She is not available today. I am expecting a call back from another NP with some information from her chart. ADD noncontrast CT of the chest shows 6.3 cm left upper lobe suprahilar mass suspicious for malignancy. There is also mediastinal adenopathy. There is a 5 x 7 mm right middle lobe nodule. There are bilateral adrenal nodules suspicious for metastasis. Patient is also found to be hyperglycemic. She has leukocytosis and concern for postobstructive pneumonia on CT imaging pain  Subjective   General  Chart Reviewed: Yes  Response To Previous Treatment: Patient with no complaints from previous session. Family / Caregiver Present: Yes (nursing and multiple MDs.)  Subjective  Subjective: Pt very painful, weak. Nursing notified to give pain meds. General Comment  Comments: Pt given stage 4 CA diagnosis this AM and overwhelmed. Pain Screening  Patient Currently in Pain: Yes  Pain Assessment  Pain Assessment: 0-10  Bagley-Baker Pain Rating: Hurts whole lot  Pain Type: Acute pain  Pain Location: Back  Vital Signs  Patient Currently in Pain: Yes       Orientation  Orientation  Overall Orientation Status: Impaired  Orientation Level: Oriented to person;Oriented to place;Oriented to time;Oriented to situation (needs reminding of situation.)  Cognition      Objective   Bed mobility  Supine to Sit: Stand by assistance  Scooting: Minimal assistance (scoot to EOB.)  Comment: Pt sat EOB with CGA sitting balance.   Transfers  Bed to Chair: Contact guard assistance (bed to [facility] to chair)  Comment: Pt stood for 2 min for hygiene after toileting, assist with donning brief. Pt with large, loose BM. Ambulation  Ambulation?: No (several steps to transfer but too weak to walk.)     Balance  Posture: Fair  Sitting - Static: Fair;+  Sitting - Dynamic: Fair;+  Standing - Static: Fair (with RW)  Standing - Dynamic: Fair (with RW)            Comment: toileting, hygiene, assist nadine/doff brief              G-Code     OutComes Score                                                     AM-PAC Score  AM-PAC Inpatient Mobility Raw Score : 14 (01/24/22 0913)  AM-PAC Inpatient T-Scale Score : 38.1 (01/24/22 0913)  Mobility Inpatient CMS 0-100% Score: 61.29 (01/24/22 0913)  Mobility Inpatient CMS G-Code Modifier : CL (01/24/22 0913)          Goals  Short term goals  Time Frame for Short term goals: Upon d/c (no goals met in full this date)  Short term goal 1: Patient will perform bed mobility with CGA. - Goal met 1/18  Short term goal 2: Patient will perform sit<>stand transfers with CGA and LRAD. Short term goal 3: Patient will ambulate 20 ft with Min A x1 and LRAD. Short term goal 4: Pt to perform bed mob with supervision. Patient Goals   Patient goals : Patient did not specify any goals wished to be achieved. Plan    Plan  Times per week: 3-5x  Times per day: Daily  Current Treatment Recommendations: Strengthening,Balance Training,Functional Mobility Training,Transfer Training,Gait Training,Endurance Training,Neuromuscular Re-education,Patient/Caregiver Education & Training,Equipment Evaluation, Education, & procurement,ADL/Self-care Training  Safety Devices  Type of devices:  All fall risk precautions in place,Call light within reach,Gait belt,Nurse notified,Patient at risk for falls,Chair alarm in place,Left in chair  Restraints  Initially in place: No     Therapy Time   Individual Concurrent Group Co-treatment   Time In 0822         Time Out 0900         Minutes 38         Timed Code Treatment Minutes: [address] [place], [id number]

## 2022-01-24 NOTE — RT PROTOCOL NOTE
RT Inhaler-Nebulizer Bronchodilator Protocol Note    There is a bronchodilator order in the chart from a provider indicating to follow the RT Bronchodilator Protocol and there is an Initiate RT Inhaler-Nebulizer Bronchodilator Protocol order as well (see protocol at bottom of note). CXR Findings:  XR CHEST PORTABLE    Result Date: 1/23/2022  1. No significant change. The findings from the last RT Protocol Assessment were as follows:   History Pulmonary Disease: Smoker 15 pack years or more  Respiratory Pattern: Regular pattern and RR 12-20 bpm  Breath Sounds: Slightly diminished and/or crackles  Cough: Strong, spontaneous, non-productive  Indication for Bronchodilator Therapy:    Bronchodilator Assessment Score: 3    Aerosolized bronchodilator medication orders have been revised according to the RT Inhaler-Nebulizer Bronchodilator Protocol below. Respiratory Therapist to perform RT Therapy Protocol Assessment initially then follow the protocol. Repeat RT Therapy Protocol Assessment PRN for score 0-3 or on second treatment, BID, and PRN for scores above 3. No Indications - adjust the frequency to every 6 hours PRN wheezing or bronchospasm, if no treatments needed after 48 hours then discontinue using Per Protocol order mode. If indication present, adjust the RT bronchodilator orders based on the Bronchodilator Assessment Score as indicated below. Use Inhaler orders unless patient has one or more of the following: on home nebulizer, not able to hold breath for 10 seconds, is not alert and oriented, cannot activate and use MDI correctly, or respiratory rate 25 breaths per minute or more, then use the equivalent nebulizer order(s) with same Frequency and PRN reasons based on the score. If a patient is on this medication at home then do not decrease Frequency below that used at home.     0-3 - enter or revise RT bronchodilator order(s) to equivalent RT Bronchodilator order with Frequency of every 4 hours PRN for wheezing or increased work of breathing using Per Protocol order mode. 4-6 - enter or revise RT Bronchodilator order(s) to two equivalent RT bronchodilator orders with one order with BID Frequency and one order with Frequency of every 4 hours PRN wheezing or increased work of breathing using Per Protocol order mode. 7-10 - enter or revise RT Bronchodilator order(s) to two equivalent RT bronchodilator orders with one order with TID Frequency and one order with Frequency of every 4 hours PRN wheezing or increased work of breathing using Per Protocol order mode. 11-13 - enter or revise RT Bronchodilator order(s) to one equivalent RT bronchodilator order with QID Frequency and an Albuterol order with Frequency of every 4 hours PRN wheezing or increased work of breathing using Per Protocol order mode. Greater than 13 - enter or revise RT Bronchodilator order(s) to one equivalent RT bronchodilator order with every 4 hours Frequency and an Albuterol order with Frequency of every 2 hours PRN wheezing or increased work of breathing using Per Protocol order mode. RT to enter RT Home Evaluation for COPD & MDI Assessment order using Per Protocol order mode.     Electronically signed by Aniceto Nunes RCP on 1/24/2022 at 7:41 AM

## 2022-01-24 NOTE — PROGRESS NOTES
[facility]    Follow Up Note    Subjective:   CHIEF COMPLAINT / HPI:   Chief Complaint   Patient presents with    Fatigue     Pt in via EMS from home. Pts neighbor called 911 because the pt has been getting weaker. Pt has been feeling weak for the past few months. Pt endorses no pain but chest pressure that goes through to her back. Pt has hx of stroke and angioplasty       Interval history: Patient originally admitted 1/13/2022. She was found to have lung mass with mediastinal adenopathy and adrenal and bone metastasis. She also has a breast mass. She did undergo fiberoptic bronchoscopy with endobronchial ultrasound returning non-small cell carcinoma likely poorly differentiated adenocarcinoma. Brushings of the left upper lobe were also positive for malignancy. Appears to represent a stage IV malignancy. Also some concern for postobstructive pneumonia. Patient is now on Zosyn changed over from azithromycin and ceftriaxone.     Past Medical History:    Reviewed; no changes    Social History:    Reviewed; no changes    REVIEW OF SYSTEMS:    CONSTITUTIONAL:  negative for fevers and chills  RESPIRATORY:  See HPI  CARDIOVASCULAR:  negative for chest pain, palpitations, edema  GASTROINTESTINAL:  negative for nausea, vomiting, diarrhea, constipation and abdominal pain    Objective:   PHYSICAL EXAM:        VITALS:  BP 94/64   Pulse 105   Temp 97.7 °F (36.5 °C) (Oral)   Resp 18   Ht 5' 4\" (1.626 m)   Wt 130 lb 11.2 oz (59.3 kg)   SpO2 96%   BMI 22.43 kg/m²  on room air     24HR INTAKE/OUTPUT:      Intake/Output Summary (Last 24 hours) at 1/24/2022 1049  Last data filed at 1/24/2022 2086  Gross per 24 hour   Intake 173.66 ml   Output 1950 ml   Net -1776.34 ml       CONSTITUTIONAL:  awake, alert,  no apparent distress, and appears stated age  LUNGS:  No increased work of breathing and clear to auscultation, no crackles or wheezes  CARDIOVASCULAR: S1 and S2 and no JVD  ABDOMEN:  normal bowel sounds, non-distended and non-tender to palpation  EXT: No edema, no calf tenderness. Pulses are present bilaterally. NEUROLOGIC:  Mental Status Exam:  Level of Alertness:   awake  Orientation:   person, place, time. Non focal  SKIN:  normal skin color, texture, turgor, no redness, warmth, or swelling at IV sites    DATA:    CBC:  Recent Labs     01/22/22  0540 01/23/22  0555 01/24/22  0630   WBC 18.2* 21.3* 25.3*   RBC 2.79* 3.05* 2.99*   HGB 8.5* 9.2* 9.1*   HCT 26.3* 28.3* 27.8*    259 257   MCV 94.5 92.9 93.0   MCH 30.5 30.1 30.4   MCHC 32.3 32.4 32.7   RDW 16.7* 16.8* 17.3*      BMP:  Recent Labs     01/22/22  0540 01/23/22  0555 01/24/22  0630   * 134* 133*   K 3.7 3.2* 3.1*    99 95*   CO2 22 24 25   BUN 12 9 10   CREATININE 1.1 1.1 1.1   CALCIUM 6.9* 6.4* 6.4*   GLUCOSE 89 80 74      ABG:  No results for input(s): PHART, BHD8IVV, PO2ART, LDC6XLO, P2AKGRBE, BEART in the last 72 hours. Procalcitonin  Recent Labs     01/23/22  0555   PROCAL 0.41*           Radiology Review:  Pertinent images / reports were reviewed as a part of this visit. Assessment:     1. 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 2. Biopsy positive paratracheal mass as well  3. Left breast mass    Plan:     1. I have reviewed laboratories, medical records and images for this visit  2. Patient seems a bit overwhelmed by everything that is happening to her. \"I just go with the flow. \"  3. Paratracheal LN is + for poorly differentiated Adeno. 4. Currently no PTX or effusion on CXR  5. Tolerating RA  6. Will follow from a distance.

## 2022-01-24 NOTE — PROGRESS NOTES
[facility]   Progress Note      1/24/2022 9:31 AM        Name: Willy Walls .              Admitted: 1/13/2022    SUBJECTIVE:  She remains weak, no SOB with an occasional cough, she continues to c/o back pain.     Reviewed interval ancillary notes    Current Medications  ipratropium-albuterol (DUONEB) nebulizer solution 1 ampule, Q4H PRN  potassium phosphate 30 mmol in dextrose 5 % 250 mL IVPB, Once  calcium gluconate 4,000 mg in dextrose 5 % 100 mL IVPB, Once  sodium chloride flush 0.9 % injection 10 mL, PRN  piperacillin-tazobactam (ZOSYN) 3,375 mg in dextrose 5 % 50 mL IVPB extended infusion (mini-bag), Q8H  sodium chloride tablet 1 g, BID WC  furosemide (LASIX) tablet 40 mg, BID  albuterol (PROVENTIL) nebulizer solution 2.5 mg, Q6H PRN  ondansetron (ZOFRAN) injection 4 mg, Q6H PRN  promethazine (PHENERGAN) injection 6.25 mg, Q6H PRN  potassium chloride (KLOR-CON M) extended release tablet 40 mEq, PRN   Or  potassium bicarb-citric acid (EFFER-K) effervescent tablet 40 mEq, PRN   Or  potassium chloride 10 mEq/100 mL IVPB (Peripheral Line), PRN  0.9 % sodium chloride infusion, PRN  sodium chloride flush 0.9 % injection 5-40 mL, 2 times per day  sodium chloride flush 0.9 % injection 5-40 mL, PRN  0.9 % sodium chloride infusion, PRN  enoxaparin (LOVENOX) injection 60 mg, BID  magnesium oxide (MAG-OX) tablet 400 mg, BID  ipratropium-albuterol (DUONEB) nebulizer solution 1 ampule, Q4H PRN  oxyCODONE-acetaminophen (PERCOCET) 5-325 MG per tablet 1 tablet, Q8H PRN  aspirin EC tablet 81 mg, Daily  atorvastatin (LIPITOR) tablet 40 mg, Daily  pantoprazole (PROTONIX) tablet 40 mg, QAM AC  levothyroxine (SYNTHROID) tablet 112 mcg, Daily  sodium chloride flush 0.9 % injection 5-40 mL, 2 times per day  sodium chloride flush 0.9 % injection 5-40 mL, PRN  0.9 % sodium chloride infusion, PRN  acetaminophen (TYLENOL) tablet 650 mg, Q6H PRN   Or  acetaminophen (TYLENOL) suppository 650 mg, Q6H PRN        Objective:  BP 94/64   Pulse 105   Temp 97.7 °F (36.5 °C) (Oral)   Resp 18   Ht 5' 4\" (1.626 m)   Wt 130 lb 11.2 oz (59.3 kg)   SpO2 96%   BMI 22.43 kg/m²     Intake/Output Summary (Last 24 hours) at 1/24/2022 0931  Last data filed at 1/24/2022 8588  Gross per 24 hour   Intake 173.66 ml   Output 1950 ml   Net -1776.34 ml      Wt Readings from Last 3 Encounters:   01/24/22 130 lb 11.2 oz (59.3 kg)   12/06/19 128 lb 12.8 oz (58.4 kg)   10/04/19 122 lb (55.3 kg)       General appearance:  Appears comfortable  Eyes: Sclera clear. Pupils equal.  ENT: Moist oral mucosa. Trachea midline, no adenopathy. Cardiovascular: Regular rhythm, normal S1, S2. No murmur. No edema in lower extremities  Respiratory: Not using accessory muscles. Good inspiratory effort. Clear to auscultation bilaterally, no wheeze or crackles. GI: Abdomen soft, no tenderness, not distended  Musculoskeletal: No cyanosis in digits, neck supple  Neurology: CN 2-12 grossly intact. No speech or motor deficits  Psych: Normal affect. Alert and oriented in time, place and person  Skin: Warm, dry, normal turgor    Labs and Tests:  CBC:   Recent Labs     01/22/22  0540 01/23/22  0555 01/24/22  0630   WBC 18.2* 21.3* 25.3*   HGB 8.5* 9.2* 9.1*    259 257     BMP:    Recent Labs     01/22/22  0540 01/23/22  0555 01/24/22  0630   * 134* 133*   K 3.7 3.2* 3.1*    99 95*   CO2 22 24 25   BUN 12 9 10   CREATININE 1.1 1.1 1.1   GLUCOSE 89 80 74     Hepatic: No results for input(s): AST, ALT, ALB, BILITOT, ALKPHOS in the last 72 hours. ASSESSMENT AND PLAN    Active Problems:    Lung mass    Severe malnutrition (HCC)    PNA (pneumonia)    Hypercalcemia  Resolved Problems:    * No resolved hospital problems. *      1.   Left upper lobe, suprahilar mass with mediastinal lymphadenopathy, bilateral adrenal metastasis and lytic bony lesions  - s/p bronchoscopy with EBUS 1/20/22, pathology showing non small cell carcinoma, likely poorly differentiated adenocarcinoma. - PD-L1 testing will be requested on specimen  - start Vit B12 and folate in anticipation of starting treatment. - seen by Dr. Dennie House, radiation oncology, for palliative radiation, plan for simulation at [facility] today     2.  2.3 cm left breast mass  - may need biopsy which can be done as outpatient     3. Hypercalcemia  - s/p Zometa on 1/14/21     4. Anemia of chronic disease. Transfuse PRBCs if hemoglobin is less than 7  - s/p pRBCs on 1/17/21  - hgb 9.1    5. Leukocytosis  - possibly d/t post obstructive pneumonia  - started on antibiotics  - ID following       Nathalie Kraft CNP  [facility]    Patient was seen this morning. Some back pain. Feeling frustrated. Does not like breathing treatment because it makes her to feel worse. Spoke to the pathologist.  Preliminary review showed poorly differentiated non-small cell lung cancer favoring adenocarcinoma. We will request PD-L1 testing. Not sure if we have enough for NGS. Will consider liquid biopsy. Her leukocytosis is worse and she does have postobstructive pneumonia. Discussed with Dr. Dennie House for consideration of palliative radiation. She has poor social support. Disposition per social service. At this point she is interested in systemic therapy. Eli Langston.  Corby Campos MD, [place] [id number]  Hematology and Oncology  [facility]  [phone]

## 2022-01-25 PROBLEM — R53.83 FATIGUE: Status: ACTIVE | Noted: 2019-08-12

## 2022-01-25 LAB
ANION GAP SERPL CALCULATED.3IONS-SCNC: 12 MMOL/L (ref 3–16)
BASOPHILS ABSOLUTE: 0.3 K/UL (ref 0–0.2)
BASOPHILS RELATIVE PERCENT: 1.2 %
BUN BLDV-MCNC: 10 MG/DL (ref 7–20)
CALCIUM SERPL-MCNC: 7.1 MG/DL (ref 8.3–10.6)
CHLORIDE BLD-SCNC: 94 MMOL/L (ref 99–110)
CO2: 24 MMOL/L (ref 21–32)
CREAT SERPL-MCNC: 1.1 MG/DL (ref 0.6–1.2)
EOSINOPHILS ABSOLUTE: 0.1 K/UL (ref 0–0.6)
EOSINOPHILS RELATIVE PERCENT: 0.6 %
GFR AFRICAN AMERICAN: 58
GFR NON-AFRICAN AMERICAN: 48
GLUCOSE BLD-MCNC: 84 MG/DL (ref 70–99)
HCT VFR BLD CALC: 25.2 % (ref 36–48)
HEMOGLOBIN: 8.2 G/DL (ref 12–16)
LYMPHOCYTES ABSOLUTE: 1 K/UL (ref 1–5.1)
LYMPHOCYTES RELATIVE PERCENT: 4.3 %
MAGNESIUM: 1.6 MG/DL (ref 1.8–2.4)
MCH RBC QN AUTO: 30.3 PG (ref 26–34)
MCHC RBC AUTO-ENTMCNC: 32.6 G/DL (ref 31–36)
MCV RBC AUTO: 93 FL (ref 80–100)
MONOCYTES ABSOLUTE: 1.4 K/UL (ref 0–1.3)
MONOCYTES RELATIVE PERCENT: 6.4 %
MRSA SCREEN RT-PCR: NORMAL
NEUTROPHILS ABSOLUTE: 19.3 K/UL (ref 1.7–7.7)
NEUTROPHILS RELATIVE PERCENT: 87.5 %
PDW BLD-RTO: 16.8 % (ref 12.4–15.4)
PHOSPHORUS: 2.7 MG/DL (ref 2.5–4.9)
PLATELET # BLD: 249 K/UL (ref 135–450)
PMV BLD AUTO: 7 FL (ref 5–10.5)
POTASSIUM SERPL-SCNC: 3.5 MMOL/L (ref 3.5–5.1)
RBC # BLD: 2.71 M/UL (ref 4–5.2)
SODIUM BLD-SCNC: 130 MMOL/L (ref 136–145)
WBC # BLD: 22 K/UL (ref 4–11)

## 2022-01-25 PROCEDURE — 83735 ASSAY OF MAGNESIUM: CPT

## 2022-01-25 PROCEDURE — 84100 ASSAY OF PHOSPHORUS: CPT

## 2022-01-25 PROCEDURE — 2580000003 HC RX 258: Performed by: INTERNAL MEDICINE

## 2022-01-25 PROCEDURE — 80048 BASIC METABOLIC PNL TOTAL CA: CPT

## 2022-01-25 PROCEDURE — 97535 SELF CARE MNGMENT TRAINING: CPT

## 2022-01-25 PROCEDURE — 6360000002 HC RX W HCPCS: Performed by: INTERNAL MEDICINE

## 2022-01-25 PROCEDURE — 1200000000 HC SEMI PRIVATE

## 2022-01-25 PROCEDURE — 85025 COMPLETE CBC W/AUTO DIFF WBC: CPT

## 2022-01-25 PROCEDURE — 6370000000 HC RX 637 (ALT 250 FOR IP): Performed by: NURSE PRACTITIONER

## 2022-01-25 PROCEDURE — 6370000000 HC RX 637 (ALT 250 FOR IP): Performed by: INTERNAL MEDICINE

## 2022-01-25 PROCEDURE — 99233 SBSQ HOSP IP/OBS HIGH 50: CPT | Performed by: INTERNAL MEDICINE

## 2022-01-25 RX ORDER — MAGNESIUM SULFATE 1 G/100ML
1000 INJECTION INTRAVENOUS PRN
Status: DISCONTINUED | OUTPATIENT
Start: 2022-01-25 | End: 2022-01-25

## 2022-01-25 RX ORDER — FUROSEMIDE 20 MG/1
20 TABLET ORAL 2 TIMES DAILY
Status: DISCONTINUED | OUTPATIENT
Start: 2022-01-25 | End: 2022-01-26

## 2022-01-25 RX ORDER — POTASSIUM CHLORIDE 20 MEQ/1
40 TABLET, EXTENDED RELEASE ORAL
Status: DISCONTINUED | OUTPATIENT
Start: 2022-01-25 | End: 2022-01-25

## 2022-01-25 RX ORDER — MAGNESIUM SULFATE IN WATER 40 MG/ML
4000 INJECTION, SOLUTION INTRAVENOUS ONCE
Status: COMPLETED | OUTPATIENT
Start: 2022-01-25 | End: 2022-01-25

## 2022-01-25 RX ADMIN — SODIUM CHLORIDE TAB 1 GM 1 G: 1 TAB at 08:20

## 2022-01-25 RX ADMIN — POTASSIUM BICARBONATE 40 MEQ: 782 TABLET, EFFERVESCENT ORAL at 08:19

## 2022-01-25 RX ADMIN — SODIUM CHLORIDE TAB 1 GM 1 G: 1 TAB at 16:17

## 2022-01-25 RX ADMIN — OXYCODONE AND ACETAMINOPHEN 2 TABLET: 5; 325 TABLET ORAL at 16:18

## 2022-01-25 RX ADMIN — Medication 10 ML: at 20:42

## 2022-01-25 RX ADMIN — OXYCODONE AND ACETAMINOPHEN 2 TABLET: 5; 325 TABLET ORAL at 05:05

## 2022-01-25 RX ADMIN — FUROSEMIDE 40 MG: 40 TABLET ORAL at 11:47

## 2022-01-25 RX ADMIN — LEVOTHYROXINE SODIUM 112 MCG: 0.11 TABLET ORAL at 08:20

## 2022-01-25 RX ADMIN — OXYCODONE AND ACETAMINOPHEN 2 TABLET: 5; 325 TABLET ORAL at 09:44

## 2022-01-25 RX ADMIN — ENOXAPARIN SODIUM 60 MG: 100 INJECTION SUBCUTANEOUS at 08:21

## 2022-01-25 RX ADMIN — MAGNESIUM SULFATE HEPTAHYDRATE 4000 MG: 40 INJECTION, SOLUTION INTRAVENOUS at 11:54

## 2022-01-25 RX ADMIN — Medication 10 ML: at 08:22

## 2022-01-25 RX ADMIN — Medication 400 MG: at 08:20

## 2022-01-25 RX ADMIN — LINEZOLID 600 MG: 2 INJECTION, SOLUTION INTRAVENOUS at 22:24

## 2022-01-25 RX ADMIN — FUROSEMIDE 20 MG: 20 TABLET ORAL at 16:18

## 2022-01-25 RX ADMIN — LINEZOLID 600 MG: 2 INJECTION, SOLUTION INTRAVENOUS at 11:49

## 2022-01-25 RX ADMIN — FOLIC ACID 1 MG: 1 TABLET ORAL at 08:20

## 2022-01-25 RX ADMIN — ENOXAPARIN SODIUM 60 MG: 100 INJECTION SUBCUTANEOUS at 20:41

## 2022-01-25 RX ADMIN — Medication 400 MG: at 20:41

## 2022-01-25 RX ADMIN — OXYCODONE AND ACETAMINOPHEN 2 TABLET: 5; 325 TABLET ORAL at 00:29

## 2022-01-25 RX ADMIN — PANTOPRAZOLE SODIUM 40 MG: 40 TABLET, DELAYED RELEASE ORAL at 05:32

## 2022-01-25 RX ADMIN — ATORVASTATIN CALCIUM 40 MG: 40 TABLET, FILM COATED ORAL at 08:20

## 2022-01-25 RX ADMIN — Medication 250 MG: at 08:20

## 2022-01-25 RX ADMIN — ASPIRIN 81 MG: 81 TABLET, COATED ORAL at 08:20

## 2022-01-25 RX ADMIN — Medication 250 MG: at 20:41

## 2022-01-25 RX ADMIN — OXYCODONE AND ACETAMINOPHEN 2 TABLET: 5; 325 TABLET ORAL at 22:22

## 2022-01-25 RX ADMIN — Medication 10 ML: at 08:20

## 2022-01-25 ASSESSMENT — PAIN SCALES - PAIN ASSESSMENT IN ADVANCED DEMENTIA (PAINAD)
CONSOLABILITY: 0
NEGVOCALIZATION: 0
FACIALEXPRESSION: 1
TOTALSCORE: 3
BODYLANGUAGE: 0
BREATHING: 2

## 2022-01-25 ASSESSMENT — ENCOUNTER SYMPTOMS
FACIAL SWELLING: 0
BLOOD IN STOOL: 0
ABDOMINAL PAIN: 0
TROUBLE SWALLOWING: 0
NAUSEA: 0
EYE REDNESS: 0
STRIDOR: 0
DIARRHEA: 0
COUGH: 0
CHOKING: 0
EYE DISCHARGE: 0
RHINORRHEA: 0
COLOR CHANGE: 0
CHEST TIGHTNESS: 0
PHOTOPHOBIA: 0
APNEA: 0
SHORTNESS OF BREATH: 0

## 2022-01-25 ASSESSMENT — PAIN SCALES - GENERAL
PAINLEVEL_OUTOF10: 8
PAINLEVEL_OUTOF10: 0
PAINLEVEL_OUTOF10: 7
PAINLEVEL_OUTOF10: 8
PAINLEVEL_OUTOF10: 5
PAINLEVEL_OUTOF10: 10
PAINLEVEL_OUTOF10: 0
PAINLEVEL_OUTOF10: 8
PAINLEVEL_OUTOF10: 8
PAINLEVEL_OUTOF10: 5
PAINLEVEL_OUTOF10: 7

## 2022-01-25 ASSESSMENT — PAIN SCALES - WONG BAKER: WONGBAKER_NUMERICALRESPONSE: 8

## 2022-01-25 ASSESSMENT — PAIN DESCRIPTION - LOCATION: LOCATION: BACK

## 2022-01-25 ASSESSMENT — PAIN DESCRIPTION - PAIN TYPE: TYPE: ACUTE PAIN

## 2022-01-25 NOTE — PROGRESS NOTES
Occupational Therapy  Facility/Department: [facility] 3A NURSING  Daily Treatment Note  NAME: Cristal Huertas  : 1945  MRN: [id number]    Date of Service: 2022    Discharge Recommendations:    Cristal Huertas scored a 15/24 on the AM-PAC ADL Inpatient form. Current research shows that an AM-PAC score of 17 or less is typically not associated with a discharge to the patient's home setting. Based on the patient's AM-PAC score and their current ADL deficits, it is recommended that the patient have 3-5 sessions per week of Occupational Therapy at d/c to increase the patient's independence. Please see assessment section for further patient specific details. If patient discharges prior to next session this note will serve as a discharge summary. Please see below for the latest assessment towards goals. OT Equipment Recommendations  Equipment Needed: No  Other: defer to next level of care. May benefit from a life alert button, grab bar in bathtub    Assessment   Performance deficits / Impairments: Decreased functional mobility ; Decreased safe awareness;Decreased balance;Decreased ADL status; Decreased endurance;Decreased high-level IADLs;Decreased strength  Assessment: Pt is currently functioning below occupational baseline and demo the deficits listed above. Pt is currently requiring CGA-MIN(A) for functional mobility/transfers and max(A) for LB ADLs.  Pt would benefit from continued skilled OT services to address these deficits and increase IND, safety, and ease with all occupational pursuits  Treatment Diagnosis: Debility and decreased ADLs due to pneumonia, weakness  Prognosis: Fair  OT Education: OT Role;Plan of Care;Transfer Training;Energy Conservation;Equipment  Patient Education: Patient verbalized understanding but would benefit from reinforcement  Barriers to Learning: cognition, emotional  REQUIRES OT FOLLOW UP: Yes  Activity Tolerance  Activity Tolerance: Patient Tolerated treatment well;Patient limited by fatigue;Patient limited by pain  Safety Devices  Safety Devices in place: Yes  Type of devices: Call light within reach;Nurse notified; All fall risk precautions in place; Patient at risk for falls; Left in bed;Bed alarm in place         Patient Diagnosis(es): The primary encounter diagnosis was Fatigue, unspecified type. Diagnoses of Suspected malignant neoplasm, Pneumonia due to infectious organism, unspecified laterality, unspecified part of lung, and Left breast mass were also pertinent to this visit. has a past medical history of Acquired spondylolisthesis, Arthritis, Cataract, Chronic pain syndrome, Degeneration of lumbar or lumbosacral intervertebral disc, Depressive disorder, not elsewhere classified, Displacement of lumbar intervertebral disc without myelopathy, H/O blood clots, HBP (high blood pressure), Heart disease, Spinal stenosis, lumbar region, without neurogenic claudication, Sprain of lumbosacral (joint) (ligament), Sprain of neck, Stroke [facility]), Thyroid disorder, and Thyroid disorder. has a past surgical history that includes partial hysterectomy (cervix not removed) (1964); Hysterectomy, total abdominal (1972); lumbar fusion; bronchoscopy (1/20/2022); and bronchoscopy (1/20/2022). Restrictions  Restrictions/Precautions  Restrictions/Precautions: Fall Risk (High fall risk)  Required Braces or Orthoses?: No  Position Activity Restriction  Other position/activity restrictions: 67 y.o. female with past medical history of thyroid disease, heart disease, CVA, thyroid disorder, DVT, A. fib, presents from home in view of progressive weakness. Patient is a poor historian. She states she has been getting weak. She also describes stabbing pain that goes from her chest to her back when she lies down. States pain has been there for few months. She states that she has been getting all of her care from a nurse practitioner who visits her at home every few months.   She has been living with her friend Alex for the last few months. I have discussed this presentation with Viri Walter over the phone. According to her, patient has been getting progressively weak over the last couple of months. She has not been eating much. She has been spending increasingly more time in bed and has not really gotten out of bed for over a week now. Patient has been managing her own medications. She has been somewhat confused over the past week but does not have memory problems at baseline. Upon chart review it appears that patient has not seen her primary care provider since 2019. Recent communication documented between patient's NP and Dr. Aki Lyn. There is being requested for imaging in view of concern for lung cancer. I attempted to reach out to [facility] NP at [phone]. She is not available today. I am expecting a call back from another NP with some information from her chart. ADD noncontrast CT of the chest shows 6.3 cm left upper lobe suprahilar mass suspicious for malignancy. There is also mediastinal adenopathy. There is a 5 x 7 mm right middle lobe nodule. There are bilateral adrenal nodules suspicious for metastasis. Patient is also found to be hyperglycemic. She has leukocytosis and concern for postobstructive pneumonia on CT imaging pain    Subjective   General  Chart Reviewed: Yes  Patient assessed for rehabilitation services?: Yes  Additional Pertinent Hx: h/o stroke, left side residual weakness  Response to previous treatment: Patient reporting fatigue but able to participate  Family / Caregiver Present: No  Diagnosis: Left breast mass/Stage 4 cancer  Subjective  Subjective: Pt supine in bed upon arrival, pleasant and agreeable to OT treatment session.  Requesting to complete ADLs  General Comment  Comments: Patient c/o 5/10 pain between shoulder blades  Pain Assessment  Pain Level: 7  Pain Type: Acute pain  Pain Location: Back  Pre Treatment Pain Screening  Intervention List: Patient able to continue with treatment;Nurse/Physician notified  Vital Signs  Patient Currently in Pain: Yes     Orientation  Orientation  Overall Orientation Status: Within Functional Limits    Objective    ADL  UE Bathing: Setup;Stand by assistance (completed while seated EOB)  LE Bathing: Setup;Maximum assistance (front/rear colette care while in stance)  UE Dressing: Minimal assistance (doffing/donning gown)  LE Dressing: Maximum assistance (doff/nadine briefs)  Toileting: Dependent/Total (incontinent of BM, assist for clothing management and colette care this date)  Additional Comments: Pt sat EOB to complete UB bathing and dressing, completed stand step transfer from EOB>BSC and completed LB bathing/dressing and toileting while on BSC  Balance  Sitting Balance: Stand by assistance  Standing Balance: Minimal assistance  Standing Balance  Time: 3-4 minutes total  Activity: functional mobility/transfers, ADL completion  Comment: use of RW, no LOB, decreased standing tolerance d/t weakness  Functional Mobility  Functional - Mobility Device: Rolling Walker  Activity: Other  Assist Level: Minimal assistance  Functional Mobility Comments: pt ambulated 2-3' from EOB>BSC with CGA-MIN(A)  Toilet Transfers  Toilet - Technique: Stand step  Equipment Used: Standard toilet  Toilet Transfer: Contact guard assistance  Bed mobility  Supine to Sit: Stand by assistance  Sit to Supine: Stand by assistance  Scooting: Stand by assistance  Transfers  Stand Step Transfers: Minimal assistance  Sit to stand: Contact guard assistance  Stand to sit: Contact guard assistance  Cognition  Overall Cognitive Status: Exceptions  Arousal/Alertness: Appropriate responses to stimuli  Following Commands:  Follows one step commands consistently  Attention Span: Appears intact  Memory: Decreased short term memory  Safety Judgement: Decreased awareness of need for assistance;Decreased awareness of need for safety  Problem Solving: Decreased awareness of errors  Insights: Decreased awareness of deficits  Initiation: Requires cues for some  Sequencing: Does not require cues  Cognition Comment: Pt emotional at EOS, emotional support provided and pt in better spirits prior to end of OT session  Perception  Overall Perceptual Status: [place] 27  Times per week: 3-5  Times per day: Daily  Current Treatment Recommendations: Endurance Training,Patient/Caregiver Education & Training,Self-Care / ADL,Functional Mobility Training,Safety Education & Training,Strengthening,Balance Training    G-Code     OutComes Score                                                  AM-PAC Score        AM-PAC Inpatient Daily Activity Raw Score: 15 (01/25/22 0935)  AM-PAC Inpatient ADL T-Scale Score : 34.69 (01/25/22 0935)  ADL Inpatient CMS 0-100% Score: 56.46 (01/25/22 0935)  ADL Inpatient CMS G-Code Modifier : CK (01/25/22 0935)    Goals  Short term goals  Time Frame for Short term goals: Until discharge  Short term goal 1: Minimal assist UB/LB bathing-ongoing 1/25  Short term goal 2: Minimal assist UB/LB- ongoing 1/25  Short term goal 3: Minimal assist toileting- max/dependent 1/25  Short term goal 4: CGA functional transfers with RW- SPT CGA-MIN(A) 1/25  Short term goal 5: SBA functional mobility-  CGA-MIN(A) stand step 1/25  Long term goals  Time Frame for Long term goals : STGs= LTGs  Patient Goals   Patient goals : Get stronger and go back home with friend       Therapy Time   Individual Concurrent Group Co-treatment   Time In 0845         Time Out 0969         Minutes 39         Timed Code Treatment Minutes: [address] [facility], [address] [place] [id number]

## 2022-01-25 NOTE — PROGRESS NOTES
Infectious Diseases   Progress Note      Admission Date: 1/13/2022  Hospital Day: Hospital Day: 13   Attending: Miya Manriquez MD  Date of service: 1/25/2022     Chief complaint/ Reason for consult:     · Sepsis with leukocytosis, tachycardia, hypotension  · On and off fever  · Generalized weakness on admission  · Mild hyponatremia  · Paratracheal lymphadenopathy, s/p bronchoscopy with EBUS and fine-needle aspiration cytology of lymph node, surgical path consistent with non-small cell carcinoma  · Negative COVID 19 NAAT on 1/20/2022, no PCR available    Microbiology:        I have reviewed allavailable micro lab data and cultures    · Blood culture (2/2) - collected on 1/13/2022: Negative      Antibiotics and immunizations:       Current antibiotics: All antibiotics and their doses were reviewed by me    Recent Abx Admin                   linezolid (ZYVOX) IVPB 600 mg (mg) 600 mg New Bag 01/25/22 1149     600 mg New Bag 01/24/22 2259    vancomycin [facility]) 50 MG/ML oral solution 250 mg (mg) 250 mg Given 01/25/22 0820     250 mg Given 01/24/22 2256                  Immunization History: All immunization history was reviewed by me today. Immunization History   Administered Date(s) Administered    Influenza, High Dose (Fluzone 65 yrs and older) 10/13/2018    Pneumococcal Conjugate 13-valent ([id number]) 10/28/2015    Pneumococcal Conjugate 7-valent (Prevnar7) 10/28/2015       Known drug allergies: All allergies were reviewed and updated    Allergies   Allergen Reactions    Other Other (See Comments)     Anti depression cause black outs     Trazodone      Sedation?  Venlafaxine      Sedation? Social history:     Social History:  All social andepidemiologic history was reviewed and updated by me today as needed. · Tobacco use:   reports that she has been smoking. She started smoking about 62 years ago. She has a 28.50 pack-year smoking history.  She has never used smokeless tobacco.  · Alcohol use:   reports previous alcohol use. · Currently lives in: [place] [address]  ·  reports no history of drug use. COVID VACCINATION AND LAB RESULT RECORDS:     Internal Administration   First Dose      Second Dose           Last COVID Lab SARS-CoV-2, NAAT (no units)   Date Value   01/20/2022 Not Detected            Assessment:     The patient is a 67 y.o. old female who  has a past medical history of Acquired spondylolisthesis, Arthritis, Cataract, Chronic pain syndrome, Degeneration of lumbar or lumbosacral intervertebral disc, Depressive disorder, not elsewhere classified, Displacement of lumbar intervertebral disc without myelopathy, H/O blood clots, HBP (high blood pressure), Heart disease, Spinal stenosis, lumbar region, without neurogenic claudication, Sprain of lumbosacral (joint) (ligament), Sprain of neck, Stroke ([facility] [address] Thyroid disorder, and Thyroid disorder. with following problems:    · Sepsis with leukocytosis, tachycardia, hypotension-fever resolved  · On and off fever-afebrile for past 24 hours  · Generalized weakness on admission  · Mild hyponatremia-serum sodium is 130 today  · Paratracheal lymphadenopathy, s/p bronchoscopy with EBUS and fine-needle aspiration cytology of lymph node, surgical path consistent with non-small cell carcinoma  · Negative COVID 19 NAAT on 1/20/2022, no PCR available  · Slightly elevated procalcitonin of 0.41 on 1/24/22  · Bilateral adrenal masses and lytic L2 vertebral body lesion likely metastatic, likely from non-small cell lung cancer, noted on CT scan of her abdomen pelvis IV contrast on 1/14/2022  · Left breast mass, likely malignant or metastatic, noted on CT scan 1/14/2022  · Right-sided nephrolithiasis  · Cholelithiasis without cholecystitis  ·       Discussion:      The patient is [place]febrile[place] [id number]    The patient is on IV linezolid and oral vancomycin. Serum creatinine is 1.1.     The patient had a bone scan done which showed lytic lesion at L2 level concerning for osseous metastasis. Plan:     Diagnostic Workup:    · Follow-up on urine culture from yesterday  · Continue to follow  fever curve, WBC count and blood cultures. · Continue to monitor blood counts, liver and renal function. Antimicrobials:    · I will leave the patient on empiric linezolid for now  · Nasal MRSA probe is negative. If the blood cultures remain negative, I will discontinue linezolid in coming days  · Will leave her on low-dose oral vancomycin for now for C. difficile prevention  · Probiotic twice daily  · We will follow up on the culture results and clinical progress and will make further recommendations accordingly. · Continue close vitals monitoring. · Maintain good glycemic control. · Fall precautions. Aspiration precautions. · Continue to watch for new fever or diarrhea. · DVT prophylaxis. · Discussed all above with patient and RN. Drug Monitoring:    · Continue monitoring for antibiotic toxicity as follows: CBC, CMP   · Continue to watch for following: new or worsening fever, new hypotension, hives, lip swelling and redness or purulence at vascular access sites. I/v access Management:    · Continue to monitor i.v access sites for erythema, induration, discharge or tenderness. · As always, continue efforts to minimize tubes/lines/drains as clinically appropriate to reduce chances of line associated infections. Patient education and counseling:        · The patient was educated in detail about the side-effects of various antibiotics and things to watch for like new rashes, lip swelling, severe reaction, worsening diarrhea, break through fever etc.  · Discussed patient's condition and what to expect. All of the patient's questions were addressed in a satisfactory manner and patient verbalized understanding all instructions.       Level of complexity of visit: High     TIME SPENT TODAY:     - Spent over  36 minutes on visit (including interval history, physical exam, review of data including labs, cultures, imaging, development and implementation of treatment plan and coordination of complex care). More than 50 percent of this includes face-to-face time spent with the patient for counseling and coordination of care. Thank you for involving me in the care of your patient. I will continue to follow. If you have anyadditional questions, please do not hesitate to contact me. Subjective: Interval history: Interval history was obtained from chart review and patient/ RN. The patient is afebrile. She is tolerating antibiotics okay. Had some diarrhea today. REVIEW OF SYSTEMS:      Review of Systems   Constitutional: Positive for fatigue. Negative for chills, diaphoresis and unexpected weight change. HENT: Negative for congestion, ear discharge, ear pain, facial swelling, hearing loss, rhinorrhea and trouble swallowing. Eyes: Negative for photophobia, discharge, redness and visual disturbance. Respiratory: Negative for apnea, cough, choking, chest tightness, shortness of breath and stridor. Cardiovascular: Negative for chest pain and palpitations. Gastrointestinal: Negative for abdominal pain, blood in stool, diarrhea and nausea. Endocrine: Negative for polydipsia, polyphagia and polyuria. Genitourinary: Negative for difficulty urinating, dysuria, frequency, hematuria, menstrual problem and vaginal discharge. Musculoskeletal: Negative for arthralgias, joint swelling, myalgias and neck stiffness. Skin: Negative for color change and rash. Allergic/Immunologic: Negative for immunocompromised state. Neurological: Negative for dizziness, seizures, speech difficulty, light-headedness and headaches. Hematological: Negative for adenopathy. Psychiatric/Behavioral: Negative for agitation, hallucinations and suicidal ideas. Past Medical History: All past medical history reviewed today.     Past Medical History:   Diagnosis Date    Acquired spondylolisthesis     Arthritis     Cataract     Chronic pain syndrome     Degeneration of lumbar or lumbosacral intervertebral disc     Depressive disorder, not elsewhere classified     Displacement of lumbar intervertebral disc without myelopathy     H/O blood clots     HBP (high blood pressure)     Heart disease     Spinal stenosis, lumbar region, without neurogenic claudication     Sprain of lumbosacral (joint) (ligament)     Sprain of neck     Stroke ([address]     Thyroid disorder     Thyroid disorder        Past Surgical History: All past surgical history was reviewed today. Past Surgical History:   Procedure Laterality Date    BRONCHOSCOPY  1/20/2022    BRONCHOSCOPY BRUSHINGS performed by Ammy Howell MD at [address]  1/20/2022    BRONCHOSCOPY W/EBUS FNA performed by Ammy Howell MD at [address]      L4-5    PARTIAL HYSTERECTOMY  1964       Family History: All family history was reviewed today. Problem Relation Age of Onset    Cancer Father         Brain Cancer     Breast Cancer Sister     High Blood Pressure Sister        Objective:       PHYSICAL EXAM:      Vitals:   Vitals:    01/25/22 0505 01/25/22 0511 01/25/22 0817 01/25/22 1146   BP: 103/66  124/81 100/70   Pulse: 81  85 88   Resp: 16  16 16   Temp: 97.7 °F (36.5 °C)  97 °F (36.1 °C) 98.4 °F (36.9 °C)   TempSrc: Oral  Axillary Oral   SpO2: 93%  96% 92%   Weight:  135 lb 8 oz (61.5 kg)     Height:           Physical Exam  Vitals and nursing note reviewed. Constitutional:       General: She is not in acute distress. Appearance: She is well-developed. She is not diaphoretic. Comments: Appears tired   HENT:      Head: Normocephalic.       Right Ear: External ear normal.      Left Ear: External ear normal.      Nose: Nose normal.   Eyes:      General: No scleral icterus. Right eye: No discharge. Left eye: No discharge. Conjunctiva/sclera: Conjunctivae normal.      Pupils: Pupils are equal, round, and reactive to light. Cardiovascular:      Rate and Rhythm: Normal rate and regular rhythm. Heart sounds: No murmur heard. No friction rub. Pulmonary:      Effort: Pulmonary effort is normal.      Breath sounds: No stridor. No wheezing or rales. Chest:      Chest wall: No tenderness. Abdominal:      Palpations: Abdomen is soft. There is no mass. Tenderness: There is no abdominal tenderness. There is no guarding or rebound. Musculoskeletal:         General: No tenderness. Cervical back: Normal range of motion and neck supple. Lymphadenopathy:      Cervical: No cervical adenopathy. Skin:     General: Skin is warm and dry. Findings: No erythema or rash. Neurological:      Mental Status: She is alert and oriented to person, place, and time. Motor: No abnormal muscle tone. Psychiatric:         Judgment: Judgment normal.            Lines and drains: All vascular access sites are healthy with no local erythema, discharge or tenderness. Intake and output:    I/O last 3 completed shifts: In: 773.7 [P.O.:600; IV Piggyback:173.7]  Out: 2450 [Urine:2450]    Lab Data:   All available labs and old records have been reviewed by me.     CBC:  Recent Labs     01/23/22  0555 01/24/22  0630 01/25/22  0529   WBC 21.3* 25.3* 22.0*   RBC 3.05* 2.99* 2.71*   HGB 9.2* 9.1* 8.2*   HCT 28.3* 27.8* 25.2*    257 249   MCV 92.9 93.0 93.0   MCH 30.1 30.4 30.3   MCHC 32.4 32.7 32.6   RDW 16.8* 17.3* 16.8*        BMP:  Recent Labs     01/23/22  0555 01/24/22  0630 01/25/22  0529   * 133* 130*   K 3.2* 3.1* 3.5   CL 99 95* 94*   CO2 24 25 24   BUN 9 10 10   CREATININE 1.1 1.1 1.1   CALCIUM 6.4* 6.4* 7.1*   GLUCOSE 80 74 84        Hepatic Function Panel:   Lab Results   Component Value Date    ALKPHOS 103 01/13/2022    ALT 17 01/13/2022    AST 22 01/13/2022    PROT 5.3 01/18/2022    PROT 7.6 02/16/2011    BILITOT 0.4 01/13/2022    BILIDIR <0.2 08/14/2019    IBILI see below 08/14/2019    LABALBU 1.9 01/18/2022       CPK: No results found for: CKTOTAL  ESR:   Lab Results   Component Value Date    SEDRATE 31 (H) 08/14/2019     CRP:   Lab Results   Component Value Date    CRP 1.9 07/30/2019           Imaging: All pertinent images and reports for the current visit were reviewed by me during this visit. NM BONE SCAN WHOLE BODY   Final Result   1. Focal uptake at L2 correlating to known lytic lesion of concern for   potential osseous metastasis. 2. Linear uptake along the tibial cortex bilaterally may represent shin   splints. More focal uptake at the proximal right tibia may potentially be   degenerative, but an underlying skeletal lesion cannot be excluded. Correlate with plain film imaging. XR CHEST PORTABLE   Final Result   1. No significant change. XR CHEST PORTABLE   Final Result   Focal consolidations seen within the left upper lobe. Pneumonia is primarily   considered. However, that must be followed to resolution to ensure that   there is no underlying neoplasm. CT ABDOMEN PELVIS W IV CONTRAST Additional Contrast? Oral   Final Result   1. Bilateral adrenal masses, likely metastatic. No other evidence of   abdominal or pelvic metastatic disease. 2. Lytic L2 vertebral body lesion concerning for metastatic disease. 3. 2.3 cm left breast mass likely malignant. 4. Cholelithiasis with no acute features. 5. Nonobstructive right nephrolithiasis. CT HEAD W WO CONTRAST   Final Result   No acute intracranial abnormality. No definite evidence of intracranial metastatic disease. It is noted that CT   imaging is insensitive for small metastatic lesions. If possible further   evaluation with MRI should be considered for better characterization.       RECOMMENDATIONS:   Unavailable         CT CHEST WO CONTRAST   Final Result   1. 6.3 cm left upper lobe, suprahilar mass, suspicious for malignancy. There   may be a postobstructive component of pneumonia, versus transbronchial spread   of tumor in the apex of the left upper lobe. 2. AP window mediastinal adenopathy, likely metastatic. 3. 9 x 7 mm right middle lobe nodule, indeterminate for inflammation, primary   or metastatic neoplasm. 4. Bilateral adrenal nodules, suspicious for metastasis. RECOMMENDATIONS:   Unavailable         XR CHEST PORTABLE   Final Result   New left upper lobe opacity concerning for pneumonia versus malignancy. RECOMMENDATION:   Chest CT is recommended for further evaluation. Medications: All current and past medications were reviewed.      magnesium sulfate  4,000 mg IntraVENous Once    potassium bicarb-citric acid  40 mEq Oral Daily with breakfast    furosemide  20 mg Oral BID    folic acid  1 mg Oral Daily    linezolid  600 mg IntraVENous Q12H    vancomycin  250 mg Oral 2 times per day    sodium chloride  1 g Oral BID WC    sodium chloride flush  5-40 mL IntraVENous 2 times per day    enoxaparin  1 mg/kg SubCUTAneous BID    magnesium oxide  400 mg Oral BID    aspirin  81 mg Oral Daily    atorvastatin  40 mg Oral Daily    pantoprazole  40 mg Oral QAM AC    levothyroxine  112 mcg Oral Daily    sodium chloride flush  5-40 mL IntraVENous 2 times per day        sodium chloride      sodium chloride      sodium chloride Stopped (01/19/22 2129)       ipratropium-albuterol, sodium chloride flush, oxyCODONE-acetaminophen **OR** oxyCODONE-acetaminophen, albuterol, ondansetron, promethazine, potassium chloride **OR** potassium alternative oral replacement **OR** [DISCONTINUED] potassium chloride, sodium chloride, sodium chloride flush, sodium chloride, ipratropium-albuterol, sodium chloride flush, sodium chloride, acetaminophen **OR** acetaminophen      Problem list:       Patient Active Problem List   Diagnosis Code    Sprain of ligament of lumbosacral joint S33. 9XXA    [facility] Neck sprain and strain S13. 9XXA    [facility] Degeneration of lumbar or lumbosacral intervertebral disc M51.37    [facility] Acquired spondylolisthesis M43.10    [facility] Displacement of lumbar intervertebral disc without myelopathy M51.26    [facility] Spinal stenosis, lumbar region, without neurogenic claudication M48.061    [facility] Depressive disorder, not elsewhere classified F32.9    Chronic pain syndrome G89.4    Lupus (HCC) M32.9    Coronary artery disease due to lipid rich plaque I25.10, I25.83    Hyperlipidemia LDL goal <70 E78.5    Paroxysmal atrial fibrillation (HCC) I48.0    History of stroke Z86.73    History of DVT (deep vein thrombosis) Z86.718    Gastroesophageal reflux disease without esophagitis K21.9    Acquired hypothyroidism E03.9    Lung mass R91.8    Tobacco use Z72.0    Elevated ferritin R79.89    Chronic insomnia F51.04    Anxiety disorder F41.9    Radiolucent lesion of bone M89.8X9    Chronic low back pain M54.50, G89.29    Breast tenderness N64.4    Bad taste in mouth R43.8    Decreased appetite R63.0    Closed fracture of upper end of left humerus S42.202A    Hyponatremia E87.1    Generalized weakness R53.1    Severe malnutrition (HCC) E43    Anemia D64.9    Hypoxia R09.02    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 Although every effort was made to ensure the accuracy of this automated transcription, some errors in transcription may have occurred inadvertently.  If you may need any clarification, please do not hesitate to contact me through [facility] or at the phone number provided below with my electronic signature. Any pictures or media included in this note were obtained after taking informed verbal consent from the patient and with their approval to include those in the patient's medical record.       Kendra Benavides MD, MPH, [address] [place] [address]  1/25/2022, 2:01 PM  [facility] Infectious Disease   [address] [place] [address], [address] [place] [address]  Office: [phone]  Fax: [phone]  Clinic days:  Tuesday & Thursday

## 2022-01-25 NOTE — PROGRESS NOTES
Physical Therapy  Roberta Johnson  Hold PT this morning as pt is leaving for [facility] for treatment at this time. Will follow up.   Cher Guillory, [address] [facility] sent to pt regarding DS result note

## 2022-01-25 NOTE — PLAN OF CARE
Pt being transferred to [facility] outpatient CT simulation for Radiation Oncology appointment. [facility] to stay with and return with pt after appointment is finished.

## 2022-01-25 NOTE — CONSULTS
Palliative Care: Follow up visit with patient after completing outpatient CT simulation @ [facility] Patient admits to being poor appetite,weak, tired and overall uncomfortable. Pain located at this time in left lower abdomen and mid clavicle area. Discussed code status again with education on all variables. Patient A/O X3. Able to express needs. Tearful at times. Patient wishes are DNR-CC. Much emotional support provided. Discussed pending SNIF option and continuation of radiation treatments. Patient wishes to stop process of radiation treatments and is not interested with aggressive measures. Is in agreement to attempt with skilled PT/OT to maintain some endurance as allowed. Perfect Serve to physician to update patient wishes/code status and desire to stop any radiation treatments at this time. Call to vashti Pollard to update on today's discussions and pending DC POC to [facility] skilled unit. [place] verbalizing understanding of patient wishes and will follow up with the Business office there to activate a medicaid application. [facility] care liaison Nena Thompson) has been updated via me at this time of this case and to assist in communication follow through. Per patient she is in agreement for hospice comfort care measures to be activated when she is no longer skill able and understands she will need residence at this facility to maintain self and pain management concerns. Per patient she has selected [facility] and allow them to follow. Hospice consult faxed and called Abi Jean-Baptiste) to [facility] to follow patient and will activate comfort care measures when no longer skilled. Palliative will continue to follow for any support to patient/family. Vashti Pollard has Palliative contact information. Nurse Melissa West and Donald Peters updated of these conversations today.

## 2022-01-25 NOTE — PROGRESS NOTES
Hospitalist Progress Note      PCP: Juaquin Meigs, DO    Date of Admission: 1/13/2022    Chief Complaint: Progressive weakness    Hospital Course: This 67 y.o. female with PMHx of  thyroid disease, heart disease, CVA, thyroid disorder, DVT, A. fib presented with progressive weakness. On admission,CT of the chest showed 6.3 cm left upper lobe suprahilar mass suspicious for malignancy, mediastinal lymphadenopathy and 5 x 7 mm right middle lobe nodule. There are bilateral adrenal nodules suspicious for metastasis and L2 lytic lesion. Patient is also found to be hyperglycemic. She has leukocytosis and concern for postobstructive pneumonia on CT imaging pain         Interval history  Pt seen and examined today. Overnight events noted, interval ancillary notes and labs reviewed. On room air satting well  Afebrile, WBCs remain elevated 22. Repeat blood cultures ordered  Low mag this morning: Replacement ordered  Plan for palliative radiation, plan for simulation   Reported generalized fatigue but denied cough, SOB, chest pain, nausea, vomiting or abdominal pain  Palliative care on board; revisiting goals of care and CODE STATUS       Assessment/Plan:    Left upper lobe, suprahilar mass with mediastinal lymphadenopathy, bilateral adrenal metastasis and lytic bony lesions  Pulmonology and oncology on board: Appreciate recs  S/p bronchoscopy with EBUS on 1/20/22 and biopsy of mediastinal lymph node:pathology consistent with poorly differentiated adenocarcinoma of lung  CT head negative for any mets  NM bone scan today and plan for vein mapping tomorrow  Radiation oncology consulted for palliative radiation.       Sepsis likely 2/2 postobstructive pneumonia due to metastatic lung cancer  Fevers, persistent leukocytosis and elevated procalcitonin  Received 5-day course of Rocephin;  switch to Zosyn on 1/21/2022 due to persistent leukocytosis  Infectious disease consulted due to persistent leukocytosis and soft pressures  Zosyn discontinued on 1/24/22: Started on IV Zyvox  3 sets of blood cultures ordered    2.3 cm left breast mass:  Oncology on board: Outpatient biopsy recommended    Anemia of chronic disease: Status post packed RBCs on 1/17/2022  Monitor hemoglobin closely  Transfuse with hemoglobin less than 7    Hypercalcemia: Likely secondary to malignancy: Resolved  PTH Rh level noted  Status post Zometa on 1/14/22  Monitor calcium level closely    Hyponatremia: Acute on chronic likely secondary to increased ADH release in the setting of metastatic lung cancer:   Nephrology on board: Appreciate their recs  Continue sodium tabs twice daily and p.o. Lasix  Restrict dietary free water to 1800ml    Hypothyroidism: Continue Synthroid    Failure to thrive: In the setting of malignancy     History of DVT: On Xarelto at home currently on hold     History of paroxysmal A. fib: On Xarelto currently on hold      DVT Prophylaxis: Lovenox  Diet: ADULT ORAL NUTRITION SUPPLEMENT; Breakfast, Dinner; Standard High Calorie/High Protein Oral Supplement  ADULT ORAL NUTRITION SUPPLEMENT; Breakfast, Lunch, Dinner, HS Snack; Standard High Calorie/High Protein Oral Supplement  ADULT DIET; Regular; 1200 ml  Code Status: Full Code      I have discussed with the patient the rationale for blood component transfusion; its benefits in treating or preventing fatigue, organ damage, or death; and its risk which includes mild transfusion reactions, rare risk of blood borne infection, or more serious but rare reactions. I have discussed the alternatives to transfusion, including the risk and consequences of not receiving transfusion. The patient had an opportunity to ask questions and had agreed to proceed with transfusion of blood components.         Medications:  Reviewed    Infusion Medications    sodium chloride      sodium chloride      sodium chloride Stopped (01/19/22 2129)     Scheduled Medications    magnesium sulfate  4,000 mg IntraVENous Once    potassium bicarb-citric acid  40 mEq Oral Daily with breakfast    folic acid  1 mg Oral Daily    linezolid  600 mg IntraVENous Q12H    vancomycin  250 mg Oral 2 times per day    sodium chloride  1 g Oral BID WC    furosemide  40 mg Oral BID    sodium chloride flush  5-40 mL IntraVENous 2 times per day    enoxaparin  1 mg/kg SubCUTAneous BID    magnesium oxide  400 mg Oral BID    aspirin  81 mg Oral Daily    atorvastatin  40 mg Oral Daily    pantoprazole  40 mg Oral QAM AC    levothyroxine  112 mcg Oral Daily    sodium chloride flush  5-40 mL IntraVENous 2 times per day     PRN Meds: ipratropium-albuterol, sodium chloride flush, oxyCODONE-acetaminophen **OR** oxyCODONE-acetaminophen, albuterol, ondansetron, promethazine, potassium chloride **OR** potassium alternative oral replacement **OR** [DISCONTINUED] potassium chloride, sodium chloride, sodium chloride flush, sodium chloride, ipratropium-albuterol, sodium chloride flush, sodium chloride, acetaminophen **OR** acetaminophen      Intake/Output Summary (Last 24 hours) at 1/25/2022 0854  Last data filed at 1/25/2022 0505  Gross per 24 hour   Intake 600 ml   Output 1300 ml   Net -700 ml       Physical Exam Performed:    /81   Pulse 85   Temp 97 °F (36.1 °C) (Axillary)   Resp 16   Ht 5' 4\" (1.626 m)   Wt 135 lb 8 oz (61.5 kg)   SpO2 96%   BMI 23.26 kg/m²     General appearance: No apparent distress, appears stated age and cooperative. HEENT: Pupils equal, round, and reactive to light. Conjunctivae/corneas clear. Neck: Supple, with full range of motion. No jugular venous distention. Trachea midline. Respiratory:  Normal respiratory effort. Clear to auscultation, bilaterally without Rales/Wheezes/Rhonchi. Cardiovascular: Regular rate and rhythm with normal S1/S2 without murmurs, rubs or gallops. Abdomen: Soft, non-tender, non-distended with normal bowel sounds. Musculoskeletal: No clubbing, cyanosis or edema bilaterally.   Full range of motion without deformity. Skin: Skin color, texture, turgor normal.  No rashes or lesions. Neurologic:  Neurovascularly intact without any focal sensory/motor deficits. Cranial nerves: II-XII intact, grossly non-focal.  Psychiatric: Alert and oriented, thought content appropriate, normal insight  Capillary Refill: Brisk,< 3 seconds   Peripheral Pulses: +2 palpable, equal bilaterally       Labs:   Recent Labs     01/23/22  0555 01/24/22  0630 01/25/22  0529   WBC 21.3* 25.3* 22.0*   HGB 9.2* 9.1* 8.2*   HCT 28.3* 27.8* 25.2*    257 249     Recent Labs     01/23/22  0555 01/24/22  0630 01/25/22  0529   * 133* 130*   K 3.2* 3.1* 3.5   CL 99 95* 94*   CO2 24 25 24   BUN 9 10 10   CREATININE 1.1 1.1 1.1   CALCIUM 6.4* 6.4* 7.1*   PHOS 2.0* 1.9* 2.7     No results for input(s): AST, ALT, BILIDIR, BILITOT, ALKPHOS in the last 72 hours. No results for input(s): INR in the last 72 hours. No results for input(s): Burnice Haralson in the last 72 hours. Urinalysis:      Lab Results   Component Value Date    NITRU Negative 01/13/2022    WBCUA 18 01/13/2022    BACTERIA RARE 01/13/2022    RBCUA 1 01/13/2022    BLOODU Negative 01/13/2022    SPECGRAV 1.015 01/13/2022    GLUCOSEU Negative 01/13/2022       Radiology:  NM BONE SCAN WHOLE BODY   Final Result   1. Focal uptake at L2 correlating to known lytic lesion of concern for   potential osseous metastasis. 2. Linear uptake along the tibial cortex bilaterally may represent shin   splints. More focal uptake at the proximal right tibia may potentially be   degenerative, but an underlying skeletal lesion cannot be excluded. Correlate with plain film imaging. XR CHEST PORTABLE   Final Result   1. No significant change. XR CHEST PORTABLE   Final Result   Focal consolidations seen within the left upper lobe. Pneumonia is primarily   considered.   However, that must be followed to resolution to ensure that   there is no underlying neoplasm. CT ABDOMEN PELVIS W IV CONTRAST Additional Contrast? Oral   Final Result   1. Bilateral adrenal masses, likely metastatic. No other evidence of   abdominal or pelvic metastatic disease. 2. Lytic L2 vertebral body lesion concerning for metastatic disease. 3. 2.3 cm left breast mass likely malignant. 4. Cholelithiasis with no acute features. 5. Nonobstructive right nephrolithiasis. CT HEAD W WO CONTRAST   Final Result   No acute intracranial abnormality. No definite evidence of intracranial metastatic disease. It is noted that CT   imaging is insensitive for small metastatic lesions. If possible further   evaluation with MRI should be considered for better characterization. RECOMMENDATIONS:   Unavailable         CT CHEST WO CONTRAST   Final Result   1. 6.3 cm left upper lobe, suprahilar mass, suspicious for malignancy. There   may be a postobstructive component of pneumonia, versus transbronchial spread   of tumor in the apex of the left upper lobe. 2. AP window mediastinal adenopathy, likely metastatic. 3. 9 x 7 mm right middle lobe nodule, indeterminate for inflammation, primary   or metastatic neoplasm. 4. Bilateral adrenal nodules, suspicious for metastasis. RECOMMENDATIONS:   Unavailable         XR CHEST PORTABLE   Final Result   New left upper lobe opacity concerning for pneumonia versus malignancy. RECOMMENDATION:   Chest CT is recommended for further evaluation.                  Active Hospital Problems    Diagnosis     Sepsis ([address] [A41.9]     Left breast mass [N63.20]     Lymphadenopathy [R59.1]     Right nephrolithiasis [N20.0]     Cholelithiasis without cholecystitis [K80.20]     Hypercalcemia [E83.52]     Severe malnutrition (HCC) [E43]     PNA (pneumonia) [J18.9]     Generalized weakness [R53.1]     Hyponatremia [E87.1]     Lung mass [R91.8]            Electronically signed by Fredy Muniz MD on 1/25/2022 at 8:54 AM

## 2022-01-25 NOTE — PROGRESS NOTES
[facility] Note    Patient Active Problem List   Diagnosis    Sprain of ligament of lumbosacral joint    BWC Neck sprain and strain    BWC Degeneration of lumbar or lumbosacral intervertebral disc    BWC Acquired spondylolisthesis    BWC Displacement of lumbar intervertebral disc without myelopathy    BWC Spinal stenosis, lumbar region, without neurogenic claudication    BWC Depressive disorder, not elsewhere classified    Chronic pain syndrome    Lupus ([address]    Coronary artery disease due to lipid rich plaque    Hyperlipidemia LDL goal <70    Paroxysmal atrial fibrillation (HCC)    History of stroke    History of DVT (deep vein thrombosis)    Gastroesophageal reflux disease without esophagitis    Acquired hypothyroidism    Lung mass    Tobacco use    Elevated ferritin    Chronic insomnia    Anxiety disorder    Radiolucent lesion of bone    Chronic low back pain    Breast tenderness    Bad taste in mouth    Decreased appetite    Closed fracture of upper end of left humerus    Hyponatremia    Generalized weakness    Severe malnutrition (HCC)    Anemia    Hypoxia    Autoimmune diabetes (HCC)    Hypergammaglobulinemia, unspecified    PNA (pneumonia)    Hypercalcemia    Sepsis ([address]    Left breast mass    Lymphadenopathy    Right nephrolithiasis    Cholelithiasis without cholecystitis       Past Medical History:   has a past medical history of Acquired spondylolisthesis, Arthritis, Cataract, Chronic pain syndrome, Degeneration of lumbar or lumbosacral intervertebral disc, Depressive disorder, not elsewhere classified, Displacement of lumbar intervertebral disc without myelopathy, H/O blood clots, HBP (high blood pressure), Heart disease, Spinal stenosis, lumbar region, without neurogenic claudication, Sprain of lumbosacral (joint) (ligament), Sprain of neck, Stroke ([address] Thyroid disorder, and Thyroid disorder.     Past Social History:   reports that she has been smoking. She started smoking about 62 years ago. She has a 28.50 pack-year smoking history. She has never used smokeless tobacco. She reports previous alcohol use. She reports that she does not use drugs. Subjective:    No complaints today  Went to [facility] for radiation treatment     Objective:      /70   Pulse 88   Temp 98.4 °F (36.9 °C) (Oral)   Resp 16   Ht 5' 4\" (1.626 m)   Wt 135 lb 8 oz (61.5 kg)   SpO2 92%   BMI 23.26 kg/m²     Wt Readings from Last 3 Encounters:   01/25/22 135 lb 8 oz (61.5 kg)   12/06/19 128 lb 12.8 oz (58.4 kg)   10/04/19 122 lb (55.3 kg)       BP Readings from Last 3 Encounters:   01/25/22 100/70   01/20/22 123/60   12/06/19 122/64     Chest- clear  Heart-regular  Abd-soft  Ext- decreased edema    Labs  Hemoglobin   Date Value Ref Range Status   01/25/2022 8.2 (L) 12.0 - 16.0 g/dL Final     Hematocrit   Date Value Ref Range Status   01/25/2022 25.2 (L) 36.0 - 48.0 % Final     WBC   Date Value Ref Range Status   01/25/2022 22.0 (H) 4.0 - 11.0 K/uL Final     Platelets   Date Value Ref Range Status   01/25/2022 249 135 - 450 K/uL Final     Lab Results   Component Value Date    CREATININE 1.1 01/25/2022    BUN 10 01/25/2022     (L) 01/25/2022    K 3.5 01/25/2022    CL 94 (L) 01/25/2022    CO2 24 01/25/2022     Uosm 225  Lon 44    Kappa and lamda elevated but normal ratio  SPEP neg    Assessment/Plan:  1. Acute-on-chronic hyponatremia. Increase ADH state. The  patient does have a possibility of metastatic lung cancer which may  increase ADH release. Na better today. Stopped NSS. Decreased NaCl tabs to bid. Lasix 40mg bid. K daily, will help with Na also. With lower bp, will decrease Lasix. 2.  Non anion gap metabolic acidosis in the setting of hypokalemia. No  diarrhea. No need for bicarbonate replacement at this time. Positive  urine anion gap. May have type 2 RTA.    Replace K.   3.  Hypokalemia - needing repletion. Replace po daily. Hypomagnesemia. Replace. It should help with serum potassium also. 4.  Hypophosphatemia. Replace. 5.  Anemia. SPEP and serum free light chain result noted. 6.  Metastatic lung cancer as per Medicine and Pulmonary. Oncology is also following. S/P bronchoscopy. 7.  Hypercalcemia-Zometa given. Ca now lower, improved. Check ionized Ca tomorrow.    8.  Stable renal status    Ritche Deniece Osler, MD

## 2022-01-26 VITALS
HEIGHT: 64 IN | WEIGHT: 133.5 LBS | RESPIRATION RATE: 6 BRPM | DIASTOLIC BLOOD PRESSURE: 64 MMHG | HEART RATE: 89 BPM | BODY MASS INDEX: 22.79 KG/M2 | TEMPERATURE: 98.2 F | SYSTOLIC BLOOD PRESSURE: 109 MMHG | OXYGEN SATURATION: 93 %

## 2022-01-26 LAB
ANION GAP SERPL CALCULATED.3IONS-SCNC: 11 MMOL/L (ref 3–16)
BASOPHILS ABSOLUTE: 0.1 K/UL (ref 0–0.2)
BASOPHILS RELATIVE PERCENT: 0.2 %
BUN BLDV-MCNC: 11 MG/DL (ref 7–20)
CALCIUM IONIZED: 0.94 MMOL/L (ref 1.12–1.32)
CALCIUM SERPL-MCNC: 7 MG/DL (ref 8.3–10.6)
CHLORIDE BLD-SCNC: 91 MMOL/L (ref 99–110)
CO2: 25 MMOL/L (ref 21–32)
CREAT SERPL-MCNC: 1.1 MG/DL (ref 0.6–1.2)
EOSINOPHILS ABSOLUTE: 0.2 K/UL (ref 0–0.6)
EOSINOPHILS RELATIVE PERCENT: 0.7 %
GFR AFRICAN AMERICAN: 58
GFR NON-AFRICAN AMERICAN: 48
GLUCOSE BLD-MCNC: 79 MG/DL (ref 70–99)
HCT VFR BLD CALC: 26 % (ref 36–48)
HEMOGLOBIN: 8.5 G/DL (ref 12–16)
LYMPHOCYTES ABSOLUTE: 1 K/UL (ref 1–5.1)
LYMPHOCYTES RELATIVE PERCENT: 4 %
MAGNESIUM: 2.7 MG/DL (ref 1.8–2.4)
MCH RBC QN AUTO: 30.2 PG (ref 26–34)
MCHC RBC AUTO-ENTMCNC: 32.8 G/DL (ref 31–36)
MCV RBC AUTO: 92.3 FL (ref 80–100)
MONOCYTES ABSOLUTE: 1.5 K/UL (ref 0–1.3)
MONOCYTES RELATIVE PERCENT: 5.7 %
NEUTROPHILS ABSOLUTE: 23 K/UL (ref 1.7–7.7)
NEUTROPHILS RELATIVE PERCENT: 89.4 %
PDW BLD-RTO: 17 % (ref 12.4–15.4)
PH VENOUS: 7.42 (ref 7.35–7.45)
PHOSPHORUS: 2.5 MG/DL (ref 2.5–4.9)
PLATELET # BLD: 301 K/UL (ref 135–450)
PMV BLD AUTO: 7 FL (ref 5–10.5)
POTASSIUM SERPL-SCNC: 3.8 MMOL/L (ref 3.5–5.1)
RBC # BLD: 2.81 M/UL (ref 4–5.2)
SODIUM BLD-SCNC: 127 MMOL/L (ref 136–145)
WBC # BLD: 25.7 K/UL (ref 4–11)

## 2022-01-26 PROCEDURE — 80048 BASIC METABOLIC PNL TOTAL CA: CPT

## 2022-01-26 PROCEDURE — 6360000002 HC RX W HCPCS: Performed by: INTERNAL MEDICINE

## 2022-01-26 PROCEDURE — 2580000003 HC RX 258: Performed by: INTERNAL MEDICINE

## 2022-01-26 PROCEDURE — 6370000000 HC RX 637 (ALT 250 FOR IP): Performed by: INTERNAL MEDICINE

## 2022-01-26 PROCEDURE — 85025 COMPLETE CBC W/AUTO DIFF WBC: CPT

## 2022-01-26 PROCEDURE — 6370000000 HC RX 637 (ALT 250 FOR IP): Performed by: NURSE PRACTITIONER

## 2022-01-26 PROCEDURE — 99232 SBSQ HOSP IP/OBS MODERATE 35: CPT | Performed by: INTERNAL MEDICINE

## 2022-01-26 PROCEDURE — 82330 ASSAY OF CALCIUM: CPT

## 2022-01-26 PROCEDURE — 84100 ASSAY OF PHOSPHORUS: CPT

## 2022-01-26 PROCEDURE — 83735 ASSAY OF MAGNESIUM: CPT

## 2022-01-26 RX ORDER — SODIUM CHLORIDE 1000 MG
1 TABLET, SOLUBLE MISCELLANEOUS 3 TIMES DAILY
Qty: 90 TABLET | Refills: 0 | Status: ON HOLD
Start: 2022-01-26 | End: 2022-01-28 | Stop reason: HOSPADM

## 2022-01-26 RX ORDER — OXYCODONE HYDROCHLORIDE AND ACETAMINOPHEN 5; 325 MG/1; MG/1
1 TABLET ORAL EVERY 6 HOURS PRN
Qty: 6 TABLET | Refills: 0 | Status: ON HOLD | OUTPATIENT
Start: 2022-01-26 | End: 2022-01-28 | Stop reason: SDUPTHER

## 2022-01-26 RX ORDER — OYSTER SHELL CALCIUM WITH VITAMIN D 500; 200 MG/1; [IU]/1
1 TABLET, FILM COATED ORAL 2 TIMES DAILY
Status: DISCONTINUED | OUTPATIENT
Start: 2022-01-26 | End: 2022-01-26 | Stop reason: HOSPADM

## 2022-01-26 RX ORDER — MORPHINE SULFATE 15 MG/1
15 TABLET, FILM COATED, EXTENDED RELEASE ORAL EVERY 12 HOURS SCHEDULED
Status: DISCONTINUED | OUTPATIENT
Start: 2022-01-26 | End: 2022-01-26 | Stop reason: HOSPADM

## 2022-01-26 RX ORDER — LINEZOLID 600 MG/1
600 TABLET, FILM COATED ORAL EVERY 12 HOURS SCHEDULED
Qty: 14 TABLET | Refills: 0
Start: 2022-01-26 | End: 2022-02-02

## 2022-01-26 RX ORDER — MORPHINE SULFATE 15 MG/1
15 TABLET, FILM COATED, EXTENDED RELEASE ORAL EVERY 12 HOURS SCHEDULED
Qty: 4 TABLET | Refills: 0 | Status: ON HOLD | OUTPATIENT
Start: 2022-01-26 | End: 2022-01-28 | Stop reason: SDUPTHER

## 2022-01-26 RX ORDER — SODIUM CHLORIDE 1000 MG
1 TABLET, SOLUBLE MISCELLANEOUS 4 TIMES DAILY
Status: DISCONTINUED | OUTPATIENT
Start: 2022-01-26 | End: 2022-01-26 | Stop reason: HOSPADM

## 2022-01-26 RX ORDER — IPRATROPIUM BROMIDE AND ALBUTEROL SULFATE 2.5; .5 MG/3ML; MG/3ML
3 SOLUTION RESPIRATORY (INHALATION) EVERY 4 HOURS PRN
Qty: 180 ML | Refills: 0
Start: 2022-01-26 | End: 2022-02-05

## 2022-01-26 RX ORDER — LINEZOLID 600 MG/1
600 TABLET, FILM COATED ORAL EVERY 12 HOURS SCHEDULED
Status: DISCONTINUED | OUTPATIENT
Start: 2022-01-26 | End: 2022-01-26 | Stop reason: HOSPADM

## 2022-01-26 RX ADMIN — CALCIUM CARBONATE-VITAMIN D TAB 500 MG-200 UNIT 1 TABLET: 500-200 TAB at 11:40

## 2022-01-26 RX ADMIN — LEVOTHYROXINE SODIUM 112 MCG: 0.11 TABLET ORAL at 09:16

## 2022-01-26 RX ADMIN — SODIUM CHLORIDE 25 ML: 9 INJECTION, SOLUTION INTRAVENOUS at 10:24

## 2022-01-26 RX ADMIN — FUROSEMIDE 20 MG: 20 TABLET ORAL at 09:16

## 2022-01-26 RX ADMIN — SODIUM CHLORIDE TAB 1 GM 1 G: 1 TAB at 09:17

## 2022-01-26 RX ADMIN — MORPHINE SULFATE 15 MG: 15 TABLET, EXTENDED RELEASE ORAL at 09:20

## 2022-01-26 RX ADMIN — Medication 10 ML: at 10:28

## 2022-01-26 RX ADMIN — CALCIUM GLUCONATE 3000 MG: 98 INJECTION, SOLUTION INTRAVENOUS at 10:24

## 2022-01-26 RX ADMIN — OXYCODONE AND ACETAMINOPHEN 2 TABLET: 5; 325 TABLET ORAL at 11:49

## 2022-01-26 RX ADMIN — Medication 10 ML: at 10:00

## 2022-01-26 RX ADMIN — ATORVASTATIN CALCIUM 40 MG: 40 TABLET, FILM COATED ORAL at 09:16

## 2022-01-26 RX ADMIN — OXYCODONE AND ACETAMINOPHEN 2 TABLET: 5; 325 TABLET ORAL at 05:17

## 2022-01-26 RX ADMIN — PANTOPRAZOLE SODIUM 40 MG: 40 TABLET, DELAYED RELEASE ORAL at 05:17

## 2022-01-26 RX ADMIN — POTASSIUM BICARBONATE 40 MEQ: 782 TABLET, EFFERVESCENT ORAL at 10:15

## 2022-01-26 RX ADMIN — ENOXAPARIN SODIUM 60 MG: 100 INJECTION SUBCUTANEOUS at 09:16

## 2022-01-26 RX ADMIN — LINEZOLID 600 MG: 2 INJECTION, SOLUTION INTRAVENOUS at 12:52

## 2022-01-26 RX ADMIN — FOLIC ACID 1 MG: 1 TABLET ORAL at 09:16

## 2022-01-26 RX ADMIN — SODIUM CHLORIDE TAB 1 GM 1 G: 1 TAB at 14:52

## 2022-01-26 RX ADMIN — ASPIRIN 81 MG: 81 TABLET, COATED ORAL at 09:16

## 2022-01-26 RX ADMIN — Medication 250 MG: at 09:16

## 2022-01-26 ASSESSMENT — ENCOUNTER SYMPTOMS
STRIDOR: 0
ABDOMINAL PAIN: 0
PHOTOPHOBIA: 0
APNEA: 0
COUGH: 0
CHEST TIGHTNESS: 0
SHORTNESS OF BREATH: 0
FACIAL SWELLING: 0
CHOKING: 0
EYE DISCHARGE: 0
TROUBLE SWALLOWING: 0
RHINORRHEA: 0
EYE REDNESS: 0
COLOR CHANGE: 0
NAUSEA: 0
DIARRHEA: 0
BLOOD IN STOOL: 0

## 2022-01-26 ASSESSMENT — PAIN SCALES - GENERAL
PAINLEVEL_OUTOF10: 8
PAINLEVEL_OUTOF10: 3
PAINLEVEL_OUTOF10: 7
PAINLEVEL_OUTOF10: 3
PAINLEVEL_OUTOF10: 8
PAINLEVEL_OUTOF10: 8

## 2022-01-26 ASSESSMENT — PAIN DESCRIPTION - DESCRIPTORS
DESCRIPTORS: BURNING
DESCRIPTORS: BURNING;THROBBING

## 2022-01-26 ASSESSMENT — PAIN DESCRIPTION - LOCATION: LOCATION: ABDOMEN;LEG;OTHER (COMMENT)

## 2022-01-26 NOTE — PROGRESS NOTES
RN attempted to call report to [facility], RN spoke to Anna and she stated that staff was in a meeting and would call back for report.

## 2022-01-26 NOTE — DISCHARGE INSTR - COC
Continuity of Care Form    Patient Name: Barb Villa   :  1945  MRN:  [id number]    Admit date:  2022  Discharge date:  ***    Code Status Order: DNR-CC   Advance Directives:      Admitting Physician:  Karl Crockett MD  PCP: Ivan Lindsay DO    Discharging Nurse: [facility] Unit/Room#: 7CB-9038/6793-31  Discharging Unit Phone Number: ***    Emergency Contact:   Extended Emergency Contact Information  Primary Emergency Contact: Mandy Ricks  Home Phone: [phone]  Relation: Other  Secondary Emergency Contact: Kaz Coats  Mobile Phone: [phone]  Relation: Child    Past Surgical History:  Past Surgical History:   Procedure Laterality Date    BRONCHOSCOPY  2022    BRONCHOSCOPY BRUSHINGS performed by Lakshmi Howell MD at [place] [address]  2022    BRONCHOSCOPY W/EBUS FNA performed by Lakshmi Howell MD at [facility]      L4-5    PARTIAL HYSTERECTOMY         Immunization History:   Immunization History   Administered Date(s) Administered    Influenza, High Dose (Fluzone 65 yrs and older) 10/13/2018    Pneumococcal Conjugate 13-valent ([id number]) 10/28/2015    Pneumococcal Conjugate 7-valent (Carlita Nathalie) 10/28/2015       Active Problems:  Patient Active Problem List   Diagnosis Code    Sprain of ligament of lumbosacral joint S33. 9XXA    [facility] Neck sprain and strain S13. 9XXA    [facility] Degeneration of lumbar or lumbosacral intervertebral disc M51.37    [facility] Acquired spondylolisthesis M43.10    [facility] Displacement of lumbar intervertebral disc without myelopathy M51.26    [facility] Spinal stenosis, lumbar region, without neurogenic claudication M48.061    [facility] Depressive disorder, not elsewhere classified F32.9    Chronic pain syndrome G89.4    Lupus (HCC) M32.9    Coronary artery disease due to lipid rich plaque I25.10, I25.83    Hyperlipidemia LDL goal <70 E78.5    Paroxysmal atrial fibrillation (HCC) I48.0    History of stroke Z86.73    History of DVT (deep vein thrombosis) Z86.718    Gastroesophageal reflux disease without esophagitis K21.9    Acquired hypothyroidism E03.9    Lung mass R91.8    Tobacco use Z72.0    Elevated ferritin R79.89    Chronic insomnia F51.04    Anxiety disorder F41.9    Radiolucent lesion of bone M89.8X9    Chronic low back pain M54.50, G89.29    Breast tenderness N64.4    Bad taste in mouth R43.8    Decreased appetite R63.0    Closed fracture of upper end of left humerus S42.202A    Hyponatremia E87.1    Fatigue R53.83    Severe malnutrition (HCC) E43    Anemia D64.9    Hypoxia R09.02    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    Hypergammaglobulinemia, 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    PNA (pneumonia) J18.9    Hypercalcemia E83.52    Sepsis (HCC) A41.9    Left breast mass N63.20    Lymphadenopathy R59.1    Right nephrolithiasis N20.0    Cholelithiasis without cholecystitis K80.20       Isolation/Infection:   Isolation            No Isolation          Patient Infection Status       None to display            Nurse Assessment:  Last Vital Signs: /64   Pulse 89   Temp 97.7 °F (36.5 °C) (Oral)   Resp 18   Ht 5' 4\" (1.626 m)   Wt 133 lb 8 oz (60.6 kg)   SpO2 93%   BMI 22.92 kg/m²     Last documented pain score (0-10 scale): Pain Level: 8  Last Weight:   Wt Readings from Last 1 Encounters:   01/26/22 133 lb 8 oz (60.6 kg)     Mental Status:  oriented and alert    IV Access:  - None    Nursing Mobility/ADLs:  Walking   Assisted  Transfer  Assisted  Bathing  Assisted  Dressing  Assisted  Toileting  Assisted  Feeding  Assisted  Med Admin  Assisted  Med Delivery   whole    Wound Care Documentation and Therapy:        Elimination:  Continence:    Bowel: No  Bladder: No  Urinary Catheter: None   Colostomy/Ileostomy/Ileal Conduit: No       Date of Last BM: 1/25/22    Intake/Output Summary (Last 24 hours) at 1/26/2022 1227  Last data filed at 1/26/2022 0936  Gross per 24 hour Intake 840 ml   Output 925 ml   Net -85 ml     I/O last 3 completed shifts: In: 80 [P.O.:780]  Out: 1825 [Urine:1825]    Safety Concerns: At Risk for Falls    Impairments/Disabilities:      None    Nutrition Therapy:  Current Nutrition Therapy:   - Oral Diet:  General    Routes of Feeding: Oral  Liquids: No Restrictions  Daily Fluid Restriction: no  Last Modified Barium Swallow with Video (Video Swallowing Test): not done    Treatments at the Time of Hospital Discharge:   Respiratory Treatments: nebulizers  Oxygen Therapy:  is not on home oxygen therapy. Ventilator:    - Had no ventilator support, did have to use a nasal cannula for time to help with breathing    Rehab Therapies: Physical Therapy and Occupational Therapy  Weight Bearing Status/Restrictions: No weight bearing restirctions  Other Medical Equipment (for information only, NOT a DME order):  walker and bedside commode  Other Treatments: none    Patient's personal belongings (please select all that are sent with patient):  Glasses, Jewelry, bags     RN SIGNATURE:  Electronically signed by Malachi Grajeda RN on 1/26/22 at 12:32 PM EST    CASE MANAGEMENT/SOCIAL WORK SECTION    Inpatient Status Date: 1/13/22    Readmission Risk Assessment Score:  Readmission Risk              Risk of Unplanned Readmission:  24           Discharging to Facility/ Agency   Name: .[facility]  Address:  [facility]  Phone: [phone]  Fax: [phone]    Dialysis Facility (if applicable)   Name:  Address:  Dialysis Schedule:  Phone:  Fax:    / signature: Electronically signed by Ivette Camacho RN on 1/26/22 at 2:31 PM EST    PHYSICIAN SECTION    Prognosis: Poor    Condition at Discharge: Stable    Rehab Potential (if transferring to Rehab): Guarded    Recommended Labs or Other Treatments After Discharge:   Patient refused chemotherapy and radiation for metastatic lung cancer.  Patient CODE STATUS has changed to DNR CC per patient and daughter's request.  Patient being discharge on 1 week course of antibiotics. If patient deteriorates, she should be transferred to inpatient hospice as patient has refused any active medical treatment and has poor prognosis        Physician Certification: I certify the above information and transfer of Rema Gonzalez  is necessary for the continuing treatment of the diagnosis listed and that she requires [place] for less 30 days.      Update Admission H&P: No change in H&P    PHYSICIAN SIGNATURE:  Electronically signed by Ganesh Carreon MD on 1/26/22 at 2:02 PM EST

## 2022-01-26 NOTE — PROGRESS NOTES
[facility] Note    Patient Active Problem List   Diagnosis    Sprain of ligament of lumbosacral joint    BWC Neck sprain and strain    BWC Degeneration of lumbar or lumbosacral intervertebral disc    BWC Acquired spondylolisthesis    BWC Displacement of lumbar intervertebral disc without myelopathy    BWC Spinal stenosis, lumbar region, without neurogenic claudication    BWC Depressive disorder, not elsewhere classified    Chronic pain syndrome    Lupus ([address]    Coronary artery disease due to lipid rich plaque    Hyperlipidemia LDL goal <70    Paroxysmal atrial fibrillation (HCC)    History of stroke    History of DVT (deep vein thrombosis)    Gastroesophageal reflux disease without esophagitis    Acquired hypothyroidism    Lung mass    Tobacco use    Elevated ferritin    Chronic insomnia    Anxiety disorder    Radiolucent lesion of bone    Chronic low back pain    Breast tenderness    Bad taste in mouth    Decreased appetite    Closed fracture of upper end of left humerus    Hyponatremia    Fatigue    Severe malnutrition (HCC)    Anemia    Hypoxia    Autoimmune diabetes (HCC)    Hypergammaglobulinemia, unspecified    PNA (pneumonia)    Hypercalcemia    Sepsis ([address]    Left breast mass    Lymphadenopathy    Right nephrolithiasis    Cholelithiasis without cholecystitis       Past Medical History:   has a past medical history of Acquired spondylolisthesis, Arthritis, Cataract, Chronic pain syndrome, Degeneration of lumbar or lumbosacral intervertebral disc, Depressive disorder, not elsewhere classified, Displacement of lumbar intervertebral disc without myelopathy, H/O blood clots, HBP (high blood pressure), Heart disease, Spinal stenosis, lumbar region, without neurogenic claudication, Sprain of lumbosacral (joint) (ligament), Sprain of neck, Stroke ([address] Thyroid disorder, and Thyroid disorder.     Past Social History:   reports that she has been smoking. She started smoking about 62 years ago. She has a 28.50 pack-year smoking history. She has never used smokeless tobacco. She reports previous alcohol use. She reports that she does not use drugs. Subjective:    No complaints today  BP lower     Objective:      BP (!) 90/56   Pulse 87   Temp 97.7 °F (36.5 °C) (Oral)   Resp 18   Ht 5' 4\" (1.626 m)   Wt 133 lb 8 oz (60.6 kg)   SpO2 93%   BMI 22.92 kg/m²     Wt Readings from Last 3 Encounters:   01/26/22 133 lb 8 oz (60.6 kg)   12/06/19 128 lb 12.8 oz (58.4 kg)   10/04/19 122 lb (55.3 kg)       BP Readings from Last 3 Encounters:   01/26/22 (!) 90/56   01/20/22 123/60   12/06/19 122/64     Chest- clear  Heart-regular  Abd-soft  Ext-no edema    Labs  Hemoglobin   Date Value Ref Range Status   01/26/2022 8.5 (L) 12.0 - 16.0 g/dL Final     Hematocrit   Date Value Ref Range Status   01/26/2022 26.0 (L) 36.0 - 48.0 % Final     WBC   Date Value Ref Range Status   01/26/2022 25.7 (H) 4.0 - 11.0 K/uL Final     Platelets   Date Value Ref Range Status   01/26/2022 301 135 - 450 K/uL Final     Lab Results   Component Value Date    CREATININE 1.1 01/26/2022    BUN 11 01/26/2022     (L) 01/26/2022    K 3.8 01/26/2022    CL 91 (L) 01/26/2022    CO2 25 01/26/2022     Uosm 225  Lon 44    Kappa and lamda elevated but normal ratio  SPEP neg    Assessment/Plan:  1. Acute-on-chronic hyponatremia. Increase ADH state. The  patient does have a possibility of metastatic lung cancer which may  increase ADH release. Na lower today. NaCl tabs QID. 2.  Non anion gap metabolic acidosis in the setting of hypokalemia. No  diarrhea. No need for bicarbonate replacement at this time. Positive  urine anion gap. May have type 2 RTA. Replace K. Effer K daily. 3.  Hypokalemia - needing repletion. Replace po daily. Hypomagnesemia. Mg higher. Stop po Mg.   4.  Hypophosphatemia. Replaced. 5.  Anemia.   SPEP and serum free light chain result noted. 6.  Metastatic lung cancer as per Medicine and Pulmonary. Oncology is also following. S/P bronchoscopy. 7.  Hypercalcemia-Zometa given. Ca still low. Give IV Ca. Start Oscal D bid. 8.  Hypotension-stop Lasix. Increase Na to QID  9. Stable renal status  10. Okay for D/C from renal perspective. Would need bmp next Monday.      Brady Carcamo MD

## 2022-01-26 NOTE — PROGRESS NOTES
Oncology and Hematology Care   Progress Note      1/26/2022 11:00 AM        Name: Dorian Muniz .              Admitted: 1/13/2022    SUBJECTIVE:  Patient complains of back pain. Has been started on MS contin. She refused radiation. She does not want chemotherapy.      Reviewed interval ancillary notes    Current Medications  morphine (MS CONTIN) extended release tablet 15 mg, 2 times per day  sodium chloride tablet 1 g, 4x Daily  calcium gluconate 3,000 mg in dextrose 5 % 100 mL IVPB, Once  calcium-vitamin D (OSCAL-500) 500-200 MG-UNIT per tablet 1 tablet, BID  potassium bicarb-citric acid (EFFER-K) effervescent tablet 40 mEq, Daily with breakfast  albuterol (PROVENTIL) nebulizer solution 2.5 mg, Q4H PRN  ipratropium-albuterol (DUONEB) nebulizer solution 1 ampule, Q4H PRN  sodium chloride flush 0.9 % injection 10 mL, PRN  folic acid (FOLVITE) tablet 1 mg, Daily  linezolid (ZYVOX) IVPB 600 mg, Q12H  vancomycin (FIRVANQ) 50 MG/ML oral solution 250 mg, 2 times per day  oxyCODONE-acetaminophen (PERCOCET) 5-325 MG per tablet 1 tablet, Q4H PRN   Or  oxyCODONE-acetaminophen (PERCOCET) 5-325 MG per tablet 2 tablet, Q4H PRN  ondansetron (ZOFRAN) injection 4 mg, Q6H PRN  promethazine (PHENERGAN) injection 6.25 mg, Q6H PRN  potassium chloride (KLOR-CON M) extended release tablet 40 mEq, PRN   Or  potassium bicarb-citric acid (EFFER-K) effervescent tablet 40 mEq, PRN  0.9 % sodium chloride infusion, PRN  sodium chloride flush 0.9 % injection 5-40 mL, 2 times per day  sodium chloride flush 0.9 % injection 5-40 mL, PRN  0.9 % sodium chloride infusion, PRN  enoxaparin (LOVENOX) injection 60 mg, BID  aspirin EC tablet 81 mg, Daily  atorvastatin (LIPITOR) tablet 40 mg, Daily  pantoprazole (PROTONIX) tablet 40 mg, QAM AC  levothyroxine (SYNTHROID) tablet 112 mcg, Daily  sodium chloride flush 0.9 % injection 5-40 mL, 2 times per day  sodium chloride flush 0.9 % injection 5-40 mL, PRN  0.9 % sodium chloride infusion, PRN  acetaminophen (TYLENOL) tablet 650 mg, Q6H PRN   Or  acetaminophen (TYLENOL) suppository 650 mg, Q6H PRN        Objective:  BP (!) 90/56   Pulse 87   Temp 97.7 °F (36.5 °C) (Oral)   Resp 18   Ht 5' 4\" (1.626 m)   Wt 133 lb 8 oz (60.6 kg)   SpO2 93%   BMI 22.92 kg/m²     Intake/Output Summary (Last 24 hours) at 1/26/2022 1100  Last data filed at 1/26/2022 0936  Gross per 24 hour   Intake 600 ml   Output 925 ml   Net -325 ml      Wt Readings from Last 3 Encounters:   01/26/22 133 lb 8 oz (60.6 kg)   12/06/19 128 lb 12.8 oz (58.4 kg)   10/04/19 122 lb (55.3 kg)       General appearance:  Appears comfortable  Eyes: Sclera clear. Pupils equal.  ENT: Moist oral mucosa. Trachea midline, no adenopathy. Cardiovascular: Regular rhythm, normal S1, S2. No murmur. No edema in lower extremities  Respiratory: Not using accessory muscles. Good inspiratory effort. Clear to auscultation bilaterally, no wheeze or crackles. GI: Abdomen soft, no tenderness, not distended  Musculoskeletal: No cyanosis in digits, neck supple  Neurology: CN 2-12 grossly intact. No speech or motor deficits  Psych: Normal affect. Alert and oriented in time, place and person  Skin: Warm, dry, normal turgor    Labs and Tests:  CBC:   Recent Labs     01/24/22  0630 01/25/22  0529 01/26/22  0520   WBC 25.3* 22.0* 25.7*   HGB 9.1* 8.2* 8.5*    249 301     BMP:    Recent Labs     01/24/22  0630 01/25/22  0529 01/26/22  0520   * 130* 127*   K 3.1* 3.5 3.8   CL 95* 94* 91*   CO2 25 24 25   BUN 10 10 11   CREATININE 1.1 1.1 1.1   GLUCOSE 74 84 79     Hepatic: No results for input(s): AST, ALT, ALB, BILITOT, ALKPHOS in the last 72 hours.     ASSESSMENT AND PLAN    Active Problems:    Lung mass    Hyponatremia    Fatigue    Severe malnutrition (HCC)    PNA (pneumonia)    Hypercalcemia    Sepsis ([address]    Left breast mass    Lymphadenopathy    Right nephrolithiasis    Cholelithiasis without cholecystitis  Resolved Problems:    * No resolved hospital problems. *         1. New diagnosis stage IV non-small cell lung cancer   - Left upper lobe, suprahilar mass with mediastinal lymphadenopathy, bilateral adrenal metastasis and lytic bony lesions  - s/p bronchoscopy with EBUS 1/20/22, pathology showing non small cell carcinoma, likely poorly differentiated adenocarcinoma. - Plan to request Caris testing. Need copies of insurance cards. - Patient refused palliative radiation.  - She states she does not want chemotherapy. - She is unsure if she would accept immunotherapy. - Had long discussion with the patient today. Explained to the her that her cancer is not curable, any treatment would be palliative/life prolonging. If she does not do any treatment Hospice would be appropriate. She verbalized understanding. She is unsure if she wants to partner with hospice at this time. Palliative care is following.     2.  2.3 cm left breast mass  - may need biopsy which can be done as outpatient     3. Hypercalcemia  - s/p Zometa on 1/14/21  - Calcium down to 7.     4. Anemia of chronic disease   - Transfuse PRBCs if hemoglobin is less than 7  - s/p pRBCs on 1/17/21  - hgb 8.5     5. Leukocytosis  - possibly d/t post obstructive pneumonia  - Receiving antibiotics  - ID following    6. Neoplasm related pain  -Started on MS contin 15 mg BID 1/26/22  -Continue percocet PRN       James Sesay, [facility]  Oncology Hematology [address] [facility].  [phone]    Patient was seen and examined. As mid back pain. Current pain medications work but do not last long. Continue current care.   We will add MS Contin 15 mg p.o. twice daily Pain control    Barb Kay MD

## 2022-01-26 NOTE — PLAN OF CARE
Problem: Falls - Risk of:  Goal: Will remain free from falls  Description: Will remain free from falls  1/26/2022 1551 by Gavin Lyn RN  Outcome: Ongoing     Problem: Falls - Risk of:  Goal: Absence of physical injury  Description: Absence of physical injury  1/26/2022 1551 by Gavin Lyn RN  Outcome: Ongoing     Problem: Skin Integrity:  Goal: Will show no infection signs and symptoms  Description: Will show no infection signs and symptoms  1/26/2022 1551 by Gavin Lyn RN  Outcome: Ongoing     Problem: Skin Integrity:  Goal: Absence of new skin breakdown  Description: Absence of new skin breakdown  1/26/2022 1551 by Gavin Lyn RN  Outcome: Ongoing     Problem: Pain:  Goal: Pain level will decrease  Description: Pain level will decrease  1/26/2022 1551 by Gavin Lyn RN  Outcome: Ongoing     Problem: Pain:  Goal: Control of acute pain  Description: Control of acute pain  1/26/2022 1551 by Gavin Lyn RN  Outcome: Ongoing     Problem: Pain:  Goal: Control of chronic pain  Description: Control of chronic pain  1/26/2022 1551 by Gavin Lyn RN  Outcome: Ongoing     Problem: Nutrition  Goal: Optimal nutrition therapy  1/26/2022 1551 by Gavin Lyn RN  Outcome: Ongoing     Problem: SAFETY  Goal: Free from intentional harm  1/26/2022 1551 by Gavin Lyn RN  Outcome: Ongoing     Problem: DAILY CARE  Goal: Daily care needs are met  1/26/2022 1551 by Gavin Lyn RN  Outcome: Ongoing     Problem: PAIN  Goal: Patient's pain/discomfort is manageable  1/26/2022 1551 by Gavin Lyn RN  Outcome: Ongoing     Problem: SKIN INTEGRITY  Goal: Skin integrity is maintained or improved  1/26/2022 1551 by Gavin Lyn RN  Outcome: Ongoing     Problem: KNOWLEDGE DEFICIT  Goal: Patient/S.O. demonstrates understanding of disease process, treatment plan, medications, and discharge instructions.   1/26/2022 1551 by Gavin Lyn RN  Outcome: Ongoing     Problem: DISCHARGE BARRIERS  Goal: Patient's continuum of care needs are met  1/26/2022 1551 by Malachi Grajeda RN  Outcome: Ongoing     Problem: Coping:  Goal: Family's ability to cope with current situation will improve  Description: Family's ability to cope with current situation will improve  1/26/2022 1551 by Malachi Grajeda RN  Outcome: Ongoing     Problem: Gas Exchange - Impaired:  Goal: Levels of oxygenation will improve  Description: Levels of oxygenation will improve  1/26/2022 1551 by Malachi Grajeda RN  Outcome: Ongoing

## 2022-01-26 NOTE — DISCHARGE SUMMARY
Data- discharge order received, pt verbalized agreement to discharge, disposition to [facility] #[id number], Kimberly Chester reviewed and signed by physician. Action- AVS prepared, NEISHA completed/ reported faxed by case management/. Discharge instruction summary: Diet- regular, Activity- up as tolerated, immunizations reviewed and updated, medications prescriptions to be filled at receiving facility except for the controlled prescriptions to be sent: to SNF, Transfer code status: DNR-CC, LDAs to remain with discharge: none. Response- Bedside RN to call report to receiving facility. Pt belongings gathered, peripheral IV and cardiac monitoring removed. Disposition to Discharged via cart/stretcher and via ambulance to skilled nursing by EMS transportation, no complications reported. 1. WEIGHT: Admit Weight: 116 lb 12.8 oz (53 kg) (01/13/22 1900)        Today  Weight: 133 lb 8 oz (60.6 kg) (01/26/22 0511)       2.  O2 SAT.: SpO2: 93 % (01/26/22 0900)

## 2022-01-26 NOTE — PROGRESS NOTES
Palliative Care:     Patient in agreement to discharge to [facility] for short term rehabilitation. Overall goal of patient is comfort care. She agrees to accept hospice when unable to tolerate the therapy within facility over next few weeks. Patient does not have a consistent/safe home to return to with 24 hour support. Is in agreement to work with business office @ [address] [place] [address] to file for medicaid application to assist with LTC cost at such time for room/board fees and accept hospice comfort care measures. Admits to significant pain and simply wants to maintain comfort. Follow up call to [address] [place] [address] with [place] [facility] to provided [facility] number; provided. Call with daughter Adan Barros ([employer] when indicated) aware of patient wishes. Agrees to come into town this Thursday (tomorrow) and work directly with [address] [place] [address] to obtain documents needed to pursue Medicaid. Palliative will continue to follow as needed. DC POC is [facility] @ 1500.

## 2022-01-26 NOTE — CARE COORDINATION
Discharge Plan:   Patient discharged to: Name: .[facility]  Address:  [facility]  Phone: [phone]  Fax: [phone]  SW/DC Planner faxed, [address] [place] and AVS   Narcotic Prescriptions faxed were:- Morphine ( MS Contin) 15 mg, Percocet 5-325 mg  prn  RN: Trini Lewis will call report      Medical Transport with: [facility] [phone]   time:  3 pm   Family advised of discharge?: Yes - Daughter- Freddie Parcel?:   ID  [id number]    All discharge needs met per case management.

## 2022-01-26 NOTE — PLAN OF CARE
Problem: Falls - Risk of:  Goal: Will remain free from falls  Description: Will remain free from falls  Outcome: Ongoing  Goal: Absence of physical injury  Description: Absence of physical injury  Outcome: Ongoing     Problem: Skin Integrity:  Goal: Will show no infection signs and symptoms  Description: Will show no infection signs and symptoms  Outcome: Ongoing  Goal: Absence of new skin breakdown  Description: Absence of new skin breakdown  Outcome: Ongoing     Problem: Pain:  Goal: Pain level will decrease  Description: Pain level will decrease  Outcome: Ongoing  Goal: Control of acute pain  Description: Control of acute pain  Outcome: Ongoing  Goal: Control of chronic pain  Description: Control of chronic pain  Outcome: Ongoing   A/O X4, VSS, O2 % on RA. Assisted tot the bathroom. Pt has a [place] which was emptied and pericare performed. Call light and bedside table within reach. Bed lowered and bed alarm activated. Pt on telemetry.  Pt from home and plans to be discharged back SNF

## 2022-01-26 NOTE — PROGRESS NOTES
Infectious Diseases   Progress Note      Admission Date: 1/13/2022  Hospital Day: Hospital Day: 14   Attending: Marley Valencia MD  Date of service: 1/26/2022     Chief complaint/ Reason for consult:     · Sepsis with leukocytosis, tachycardia, hypotension  · On and off fever  · Generalized weakness on admission  · Mild hyponatremia  · Paratracheal lymphadenopathy, s/p bronchoscopy with EBUS and fine-needle aspiration cytology of lymph node, surgical path consistent with non-small cell carcinoma  · Negative COVID 19 NAAT on 1/20/2022, no PCR available    Microbiology:        I have reviewed allavailable micro lab data and cultures    · Blood culture (2/2) - collected on 1/13/2022: Negative      Antibiotics and immunizations:       Current antibiotics: All antibiotics and their doses were reviewed by me    Recent Abx Admin                   linezolid (ZYVOX) IVPB 600 mg (mg) 600 mg New Bag 01/26/22 1252     600 mg New Bag 01/25/22 2224    vancomycin [facility]) 50 MG/ML oral solution 250 mg (mg) 250 mg Given 01/26/22 0916     250 mg Given 01/25/22 2041                  Immunization History: All immunization history was reviewed by me today. Immunization History   Administered Date(s) Administered    Influenza, High Dose (Fluzone 65 yrs and older) 10/13/2018    Pneumococcal Conjugate 13-valent ([id number]) 10/28/2015    Pneumococcal Conjugate 7-valent (Prevnar7) 10/28/2015       Known drug allergies: All allergies were reviewed and updated    Allergies   Allergen Reactions    Other Other (See Comments)     Anti depression cause black outs     Trazodone      Sedation?  Venlafaxine      Sedation? Social history:     Social History:  All social andepidemiologic history was reviewed and updated by me today as needed. · Tobacco use:   reports that she has been smoking. She started smoking about 62 years ago. She has a 28.50 pack-year smoking history.  She has never used smokeless tobacco.  · Alcohol use:   reports previous alcohol use. · Currently lives in: [place] [address]  ·  reports no history of drug use. COVID VACCINATION AND LAB RESULT RECORDS:     Internal Administration   First Dose      Second Dose           Last COVID Lab SARS-CoV-2, NAAT (no units)   Date Value   01/20/2022 Not Detected            Assessment:     The patient is a 67 y.o. old female who  has a past medical history of Acquired spondylolisthesis, Arthritis, Cataract, Chronic pain syndrome, Degeneration of lumbar or lumbosacral intervertebral disc, Depressive disorder, not elsewhere classified, Displacement of lumbar intervertebral disc without myelopathy, H/O blood clots, HBP (high blood pressure), Heart disease, Spinal stenosis, lumbar region, without neurogenic claudication, Sprain of lumbosacral (joint) (ligament), Sprain of neck, Stroke ([address] Thyroid disorder, and Thyroid disorder. with following problems:    · Sepsis with leukocytosis, tachycardia, hypotension-fever is resolved  · Enterococcus faecium UTI-this is a new finding today  · Generalized weakness on admission  · Mild hyponatremia-ongoing, serum sodium is 127  · Paratracheal lymphadenopathy, s/p bronchoscopy with EBUS and fine-needle aspiration cytology of lymph node, surgical path consistent with non-small cell carcinoma  · Negative COVID 19 NAAT on 1/20/2022, no PCR available  · Slightly elevated procalcitonin of 0.41 on 1/24/22  · Bilateral adrenal masses and lytic L2 vertebral body lesion likely metastatic, likely from non-small cell lung cancer, noted on CT scan of her abdomen pelvis IV contrast on 1/14/2022  · Left breast mass, likely malignant or metastatic, noted on CT scan 1/14/2022  · Right-sided nephrolithiasis  · Cholelithiasis without cholecystitis        Discussion:      Patient's fever seems to have responded after starting the antibiotic.     She has metastatic lung cancer and has decided to go for hospice    However, would still like to continue treatment for the  UTI    Urine culture has grown greater than 100,000 CFU per mL of Enterococcus faecium. Sensitivities are pending. Should be covered with Zyvox, however, bioavailability may be limited      Plan:     Diagnostic Workup:      · Continue to follow  fever curve, WBC count and blood cultures. · Continue to monitor blood counts, liver and renal function. Antimicrobials:    · Will switch IV to p.o. linezolid 60 mg every 12 hours  · The patient does not want to wait for the sensitivities of Enterococcus faecium, could consider 1 week course of oral linezolid at discharge  · We will stop empiric oral vancomycin today  · Patient planning to go for hospice  · Continue close vitals monitoring. · Maintain good glycemic control. · Fall precautions. · Aspiration precautions. · Continue to watch for new fever or diarrhea. · DVT prophylaxis. · Discussed all above with patient and RN. · Discussed with Dr. Tam Henriquez      Drug Monitoring:    · Continue monitoring for antibiotic toxicity as follows: CBC, CMP   · Continue to watch for following: new or worsening fever, new hypotension, hives, lip swelling and redness or purulence at vascular access sites. I/v access Management:    · Continue to monitor i.v access sites for erythema, induration, discharge or tenderness. · As always, continue efforts to minimize tubes/lines/drains as clinically appropriate to reduce chances of line associated infections. Patient education and counseling:        · The patient was educated in detail about the side-effects of various antibiotics and things to watch for like new rashes, lip swelling, severe reaction, worsening diarrhea, break through fever etc.  · Discussed patient's condition and what to expect. All of the patient's questions were addressed in a satisfactory manner and patient verbalized understanding all instructions.       Level of complexity of visit: High       Thank you for involving me in the care of your patient. I will continue to follow. If you have anyadditional questions, please do not hesitate to contact me. Subjective: Interval history: Interval history was obtained from chart review and patient/ RN. She is afebrile. He has been tolerating antibiotics okay. Diarrhea improved after initiation of oral vancomycin. REVIEW OF SYSTEMS:      Review of Systems   Constitutional: Positive for fatigue. Negative for chills, diaphoresis and unexpected weight change. HENT: Negative for congestion, ear discharge, ear pain, facial swelling, hearing loss, rhinorrhea and trouble swallowing. Eyes: Negative for photophobia, discharge, redness and visual disturbance. Respiratory: Negative for apnea, cough, choking, chest tightness, shortness of breath and stridor. Cardiovascular: Negative for chest pain and palpitations. Gastrointestinal: Negative for abdominal pain, blood in stool, diarrhea and nausea. Endocrine: Negative for polydipsia, polyphagia and polyuria. Genitourinary: Negative for difficulty urinating, dysuria, frequency, hematuria, menstrual problem and vaginal discharge. Musculoskeletal: Negative for arthralgias, joint swelling, myalgias and neck stiffness. Skin: Negative for color change and rash. Allergic/Immunologic: Negative for immunocompromised state. Neurological: Negative for dizziness, seizures, speech difficulty, light-headedness and headaches. Hematological: Negative for adenopathy. Psychiatric/Behavioral: Negative for agitation, hallucinations and suicidal ideas. Past Medical History: All past medical history reviewed today.     Past Medical History:   Diagnosis Date    Acquired spondylolisthesis     Arthritis     Cataract     Chronic pain syndrome     Degeneration of lumbar or lumbosacral intervertebral disc     Depressive disorder, not elsewhere classified     Displacement of lumbar intervertebral disc without myelopathy     H/O blood clots     HBP (high blood pressure)     Heart disease     Spinal stenosis, lumbar region, without neurogenic claudication     Sprain of lumbosacral (joint) (ligament)     Sprain of neck     Stroke (HCC)     Thyroid disorder     Thyroid disorder        Past Surgical History: All past surgical history was reviewed today. Past Surgical History:   Procedure Laterality Date    BRONCHOSCOPY  1/20/2022    BRONCHOSCOPY BRUSHINGS performed by Vicki Gallego MD at [address]  1/20/2022    BRONCHOSCOPY W/EBUS FNA performed by Vicki Gallego MD at [address]      L4-5    PARTIAL HYSTERECTOMY  1964       Family History: All family history was reviewed today. Problem Relation Age of Onset    Cancer Father         Brain Cancer     Breast Cancer Sister     High Blood Pressure Sister        Objective:       PHYSICAL EXAM:      Vitals:   Vitals:    01/26/22 0500 01/26/22 0511 01/26/22 0900 01/26/22 1130   BP: 103/60  (!) 90/56 109/64   Pulse: 86  87 89   Resp: 16  18 (!) 6   Temp: 97.7 °F (36.5 °C)  97.7 °F (36.5 °C) 98.2 °F (36.8 °C)   TempSrc: Oral  Oral Oral   SpO2:   93%    Weight:  133 lb 8 oz (60.6 kg)     Height:           Physical Exam  Vitals and nursing note reviewed. Constitutional:       General: She is not in acute distress. Appearance: She is well-developed. She is not diaphoretic. Comments: Appears tired   HENT:      Head: Normocephalic. Right Ear: External ear normal.      Left Ear: External ear normal.      Nose: Nose normal.   Eyes:      General: No scleral icterus. Right eye: No discharge. Left eye: No discharge. Conjunctiva/sclera: Conjunctivae normal.      Pupils: Pupils are equal, round, and reactive to light. Cardiovascular:      Rate and Rhythm: Normal rate and regular rhythm. Heart sounds: No murmur heard.   No friction rub.   Pulmonary:      Effort: Pulmonary effort is normal.      Breath sounds: No stridor. No wheezing or rales. Chest:      Chest wall: No tenderness. Abdominal:      Palpations: Abdomen is soft. There is no mass. Tenderness: There is no abdominal tenderness. There is no guarding or rebound. Musculoskeletal:         General: No tenderness. Cervical back: Normal range of motion and neck supple. Lymphadenopathy:      Cervical: No cervical adenopathy. Skin:     General: Skin is warm and dry. Findings: No erythema or rash. Neurological:      Mental Status: She is alert and oriented to person, place, and time. Motor: No abnormal muscle tone. Psychiatric:         Judgment: Judgment normal.                Lines and drains: All vascular access sites are healthy with no local erythema, discharge or tenderness. Intake and output:    I/O last 3 completed shifts: In: 80 [P.O.:780]  Out: 1825 [Urine:1825]    Lab Data:   All available labs and old records have been reviewed by me.     CBC:  Recent Labs     01/24/22  0630 01/25/22  0529 01/26/22  0520   WBC 25.3* 22.0* 25.7*   RBC 2.99* 2.71* 2.81*   HGB 9.1* 8.2* 8.5*   HCT 27.8* 25.2* 26.0*    249 301   MCV 93.0 93.0 92.3   MCH 30.4 30.3 30.2   MCHC 32.7 32.6 32.8   RDW 17.3* 16.8* 17.0*        BMP:  Recent Labs     01/24/22  0630 01/25/22  0529 01/26/22  0520   * 130* 127*   K 3.1* 3.5 3.8   CL 95* 94* 91*   CO2 25 24 25   BUN 10 10 11   CREATININE 1.1 1.1 1.1   CALCIUM 6.4* 7.1* 7.0*   GLUCOSE 74 84 79        Hepatic Function Panel:   Lab Results   Component Value Date    ALKPHOS 103 01/13/2022    ALT 17 01/13/2022    AST 22 01/13/2022    PROT 5.3 01/18/2022    PROT 7.6 02/16/2011    BILITOT 0.4 01/13/2022    BILIDIR <0.2 08/14/2019    IBILI see below 08/14/2019    LABALBU 1.9 01/18/2022       CPK: No results found for: CKTOTAL  ESR:   Lab Results   Component Value Date    SEDRATE 31 (H) 08/14/2019     CRP:   Lab Results Component Value Date    CRP 1.9 07/30/2019           Imaging: All pertinent images and reports for the current visit were reviewed by me during this visit. NM BONE SCAN WHOLE BODY   Final Result   1. Focal uptake at L2 correlating to known lytic lesion of concern for   potential osseous metastasis. 2. Linear uptake along the tibial cortex bilaterally may represent shin   splints. More focal uptake at the proximal right tibia may potentially be   degenerative, but an underlying skeletal lesion cannot be excluded. Correlate with plain film imaging. XR CHEST PORTABLE   Final Result   1. No significant change. XR CHEST PORTABLE   Final Result   Focal consolidations seen within the left upper lobe. Pneumonia is primarily   considered. However, that must be followed to resolution to ensure that   there is no underlying neoplasm. CT ABDOMEN PELVIS W IV CONTRAST Additional Contrast? Oral   Final Result   1. Bilateral adrenal masses, likely metastatic. No other evidence of   abdominal or pelvic metastatic disease. 2. Lytic L2 vertebral body lesion concerning for metastatic disease. 3. 2.3 cm left breast mass likely malignant. 4. Cholelithiasis with no acute features. 5. Nonobstructive right nephrolithiasis. CT HEAD W WO CONTRAST   Final Result   No acute intracranial abnormality. No definite evidence of intracranial metastatic disease. It is noted that CT   imaging is insensitive for small metastatic lesions. If possible further   evaluation with MRI should be considered for better characterization. RECOMMENDATIONS:   Unavailable         CT CHEST WO CONTRAST   Final Result   1. 6.3 cm left upper lobe, suprahilar mass, suspicious for malignancy. There   may be a postobstructive component of pneumonia, versus transbronchial spread   of tumor in the apex of the left upper lobe. 2. AP window mediastinal adenopathy, likely metastatic.    3. 9 x 7 mm right middle lobe nodule, indeterminate for inflammation, primary   or metastatic neoplasm. 4. Bilateral adrenal nodules, suspicious for metastasis. RECOMMENDATIONS:   Unavailable         XR CHEST PORTABLE   Final Result   New left upper lobe opacity concerning for pneumonia versus malignancy. RECOMMENDATION:   Chest CT is recommended for further evaluation. Medications: All current and past medications were reviewed.  morphine  15 mg Oral 2 times per day    sodium chloride  1 g Oral 4x Daily    calcium-vitamin D  1 tablet Oral BID    linezolid  600 mg Oral 2 times per day    potassium bicarb-citric acid  40 mEq Oral Daily with breakfast    folic acid  1 mg Oral Daily    sodium chloride flush  5-40 mL IntraVENous 2 times per day    enoxaparin  1 mg/kg SubCUTAneous BID    aspirin  81 mg Oral Daily    atorvastatin  40 mg Oral Daily    pantoprazole  40 mg Oral QAM AC    levothyroxine  112 mcg Oral Daily    sodium chloride flush  5-40 mL IntraVENous 2 times per day        sodium chloride      sodium chloride      sodium chloride 25 mL (01/26/22 1024)       albuterol, ipratropium-albuterol, sodium chloride flush, oxyCODONE-acetaminophen **OR** oxyCODONE-acetaminophen, ondansetron, promethazine, potassium chloride **OR** potassium alternative oral replacement **OR** [DISCONTINUED] potassium chloride, sodium chloride, sodium chloride flush, sodium chloride, sodium chloride flush, sodium chloride, acetaminophen **OR** acetaminophen      Problem list:       Patient Active Problem List   Diagnosis Code    Sprain of ligament of lumbosacral joint S33. 9XXA    [facility] Neck sprain and strain S13. 9XXA    [facility] Degeneration of lumbar or lumbosacral intervertebral disc M51.37    [facility] Acquired spondylolisthesis M43.10    [facility] Displacement of lumbar intervertebral disc without myelopathy M51.26    [facility] Spinal stenosis, lumbar region, without neurogenic claudication M48.061    [facility] Depressive disorder, not elsewhere classified F32.9    Chronic pain syndrome G89.4    Lupus (HCC) M32.9    Coronary artery disease due to lipid rich plaque I25.10, I25.83    Hyperlipidemia LDL goal <70 E78.5    Paroxysmal atrial fibrillation (HCC) I48.0    History of stroke Z86.73    History of DVT (deep vein thrombosis) Z86.718    Gastroesophageal reflux disease without esophagitis K21.9    Acquired hypothyroidism E03.9    Lung mass R91.8    Tobacco use Z72.0    Elevated ferritin R79.89    Chronic insomnia F51.04    Anxiety disorder F41.9    Radiolucent lesion of bone M89.8X9    Chronic low back pain M54.50, G89.29    Breast tenderness N64.4    Bad taste in mouth R43.8    Decreased appetite R63.0    Closed fracture of upper end of left humerus S42.202A    Hyponatremia E87.1    Generalized weakness R53.1    Severe malnutrition (HCC) E43    Anemia D64.9    Hypoxia R09.02    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 Although every effort was made to ensure the accuracy of this automated transcription, some errors in transcription may have occurred inadvertently. If you may need any clarification, please do not hesitate to contact me through EPIC or at the phone number provided below with my electronic signature. Any pictures or media included in this note were obtained after taking informed verbal consent from the patient and with their approval to include those in the patient's medical record.       Tadeo Keith MD, MPH, FACP, [facility]  1/26/2022, 1:59 PM  [facility] Infectious Disease   [address] [place] [address], [address] [facility] [place] [address]  Office: [phone]  Fax: [phone]  Clinic days:  Tuesday & Thursday

## 2022-01-26 NOTE — CARE COORDINATION
CM spoke to [facility][note 3] regarding patient's discharge , stated able to accept patient today. Transport scheduled with  time of 3 pm via [employer].

## 2022-01-26 NOTE — PLAN OF CARE
Problem: Falls - Risk of:  Goal: Will remain free from falls  Description: Will remain free from falls  1/26/2022 1552 by Liam Francis RN  Outcome: Completed  1/26/2022 1551 by Liam Francis RN  Outcome: Ongoing  1/26/2022 0623 by Lorenzo Javed RN  Outcome: Ongoing  Goal: Absence of physical injury  Description: Absence of physical injury  1/26/2022 1552 by Liam Francis RN  Outcome: Completed  1/26/2022 1551 by Liam Francis RN  Outcome: Ongoing  1/26/2022 0623 by Lorenzo Javed RN  Outcome: Ongoing     Problem: Skin Integrity:  Goal: Will show no infection signs and symptoms  Description: Will show no infection signs and symptoms  1/26/2022 1552 by Liam Francis RN  Outcome: Completed  1/26/2022 1551 by Liam Francis RN  Outcome: Ongoing  1/26/2022 0623 by Lorenzo Javed RN  Outcome: Ongoing  Goal: Absence of new skin breakdown  Description: Absence of new skin breakdown  1/26/2022 1552 by Liam Francis RN  Outcome: Completed  1/26/2022 1551 by Liam Francis RN  Outcome: Ongoing  1/26/2022 0623 by Lorenzo Javed RN  Outcome: Ongoing     Problem: Pain:  Goal: Pain level will decrease  Description: Pain level will decrease  1/26/2022 1552 by Liam Francis RN  Outcome: Completed  1/26/2022 1551 by Liam Francis RN  Outcome: Ongoing  1/26/2022 0623 by Lorenzo Javed RN  Outcome: Ongoing  Goal: Control of acute pain  Description: Control of acute pain  1/26/2022 1552 by Liam Francis RN  Outcome: Completed  1/26/2022 1551 by Liam Francis RN  Outcome: Ongoing  1/26/2022 0623 by Lorenzo Javed RN  Outcome: Ongoing  Goal: Control of chronic pain  Description: Control of chronic pain  1/26/2022 1552 by Liam Francis RN  Outcome: Completed  1/26/2022 1551 by Liam Francis RN  Outcome: Ongoing  1/26/2022 0623 by Lorenzo Javed RN  Outcome: Ongoing     Problem: Nutrition  Goal: Optimal nutrition therapy  1/26/2022 1552 by Liam Francis RN  Outcome: Completed  1/26/2022 1551 by Liam Francis RN  Outcome: Ongoing  1/26/2022 3930 by Oly Benjamin RN  Outcome: Ongoing     Problem: SAFETY  Goal: Free from accidental physical injury  1/26/2022 1552 by Raina Harrell RN  Outcome: Completed  1/26/2022 1551 by Raina Harrell RN  Outcome: Ongoing  1/26/2022 0623 by Oly Benjamin RN  Outcome: Ongoing  Goal: Free from intentional harm  1/26/2022 1552 by Raina Harrell RN  Outcome: Completed  1/26/2022 1551 by Raina Harrell RN  Outcome: Ongoing  1/26/2022 0623 by Oly Benjamin RN  Outcome: Ongoing     Problem: DAILY CARE  Goal: Daily care needs are met  1/26/2022 1552 by Raina Harrell RN  Outcome: Completed  1/26/2022 1551 by Raina Harrell RN  Outcome: Ongoing  1/26/2022 0623 by Oly Benjamin RN  Outcome: Ongoing     Problem: PAIN  Goal: Patient's pain/discomfort is manageable  1/26/2022 1552 by Raina Harrell RN  Outcome: Completed  1/26/2022 1551 by Raina Harrell RN  Outcome: Ongoing  1/26/2022 0623 by Oly Benjamin RN  Outcome: Ongoing     Problem: SKIN INTEGRITY  Goal: Skin integrity is maintained or improved  1/26/2022 1552 by Raina Harrell RN  Outcome: Completed  1/26/2022 1551 by Raina Harrell RN  Outcome: Ongoing  1/26/2022 0623 by Oly Benjamin RN  Outcome: Ongoing     Problem: KNOWLEDGE DEFICIT  Goal: Patient/S.O. demonstrates understanding of disease process, treatment plan, medications, and discharge instructions.   1/26/2022 1552 by Raina Harrell RN  Outcome: Completed  1/26/2022 1551 by Raina Harrell RN  Outcome: Ongoing  1/26/2022 0623 by Oly Benjamin RN  Outcome: Ongoing     Problem: DISCHARGE BARRIERS  Goal: Patient's continuum of care needs are met  1/26/2022 1552 by Raina Harrell RN  Outcome: Completed  1/26/2022 1551 by Raina Harrell RN  Outcome: Ongoing  1/26/2022 0623 by Oly Benjamin RN  Outcome: Ongoing     Problem: Gas Exchange - Impaired:  Goal: Levels of oxygenation will improve  Description: Levels of oxygenation will improve  1/26/2022 1552 by Raina Harrell RN  Outcome: Completed  1/26/2022 1551 by Raina Harrell, RN  Outcome: Ongoing  1/26/2022 0623 by Marlen Mistry RN  Outcome: Ongoing     Problem: Coping:  Goal: Family's ability to cope with current situation will improve  Description: Family's ability to cope with current situation will improve  1/26/2022 1552 by Norman Funez RN  Outcome: Completed  1/26/2022 1551 by Norman Funez RN  Outcome: Ongoing  1/26/2022 0623 by Marlen Mistry RN  Outcome: Ongoing     Problem: Health Behavior:  Goal: Ability to identify and utilize available support systems will improve  Description: Ability to identify and utilize available support systems will improve  1/26/2022 1552 by Norman Funez RN  Outcome: Completed  1/26/2022 1551 by Norman Funez RN  Outcome: Ongoing  1/26/2022 0623 by Marlen Mistry RN  Outcome: Ongoing

## 2022-01-27 ENCOUNTER — APPOINTMENT (OUTPATIENT)
Dept: GENERAL RADIOLOGY | Age: 77
DRG: 871 | End: 2022-01-27
Payer: MEDICARE

## 2022-01-27 ENCOUNTER — HOSPITAL ENCOUNTER (INPATIENT)
Age: 77
LOS: 1 days | Discharge: NURSING, REHAB OR LONG-TERM CARE | DRG: 871 | End: 2022-01-28
Attending: EMERGENCY MEDICINE | Admitting: INTERNAL MEDICINE
Payer: MEDICARE

## 2022-01-27 DIAGNOSIS — C34.90 MALIGNANT NEOPLASM OF LUNG, UNSPECIFIED LATERALITY, UNSPECIFIED PART OF LUNG (HCC): ICD-10-CM

## 2022-01-27 DIAGNOSIS — Z20.822 PERSON UNDER INVESTIGATION FOR COVID-19: ICD-10-CM

## 2022-01-27 DIAGNOSIS — R65.20 SEVERE SEPSIS (HCC): Primary | ICD-10-CM

## 2022-01-27 DIAGNOSIS — J18.9 PNEUMONIA DUE TO INFECTIOUS ORGANISM, UNSPECIFIED LATERALITY, UNSPECIFIED PART OF LUNG: ICD-10-CM

## 2022-01-27 DIAGNOSIS — C79.51 MALIGNANT NEOPLASM METASTATIC TO BONE (HCC): ICD-10-CM

## 2022-01-27 DIAGNOSIS — A41.9 SEVERE SEPSIS (HCC): Primary | ICD-10-CM

## 2022-01-27 DIAGNOSIS — E87.1 HYPONATREMIA: ICD-10-CM

## 2022-01-27 DIAGNOSIS — J96.01 ACUTE HYPOXEMIC RESPIRATORY FAILURE (HCC): ICD-10-CM

## 2022-01-27 PROCEDURE — 99284 EMERGENCY DEPT VISIT MOD MDM: CPT

## 2022-01-27 PROCEDURE — 96365 THER/PROPH/DIAG IV INF INIT: CPT

## 2022-01-27 PROCEDURE — 71045 X-RAY EXAM CHEST 1 VIEW: CPT

## 2022-01-28 VITALS
SYSTOLIC BLOOD PRESSURE: 100 MMHG | RESPIRATION RATE: 26 BRPM | HEART RATE: 90 BPM | TEMPERATURE: 97.3 F | OXYGEN SATURATION: 95 % | DIASTOLIC BLOOD PRESSURE: 67 MMHG | BODY MASS INDEX: 24.19 KG/M2 | WEIGHT: 140.9 LBS

## 2022-01-28 PROBLEM — N17.9 AKI (ACUTE KIDNEY INJURY) (HCC): Status: ACTIVE | Noted: 2022-01-28

## 2022-01-28 PROBLEM — Z20.822 SUSPECTED COVID-19 VIRUS INFECTION: Status: ACTIVE | Noted: 2022-01-28

## 2022-01-28 PROBLEM — J18.9 PNEUMONIA: Status: ACTIVE | Noted: 2022-01-28

## 2022-01-28 PROBLEM — C34.92 NON-SMALL CELL CANCER OF LEFT LUNG (HCC): Status: ACTIVE | Noted: 2022-01-28

## 2022-01-28 LAB
A/G RATIO: 0.5 (ref 1.1–2.2)
ALBUMIN SERPL-MCNC: 2.2 G/DL (ref 3.4–5)
ALP BLD-CCNC: 146 U/L (ref 40–129)
ALT SERPL-CCNC: 13 U/L (ref 10–40)
ANION GAP SERPL CALCULATED.3IONS-SCNC: 15 MMOL/L (ref 3–16)
AST SERPL-CCNC: 25 U/L (ref 15–37)
BASE EXCESS VENOUS: -1.2 MMOL/L (ref -3–3)
BASOPHILS ABSOLUTE: 0 K/UL (ref 0–0.2)
BASOPHILS RELATIVE PERCENT: 0.2 %
BILIRUB SERPL-MCNC: 0.6 MG/DL (ref 0–1)
BUN BLDV-MCNC: 19 MG/DL (ref 7–20)
CALCIUM SERPL-MCNC: 7.8 MG/DL (ref 8.3–10.6)
CARBOXYHEMOGLOBIN: 13.2 % (ref 0–1.5)
CHLORIDE BLD-SCNC: 87 MMOL/L (ref 99–110)
CO2: 18 MMOL/L (ref 21–32)
CREAT SERPL-MCNC: 1.5 MG/DL (ref 0.6–1.2)
EKG ATRIAL RATE: 106 BPM
EKG DIAGNOSIS: NORMAL
EKG P AXIS: 48 DEGREES
EKG P-R INTERVAL: 148 MS
EKG Q-T INTERVAL: 328 MS
EKG QRS DURATION: 72 MS
EKG QTC CALCULATION (BAZETT): 435 MS
EKG R AXIS: -7 DEGREES
EKG T AXIS: 70 DEGREES
EKG VENTRICULAR RATE: 106 BPM
EOSINOPHILS ABSOLUTE: 0.2 K/UL (ref 0–0.6)
EOSINOPHILS RELATIVE PERCENT: 0.7 %
GFR AFRICAN AMERICAN: 41
GFR NON-AFRICAN AMERICAN: 34
GLUCOSE BLD-MCNC: 77 MG/DL (ref 70–99)
HCO3 VENOUS: 21.7 MMOL/L (ref 23–29)
HCT VFR BLD CALC: 29.9 % (ref 36–48)
HEMOGLOBIN: 9.2 G/DL (ref 12–16)
LACTIC ACID, SEPSIS: 1.4 MMOL/L (ref 0.4–1.9)
LACTIC ACID, SEPSIS: 2.4 MMOL/L (ref 0.4–1.9)
LYMPHOCYTES ABSOLUTE: 1.4 K/UL (ref 1–5.1)
LYMPHOCYTES RELATIVE PERCENT: 5.7 %
MCH RBC QN AUTO: 29.8 PG (ref 26–34)
MCHC RBC AUTO-ENTMCNC: 30.6 G/DL (ref 31–36)
MCV RBC AUTO: 97.4 FL (ref 80–100)
METHEMOGLOBIN VENOUS: 0 %
MONOCYTES ABSOLUTE: 2 K/UL (ref 0–1.3)
MONOCYTES RELATIVE PERCENT: 8.1 %
NEUTROPHILS ABSOLUTE: 21.2 K/UL (ref 1.7–7.7)
NEUTROPHILS RELATIVE PERCENT: 85.3 %
O2 CONTENT, VEN: 11 VOL %
O2 SAT, VEN: 100 %
O2 THERAPY: ABNORMAL
ORGANISM: ABNORMAL
PCO2, VEN: 29 MMHG (ref 40–50)
PDW BLD-RTO: 18.4 % (ref 12.4–15.4)
PH VENOUS: 7.48 (ref 7.35–7.45)
PLATELET # BLD: 313 K/UL (ref 135–450)
PMV BLD AUTO: 7.2 FL (ref 5–10.5)
PO2, VEN: 130 MMHG (ref 25–40)
POTASSIUM SERPL-SCNC: 4.5 MMOL/L (ref 3.5–5.1)
PRO-BNP: 2157 PG/ML (ref 0–449)
PROCALCITONIN: 22.89 NG/ML (ref 0–0.15)
RBC # BLD: 3.07 M/UL (ref 4–5.2)
SARS-COV-2, NAAT: NOT DETECTED
SARS-COV-2: NOT DETECTED
SODIUM BLD-SCNC: 120 MMOL/L (ref 136–145)
TCO2 CALC VENOUS: 51 MMOL/L
TOTAL PROTEIN: 7 G/DL (ref 6.4–8.2)
TROPONIN: <0.01 NG/ML
URINE CULTURE, ROUTINE: ABNORMAL
WBC # BLD: 24.8 K/UL (ref 4–11)

## 2022-01-28 PROCEDURE — 87635 SARS-COV-2 COVID-19 AMP PRB: CPT

## 2022-01-28 PROCEDURE — 83880 ASSAY OF NATRIURETIC PEPTIDE: CPT

## 2022-01-28 PROCEDURE — 93010 ELECTROCARDIOGRAM REPORT: CPT | Performed by: INTERNAL MEDICINE

## 2022-01-28 PROCEDURE — 83605 ASSAY OF LACTIC ACID: CPT

## 2022-01-28 PROCEDURE — 80053 COMPREHEN METABOLIC PANEL: CPT

## 2022-01-28 PROCEDURE — 84145 PROCALCITONIN (PCT): CPT

## 2022-01-28 PROCEDURE — 6360000002 HC RX W HCPCS: Performed by: EMERGENCY MEDICINE

## 2022-01-28 PROCEDURE — U0003 INFECTIOUS AGENT DETECTION BY NUCLEIC ACID (DNA OR RNA); SEVERE ACUTE RESPIRATORY SYNDROME CORONAVIRUS 2 (SARS-COV-2) (CORONAVIRUS DISEASE [COVID-19]), AMPLIFIED PROBE TECHNIQUE, MAKING USE OF HIGH THROUGHPUT TECHNOLOGIES AS DESCRIBED BY CMS-2020-01-R: HCPCS

## 2022-01-28 PROCEDURE — 87040 BLOOD CULTURE FOR BACTERIA: CPT

## 2022-01-28 PROCEDURE — 6370000000 HC RX 637 (ALT 250 FOR IP): Performed by: INTERNAL MEDICINE

## 2022-01-28 PROCEDURE — U0005 INFEC AGEN DETEC AMPLI PROBE: HCPCS

## 2022-01-28 PROCEDURE — 36415 COLL VENOUS BLD VENIPUNCTURE: CPT

## 2022-01-28 PROCEDURE — 85025 COMPLETE CBC W/AUTO DIFF WBC: CPT

## 2022-01-28 PROCEDURE — 82803 BLOOD GASES ANY COMBINATION: CPT

## 2022-01-28 PROCEDURE — 2580000003 HC RX 258: Performed by: EMERGENCY MEDICINE

## 2022-01-28 PROCEDURE — 93005 ELECTROCARDIOGRAM TRACING: CPT | Performed by: NURSE PRACTITIONER

## 2022-01-28 PROCEDURE — 96365 THER/PROPH/DIAG IV INF INIT: CPT

## 2022-01-28 PROCEDURE — 6360000002 HC RX W HCPCS: Performed by: INTERNAL MEDICINE

## 2022-01-28 PROCEDURE — 2580000003 HC RX 258: Performed by: INTERNAL MEDICINE

## 2022-01-28 PROCEDURE — 84484 ASSAY OF TROPONIN QUANT: CPT

## 2022-01-28 PROCEDURE — 1200000000 HC SEMI PRIVATE

## 2022-01-28 RX ORDER — POTASSIUM CHLORIDE 7.45 MG/ML
10 INJECTION INTRAVENOUS PRN
Status: DISCONTINUED | OUTPATIENT
Start: 2022-01-28 | End: 2022-01-28 | Stop reason: HOSPADM

## 2022-01-28 RX ORDER — POTASSIUM CHLORIDE 20 MEQ/1
20 TABLET, EXTENDED RELEASE ORAL 2 TIMES DAILY
Status: DISCONTINUED | OUTPATIENT
Start: 2022-01-28 | End: 2022-01-28 | Stop reason: HOSPADM

## 2022-01-28 RX ORDER — ONDANSETRON 2 MG/ML
4 INJECTION INTRAMUSCULAR; INTRAVENOUS EVERY 6 HOURS PRN
Status: DISCONTINUED | OUTPATIENT
Start: 2022-01-28 | End: 2022-01-28 | Stop reason: HOSPADM

## 2022-01-28 RX ORDER — MIRTAZAPINE 15 MG/1
15 TABLET, FILM COATED ORAL NIGHTLY
Status: DISCONTINUED | OUTPATIENT
Start: 2022-01-28 | End: 2022-01-28 | Stop reason: HOSPADM

## 2022-01-28 RX ORDER — IPRATROPIUM BROMIDE AND ALBUTEROL SULFATE 2.5; .5 MG/3ML; MG/3ML
3 SOLUTION RESPIRATORY (INHALATION) EVERY 4 HOURS PRN
Status: DISCONTINUED | OUTPATIENT
Start: 2022-01-28 | End: 2022-01-28 | Stop reason: HOSPADM

## 2022-01-28 RX ORDER — ONDANSETRON 4 MG/1
4 TABLET, ORALLY DISINTEGRATING ORAL EVERY 8 HOURS PRN
Status: DISCONTINUED | OUTPATIENT
Start: 2022-01-28 | End: 2022-01-28 | Stop reason: HOSPADM

## 2022-01-28 RX ORDER — DEXAMETHASONE SODIUM PHOSPHATE 4 MG/ML
6 INJECTION, SOLUTION INTRA-ARTICULAR; INTRALESIONAL; INTRAMUSCULAR; INTRAVENOUS; SOFT TISSUE EVERY 24 HOURS
Status: DISCONTINUED | OUTPATIENT
Start: 2022-01-28 | End: 2022-01-28 | Stop reason: HOSPADM

## 2022-01-28 RX ORDER — SODIUM CHLORIDE 0.9 % (FLUSH) 0.9 %
10 SYRINGE (ML) INJECTION PRN
Status: DISCONTINUED | OUTPATIENT
Start: 2022-01-28 | End: 2022-01-28 | Stop reason: HOSPADM

## 2022-01-28 RX ORDER — 0.9 % SODIUM CHLORIDE 0.9 %
500 INTRAVENOUS SOLUTION INTRAVENOUS PRN
Status: DISCONTINUED | OUTPATIENT
Start: 2022-01-28 | End: 2022-01-28 | Stop reason: HOSPADM

## 2022-01-28 RX ORDER — HYDRALAZINE HYDROCHLORIDE 20 MG/ML
10 INJECTION INTRAMUSCULAR; INTRAVENOUS EVERY 6 HOURS PRN
Status: DISCONTINUED | OUTPATIENT
Start: 2022-01-28 | End: 2022-01-28 | Stop reason: HOSPADM

## 2022-01-28 RX ORDER — GABAPENTIN 100 MG/1
200 CAPSULE ORAL NIGHTLY
Status: DISCONTINUED | OUTPATIENT
Start: 2022-01-28 | End: 2022-01-28 | Stop reason: HOSPADM

## 2022-01-28 RX ORDER — SODIUM CHLORIDE 9 MG/ML
INJECTION, SOLUTION INTRAVENOUS CONTINUOUS
Status: DISCONTINUED | OUTPATIENT
Start: 2022-01-28 | End: 2022-01-28 | Stop reason: HOSPADM

## 2022-01-28 RX ORDER — POTASSIUM CHLORIDE 20 MEQ/1
40 TABLET, EXTENDED RELEASE ORAL PRN
Status: DISCONTINUED | OUTPATIENT
Start: 2022-01-28 | End: 2022-01-28 | Stop reason: HOSPADM

## 2022-01-28 RX ORDER — AMITRIPTYLINE HYDROCHLORIDE 25 MG/1
25 TABLET, FILM COATED ORAL NIGHTLY
Status: DISCONTINUED | OUTPATIENT
Start: 2022-01-28 | End: 2022-01-28 | Stop reason: HOSPADM

## 2022-01-28 RX ORDER — MORPHINE SULFATE 15 MG/1
15 TABLET, FILM COATED, EXTENDED RELEASE ORAL EVERY 12 HOURS SCHEDULED
Status: DISCONTINUED | OUTPATIENT
Start: 2022-01-28 | End: 2022-01-28 | Stop reason: HOSPADM

## 2022-01-28 RX ORDER — ASPIRIN 81 MG/1
81 TABLET ORAL DAILY
Status: DISCONTINUED | OUTPATIENT
Start: 2022-01-28 | End: 2022-01-28 | Stop reason: HOSPADM

## 2022-01-28 RX ORDER — SODIUM CHLORIDE 9 MG/ML
25 INJECTION, SOLUTION INTRAVENOUS PRN
Status: DISCONTINUED | OUTPATIENT
Start: 2022-01-28 | End: 2022-01-28 | Stop reason: HOSPADM

## 2022-01-28 RX ORDER — SODIUM CHLORIDE 0.9 % (FLUSH) 0.9 %
5-40 SYRINGE (ML) INJECTION EVERY 12 HOURS SCHEDULED
Status: DISCONTINUED | OUTPATIENT
Start: 2022-01-28 | End: 2022-01-28 | Stop reason: HOSPADM

## 2022-01-28 RX ORDER — ACETAMINOPHEN 325 MG/1
650 TABLET ORAL EVERY 6 HOURS PRN
Status: DISCONTINUED | OUTPATIENT
Start: 2022-01-28 | End: 2022-01-28 | Stop reason: HOSPADM

## 2022-01-28 RX ORDER — SODIUM CHLORIDE 1000 MG
1 TABLET, SOLUBLE MISCELLANEOUS 3 TIMES DAILY
Status: DISCONTINUED | OUTPATIENT
Start: 2022-01-28 | End: 2022-01-28 | Stop reason: HOSPADM

## 2022-01-28 RX ORDER — OXYCODONE HYDROCHLORIDE AND ACETAMINOPHEN 5; 325 MG/1; MG/1
1 TABLET ORAL EVERY 6 HOURS PRN
Status: DISCONTINUED | OUTPATIENT
Start: 2022-01-28 | End: 2022-01-28 | Stop reason: HOSPADM

## 2022-01-28 RX ORDER — LEVOFLOXACIN 5 MG/ML
750 INJECTION, SOLUTION INTRAVENOUS EVERY 24 HOURS
Status: DISCONTINUED | OUTPATIENT
Start: 2022-01-28 | End: 2022-01-28

## 2022-01-28 RX ORDER — PANTOPRAZOLE SODIUM 40 MG/1
40 TABLET, DELAYED RELEASE ORAL
Status: DISCONTINUED | OUTPATIENT
Start: 2022-01-28 | End: 2022-01-28 | Stop reason: HOSPADM

## 2022-01-28 RX ORDER — OXYCODONE HYDROCHLORIDE AND ACETAMINOPHEN 5; 325 MG/1; MG/1
1 TABLET ORAL EVERY 6 HOURS PRN
Qty: 8 TABLET | Refills: 0 | Status: SHIPPED | OUTPATIENT
Start: 2022-01-28 | End: 2022-01-30

## 2022-01-28 RX ORDER — LINEZOLID 600 MG/1
600 TABLET, FILM COATED ORAL EVERY 12 HOURS SCHEDULED
Status: DISCONTINUED | OUTPATIENT
Start: 2022-01-28 | End: 2022-01-28 | Stop reason: HOSPADM

## 2022-01-28 RX ORDER — OXYCODONE HYDROCHLORIDE AND ACETAMINOPHEN 5; 325 MG/1; MG/1
TABLET ORAL
Status: DISCONTINUED
Start: 2022-01-28 | End: 2022-01-28 | Stop reason: HOSPADM

## 2022-01-28 RX ORDER — 0.9 % SODIUM CHLORIDE 0.9 %
30 INTRAVENOUS SOLUTION INTRAVENOUS ONCE
Status: COMPLETED | OUTPATIENT
Start: 2022-01-28 | End: 2022-01-28

## 2022-01-28 RX ORDER — ALBUTEROL SULFATE 90 UG/1
2 AEROSOL, METERED RESPIRATORY (INHALATION)
Status: DISCONTINUED | OUTPATIENT
Start: 2022-01-28 | End: 2022-01-28 | Stop reason: HOSPADM

## 2022-01-28 RX ORDER — ATORVASTATIN CALCIUM 40 MG/1
40 TABLET, FILM COATED ORAL DAILY
Status: DISCONTINUED | OUTPATIENT
Start: 2022-01-28 | End: 2022-01-28 | Stop reason: HOSPADM

## 2022-01-28 RX ORDER — LEVOTHYROXINE SODIUM 112 UG/1
112 TABLET ORAL DAILY
Status: DISCONTINUED | OUTPATIENT
Start: 2022-01-28 | End: 2022-01-28 | Stop reason: HOSPADM

## 2022-01-28 RX ORDER — ACETAMINOPHEN 650 MG/1
650 SUPPOSITORY RECTAL EVERY 6 HOURS PRN
Status: DISCONTINUED | OUTPATIENT
Start: 2022-01-28 | End: 2022-01-28 | Stop reason: HOSPADM

## 2022-01-28 RX ORDER — MORPHINE SULFATE 15 MG/1
15 TABLET, FILM COATED, EXTENDED RELEASE ORAL EVERY 12 HOURS SCHEDULED
Qty: 4 TABLET | Refills: 0 | Status: SHIPPED | OUTPATIENT
Start: 2022-01-28 | End: 2022-01-30

## 2022-01-28 RX ORDER — BISACODYL 10 MG
10 SUPPOSITORY, RECTAL RECTAL DAILY PRN
COMMUNITY
Start: 2022-01-26

## 2022-01-28 RX ORDER — IPRATROPIUM BROMIDE AND ALBUTEROL SULFATE 2.5; .5 MG/3ML; MG/3ML
1 SOLUTION RESPIRATORY (INHALATION)
Status: DISCONTINUED | OUTPATIENT
Start: 2022-01-28 | End: 2022-01-28

## 2022-01-28 RX ADMIN — SODIUM CHLORIDE TAB 1 GM 1 G: 1 TAB at 09:08

## 2022-01-28 RX ADMIN — OXYCODONE AND ACETAMINOPHEN 1 TABLET: 5; 325 TABLET ORAL at 06:32

## 2022-01-28 RX ADMIN — SODIUM CHLORIDE, PRESERVATIVE FREE 10 ML: 5 INJECTION INTRAVENOUS at 09:15

## 2022-01-28 RX ADMIN — PANTOPRAZOLE SODIUM 40 MG: 40 TABLET, DELAYED RELEASE ORAL at 09:08

## 2022-01-28 RX ADMIN — LINEZOLID 600 MG: 600 TABLET, FILM COATED ORAL at 09:08

## 2022-01-28 RX ADMIN — DEXAMETHASONE SODIUM PHOSPHATE 6 MG: 4 INJECTION, SOLUTION INTRAMUSCULAR; INTRAVENOUS at 09:09

## 2022-01-28 RX ADMIN — POTASSIUM CHLORIDE 20 MEQ: 1500 TABLET, EXTENDED RELEASE ORAL at 09:08

## 2022-01-28 RX ADMIN — SODIUM CHLORIDE 1818 ML: 9 INJECTION, SOLUTION INTRAVENOUS at 02:02

## 2022-01-28 RX ADMIN — RIVAROXABAN 10 MG: 10 TABLET, FILM COATED ORAL at 12:04

## 2022-01-28 RX ADMIN — ATORVASTATIN CALCIUM 40 MG: 40 TABLET, FILM COATED ORAL at 09:08

## 2022-01-28 RX ADMIN — SODIUM CHLORIDE TAB 1 GM 1 G: 1 TAB at 16:00

## 2022-01-28 RX ADMIN — MORPHINE SULFATE 15 MG: 15 TABLET, FILM COATED, EXTENDED RELEASE ORAL at 09:09

## 2022-01-28 RX ADMIN — VANCOMYCIN HYDROCHLORIDE 1000 MG: 1 INJECTION, POWDER, LYOPHILIZED, FOR SOLUTION INTRAVENOUS at 05:41

## 2022-01-28 RX ADMIN — CEFEPIME HYDROCHLORIDE 2000 MG: 2 INJECTION, POWDER, FOR SOLUTION INTRAVENOUS at 02:27

## 2022-01-28 RX ADMIN — CHOLECALCIFEROL TAB 125 MCG (5000 UNIT) 5000 UNITS: 125 TAB at 09:14

## 2022-01-28 RX ADMIN — SODIUM CHLORIDE: 9 INJECTION, SOLUTION INTRAVENOUS at 07:00

## 2022-01-28 RX ADMIN — LEVOTHYROXINE SODIUM 112 MCG: 0.11 TABLET ORAL at 09:08

## 2022-01-28 RX ADMIN — OXYCODONE AND ACETAMINOPHEN 1 TABLET: 5; 325 TABLET ORAL at 16:00

## 2022-01-28 RX ADMIN — ASPIRIN 81 MG: 81 TABLET, COATED ORAL at 09:14

## 2022-01-28 ASSESSMENT — PAIN DESCRIPTION - LOCATION
LOCATION: GENERALIZED
LOCATION: GENERALIZED
LOCATION: ABDOMEN
LOCATION: GENERALIZED
LOCATION: GENERALIZED

## 2022-01-28 ASSESSMENT — PAIN SCALES - GENERAL
PAINLEVEL_OUTOF10: 10
PAINLEVEL_OUTOF10: 4
PAINLEVEL_OUTOF10: 4
PAINLEVEL_OUTOF10: 6
PAINLEVEL_OUTOF10: 6
PAINLEVEL_OUTOF10: 10
PAINLEVEL_OUTOF10: 10

## 2022-01-28 ASSESSMENT — ENCOUNTER SYMPTOMS
COUGH: 1
CHEST TIGHTNESS: 0
NAUSEA: 0
ABDOMINAL PAIN: 0
DIARRHEA: 0
VOMITING: 0
SHORTNESS OF BREATH: 0

## 2022-01-28 ASSESSMENT — PAIN DESCRIPTION - ONSET
ONSET: ON-GOING

## 2022-01-28 ASSESSMENT — PAIN DESCRIPTION - FREQUENCY
FREQUENCY: CONTINUOUS

## 2022-01-28 ASSESSMENT — PAIN DESCRIPTION - PROGRESSION: CLINICAL_PROGRESSION: GRADUALLY WORSENING

## 2022-01-28 ASSESSMENT — PAIN DESCRIPTION - PAIN TYPE
TYPE: ACUTE PAIN
TYPE: ACUTE PAIN;CHRONIC PAIN
TYPE: ACUTE PAIN;CHRONIC PAIN

## 2022-01-28 ASSESSMENT — PAIN DESCRIPTION - DESCRIPTORS
DESCRIPTORS: ACHING

## 2022-01-28 ASSESSMENT — PAIN - FUNCTIONAL ASSESSMENT: PAIN_FUNCTIONAL_ASSESSMENT: PREVENTS OR INTERFERES WITH ALL ACTIVE AND SOME PASSIVE ACTIVITIES

## 2022-01-28 NOTE — CONSULTS
Renal Consult  Full Note Dictated [id number]  A/P  1. Acute on Chronic Hyponatremia-increased ADH state  2. TESSY-probably prerenal  3. Hypotension  4. H/O Metastatic Lung CA  5. Anemia  6. Hypocalcemia    No additional recommendations since patient has decided to pursue hospice care. DNR-CC.      Thank you

## 2022-01-28 NOTE — ED PROVIDER NOTES
I independently saw performed a substantive portion of the visit (history, physical, and MDM) on Concepcion Giron.   All diagnostic, treatment, and disposition decisions were made by myself in conjunction with the advanced practice provider. I have participated in the medical decision making and directed the treatment plan and disposition of the patient. For further details of Lavetta Meckel [facility] emergency department encounter, please see the advanced practice provider's documentation. CHIEF COMPLAINT  Chief Complaint   Patient presents with    Shortness of Breath     pt was brought in by EMS, pt states lives at [facility] and EMS was called for low O2 at 89%. pt states she was just sleeping. pt placed on 5L NC in squad. Briefly, Concepcion Giron is a 67 y.o. female  who presents to the ED complaining of hypoxia at [facility] and sent here for evaluation due to SOB. Has a mild cough. Recently dx with lung CA.  +history of blood clots and on anticoagulation. No chest pain right now. Denies fevers. No abd pain vomiting or diarrhea. FOCUSED PHYSICAL EXAMINATION  /72   Pulse 96   Temp 97.8 °F (36.6 °C) (Oral)   Resp 15   SpO2 99%    Focused physical examination notable for no acute distress, well-appearing, well-nourished, normal speech and mentation without obvious facial droop, no obvious rash. No obvious cranial nerve deficits on my initial exam. Mild scattered rhonchi, conversational dyspnea, no wheezing. Abd soft NTND. Tachycardia, reg rhythm.     The 12 lead EKG was interpreted by me as follows:  Rate: tachycardia with a rate of 106  Rhythm: sinus  Axis: normal  Intervals: normal MO, narrow QRS, normal QTc  ST segments: no ST elevations or depressions  T waves: no abnormal inversions  Non-specific T wave changes: not present  Prior EKG comparison: EKG dated 1/13/22 is not significantly different    MDM:  Diagnostic considerations included acute coronary syndrome, pulmonary embolism, COPD/asthma, pneumonia, sepsis, pericardial tamponade, pneumothorax, CHF, thoracic aortic dissection, anxiety    ED course was notable for criteria for severe sepsis with elevation in lactate, tachycardia and leukocytosis with suspected pneumonia. Patient was given fluids. She is not hypotensive. She is stable on nasal cannula oxygen for now and will be covered with broad-spectrum antibiotics and tested for Covid. Procalcitonin however is profoundly elevated. This suggests bacterial etiology. SEP-1 CORE MEASURE DATA      Sepsis Criteria   Severe Sepsis Criteria   Septic Shock Criteria     Must be confirmed or suspected to move forward with diagnosis of sepsis. Must meet 2:    [] Temperature > 100.9 F (38.3 C)        or < 96.8 F (36 C)  [x] HR > 90  [] RR > 20  [x] WBC > 12 or < 4 or 10% bands    AND:    [x] Infection Confirmed or        Suspected. OR:    [] Exclude from SEP-1 because:    [] No infection present or suspected  [] Does not have 2+ SIRS criteria but may have an incidental infection that requires treatment  [] May have sepsis, but does not meet criteria for severe sepsis or septic shock  [] Alternative explanation for abnormal labs and/or vitals (see MDM)  [] Viral etiology found or highly suspected (including COVID-19) without concomitant bacterial infection   Must meet 1:    [x] Lactate > 2       or   [] Signs of Organ Dysfunction:    - SBP < 90 or MAP < 65  - Altered mental status  - Creatinine > 2 or increased from      baseline  - Urine Output < 0.5 ml/kg/hr  - Bilirubin > 2  - INR > 1.5 (not anticoagulated)  - Platelets < 962,388  - Acute Respiratory Failure as     evidenced by new need for NIPPV     or mechanical ventilation        [] No criteria met for Severe Sepsis.    Must meet 1:    [] Lactate > 4        or   [] SBP < 90 or MAP < 65 for at        least two readings in the first        hour after fluid bolus        administration      [] Vasopressors initiated (if hypotension persists after fluid resuscitation)                [x] No criteria met for Septic Shock. Patient Vitals for the past 6 hrs:   BP Temp Pulse Resp SpO2   01/27/22 2317 108/71 -- -- -- 95 %   01/27/22 2342 -- 97.8 °F (36.6 °C) 98 20 --   01/28/22 0030 107/72 -- 96 15 99 %      Recent Labs     01/25/22  0529 01/26/22  0520 01/28/22  0050   WBC 22.0* 25.7* 24.8*   CREATININE 1.1 1.1 1.5*   BILITOT  --   --  0.6    301 313         Sepsis Time Identified: 1:30am    Fluid Resuscitation Rational: at least 30mL/kg based on entered actual weight at time of triage    Infection Source: Pulmonary - Nosocomial     Repeat lactate level: pending    Reassessment Exam:   Not applicable. Patient does not have septic shock. During the patient's ED course, the patient was given:  Medications   0.9 % sodium chloride bolus (has no administration in time range)   vancomycin 1000 mg IVPB in 250 mL D5W addavial (has no administration in time range)   cefepime (MAXIPIME) 2000 mg IVPB minibag (has no administration in time range)        CLINICAL IMPRESSION  1. Severe sepsis ([address]    2. Pneumonia due to infectious organism, unspecified laterality, unspecified part of lung    3. Malignant neoplasm of lung, unspecified laterality, unspecified part of lung ([address]    4. Hyponatremia    5. Acute hypoxemic respiratory failure ([address]    6. Person under investigation for COVID-19        DISPOSITION  Donal December was admitted in fair condition. The plan is to admit to the hospital at this time under the PCP's admitting service. Dr. Bryon Torres accepted the patient and will take over the patient's care. The total critical care time I independently spent while evaluating and treating this patient was 50 minutes. This excludes time spent doing separately billable procedures.   This includes time at the bedside, data interpretation, medication management, obtaining critical history from collateral sources if the patient is unable to provide it directly, and physician consultation. Specifics of interventions taken and potentially life-threatening diagnostic considerations are listed above in the medical decision making. If this was a shared visit with an JIMBO, the time in this attestation is non-concurrent critical care time out of the total shared critical care time provided by the JIMBO and myself. This chart was created using Dragon dictation software. Efforts were made by me to ensure accuracy, however some errors may be present due to limitations of this technology.             Fredy Mott MD  01/28/22 1954

## 2022-01-28 NOTE — DISCHARGE INSTR - COC
Continuity of Care Form    Patient Name: Kellie Davis   :  1945  MRN:  [id number]    [facility] date:  2022  Discharge date:  2022    Code Status Order: DNR-CC   Advance Directives:      Admitting Physician:  Andrew Carcamo MD  PCP: Sofy Melgoza DO    Discharging Nurse: [facility] Unit/Room#: [id number]-[id number]/5533-64  Discharging Unit Phone Number: [phone]    Emergency Contact:   Extended Emergency Contact Information  Primary Emergency Contact: Fide Murray  Home Phone: [phone]  Relation: Other  Secondary Emergency Contact: Shaunna Sharma  Mobile Phone: [phone]  Relation: Child    Past Surgical History:  Past Surgical History:   Procedure Laterality Date    BRONCHOSCOPY  2022    BRONCHOSCOPY BRUSHINGS performed by Kiran Ledezma MD at [address] [place] [address]  2022    BRONCHOSCOPY W/EBUS FNA performed by Kiran Ledezma MD at [facility]      L4-5    PARTIAL HYSTERECTOMY  1964       Immunization History:   Immunization History   Administered Date(s) Administered    Influenza, High Dose (Fluzone 65 yrs and older) 10/13/2018    Pneumococcal Conjugate 13-valent ([id number]) 10/28/2015    Pneumococcal Conjugate 7-valent (Onita Doll) 10/28/2015       Active Problems:  Patient Active Problem List   Diagnosis Code    Sprain of ligament of lumbosacral joint S33. 9XXA    [facility] Neck sprain and strain S13. 9XXA    [facility] Degeneration of lumbar or lumbosacral intervertebral disc M51.37    [facility] Acquired spondylolisthesis M43.10    [facility] Displacement of lumbar intervertebral disc without myelopathy M51.26    [facility] Spinal stenosis, lumbar region, without neurogenic claudication M48.061    [facility] Depressive disorder, not elsewhere classified F32.9    Chronic pain syndrome G89.4    Lupus (HCC) M32.9    Coronary artery disease due to lipid rich plaque I25.10, I25.83    Hyperlipidemia LDL goal <70 E78.5    Paroxysmal atrial fibrillation (HCC) I48.0    History of stroke Z86.73    History of DVT (deep vein thrombosis) Z86.718    Gastroesophageal reflux disease without esophagitis K21.9    Acquired hypothyroidism E03.9    Lung mass R91.8    Tobacco use Z72.0    Elevated ferritin R79.89    Chronic insomnia F51.04    Anxiety disorder F41.9    Radiolucent lesion of bone M89.8X9    Chronic low back pain M54.50, G89.29    Breast tenderness N64.4    Bad taste in mouth R43.8    Decreased appetite R63.0    Closed fracture of upper end of left humerus S42.202A    Hyponatremia E87.1    Generalized weakness R53.1    Severe malnutrition (HCC) E43    Anemia D64.9    Hypoxia R09.02    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    Hypergammaglobulinemia, 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    PNA (pneumonia) J18.9    Hypercalcemia E83.52    Sepsis (HCC) A41.9    Left breast mass N63.20    Lymphadenopathy R59.1    Right nephrolithiasis N20.0    Cholelithiasis without cholecystitis K80.20    UTI (urinary tract infection) due to Enterococcus N39.0, B95.2    Pneumonia J18.9    Suspected COVID-19 virus infection Z20.822    TESSY (acute kidney injury) (HCC) N17.9    Non-small cell cancer of left lung (HCC) C34.92       Isolation/Infection:   Isolation            Droplet Plus          Patient Infection Status       Infection Onset Added Last Indicated Last Indicated By Review Planned Expiration Resolved Resolved By    COVID-19 (Rule Out) 01/27/22 01/27/22 01/28/22 COVID-19 (Ordered) 02/03/22 02/10/22              Nurse Assessment:  Last Vital Signs: /70   Pulse 91   Temp 98.2 °F (36.8 °C) (Axillary)   Resp 26   Wt 140 lb 14.4 oz (63.9 kg)   SpO2 100%   BMI 24.19 kg/m²     Last documented pain score (0-10 scale): Pain Level: 10  Last Weight:   Wt Readings from Last 1 Encounters:   01/28/22 140 lb 14.4 oz (63.9 kg)     Mental Status:  oriented    IV Access:  - None    Nursing Mobility/ADLs:  Walking   Assisted  Transfer  Assisted  Bathing Assisted  Dressing  Assisted  Toileting  Assisted  Feeding  Independent  Med Admin  Assisted  Med Delivery   whole - one at a time    Wound Care Documentation and Therapy:        Elimination:  Continence: Bowel: Yes  Bladder: Yes  Urinary Catheter: None   Colostomy/Ileostomy/Ileal Conduit: No       Date of Last BM: prior to admission  No intake or output data in the 24 hours ending 01/28/22 1113  No intake/output data recorded. Safety Concerns: At Risk for Falls and Aspiration Risk    Impairments/Disabilities:      None    Nutrition Therapy:  Current Nutrition Therapy:   - Oral Diet:  General    Routes of Feeding: Oral  Liquids: Thin Liquids  Daily Fluid Restriction: no  Last Modified Barium Swallow with Video (Video Swallowing Test): not done    Treatments at the Time of Hospital Discharge:   Respiratory Treatments: N/A  Oxygen Therapy:  is not on home oxygen therapy. Requiring oxygen in hospital at 4L. min  Ventilator:    - No ventilator support    Rehab Therapies: Physical Therapy and Occupational Therapy  Weight Bearing Status/Restrictions: No weight bearing restirctions  Other Medical Equipment (for information only, NOT a DME order):  wheelchair and walker  Other Treatments: N/A    Patient's personal belongings (please select all that are sent with patient):  None    RN SIGNATURE:  Electronically signed by Nusrat Rico RN on 1/28/22 at 11:16 AM EST    CASE MANAGEMENT/SOCIAL WORK SECTION    Inpatient Status Date:  1/28/22    Readmission Risk Assessment Score:  Readmission Risk              Risk of Unplanned Readmission:  40           Discharging to Facility/ Agency   Name: [id number]    under Hospice care via [facility]   Address:   [facility]   Phone: [phone]  Fax:  [phone]    Dialysis Facility (if applicable)   Name:  Address:  Dialysis Schedule:  Phone:  Fax:    / signature: Electronically signed by Lakisha Sharpe RN on 1/28/22 at 11:56 AM EST    PHYSICIAN SECTION    Prognosis: {Prognosis:7819733049}    Condition at Discharge: 50Theresa Castro Patient Condition:410261023}    Rehab Potential (if transferring to Rehab): {Prognosis:1220620137}    Recommended Labs or Other Treatments After Discharge: ***    Physician Certification: I certify the above information and transfer of Willy Walls  is necessary for the continuing treatment of the diagnosis listed and that she requires {Admit to Appropriate Level of Care:68420} for {GREATER/LESS:448571752} 30 days.      Update Admission H&P: {CHP DME Changes in OUENX:095123177}    PHYSICIAN SIGNATURE:  {Esignature:[id number]}

## 2022-01-28 NOTE — PROGRESS NOTES
Report called to [facility] at this time. All questions answered and concerns addressed. Left callback number with receiving nurse.

## 2022-01-28 NOTE — CARE COORDINATION
[facility] Nurse meeting with patient and completing the paper work. Patient will be going back to [facility] under hospice care via [facility]. Transport has been scheduled with  of  between 4.30 -5 pm via [address] Teri at [facility] was informed of the  time. She stated the  facility has discussed with [facility] and ready to accept the patient.

## 2022-01-28 NOTE — CARE COORDINATION
Discharge Plan:   Patient discharged to:  Name: [facility] under hospice care   via [facility]   Address:   [address]   Phone: [phone]  Fax:  [phone]  SW/DC Planner faxed, [address] and [address]   Narcotic Prescriptions faxed were:  Percocet 5-325 mg prn q 6 hrs,  Morphine ( MS Contin) 15 mg q 12 hrs  RN: Deneen Todd will call report       Medical Transport with:  [facility] - [phone]   time: 4.30- 5 pm  Family advised of discharge?: Left VM for daughter - Facundo Altamirano?:   N/A    All discharge needs met per case management.

## 2022-01-28 NOTE — ED PROVIDER NOTES
[facility]        Pt Name: Concepcion Giron  MRN: [id number]  [place] 1945  Date of evaluation: 1/27/2022  Provider: MANUEL Fall CNP  PCP: Wilman Miller DO  Note Started: 1:18 AM EST       Patient was seen in conjunction with attending physician, Dr. Elida Medellin       Chief Complaint   Patient presents with    Shortness of Breath     pt was brought in by EMS, pt states lives at [facility] and EMS was called for low O2 at 89%. pt states she was just sleeping. pt placed on 5L NC in squad. HISTORY OF PRESENT ILLNESS   (Location, Timing/Onset, Context/Setting, Quality, Duration, Modifying Factors, Severity, Associated Signs and Symptoms)  Note limiting factors. Chief Complaint: Evelyn Connolly is a 67 y.o. female who presents to the emergency department from local nursing home with complaint of hypoxia. The patient reports that she was sleeping when staff was checking her pulse oximetry. Patient is from [address] [place] [address] this evening. She does not wear supplemental oxygen typically. She does continue to smoke, denies significant sob or chest pain. Reports chronic cough. Patient is obviously sob, conversationally dyspneic. Nursing Notes were all reviewed and agreed with or any disagreements were addressed in the HPI. REVIEW OF SYSTEMS    (2-9 systems for level 4, 10 or more for level 5)     Review of Systems   Constitutional: Negative for activity change, chills and fever. Respiratory: Positive for cough. Negative for chest tightness and shortness of breath. Cardiovascular: Negative for chest pain. Gastrointestinal: Negative for abdominal pain, diarrhea, nausea and vomiting. Genitourinary: Negative for dysuria. All other systems reviewed and are negative. Positives and Pertinent negatives as per HPI.  Except as noted above in the ROS, all other systems were reviewed and negative. PAST MEDICAL HISTORY     Past Medical History:   Diagnosis Date    Acquired spondylolisthesis     Arthritis     Cataract     Chronic pain syndrome     Degeneration of lumbar or lumbosacral intervertebral disc     Depressive disorder, not elsewhere classified     Displacement of lumbar intervertebral disc without myelopathy     H/O blood clots     HBP (high blood pressure)     Heart disease     Spinal stenosis, lumbar region, without neurogenic claudication     Sprain of lumbosacral (joint) (ligament)     Sprain of neck     Stroke (HCC)     Thyroid disorder     Thyroid disorder          SURGICAL HISTORY     Past Surgical History:   Procedure Laterality Date    BRONCHOSCOPY  1/20/2022    BRONCHOSCOPY BRUSHINGS performed by Pj Mathew MD at [address]  1/20/2022    BRONCHOSCOPY W/EBUS FNA performed by Pj Mathew MD at [facility]    LUMBAR FUSION      L4-5   [address] [facility]       Previous Medications    ALBUTEROL (PROVENTIL) (5 MG/ML) 0.5% NEBULIZER SOLUTION    Take 0.5 mLs by nebulization every 4 hours as needed for Wheezing or Shortness of Breath    AMITRIPTYLINE (ELAVIL) 25 MG TABLET    Take 1 tablet by mouth nightly    ASPIRIN 81 MG EC TABLET    Take 81 mg by mouth daily. ATORVASTATIN (LIPITOR) 40 MG TABLET    TAKE 1 TABLET BY MOUTH DAILY    CHOLECALCIFEROL (VITAMIN D3) 5000 UNITS TABS    Take 1 tablet by mouth daily     ESOMEPRAZOLE (NEXIUM) 40 MG DELAYED RELEASE CAPSULE    Take 1 capsule by mouth every morning (before breakfast)    GABAPENTIN (NEURONTIN) 100 MG CAPSULE    Take 200 mg by mouth nightly.     IPRATROPIUM-ALBUTEROL (DUONEB) 0.5-2.5 (3) MG/3ML SOLN NEBULIZER SOLUTION    Inhale 3 mLs into the lungs every 4 hours as needed for Shortness of Breath    LEVOTHYROXINE (SYNTHROID) 112 MCG TABLET TAKE 1 TABLET BY MOUTH DAILY    LINEZOLID (ZYVOX) 600 MG TABLET    Take 1 tablet by mouth every 12 hours for 7 days    MIRTAZAPINE (REMERON) 15 MG TABLET    Take 1 tablet by mouth nightly    MISC. DEVICES (QUAD CANE) MISC    1 each by Does not apply route daily    MORPHINE (MS CONTIN) 15 MG EXTENDED RELEASE TABLET    Take 1 tablet by mouth every 12 hours for 2 days. OXYCODONE-ACETAMINOPHEN (PERCOCET) 5-325 MG PER TABLET    Take 1 tablet by mouth every 6 hours as needed for Pain for up to 2 days. POTASSIUM CHLORIDE (KLOR-CON M) 20 MEQ EXTENDED RELEASE TABLET    Take 20 mEq by mouth 2 times daily    SODIUM CHLORIDE 1 G TABLET    Take 1 tablet by mouth 3 times daily for 5 days    XARELTO 10 MG TABS TABLET    TAKE 1 TABLET BY MOUTH EVERY 24 HOURS         ALLERGIES     Other, Trazodone, and Venlafaxine    FAMILYHISTORY       Family History   Problem Relation Age of Onset    Cancer Father         Brain Cancer     Breast Cancer Sister     High Blood Pressure Sister           SOCIAL HISTORY       Social History     Tobacco Use    Smoking status: Current Every Day Smoker     Packs/day: 1.00     Years: 57.00     Pack years: 57.00     Types: Cigarettes     Start date: 1960    Smokeless tobacco: Never Used   Vaping Use    Vaping Use: Never used   Substance Use Topics    Alcohol use: Not Currently    Drug use: No       SCREENINGS             PHYSICAL EXAM    (up to 7 for level 4, 8 or more for level 5)     ED Triage Vitals   BP Temp Temp Source Pulse Resp SpO2 Height Weight   01/27/22 2317 01/27/22 2342 01/27/22 2342 01/27/22 2342 01/27/22 2342 01/27/22 2317 -- --   108/71 97.8 °F (36.6 °C) Oral 98 20 95 %         Physical Exam  Vitals and nursing note reviewed. Constitutional:       Appearance: She is well-developed. She is not diaphoretic. HENT:      Head: Normocephalic and atraumatic.       Right Ear: External ear normal.      Left Ear: External ear normal.   Eyes:      General:         Right eye: No discharge. Left eye: No discharge. Neck:      Vascular: No JVD. Cardiovascular:      Rate and Rhythm: Normal rate and regular rhythm. Pulses: Normal pulses. Heart sounds: Normal heart sounds. Pulmonary:      Effort: Pulmonary effort is normal. No respiratory distress. Breath sounds: Rhonchi present. Abdominal:      Palpations: Abdomen is soft. Musculoskeletal:         General: Normal range of motion. Cervical back: Normal range of motion and neck supple. Skin:     General: Skin is warm and dry. Coloration: Skin is not pale. Neurological:      Mental Status: She is alert and oriented to person, place, and time.    Psychiatric:         Behavior: Behavior normal.         DIAGNOSTIC RESULTS   LABS:    Labs Reviewed   CBC WITH AUTO DIFFERENTIAL - Abnormal; Notable for the following components:       Result Value    WBC 24.8 (*)     RBC 3.07 (*)     Hemoglobin 9.2 (*)     Hematocrit 29.9 (*)     MCHC 30.6 (*)     RDW 18.4 (*)     Neutrophils Absolute 21.2 (*)     Monocytes Absolute 2.0 (*)     All other components within normal limits    Narrative:     Performed at:  [facility] [address], [place], [address]   Phone [phone]   COMPREHENSIVE METABOLIC PANEL - Abnormal; Notable for the following components:    Sodium 120 (*)     Chloride 87 (*)     CO2 18 (*)     CREATININE 1.5 (*)     GFR Non- 34 (*)     GFR  41 (*)     Calcium 7.8 (*)     Albumin 2.2 (*)     Albumin/Globulin Ratio 0.5 (*)     Alkaline Phosphatase 146 (*)     All other components within normal limits    Narrative:     Performed at:  [facility] [address], [place], [address]   Phone [phone] - Abnormal; Notable for the following components:    Pro-BNP 2,157 (*)     All other components within normal limits    Narrative:     Performed at:  [facility]) - [address],  [place], [facility] [place]   Phone [phone]   LACTATE, SEPSIS - Abnormal; Notable for the following components:    Lactic Acid, Sepsis 2.4 (*)     All other components within normal limits    Narrative:     Performed at:  [facility] [address],  [place], [place]   Phone [phone]   PROCALCITONIN - Abnormal; Notable for the following components:    Procalcitonin 22.89 (*)     All other components within normal limits    Narrative:     Performed at:  [facility] [address]  [facility] [place]   Phone [phone]   BLOOD GAS, VENOUS - Abnormal; Notable for the following components:    pH, Prince 7.483 (*)     pCO2, Prince 29.0 (*)     pO2, [place] 130.0 (*)     HCO3, Venous 21.7 (*)     Carboxyhemoglobin 13.2 (*)     All other components within normal limits    Narrative:     Performed at:  [facility] [address],  [place], [facility] [place]   Phone [phone]   CULTURE, BLOOD 1   CULTURE, BLOOD 2   TROPONIN    Narrative:     Performed at:  [facility] [address]  [place], [facility] [place]   Phone [phone]   LACTATE, SEPSIS   COVID-19   URINE RT REFLEX TO CULTURE       When ordered only abnormal lab results are displayed. All other labs were within normal range or not returned as of this dictation. EKG: When ordered, EKG's are interpreted by the Emergency Department Physician in the absence of a cardiologist.  Please see their note for interpretation of EKG. RADIOLOGY:   Non-plain film images such as CT, Ultrasound and MRI are read by the radiologist. Plain radiographic images are visualized and preliminarily interpreted by the ED Provider with the below findings:        Interpretation per the Radiologist below, if available at the time of this note:    XR CHEST PORTABLE   Preliminary Result   1.  Stable left upper lobe infiltrate/mass, concerning for malignancy. Superimposed pneumonia cannot be excluded. 2. No new lung infiltrate. XR CHEST PORTABLE    Result Date: 1/28/2022  1. Stable left upper lobe infiltrate/mass, concerning for malignancy. Superimposed pneumonia cannot be excluded. 2. No new lung infiltrate. XR CHEST PORTABLE    Result Date: 1/23/2022  EXAMINATION: ONE XRAY VIEW OF THE CHEST 1/23/2022 9:26 am COMPARISON: 01/15/2022 HISTORY: ORDERING SYSTEM PROVIDED HISTORY: PNEUMONIA TECHNOLOGIST PROVIDED HISTORY: Reason for exam:->PNEUMONIA Reason for Exam: PNEUMONIA FINDINGS: The left PICC terminates in the distal SVC. No change in the left upper lobe consolidation either due to pneumonia or malignancy. There is bibasilar scarring. The cardiac silhouette is within normal limits. There is no pneumothorax or pleural effusion. 1. No significant change. NM BONE SCAN WHOLE BODY    Result Date: 1/24/2022  EXAMINATION: WHOLE BODY BONE SCAN  1/24/2022 TECHNIQUE: The patient was injected intravenously with 28.3 mCi of 99 mTc MDP and scintigraphy of the entire skeleton was performed approximately three hours later. COMPARISON: 01/14/2022 CT HISTORY: ORDERING SYSTEM PROVIDED HISTORY: bone metastasis TECHNOLOGIST PROVIDED HISTORY: Reason for exam:->bone metastasis Reason for Exam: Bone Mets FINDINGS: Focal uptake in the left body of L2 correlating to a lytic lesion described on prior CT. Focal uptake is also observed at the proximal right tibia, presumably near the tibial spine and at the medial tibial plateau. Foci of activity in the bilateral shoulders and SI joints are most likely degenerative. There is also linear uptake along the anterior tibia bilaterally. The remainder of the osseous structures and soft tissues are unremarkable. Physiologic activity is present in the skeletal and renal collecting systems.      1. Focal uptake at L2 correlating to known lytic lesion of concern for potential osseous metastasis. 2. Linear uptake along the tibial cortex bilaterally may represent shin splints. More focal uptake at the proximal right tibia may potentially be degenerative, but an underlying skeletal lesion cannot be excluded. Correlate with plain film imaging. PROCEDURES   Unless otherwise noted below, none     Procedures    CRITICAL CARE TIME   The total critical care time spent while evaluating and treating this patient was at least 32 minutes. This excludes time spent doing separately billable procedures. This includes time at the bedside, data interpretation, medication management, obtaining critical history from collateral sources if the patient is unable to provide it directly, and physician consultation. Specifics of interventions taken and potentially life-threatening diagnostic considerations are listed above in the medical decision making. CONSULTS:  None      EMERGENCY DEPARTMENT COURSE and DIFFERENTIAL DIAGNOSIS/MDM:   Vitals:    Vitals:    01/27/22 2317 01/27/22 2342 01/28/22 0030   BP: 108/71  107/72   Pulse:  98 96   Resp:  20 15   Temp:  97.8 °F (36.6 °C)    TempSrc:  Oral    SpO2: 95%  99%       Patient was given the following medications:  Medications   0.9 % sodium chloride bolus (has no administration in time range)   vancomycin 1000 mg IVPB in 250 mL D5W addavial (has no administration in time range)   cefepime (MAXIPIME) 2000 mg IVPB minibag (has no administration in time range)           Briefly, this is a 69-year-old female who presents to the emergency department from [facility] with complaint of hypoxia. Patient did have recent admission and found non-small cell lung carcinoma with possible pneumonia. Looks like she may have been discharged back to facility on 126 from reviewing nursing note, physician discharge note is not yet available. ED course concerning for severe sepsis with likely suspected pneumonia.   Not hypotensive, given IV fluids and broad-spectrum antibiotics. Stable on nasal cannula oxygen and tested for Covid. Procalcitonin profoundly elevated. Patient will be admitted to Dr. Golden Cole. Patient updated regarding plan of care    FINAL IMPRESSION      1. Severe sepsis ([facility] [address]    2. Pneumonia due to infectious organism, unspecified laterality, unspecified part of lung    3. Malignant neoplasm of lung, unspecified laterality, unspecified part of lung ([address]    4. Hyponatremia    5. Acute hypoxemic respiratory failure ([facility] [address]    6. Person under investigation for COVID-19          DISPOSITION/PLAN   DISPOSITION Decision To Admit 01/28/2022 01:46:57 AM      PATIENT REFERRED TO:  No follow-up provider specified.     DISCHARGE MEDICATIONS:  New Prescriptions    No medications on file       DISCONTINUED MEDICATIONS:  Discontinued Medications    No medications on file              (Please note that portions of this note were completed with a voice recognition program.  Efforts were made to edit the dictations but occasionally words are mis-transcribed.)    MANUEL Abraham CNP (electronically signed)           MANUEL Abraham CNP  01/28/22 0159

## 2022-01-28 NOTE — DISCHARGE SUMMARY
[facility] -- Physician Discharge Summary     Terry December [birth year]  MRN: [id number]    Admit Date: 1/27/2022  Discharge Date: No discharge date for patient encounter. Attending MD: Tete Nguyen MD  Discharging MD: Tete Nguyen MD  PCP: Speedy Eduardo DO [address] [phone]    Admission Diagnosis: Hyponatremia [E87.1]  Pneumonia [J18.9]  Severe sepsis ([address] [A41.9, R65.20]  Malignant neoplasm of lung, unspecified laterality, unspecified part of lung ([facility] [C34.90]  Acute hypoxemic respiratory failure ([facility] [J96.01]  Pneumonia due to infectious organism, unspecified laterality, unspecified part of lung [J18.9]  Person under investigation for COVID-19 [Z20.822]  DISCHARGE DIAGNOSIS: same    Full Hospital Problem List:  [facility] [address] Problems    Diagnosis Date Noted    Pneumonia [J18.9] 01/28/2022    Suspected COVID-19 virus infection [Z20.822] 01/28/2022    TESSY (acute kidney injury) ([facility] [N17.9] 01/28/2022    Non-small cell cancer of left lung ([facility] [C34.92] 01/28/2022    Sepsis ([facility] [A41.9]     PNA (pneumonia) [J18.9] 01/13/2022    Hyponatremia [E87.1] 08/12/2019    Lung mass [R91.8] 07/30/2019           Hospital Course:  67 y. o. female who presents to the emergency department from local nursing home with complaint of hypoxia.  The patient reports that she was sleeping when staff was checking her pulse oximetry.  Patient is from [address] [place] [address] this evening.  She does not wear supplemental oxygen typically.     She does continue to smoke, denies significant sob or chest pain.  Reports chronic cough.     Patient is obviously sob, conversationally dyspneic.     She has new NSCLC dx  Has been rapidly declining  She wishes to enroll with hospice and return to SNF    Consults made during Hospitalization:  IP CONSULT TO PHARMACY  IP CONSULT TO NEPHROLOGY  IP CONSULT TO ONCOLOGY  IP CONSULT TO PALLIATIVE CARE  IP CONSULT TO HOSPICE    Treatment team at time of Discharge: Treatment Team: Attending Provider: Dakota Mcmahon MD; Registered Nurse: Milad Smith, RN; Consulting Physician: Lorna Friedman MD; Consulting Physician: Mj Hdz MD; Consulting Physician: Ramin Vargas, RN; : Elinor Perez, RN; Utilization Reviewer: Harlo Kehr, RN; Respiratory Therapist (Day): Kp Dixon    Imaging Results:  CT HEAD W WO CONTRAST    Result Date: 1/14/2022  EXAMINATION: CT OF THE HEAD WITH AND WITHOUT CONTRAST  1/14/2022 12:18 pm TECHNIQUE: CT of the head/brain was performed without and with the administration of intravenous contrast. Multiplanar reformatted images are provided for review. Dose modulation, iterative reconstruction, and/or weight based adjustment of the mA/kV was utilized to reduce the radiation dose to as low as reasonably achievable. COMPARISON: None. HISTORY: ORDERING SYSTEM PROVIDED HISTORY: R/o mets TECHNOLOGIST PROVIDED HISTORY: Reason for exam:->R/o mets Reason for Exam: r/o mets FINDINGS: BRAIN/VENTRICLES: There is no acute intracranial hemorrhage, mass effect or midline shift. No abnormal extra-axial fluid collection. The gray-white differentiation is maintained without evidence of an acute infarct. There is no evidence of hydrocephalus. No definite areas of postcontrast enhancement are identified. Encephalomalacia in left cerebellar hemisphere is consistent with remote insult. ORBITS: The visualized portion of the orbits demonstrate no acute abnormality. SINUSES: The visualized paranasal sinuses and mastoid air cells demonstrate no acute abnormality. SOFT TISSUES/SKULL:  No acute abnormality of the visualized skull or soft tissues. No acute intracranial abnormality. No definite evidence of intracranial metastatic disease. It is noted that CT imaging is insensitive for small metastatic lesions.   If possible further evaluation with MRI should be considered for better characterization. RECOMMENDATIONS: Unavailable     CT CHEST WO CONTRAST    Result Date: 1/13/2022  EXAMINATION: CT OF THE CHEST WITHOUT CONTRAST 1/13/2022 12:16 pm TECHNIQUE: CT of the chest was performed without the administration of intravenous contrast. Multiplanar reformatted images are provided for review. Dose modulation, iterative reconstruction, and/or weight based adjustment of the mA/kV was utilized to reduce the radiation dose to as low as reasonably achievable. COMPARISON: Chest x-ray, 3 hours ago HISTORY: ORDERING SYSTEM PROVIDED HISTORY: mass TECHNOLOGIST PROVIDED HISTORY: Reason for exam:->mass Decision Support Exception - unselect if not a suspected or confirmed emergency medical condition->Emergency Medical Condition (MA) Reason for Exam: mass FINDINGS: Mediastinum: There are several AP window nodes for example, mild 2 measuring 19 and 18 mm in short axis. Lungs/pleura: There is a left upper lobe suprahilar mass, measuring 5.2 x 6.3 x 6.2 cm. There are peripheral components of ground-glass and interlobular septal thickening in the left apex. In the right middle lobe there is a 9 x 7 mm partially solid nodule. The solid component is 3 mm. (Image 55, series 3). Mild dependent atelectasis is noted. There is pleural thickening along the lateral aspect of the right hemithorax, likely fibrotic. Upper Abdomen: There is a right adrenal nodule measuring 4 cm in diameter. There are 2 left adrenal nodules, measuring 2.6 and 2.5 cm in diameter. Soft Tissues/Bones: No suspicious lytic or blastic bone lesions are appreciated. 1. 6.3 cm left upper lobe, suprahilar mass, suspicious for malignancy. There may be a postobstructive component of pneumonia, versus transbronchial spread of tumor in the apex of the left upper lobe. 2. AP window mediastinal adenopathy, likely metastatic. 3. 9 x 7 mm right middle lobe nodule, indeterminate for inflammation, primary or metastatic neoplasm.  4. Bilateral adrenal nodules, suspicious for metastasis. RECOMMENDATIONS: Unavailable     CT ABDOMEN PELVIS W IV CONTRAST Additional Contrast? Oral    Result Date: 1/15/2022  EXAMINATION: CT OF THE ABDOMEN AND PELVIS WITH CONTRAST 1/14/2022 12:18 pm TECHNIQUE: CT of the abdomen and pelvis was performed with the administration of intravenous contrast. Multiplanar reformatted images are provided for review. Dose modulation, iterative reconstruction, and/or weight based adjustment of the mA/kV was utilized to reduce the radiation dose to as low as reasonably achievable. COMPARISON: CT of the chest 01/13/2022. HISTORY: ORDERING SYSTEM PROVIDED HISTORY: R/O mets TECHNOLOGIST PROVIDED HISTORY: Reason for exam:->r/o met Additional Contrast?->Oral Reason for Exam: R/O mets FINDINGS: Lower Chest: No acute infiltrate at the lung bases. Stable 5 mm noncalcified right lower lobe pulmonary nodule. Coronary vascular calcification. Organs: Mild hepatic steatosis with no focal abnormality. Cholelithiasis with no biliary dilatation. The spleen and pancreas are unremarkable. 2.7 x 4.8 cm right adrenal mass. Two left adrenal lesions measuring 1.1 x 2.5 cm and 1.6 x 2.6 cm.  3.5 cm right renal cyst.  Nonobstructive right nephrolithiasis. No solid renal mass or significant hydronephrosis. GI/Bowel: No pericolonic inflammatory changes. No small bowel distension. Stomach and duodenal sweep are unremarkable. Pelvis: No pelvic mass or free pelvic fluid. Previous hysterectomy. Mild distention of the urinary bladder. Peritoneum/Retroperitoneum: The abdominal aorta is normal in caliber with moderate diffuse calcified plaque. No pathologic retroperitoneal adenopathy or significant upper abdominal ascites. Bones/Soft Tissues: 2.1 x 2.3 cm left breast mass. No other focal soft tissue abnormality. There is a lytic lesion in the L2 vertebral body concerning for metastatic disease.   Moderate degenerative disc disease throughout the lumbar spine with posterior fusion at L4-5. Sclerosis within the superior aspect of the femoral heads bilaterally. 1. Bilateral adrenal masses, likely metastatic. No other evidence of abdominal or pelvic metastatic disease. 2. Lytic L2 vertebral body lesion concerning for metastatic disease. 3. 2.3 cm left breast mass likely malignant. 4. Cholelithiasis with no acute features. 5. Nonobstructive right nephrolithiasis. XR CHEST PORTABLE    Result Date: 1/28/2022  EXAMINATION: ONE XRAY VIEW OF THE CHEST 1/27/2022 11:54 pm COMPARISON: 01/23/2022 HISTORY: ORDERING SYSTEM PROVIDED HISTORY: SOB TECHNOLOGIST PROVIDED HISTORY: Reason for exam:->SOB Reason for Exam: Shortness of Breath Relevant Medical/Surgical History: previous hypertension and heart disease FINDINGS: The cardiac silhouette and mediastinal contours are stable. Vascular calcifications are noted along the aortic arch. Emphysematous changes are noted in the lungs. There is a persistent left upper lobe infiltrate/mass. No pneumothorax. The visualized osseous structures are unremarkable. 1. Stable left upper lobe infiltrate/mass, concerning for malignancy. Superimposed pneumonia cannot be excluded. 2. No new lung infiltrate. XR CHEST PORTABLE    Result Date: 1/23/2022  EXAMINATION: ONE XRAY VIEW OF THE CHEST 1/23/2022 9:26 am COMPARISON: 01/15/2022 HISTORY: ORDERING SYSTEM PROVIDED HISTORY: PNEUMONIA TECHNOLOGIST PROVIDED HISTORY: Reason for exam:->PNEUMONIA Reason for Exam: PNEUMONIA FINDINGS: The left PICC terminates in the distal SVC. No change in the left upper lobe consolidation either due to pneumonia or malignancy. There is bibasilar scarring. The cardiac silhouette is within normal limits. There is no pneumothorax or pleural effusion. 1. No significant change.      XR CHEST PORTABLE    Result Date: 1/15/2022  EXAMINATION: ONE XRAY VIEW OF THE CHEST 1/15/2022 11:09 am COMPARISON: 01/13/2022 HISTORY: ORDERING SYSTEM PROVIDED HISTORY: SOB TECHNOLOGIST PROVIDED HISTORY: Reason for exam:->SOB Reason for Exam: Fatigue (Pt in via EMS from home. Pts neighbor called 911 because the pt has been getting weaker. Pt has been feeling weak for the past few months. Pt endorses no pain but chest pressure that goes through to her back. Pt has hx of stroke and angioplasty) FINDINGS: Large area of consolidation seen within the left upper lobe, which appears mildly increased when compared to the previous exam.  Chronic interstitial opacities are seen throughout the remainder of the lungs. No other infiltrates are seen. No pneumothorax. No free air. Cardial pericardial silhouette is unremarkable for age. Focal consolidations seen within the left upper lobe. Pneumonia is primarily considered. However, that must be followed to resolution to ensure that there is no underlying neoplasm. XR CHEST PORTABLE    Result Date: 1/13/2022  EXAMINATION: ONE XRAY VIEW OF THE CHEST 1/13/2022 8:46 am COMPARISON: Chest x-ray dated 08/15/2019 HISTORY: ORDERING SYSTEM PROVIDED HISTORY: other TECHNOLOGIST PROVIDED HISTORY: Reason for exam:->other Reason for Exam: fatigue FINDINGS: New left upper lobe opacity concerning for pneumonia versus malignancy. The right lung is clear. No pleural effusion or pneumothorax. Cardiac silhouette is unremarkable. Degenerative disease of the visualized osseous structures. New left upper lobe opacity concerning for pneumonia versus malignancy. RECOMMENDATION: Chest CT is recommended for further evaluation. NM BONE SCAN WHOLE BODY    Result Date: 1/24/2022  EXAMINATION: WHOLE BODY BONE SCAN  1/24/2022 TECHNIQUE: The patient was injected intravenously with 28.3 mCi of 99 mTc MDP and scintigraphy of the entire skeleton was performed approximately three hours later.  COMPARISON: 01/14/2022 CT HISTORY: ORDERING SYSTEM PROVIDED HISTORY: bone metastasis TECHNOLOGIST PROVIDED HISTORY: Reason for exam:->bone metastasis Reason for Exam: Bone Mets FINDINGS: Focal uptake in the left body of L2 correlating to a lytic lesion described on prior CT. Focal uptake is also observed at the proximal right tibia, presumably near the tibial spine and at the medial tibial plateau. Foci of activity in the bilateral shoulders and SI joints are most likely degenerative. There is also linear uptake along the anterior tibia bilaterally. The remainder of the osseous structures and soft tissues are unremarkable. Physiologic activity is present in the skeletal and renal collecting systems. 1. Focal uptake at L2 correlating to known lytic lesion of concern for potential osseous metastasis. 2. Linear uptake along the tibial cortex bilaterally may represent shin splints. More focal uptake at the proximal right tibia may potentially be degenerative, but an underlying skeletal lesion cannot be excluded. Correlate with plain film imaging. Discharge Exam:  See progress note from day of d/c    Disposition: SNF    Condition: stable    Discharge Medications:     Medication List      CHANGE how you take these medications    Xarelto 10 MG Tabs tablet  Generic drug: rivaroxaban  TAKE 1 TABLET BY MOUTH EVERY 24 HOURS  What changed: See the new instructions. Notes to patient: Use: to thin your blood to reduce risk of blood clots, heart attack, and stroke.    Side effects: bleeding        CONTINUE taking these medications    albuterol (5 MG/ML) 0.5% nebulizer solution  Commonly known as: PROVENTIL  Take 0.5 mLs by nebulization every 4 hours as needed for Wheezing or Shortness of Breath  Notes to patient: Albuterol/pratropium (DuoNeb) or Albuterol (Proventil)  Use: to open airways, reduce secretions  Side effects: nervousness, jitteriness, shakiness, headache, fast heartbeat     amitriptyline 25 MG tablet  Commonly known as: ELAVIL  Take 1 tablet by mouth nightly  Notes to patient: Use: antidepressant  Side effects: nausea, vomiting & dry mouth       atorvastatin 40 MG tablet  Commonly known as: LIPITOR  TAKE 1 TABLET BY MOUTH DAILY  Notes to patient: Use: lower bad cholesterol  Side effects: headache, muscle pain, constipation, diarrhea     bisacodyl 10 MG suppository  Commonly known as: DULCOLAX  Notes to patient: Softens stool to treat or prevent constipation  Side effects: soft stool      esomeprazole 40 MG delayed release capsule  Commonly known as: NEXIUM  Take 1 capsule by mouth every morning (before breakfast)  Notes to patient: Use: heartburn  Side effects: stomach pain, cramps, itching     gabapentin 100 MG capsule  Commonly known as: NEURONTIN  Notes to patient: Use: nerve pain. Side effects: Drowsiness, dizziness, loss of coordination, tiredness, blurred. ipratropium-albuterol 0.5-2.5 (3) MG/3ML Soln nebulizer solution  Commonly known as: DUONEB  Inhale 3 mLs into the lungs every 4 hours as needed for Shortness of Breath  Notes to patient: Albuterol/pratropium (DuoNeb)   Use: to open airways, thin secretions  Side effects: nervousness, jitteriness, shakiness, headache, fast heartbeat     levothyroxine 112 MCG tablet  Commonly known as: SYNTHROID  TAKE 1 TABLET BY MOUTH DAILY  Notes to patient: Use: hypothyroidism  Side effects: headache, weakness, diarrhea     linezolid 600 MG tablet  Commonly known as: ZYVOX  Take 1 tablet by mouth every 12 hours for 7 days  Notes to patient: Use: Antibiotic used to treat infection  Side effects: nausea, diarrhea     magnesium hydroxide 400 MG/5ML suspension  Commonly known as: MILK OF MAGNESIA  Notes to patient: Softens stool to treat or prevent constipation  Side effects: soft stool      mirtazapine 15 MG tablet  Commonly known as: REMERON  Take 1 tablet by mouth nightly  Notes to patient: Use: depression  Side effects: dizziness, weight gain, dry mouth     morphine 15 MG extended release tablet  Commonly known as: MS CONTIN  Take 1 tablet by mouth every 12 hours for 2 days.      oxyCODONE-acetaminophen 5-325 MG per tablet  Commonly known as: PERCOCET  Take 1 tablet by mouth every 6 hours as needed for Pain for up to 2 days. potassium chloride 20 MEQ extended release tablet  Commonly known as: KLOR-CON M  Notes to patient: Potassium chloride/ Klor-Con  Use: restore potassium in your body  Side effects: stomach upset     Quad Cane Misc  1 each by Does not apply route daily     Vitamin D3 125 MCG (5000 UT) Tabs  Notes to patient: Dietary/vitamin supplement  Side effects: discolored urine         STOP taking these medications    aspirin 81 MG EC tablet     sodium chloride 1 g tablet           Where to Get Your Medications      You can get these medications from any pharmacy    Bring a paper prescription for each of these medications  · morphine 15 MG extended release tablet  · oxyCODONE-acetaminophen 5-325 MG per tablet            Allergies: Allergies   Allergen Reactions    Other Other (See Comments)     Anti depression cause black outs     Trazodone      Sedation?  Venlafaxine      Sedation? Follow up Instructions: Follow-up with PCP: Effie Nascimento DO in 2 wk .       Total time spent on day of discharge including face-to-face visit, examination, documentation, counseling, preparation of discharge plans and followup, and discharge medicine reconciliation and presciptions is 33 minutes    Signed:  Toñito Duran MD  1/28/2022

## 2022-01-28 NOTE — PROGRESS NOTES
Aspiration Screen    Name: Cindi Figueroa  : 1945  Medical Diagnosis: Hyponatremia [E87.1]  Pneumonia [J18.9]  Severe sepsis ([address] [A41.9, R65.20]  Malignant neoplasm of lung, unspecified laterality, unspecified part of lung ([address] [C34.90]  Acute hypoxemic respiratory failure ([address] [J96.01]  Pneumonia due to infectious organism, unspecified laterality, unspecified part of lung [J18.9]  Person under investigation for COVID-19 [Z20.822]    Swallow screen to rule out aspiration completed per pneumonia protocol. Patient demonstrates some increased risk indicators for potential dysphagia / aspiration per swallow screen. RECOMMEND: Clinical Swallow Evaluation at bedside to assess swallowing function, rule out aspiration, and determine appropriate diet level.      Tish Berry M.A., [address] [place] [address]  Speech-Language Pathologist

## 2022-01-28 NOTE — DISCHARGE INSTR - DIET

## 2022-01-28 NOTE — CONSULTS
[address]                     [facility], [address]                                  CONSULTATION    PATIENT NAME: Anita Vernon                     :        1945  MED REC NO:   [id number]                          ROOM:       [id number]  ACCOUNT NO:   [de-identified]                           ADMIT DATE: 2022  PROVIDER:     Pepe Elena MD    RENAL CONSULTATION    CONSULT DATE:  2022    CONSULTING PHYSICIAN:  Pepe Elena MD    REFERRING PROVIDER:  Joe Koehler MD    REASON FOR CONSULTATION:  Acute-on-chronic hyponatremia, metabolic  acidosis, TESSY. HISTORY OF PRESENT ILLNESS:  The patient is a 53-year-old female with  history of hyponatremia, hypokalemia, hypophosphatemia, metastatic lung  cancer, hypocalcemia, edema who presented back to the hospital yesterday  secondary to hypoxia and shortness of breath. I am asked to see the  patient because her serum sodium is down to 120 from 127 on her  discharge two days ago. Creatinine also went up from 1.1 to 1.5. Lowest systolic blood pressure was in the 100s range. During her recent  discharge, she was supposed to take sodium chloride tablet four times a  day. The patient is currently drowsy, so most of this information is  from records. The hospice nurses also informed us that she would be DNR  comfort care and will be pursuing hospice and be discharged at this  time. PAST MEDICAL HISTORY:  Includes hyponatremia, metastatic lung cancer,  chronic pain syndrome, hypertension, heart disease, CVA, hypokalemia,  hypophosphatemia. ALLERGIES:  TRAZODONE and VENLAFAXINE. MEDICATIONS PRIOR TO ADMISSION:  Includes Bisacodyl, magnesium  hydroxide, albuterol, ipratropium plus albuterol, linezolid, sodium  chloride tablet, potassium chloride, gabapentin, Xarelto, atorvastatin,  levothyroxine, mirtazapine, cholecalciferol, amitriptyline,  esomeprazole, aspirin.     SOCIAL HISTORY:  Positive history of smoking. Previous alcohol use. No  drug abuse. FAMILY HISTORY:  Includes breast cancer, hypertension. REVIEW OF SYSTEMS:  The patient is drowsy, so not able to cooperate. PHYSICAL EXAMINATION:  VITAL SIGNS:  Blood pressure 101/70, pulse 91, respirations 26,  temperature 98.2, saturating 100%. Weight listed at 63.9 kg. GENERAL. Appears drowsy. HEENT:  Anicteric sclerae. Clear conjunctivae. SKIN:  Anicteric. No rash. CHEST:  Clear. HEART:  Regular. ABDOMEN:  Soft, nontender. No rebound or involuntary guarding. EXTREMITIES:  Trace edema. LABORATORY DATA:  Sodium 120, potassium 4.5, chloride 87, bicarb 18, BUN  19, creatinine 1.5, glucose 77, calcium 7.8. Albumin 2.2. WBC 24.8,  hemoglobin 9.2, hematocrit 29.9, platelet count 902,115. Blood gas, pH  7.483, pCO2 29. ASSESSMENT AND PLAN:  1. Acute-on-chronic hyponatremia, lower serum sodium at this time  probably secondary to increased ADH state. She does have lower blood  pressures. She is currently drowsy, which may have been affected by the  lower serum sodium level. No additional intervention at this time since  the patient has been changed to DNR comfort care and will be pursuing  hospice. 2.  TESSY probably due to prerenal azotemia with acuity of onset. 3.  Respiratory alkalosis. 4.  Relative hypotension. 5.  Metastatic lung cancer. 6.  History of anemia. The patient will be pursuing hospice. No further renal recommendation. Thank you for the consult.         Christin Thorne MD    D: 01/28/2022 11:49:50       T: 01/28/2022 11:53:10     [email or web address]  Job#: [id number]     Doc#: [id number]    CC:

## 2022-01-28 NOTE — H&P
History and Physical  Dr. Lindsay Harmon  1/28/2022    PCP: Tha Plata DO    Cc:   Chief Complaint   Patient presents with    Shortness of Breath     pt was brought in by EMS, pt states lives at [facility] and EMS was called for low O2 at 89%. pt states she was just sleeping. pt placed on 5L NC in squad. HPI:  Pranay Zepeda is a 67 y.o. female who has a past medical history of Acquired spondylolisthesis, Arthritis, Cataract, Chronic pain syndrome, Degeneration of lumbar or lumbosacral intervertebral disc, Depressive disorder, not elsewhere classified, Displacement of lumbar intervertebral disc without myelopathy, H/O blood clots, HBP (high blood pressure), Heart disease, Spinal stenosis, lumbar region, without neurogenic claudication, Sprain of lumbosacral (joint) (ligament), Sprain of neck, Stroke [facility]), Thyroid disorder, and Thyroid disorder. Patient presents with Hyponatremia. HPI  (1-3 for expanded problem focused, ?4 for detailed/comprehensive)     67 y.o. female who presents to the emergency department from local nursing home with complaint of hypoxia. The patient reports that she was sleeping when staff was checking her pulse oximetry. Patient is from [address] [place] [address]-care [facility] this evening. She does not wear supplemental oxygen typically.     She does continue to smoke, denies significant sob or chest pain. Reports chronic cough.     Patient is obviously sob, conversationally dyspneic. Hard to get much other hx from patient due to dyspnea    CXR from er reviewed   Impression:       1. Stable left upper lobe infiltrate/mass, concerning for malignancy. Superimposed pneumonia cannot be excluded. 2. No new lung infiltrate. Recent bronchscopy bx results reviewed - pos for NSCLC  Oncology asked to see again    Problem list of hospitalization thus far:   Active Hospital Problems    Diagnosis     Pneumonia [J18.9]     Suspected COVID-19 virus infection [Z20.822]     TESSY PFSH: The above PMHx, PSHx, SocHx, FamHx has been reviewed by myself. (1 area for detailed, 2-3 for comprehensive)      Code Status: DNR-CC    Meds - following list of home medications fromelectronic chart has been reviewed by myself  Prior to Admission medications    Medication Sig Start Date End Date Taking? Authorizing Provider   bisacodyl (DULCOLAX) 10 MG suppository Place 10 mg rectally daily as needed for Constipation Indications: Constipation 1/26/22  Yes Historical Provider, MD   magnesium hydroxide (MILK OF MAGNESIA) 400 MG/5ML suspension Take 30 mLs by mouth daily as needed for Constipation Indications: Constipation   Yes Historical Provider, MD   morphine (MS CONTIN) 15 MG extended release tablet Take 1 tablet by mouth every 12 hours for 2 days. 1/26/22 1/28/22 Yes Marcelino Sarabia MD   oxyCODONE-acetaminophen (PERCOCET) 5-325 MG per tablet Take 1 tablet by mouth every 6 hours as needed for Pain for up to 2 days. 1/26/22 1/28/22 Yes Marcelino Sarabia MD   albuterol (PROVENTIL) (5 MG/ML) 0.5% nebulizer solution Take 0.5 mLs by nebulization every 4 hours as needed for Wheezing or Shortness of Breath 1/26/22 2/5/22 Yes Marcelino Sarabia MD   ipratropium-albuterol (DUONEB) 0.5-2.5 (3) MG/3ML SOLN nebulizer solution Inhale 3 mLs into the lungs every 4 hours as needed for Shortness of Breath 1/26/22 2/5/22 Yes Marcelino Sarabia MD   linezolid (ZYVOX) 600 MG tablet Take 1 tablet by mouth every 12 hours for 7 days 1/26/22 2/2/22 Yes Marcelino Sarabia MD   sodium chloride 1 g tablet Take 1 tablet by mouth 3 times daily for 5 days 1/26/22 1/31/22 Yes Marcelino Sarabia MD   potassium chloride (KLOR-CON M) 20 MEQ extended release tablet Take 20 mEq by mouth 2 times daily   Yes Historical Provider, MD   gabapentin (NEURONTIN) 100 MG capsule Take 200 mg by mouth nightly.    Yes Historical Provider, MD   XARELTO 10 MG TABS tablet TAKE 1 TABLET BY MOUTH EVERY 24 HOURS  Patient taking differently: 10 mg daily  2/1/21  Yes Rajeev Cevallos DO   atorvastatin (LIPITOR) 40 MG tablet TAKE 1 TABLET BY MOUTH DAILY 2/1/21  Yes Rajeev Cevallos DO   levothyroxine (SYNTHROID) 112 MCG tablet TAKE 1 TABLET BY MOUTH DAILY 2/1/21 1/28/22 Yes Rajeev Cevallos DO   mirtazapine (REMERON) 15 MG tablet Take 1 tablet by mouth nightly 8/17/19  Yes Elizabeth Caba MD   Cholecalciferol (VITAMIN D3) 5000 units TABS Take 1 tablet by mouth daily    Yes Historical Provider, MD   amitriptyline (ELAVIL) 25 MG tablet Take 1 tablet by mouth nightly 7/30/19  Yes Rajeev Cevallos DO   esomeprazole (NEXIUM) 40 MG delayed release capsule Take 1 capsule by mouth every morning (before breakfast) 7/30/19  Yes Rajeev Cevallos DO   aspirin 81 MG EC tablet Take 81 mg by mouth daily. Yes Historical Provider, MD   Misc. Devices (QUAD CANE) MISC 1 each by Does not apply route daily 11/1/19   Rajeev Cevallos, DO         Allergies   Allergen Reactions    Other Other (See Comments)     Anti depression cause black outs     Trazodone      Sedation?  Venlafaxine      Sedation?              EXAM: (2-7 system for EPF/Detailed, ?8 for Comprehensive)  /65   Pulse 111   Temp 97.8 °F (36.6 °C) (Oral)   Resp 24   Wt 140 lb 14.4 oz (63.9 kg)   SpO2 100%   BMI 24.19 kg/m²   Constitutional: vitals as above: alert, appears stated age and cooperative    Psychiatric: normal insight and judgment, oriented to person, place, time, and general circumstances    Head: Normocephalic, without obvious abnormality, atraumatic    Eyes:lids and lashes normal, conjunctivae and sclerae normal and pupils equal, round, reactive to light and accomodation    EMNT: external ears normal, nares midline    Neck: no carotid bruit, supple, symmetrical, trachea midline and thyroid not enlarged, symmetric, no tenderness/mass/nodules     Respiratory: crackles bilat    Cardiovascular: normal rate, regular rhythm, normal S1 and S2 and no murmurs    Gastrointestinal: soft, non-tender, non-distended, normal bowel sounds, no masses or organomegaly    Extremities: no clubbing, no edema    Skin:No rashes or nodules noted.     Neurologic:negative         LABS:  Labs Reviewed   CBC WITH AUTO DIFFERENTIAL - Abnormal; Notable for the following components:       Result Value    WBC 24.8 (*)     RBC 3.07 (*)     Hemoglobin 9.2 (*)     Hematocrit 29.9 (*)     MCHC 30.6 (*)     RDW 18.4 (*)     Neutrophils Absolute 21.2 (*)     Monocytes Absolute 2.0 (*)     All other components within normal limits    Narrative:     Performed at:  [facility] [address], [address]   Phone [phone]   COMPREHENSIVE METABOLIC PANEL - Abnormal; Notable for the following components:    Sodium 120 (*)     Chloride 87 (*)     CO2 18 (*)     CREATININE 1.5 (*)     GFR Non- 34 (*)     GFR  41 (*)     Calcium 7.8 (*)     Albumin 2.2 (*)     Albumin/Globulin Ratio 0.5 (*)     Alkaline Phosphatase 146 (*)     All other components within normal limits    Narrative:     Performed at:  [facility] [address], [address]   Phone [phone]  - Abnormal; Notable for the following components:    Pro-BNP 2,157 (*)     All other components within normal limits    Narrative:     Performed at:  [facility] [address] [facility] [address] [facility] [address], [address]   Phone [phone]   LACTATE, SEPSIS - Abnormal; Notable for the following components:    Lactic Acid, Sepsis 2.4 (*)     All other components within normal limits    Narrative:     Performed at:  [facility] [address], [address]   Phone [phone]   PROCALCITONIN - Abnormal; Notable for the following components:    Procalcitonin 22.89 (*)     All other components within normal limits    Narrative:     Performed at:  [facility]  [address] [facility], [address] [facility]   Phone [phone]   BLOOD GAS, VENOUS - Abnormal; Notable for the following components:    pH, Prince 7.483 (*)     pCO2, Prince 29.0 (*)     pO2, Prince 130.0 (*)     HCO3, Venous 21.7 (*)     Carboxyhemoglobin 13.2 (*)     All other components within normal limits    Narrative:     Performed at:  [facility]  [address] [facility], [address] [facility]   Phone [phone]   CULTURE, BLOOD 1   CULTURE, BLOOD 2   CULTURE, RESPIRATORY   TROPONIN    Narrative:     Performed at:  [facility]  [address] [facility], [address] [facility]   Phone [phone]   LACTATE, SEPSIS   COVID-19   URINE RT REFLEX TO CULTURE         IMAGING:  Imaging results from the ER have been reviewed in the computerized chart. CT HEAD W WO CONTRAST    Result Date: 1/14/2022  EXAMINATION: CT OF THE HEAD WITH AND WITHOUT CONTRAST  1/14/2022 12:18 pm TECHNIQUE: CT of the head/brain was performed without and with the administration of intravenous contrast. Multiplanar reformatted images are provided for review. Dose modulation, iterative reconstruction, and/or weight based adjustment of the mA/kV was utilized to reduce the radiation dose to as low as reasonably achievable. COMPARISON: None. HISTORY: ORDERING SYSTEM PROVIDED HISTORY: R/o mets TECHNOLOGIST PROVIDED HISTORY: Reason for exam:->R/o mets Reason for Exam: r/o mets FINDINGS: BRAIN/VENTRICLES: There is no acute intracranial hemorrhage, mass effect or midline shift. No abnormal extra-axial fluid collection. The gray-white differentiation is maintained without evidence of an acute infarct. There is no evidence of hydrocephalus. No definite areas of postcontrast enhancement are identified. Encephalomalacia in left cerebellar hemisphere is consistent with remote insult. ORBITS: The visualized portion of the orbits demonstrate no acute abnormality.  SINUSES: The visualized paranasal sinuses and mastoid air cells demonstrate no acute abnormality. SOFT TISSUES/SKULL:  No acute abnormality of the visualized skull or soft tissues. No acute intracranial abnormality. No definite evidence of intracranial metastatic disease. It is noted that CT imaging is insensitive for small metastatic lesions. If possible further evaluation with MRI should be considered for better characterization. RECOMMENDATIONS: Unavailable     CT CHEST WO CONTRAST    Result Date: 1/13/2022  EXAMINATION: CT OF THE CHEST WITHOUT CONTRAST 1/13/2022 12:16 pm TECHNIQUE: CT of the chest was performed without the administration of intravenous contrast. Multiplanar reformatted images are provided for review. Dose modulation, iterative reconstruction, and/or weight based adjustment of the mA/kV was utilized to reduce the radiation dose to as low as reasonably achievable. COMPARISON: Chest x-ray, 3 hours ago HISTORY: ORDERING SYSTEM PROVIDED HISTORY: mass TECHNOLOGIST PROVIDED HISTORY: Reason for exam:->mass Decision Support Exception - unselect if not a suspected or confirmed emergency medical condition->Emergency Medical Condition (MA) Reason for Exam: mass FINDINGS: Mediastinum: There are several AP window nodes for example, mild 2 measuring 19 and 18 mm in short axis. Lungs/pleura: There is a left upper lobe suprahilar mass, measuring 5.2 x 6.3 x 6.2 cm. There are peripheral components of ground-glass and interlobular septal thickening in the left apex. In the right middle lobe there is a 9 x 7 mm partially solid nodule. The solid component is 3 mm. (Image 55, series 3). Mild dependent atelectasis is noted. There is pleural thickening along the lateral aspect of the right hemithorax, likely fibrotic. Upper Abdomen: There is a right adrenal nodule measuring 4 cm in diameter. There are 2 left adrenal nodules, measuring 2.6 and 2.5 cm in diameter. Soft Tissues/Bones: No suspicious lytic or blastic bone lesions are appreciated. 1. 6.3 cm left upper lobe, suprahilar mass, suspicious for malignancy. There may be a postobstructive component of pneumonia, versus transbronchial spread of tumor in the apex of the left upper lobe. 2. AP window mediastinal adenopathy, likely metastatic. 3. 9 x 7 mm right middle lobe nodule, indeterminate for inflammation, primary or metastatic neoplasm. 4. Bilateral adrenal nodules, suspicious for metastasis. RECOMMENDATIONS: Unavailable     CT ABDOMEN PELVIS W IV CONTRAST Additional Contrast? Oral    Result Date: 1/15/2022  EXAMINATION: CT OF THE ABDOMEN AND PELVIS WITH CONTRAST 1/14/2022 12:18 pm TECHNIQUE: CT of the abdomen and pelvis was performed with the administration of intravenous contrast. Multiplanar reformatted images are provided for review. Dose modulation, iterative reconstruction, and/or weight based adjustment of the mA/kV was utilized to reduce the radiation dose to as low as reasonably achievable. COMPARISON: CT of the chest 01/13/2022. HISTORY: ORDERING SYSTEM PROVIDED HISTORY: R/O mets TECHNOLOGIST PROVIDED HISTORY: Reason for exam:->r/o met Additional Contrast?->Oral Reason for Exam: R/O mets FINDINGS: Lower Chest: No acute infiltrate at the lung bases. Stable 5 mm noncalcified right lower lobe pulmonary nodule. Coronary vascular calcification. Organs: Mild hepatic steatosis with no focal abnormality. Cholelithiasis with no biliary dilatation. The spleen and pancreas are unremarkable. 2.7 x 4.8 cm right adrenal mass. Two left adrenal lesions measuring 1.1 x 2.5 cm and 1.6 x 2.6 cm.  3.5 cm right renal cyst.  Nonobstructive right nephrolithiasis. No solid renal mass or significant hydronephrosis. GI/Bowel: No pericolonic inflammatory changes. No small bowel distension. Stomach and duodenal sweep are unremarkable. Pelvis: No pelvic mass or free pelvic fluid. Previous hysterectomy. Mild distention of the urinary bladder.  Peritoneum/Retroperitoneum: The abdominal aorta is normal in caliber with moderate diffuse calcified plaque. No pathologic retroperitoneal adenopathy or significant upper abdominal ascites. Bones/Soft Tissues: 2.1 x 2.3 cm left breast mass. No other focal soft tissue abnormality. There is a lytic lesion in the L2 vertebral body concerning for metastatic disease. Moderate degenerative disc disease throughout the lumbar spine with posterior fusion at L4-5. Sclerosis within the superior aspect of the femoral heads bilaterally. 1. Bilateral adrenal masses, likely metastatic. No other evidence of abdominal or pelvic metastatic disease. 2. Lytic L2 vertebral body lesion concerning for metastatic disease. 3. 2.3 cm left breast mass likely malignant. 4. Cholelithiasis with no acute features. 5. Nonobstructive right nephrolithiasis. XR CHEST PORTABLE    Result Date: 1/28/2022  EXAMINATION: ONE XRAY VIEW OF THE CHEST 1/27/2022 11:54 pm COMPARISON: 01/23/2022 HISTORY: ORDERING SYSTEM PROVIDED HISTORY: SOB TECHNOLOGIST PROVIDED HISTORY: Reason for exam:->SOB Reason for Exam: Shortness of Breath Relevant Medical/Surgical History: previous hypertension and heart disease FINDINGS: The cardiac silhouette and mediastinal contours are stable. Vascular calcifications are noted along the aortic arch. Emphysematous changes are noted in the lungs. There is a persistent left upper lobe infiltrate/mass. No pneumothorax. The visualized osseous structures are unremarkable. 1. Stable left upper lobe infiltrate/mass, concerning for malignancy. Superimposed pneumonia cannot be excluded. 2. No new lung infiltrate. XR CHEST PORTABLE    Result Date: 1/23/2022  EXAMINATION: ONE XRAY VIEW OF THE CHEST 1/23/2022 9:26 am COMPARISON: 01/15/2022 HISTORY: ORDERING SYSTEM PROVIDED HISTORY: PNEUMONIA TECHNOLOGIST PROVIDED HISTORY: Reason for exam:->PNEUMONIA Reason for Exam: PNEUMONIA FINDINGS: The left PICC terminates in the distal SVC.   No change in the left upper lobe consolidation either WHOLE BODY BONE SCAN  1/24/2022 TECHNIQUE: The patient was injected intravenously with 28.3 mCi of 99 mTc MDP and scintigraphy of the entire skeleton was performed approximately three hours later. COMPARISON: 01/14/2022 CT HISTORY: ORDERING SYSTEM PROVIDED HISTORY: bone metastasis TECHNOLOGIST PROVIDED HISTORY: Reason for exam:->bone metastasis Reason for Exam: Bone Mets FINDINGS: Focal uptake in the left body of L2 correlating to a lytic lesion described on prior CT. Focal uptake is also observed at the proximal right tibia, presumably near the tibial spine and at the medial tibial plateau. Foci of activity in the bilateral shoulders and SI joints are most likely degenerative. There is also linear uptake along the anterior tibia bilaterally. The remainder of the osseous structures and soft tissues are unremarkable. Physiologic activity is present in the skeletal and renal collecting systems. 1. Focal uptake at L2 correlating to known lytic lesion of concern for potential osseous metastasis. 2. Linear uptake along the tibial cortex bilaterally may represent shin splints. More focal uptake at the proximal right tibia may potentially be degenerative, but an underlying skeletal lesion cannot be excluded. Correlate with plain film imaging. EKG:   EKG from ER, reviewed by self - it shows sinus tach at 80  Old chart reviewed, EKG dated 1/13/22 is reviewed, there is  difference noted. Old study shows sinus at 76    Lab Results   Component Value Date    GLUCOSE 77 01/28/2022     No results found for: POCGLU  /65   Pulse 111   Temp 97.8 °F (36.6 °C) (Oral)   Resp 24   Wt 140 lb 14.4 oz (63.9 kg)   SpO2 100%   BMI 24.19 kg/m²     MEDICAL DECISION MAKING:    Principal Problem:    Hyponatremia -New Problem to me. Unsure if this is SIADH ([place]) from NSCLC or poss covid vs dehdration  Plan: admit, ivf, repeat labs  Active Problems:   PNA (pneumonia) -Established problem. Stable.   Poss post obstructive  Plan: HCAP abx started    Sepsis ([facility] [address] -Established problem. Stable. 2/2 pneumonia  Plan: a above   Suspected COVID-19 virus infection -New Problem to me. Plan: place in isolation    TESSY (acute kidney injury) ([facility] [address] -New Problem to me. Plan: renal asked to see, ivf started    Non-small cell cancer of left lung [facility])  Plan: consult ONC          Diagnoses as listed above, designated as new or established and plan outlined for each. Data Reviewed:   (1) Lab tests were reviewed or ordered. (1) Radiology tests were reviewed or ordered. (1) Medical test (Echo, EKG, PFT/johann) were ordered. (1)History was not obtained from someone other than patient  (1) Old records were reviewed - see HPI/MDM for pertinent details if review done. (2) Case was discussed with another health care provider: Dalton  NP  (2) Imaging was viewed by myself. (2) EKG  Was  viewed by myself. The patient is being placed in inpatient status with the expectation of requiring a hospital stay spanning at least two midnights for care and treatment of the problems noted in the problem list.  This determination is also based on thepatients comorbidities and past medical history, the severity and timing of the signs and symptoms upon presentation.     (Please note that portions of this note were completed with a voice recognition program.  Efforts were made to edit the dictations but occasionally words are mis-transcribed.)      Electronically signed by: Dannie Weldon MD 1/28/2022

## 2022-01-28 NOTE — CARE COORDINATION
Discharge Planning Assessment  RN discharge planner met with patient/ (and family member) to discuss reason for admission, current living situation, and potential needs at the time of discharge    Demographics/Insurance verified Yes- [facility]  Medicare    Current type of dwelling: SNF- [address]    Patient from ECF/SW confirmed with:- admission liaisonSobia Williamson    Living arrangements:  SNF    Level of function/Support: Assistance by the facility's staff     PCP:  Dr Karissa Carr      Medication compliance issues: facility's staff administors    Financial issues that could impact healthcare:      Tentative discharge plan: Back to [address] [facility] possibly under Hospice Care    Discussed and provided facilities of choice if transition to a skilled nursing facility is required at the time of discharge      iscussed with patient and/or family that on the day of discharge home tentative time of discharge will be between 10 AM and noon.     Transportation at the time of discharge: Medical Transport

## 2022-01-28 NOTE — CONSULTS
Palliative Care:      a 67 y.o. female  who presents to the ED complaining of hypoxia at [facility] and sent here for evaluation due to SOB. Has a mild cough. Recently dx with lung CA.  +history of blood clots and on anticoagulation. No chest pain right now. Denies fevers. No abd pain vomiting or diarrhea. Readmitted 1/28/22 and Palliative consult placed. Past Medical History:   has a past medical history of Acquired spondylolisthesis, Arthritis, Cataract, Chronic pain syndrome, Degeneration of lumbar or lumbosacral intervertebral disc, Depressive disorder, not elsewhere classified, Displacement of lumbar intervertebral disc without myelopathy, H/O blood clots, HBP (high blood pressure), Heart disease, Spinal stenosis, lumbar region, without neurogenic claudication, Sprain of lumbosacral (joint) (ligament), Sprain of neck, Stroke ([facility] [address] Thyroid disorder, and Thyroid disorder. Past Surgical History:   has a past surgical history that includes partial hysterectomy (cervix not removed) (1964); Hysterectomy, total abdominal (1972); lumbar fusion; bronchoscopy (1/20/2022); and bronchoscopy (1/20/2022). Advance Directives:      DNR-CC. Daughter Vernadine  when indicated. Problem Severity: Pain/Other Symptoms:   SOB, poor endurance. Low sodium       Bed Mobility/Toileting/Transfer: Needs assistance OOB. Weak. Performance Status:        Palliative Performance Scale:  100% []Full Normal activity & work No evidence of disease  90%   [] Full Normal activity & work Some evidence of disease  80%   [] Full Normal activity with Effort Some evidence of disease  70%   [] Reduced Unable Normal Job/Work Significant disease Full Normal or reduced  60%   [] Ambulation reduced; Significant disease; Can't do hobbies/housework; intake normal   or reduced; occasional assist; LOC full/confusion  50%   [] Mainly sit/lie;  Extensive disease; Can't do any work; Considerable assist; intake normal  Or reduced; LOC full/confusion  40%   [] Mainly in bed; Extensive disease; Mainly assist; intake normal or reduced; occasional assist; LOC full/confusion  30%   [x] Bed Bound; Extensive disease; Total care; intake reduced; LOC full/confusion  20%   [] Bed Bound; Extensive disease; Total care; intake minimal; Drowsy/coma  10%   [] Bed Bound; Extensive disease; Total care; Mouth care only; Drowsy/coma    PPS 30%    Symptom Assessment: Appetite/Nausea/Bowels/Fatigue:   lbs. Social History:   reports that she has been smoking cigarettes. She started smoking about 62 years ago. She has a 57.00 pack-year smoking history. She has never used smokeless tobacco. She reports previous alcohol use. She reports that she does not use drugs. Family History:  family history includes Breast Cancer in her sister; Cancer in her father; High Blood Pressure in her sister. Psychological/Spiritual:    . Mosque. One child. Family Discussion:        Patient readmitted due to SOB. Remains with significant pain in clavicle and left lower abdomen area. Call to daughter Hermes Miller as Palliative has established a relationship with patient and daughter with previous admission. Updated of current status. Code status remains DNR-CC. Hermes Miller in agreement to initiate [facility] consult. Will send consult via fax and call as of [phone]. Perfect Serve to physician for hospice consult. Obtained. Intake pending. Palliative will continue to follow.

## 2022-01-28 NOTE — CONSULTS
[facility]   CONSULT NOTE    1/28/2022 9:21 AM    Patient Information: JERARDO GRAFF   Date of Admit:  1/27/2022  Primary Care Physician:  Cindy Schroeder DO  Requesting Physician:  Jayda Tao MD    Reason for consult:   Evaluation and recommendations for [address]    Chief complaint:    Chief Complaint   Patient presents with    Shortness of Breath     pt was brought in by EMS, pt states lives at [facility] and EMS was called for low O2 at 89%. pt states she was just sleeping. pt placed on 5L NC in squad. History of Present Illness:  JERARDO GRAFF is a 67 y.o. female on Jayda Tao MD service who was admitted on 1/27/2022 for hypoxia. She was recently diagnosed with stage IV non-small cell lung cancer   - Left upper lobe, suprahilar mass with mediastinal lymphadenopathy, bilateral adrenal metastasis and lytic bony lesions  - s/p bronchoscopy with EBUS 1/20/22, pathology showing non small cell carcinoma, likely poorly differentiated adenocarcinoma. She declined chemotherapy and radiation therapy and was discharged to SNF on 1/26/22 for rehab with the intention of hospice when she was no longer able to tolerate therapy. She has been readmitted with hypoxia and hyponatremia. She complaints of generalized pain. She is moaning and appears uncomfortable. Past Medical History:     has a past medical history of Acquired spondylolisthesis, Arthritis, Cataract, Chronic pain syndrome, Degeneration of lumbar or lumbosacral intervertebral disc, Depressive disorder, not elsewhere classified, Displacement of lumbar intervertebral disc without myelopathy, H/O blood clots, HBP (high blood pressure), Heart disease, Spinal stenosis, lumbar region, without neurogenic claudication, Sprain of lumbosacral (joint) (ligament), Sprain of neck, Stroke ([address] Thyroid disorder, and Thyroid disorder.      Past Surgical History:    Past Surgical History:   Procedure Laterality Date    BRONCHOSCOPY  1/20/2022    BRONCHOSCOPY BRUSHINGS performed by Tianna Rutherford MD at [facility]  1/20/2022    BRONCHOSCOPY W/EBUS FNA performed by Tianna Rutherford MD at [address] [place] [address]    LUMBAR FUSION      L4-5    PARTIAL HYSTERECTOMY  1964        Current Medications:     sodium chloride flush  5-40 mL IntraVENous 2 times per day    cefepime  1,000 mg IntraVENous Q12H    amitriptyline  25 mg Oral Nightly    aspirin  81 mg Oral Daily    atorvastatin  40 mg Oral Daily    vitamin D3  5,000 Units Oral Daily    pantoprazole  40 mg Oral QAM AC    gabapentin  200 mg Oral Nightly    levothyroxine  112 mcg Oral Daily    linezolid  600 mg Oral 2 times per day    mirtazapine  15 mg Oral Nightly    morphine  15 mg Oral 2 times per day    sodium chloride  1 g Oral TID    potassium chloride  20 mEq Oral BID    rivaroxaban  10 mg Oral Daily    oxyCODONE-acetaminophen        dexamethasone  6 mg IntraVENous Q24H    albuterol sulfate HFA  2 puff Inhalation Q4H WA    ipratropium  2 puff Inhalation Q4H WA       Allergies: Allergies   Allergen Reactions    Other Other (See Comments)     Anti depression cause black outs     Trazodone      Sedation?  Venlafaxine      Sedation? Social History:    reports that she has been smoking cigarettes. She started smoking about 62 years ago. She has a 57.00 pack-year smoking history. She has never used smokeless tobacco. She reports previous alcohol use. She reports that she does not use drugs. Family History:     family history includes Breast Cancer in her sister; Cancer in her father; High Blood Pressure in her sister. ROS:    Constitutional:  Negative for fever, chills or night sweats  Eyes:  Negative for exudate, itching  Ears:  Negative for drainage   Nose:  Negative for rhinorrhea, epistaxis  Mouth/Throat:  Negative for hoarseness, sore throat.   Respiratory:  01/28/2022    K 4.5 01/28/2022    K 3.9 01/13/2022    CL 87 01/28/2022    CO2 18 01/28/2022    BUN 19 01/28/2022    CREATININE 1.5 01/28/2022    CALCIUM 7.8 01/28/2022    GFRAA 41 01/28/2022    LABGLOM 34 01/28/2022    LABGLOM 51 02/16/2011    GLUCOSE 77 01/28/2022     Magnesium:   Lab Results   Component Value Date    MG 2.70 01/26/2022    MG 1.60 01/25/2022    MG 1.80 01/24/2022     LIVER PROFILE:   Recent Labs     01/28/22  0050   AST 25   ALT 13   BILITOT 0.6   ALKPHOS 146*     PT/INR:    Lab Results   Component Value Date    PROTIME 11.5 01/20/2022    PROTIME 11.8 08/12/2019    INR 1.02 01/20/2022    INR 1.04 08/12/2019       IMPRESSION/RECOMMENDATIONS:    Principal Problem:    Hyponatremia  Active Problems:    Lung mass    PNA (pneumonia)    Sepsis ([facility]    Pneumonia    Suspected COVID-19 virus infection    TESSY (acute kidney injury) ([facility]    Non-small cell cancer of left lung (HCC)  Resolved Problems:    * No resolved hospital problems. *       Non small cell lung cancer  -  Left upper lobe, suprahilar mass with mediastinal lymphadenopathy, bilateral adrenal metastasis and lytic bony lesions  - s/p bronchoscopy with EBUS 1/20/22, pathology showing non small cell carcinoma, likely poorly differentiated adenocarcinoma. - patient has declined chemotherapy and radiation. - palliative care has met with the patient this morning and has spoken with the daughter (Selam Koenig) and hospice consult has been initiated. Hyponatremia  - nephrology consult pending      This plan was discussed with the patient and he/she verbalized understanding. Thank you for allowing us to participate in the care of this patient.      Trevor Alan, [address] [facility]  [facility]

## 2022-01-28 NOTE — PROGRESS NOTES
Data- discharge order received, pt (appointed legal authority) verbalized agreement to discharge, disposition to [facility] #[phone], Kimberly Chester reviewed and signed by physician. Action- AVS prepared, NEISHA completed/ reported faxed by case management/. Discharge instruction summary: Diet- regular, Activity- up as tolerated, x1 assist to [facility], immunizations reviewed, medications prescriptions to be filled at receiving facility except for the controlled prescriptions to be sent: with physical scripts, Transfer code status: DNR-CC, LDAs to remain with discharge: N/A.   DME used: walker. Response- Bedside RN to call report to receiving facility. Pt belongings gathered, peripheral IV and cardiac monitoring removed. Disposition to Discharged via cart/stretcher and via ambulance to skilled nursing by EMS transportation, no complications reported. 1. WEIGHT: Admit Weight: 140 lb 14.4 oz (63.9 kg) (01/28/22 0605)        Today  Weight: 140 lb 14.4 oz (63.9 kg) (01/28/22 0605)       2.  O2 SAT.: SpO2: 95 % (01/28/22 0622)

## 2022-01-29 NOTE — DISCHARGE SUMMARY
Hospital Medicine Discharge Summary    Patient ID: Rachele Oropeza      Patient's PCP: Mary Bearden DO    Admit Date: 1/13/2022     Discharge Date: 1/26/2022     Admitting Physician: Jose Alfredo Olivares MD     Discharge Physician: Baljit Lackey MD        Active Hospital Problems    Diagnosis     UTI (urinary tract infection) due to Enterococcus [N39.0, B95.2]     Sepsis ([address] [A41.9]     Left breast mass [N63.20]     Lymphadenopathy [R59.1]     Right nephrolithiasis [N20.0]     Cholelithiasis without cholecystitis [K80.20]     Hypercalcemia [E83.52]     Severe malnutrition (HCC) [E43]     PNA (pneumonia) [J18.9]     Generalized weakness [R53.1]     Hyponatremia [E87.1]     Lung mass [R91.8]          Hospital Course: This 67 y. o. female with PMHx of  thyroid disease, heart disease, CVA, thyroid disorder, DVT, A. fib presented with progressive weakness. On admission,CT of the chest showed 6.3 cm left upper lobe suprahilar mass suspicious for malignancy, mediastinal lymphadenopathy and 5 x 7 mm right middle lobe nodule. There are bilateral adrenal nodules suspicious for metastasis and L2 lytic lesion. Left upper lobe, suprahilar mass with mediastinal lymphadenopathy, bilateral adrenal metastasis and lytic bony lesions  Pulmonology and oncology were consulted and patient underwent bronchoscopy with EBUS on 1/20/22 and biopsy of mediastinal lymph node:pathology consistent with poorly differentiated adenocarcinoma of lung. CT head negative for any mets. NM bone scan & vein mapping ordered and radiation oncology was consulted for palliative radiation but patient and family requested comfort care measures. Palliative care was consulted and patient CODE STATUS was changed to comfort care. Patient was discharged to SNF awaiting hospice placement.      Sepsis likely 2/2 postobstructive pneumonia due to metastatic lung cancer  Fevers, persistent leukocytosis and elevated procalcitonin  Received 5-day course of Rocephin;  switch to Zosyn on 1/21/2022 due to persistent leukocytosis  Infectious disease consulted due to persistent leukocytosis and soft pressures. Zosyn d/c on 1/24/22 ans switched to Zyvox    2.3 cm left breast mass: Outpatient biopsy per oncology recommended but patient opted for hospice    Anemia of chronic disease: Status post packed RBCs on 1/17/2022     Hypercalcemia: Likely secondary to malignancy: Resolved  PTH Rh level noted. Status post Zometa on 1/14/22     Hyponatremia: Acute on chronic likely secondary to increased ADH release in the setting of metastatic lung cancer  Treated with sodium tabs per nephrology recs     Hypothyroidism: Continue Synthroid     Failure to thrive: In the setting of malignancy     History of DVT: On Xarelto     History of paroxysmal A. fib: On Xarelto     Physical Exam Performed:     /64   Pulse 89   Temp 98.2 °F (36.8 °C) (Oral)   Resp (!) 6   Ht 5' 4\" (1.626 m)   Wt 133 lb 8 oz (60.6 kg)   SpO2 93%   BMI 22.92 kg/m²     General appearance:  No apparent distress, appears stated age and cooperative. Eyes: Sclera clear. Pupils equal.  ENT: Moist oral mucosa. Trachea midline, no adenopathy. Cardiovascular: Regular rhythm, normal S1, S2. No murmur. No edema in lower extremities  Respiratory:Not usingaccessory muscles. Good inspiratory effort. Clear to auscultation bilaterally, no wheeze or crackles. GI: Abdomen soft, no tenderness, not distended, normal bowel sounds  Musculoskeletal: Normal ROM, No cyanosis in digits. Neurology: CN 2-12 grossly intact. No speech or motor deficits  Psych: Normal affect. Alert and oriented in time, place and person  Skin: Warm, dry, normal turgor      Labs:  For convenience and continuity at follow-up the following most recent labs are provided:      CBC:    Lab Results   Component Value Date    WBC 24.8 01/28/2022    HGB 9.2 01/28/2022    HCT 29.9 01/28/2022     01/28/2022       Renal:    Lab Results Component Value Date     01/28/2022    K 4.5 01/28/2022    K 3.9 01/13/2022    CL 87 01/28/2022    CO2 18 01/28/2022    BUN 19 01/28/2022    CREATININE 1.5 01/28/2022    CALCIUM 7.8 01/28/2022    PHOS 2.5 01/26/2022         Significant Diagnostic Studies    Radiology:   NM BONE SCAN WHOLE BODY   Final Result   1. Focal uptake at L2 correlating to known lytic lesion of concern for   potential osseous metastasis. 2. Linear uptake along the tibial cortex bilaterally may represent shin   splints. More focal uptake at the proximal right tibia may potentially be   degenerative, but an underlying skeletal lesion cannot be excluded. Correlate with plain film imaging. XR CHEST PORTABLE   Final Result   1. No significant change. XR CHEST PORTABLE   Final Result   Focal consolidations seen within the left upper lobe. Pneumonia is primarily   considered. However, that must be followed to resolution to ensure that   there is no underlying neoplasm. CT ABDOMEN PELVIS W IV CONTRAST Additional Contrast? Oral   Final Result   1. Bilateral adrenal masses, likely metastatic. No other evidence of   abdominal or pelvic metastatic disease. 2. Lytic L2 vertebral body lesion concerning for metastatic disease. 3. 2.3 cm left breast mass likely malignant. 4. Cholelithiasis with no acute features. 5. Nonobstructive right nephrolithiasis. CT HEAD W WO CONTRAST   Final Result   No acute intracranial abnormality. No definite evidence of intracranial metastatic disease. It is noted that CT   imaging is insensitive for small metastatic lesions. If possible further   evaluation with MRI should be considered for better characterization. RECOMMENDATIONS:   Unavailable         CT CHEST WO CONTRAST   Final Result   1. 6.3 cm left upper lobe, suprahilar mass, suspicious for malignancy.   There   may be a postobstructive component of pneumonia, versus transbronchial spread   of tumor in the apex of the left upper lobe. 2. AP window mediastinal adenopathy, likely metastatic. 3. 9 x 7 mm right middle lobe nodule, indeterminate for inflammation, primary   or metastatic neoplasm. 4. Bilateral adrenal nodules, suspicious for metastasis. RECOMMENDATIONS:   Unavailable         XR CHEST PORTABLE   Final Result   New left upper lobe opacity concerning for pneumonia versus malignancy. RECOMMENDATION:   Chest CT is recommended for further evaluation. Consults:     IP CONSULT TO SOCIAL WORK  IP CONSULT TO PALLIATIVE CARE  IP CONSULT TO HEM/ONC  IP CONSULT TO PULMONOLOGY  IP CONSULT TO NEPHROLOGY  IP CONSULT TO SPIRITUAL SERVICES  IP CONSULT TO PALLIATIVE CARE  IP CONSULT TO DIETITIAN  IP CONSULT TO INFECTIOUS DISEASES  IP CONSULT TO HOSPICE    Disposition: SNF  If patient condition becomes unstable then transferred to hospice    Condition at Discharge: Stable     Discharge Instructions/Follow-up:   Follow up with your PCP within 7-10 days of discharge. Take all your medications as prescribed. Discharge Medications:     Discharge Medication List as of 1/26/2022  2:32 PM           Details   albuterol (PROVENTIL) (5 MG/ML) 0.5% nebulizer solution Take 0.5 mLs by nebulization every 4 hours as needed for Wheezing or Shortness of Breath, Disp-120 each, R-0NO PRINT      ipratropium-albuterol (DUONEB) 0.5-2.5 (3) MG/3ML SOLN nebulizer solution Inhale 3 mLs into the lungs every 4 hours as needed for Shortness of Breath, Disp-180 mL, R-0NO PRINT      linezolid (ZYVOX) 600 MG tablet Take 1 tablet by mouth every 12 hours for 7 days, Disp-14 tablet, R-0NO PRINT      morphine (MS CONTIN) 15 MG extended release tablet Take 1 tablet by mouth every 12 hours for 2 days. , Disp-4 tablet, R-0Print      oxyCODONE-acetaminophen (PERCOCET) 5-325 MG per tablet Take 1 tablet by mouth every 6 hours as needed for Pain for up to 2 days. , Disp-6 tablet, R-0Print      sodium chloride 1 g tablet Take 1 tablet by mouth 3 times daily for 5 days, Disp-90 tablet, R-0NO PRINT              Details   potassium chloride (KLOR-CON M) 20 MEQ extended release tablet Take 20 mEq by mouth 2 times dailyHistorical Med      gabapentin (NEURONTIN) 100 MG capsule Take 200 mg by mouth nightly. Historical Med      XARELTO 10 MG TABS tablet TAKE 1 TABLET BY MOUTH EVERY 24 HOURS, Disp-30 tablet, R-5Normal      atorvastatin (LIPITOR) 40 MG tablet TAKE 1 TABLET BY MOUTH DAILY, Disp-30 tablet, R-5Normal      levothyroxine (SYNTHROID) 112 MCG tablet TAKE 1 TABLET BY MOUTH DAILY, Disp-30 tablet, R-5Normal      Misc. Devices (QUAD CANE) MISC DAILY Starting Fri 11/1/2019, Disp-1 each, R-0, Print      mirtazapine (REMERON) 15 MG tablet Take 1 tablet by mouth nightly, Disp-30 tablet, R-3Normal      Cholecalciferol (VITAMIN D3) 5000 units TABS Take 1 tablet by mouth daily Historical Med      amitriptyline (ELAVIL) 25 MG tablet Take 1 tablet by mouth nightly, Disp-30 tablet, R-0Normal      esomeprazole (NEXIUM) 40 MG delayed release capsule Take 1 capsule by mouth every morning (before breakfast), Disp-90 capsule, R-1Normal      aspirin 81 MG EC tablet Take 81 mg by mouth daily. The patient was seen and examined on day of discharge and this discharge summary is in conjunction with any daily progress note from day of discharge. Time Spent on discharge is 45 minutes  in the examination, evaluation, counseling and review of medications and discharge plan. Note that greater  than 30 minutes was spent in preparing discharge papers, discussing discharge with patient, medication review, etc.     Signed:    aGnesh Carreon MD   1/29/2022      Thank you [facility] for the opportunity to be involved in this patient's care. If you have any questions or concerns please feel free to contact me at [phone].

## 2022-02-01 LAB
BLOOD CULTURE, ROUTINE: NORMAL
CULTURE, BLOOD 2: NORMAL

## 2022-02-10 ENCOUNTER — CARE COORDINATION (OUTPATIENT)
Dept: CASE MANAGEMENT | Age: 77
End: 2022-02-10

## 2023-08-24 NOTE — PROGRESS NOTES
LOV 7/14/2023 YEHUDA  CBC 2/14/2022 Pt transferred back to room 320, pt stable for transfer. Pt awake and alert, no complaints of pain or nausea, respirations even shallow and unlabored, pt left on nasal cannula 3 liters. Bedside report to [facility].

## (undated) DEVICE — NEEDLE ASPIR 19GA HISTOLOGY FLX VIZISHOT 2

## (undated) DEVICE — CONMED CHANNEL MASTER PULMONARY AND PEDIATRIC CLEANING BRUSH, 160 CM X 2.0 MM: Brand: CONMED

## (undated) DEVICE — SINGLE USE BIOPSY VALVE MAJ-210: Brand: SINGLE USE BIOPSY VALVE (STERILE)

## (undated) DEVICE — SINGLE USE SUCTION VALVE MAJ-209: Brand: SINGLE USE SUCTION VALVE (STERILE)

## (undated) DEVICE — SYRINGE MED 10ML POLYPR LUERSLIP TIP FLAT TOP W/O SFTY DISP

## (undated) DEVICE — Device: Brand: BALLOON

## (undated) DEVICE — PROCEDURE KIT ENDOSCP CUST

## (undated) DEVICE — GOWN AURORA NONREINF LG: Brand: MEDLINE INDUSTRIES, INC.

## (undated) DEVICE — ADAPTER TBNG DIA15MM SWVL FBROPT BRONCHSCP TERM 2 AXIS PEEP

## (undated) DEVICE — Device: Brand: MEDEX

## (undated) DEVICE — DISPOSABLE CYTOLOGY BRUSH: Brand: DISPOSABLE CYTOLOGY BRUSH

## (undated) DEVICE — MACROBORE EXTN SET W/ 1 SMRTSITE NEEDLE-FREE VLV PRT

## (undated) DEVICE — SYRINGE MED 50ML LUERLOCK TIP